# Patient Record
Sex: FEMALE | Race: WHITE | NOT HISPANIC OR LATINO | Employment: FULL TIME | URBAN - METROPOLITAN AREA
[De-identification: names, ages, dates, MRNs, and addresses within clinical notes are randomized per-mention and may not be internally consistent; named-entity substitution may affect disease eponyms.]

---

## 2017-02-10 ENCOUNTER — OFFICE VISIT (OUTPATIENT)
Dept: FAMILY MEDICINE | Facility: CLINIC | Age: 57
End: 2017-02-10
Payer: COMMERCIAL

## 2017-02-10 VITALS
HEIGHT: 69 IN | SYSTOLIC BLOOD PRESSURE: 126 MMHG | OXYGEN SATURATION: 96 % | HEART RATE: 92 BPM | WEIGHT: 243 LBS | TEMPERATURE: 96.7 F | DIASTOLIC BLOOD PRESSURE: 78 MMHG | BODY MASS INDEX: 35.99 KG/M2

## 2017-02-10 DIAGNOSIS — Z00.01 ENCOUNTER FOR GENERAL ADULT MEDICAL EXAMINATION WITH ABNORMAL FINDINGS: Primary | ICD-10-CM

## 2017-02-10 DIAGNOSIS — N39.46 MIXED INCONTINENCE: ICD-10-CM

## 2017-02-10 DIAGNOSIS — E03.9 ACQUIRED HYPOTHYROIDISM: ICD-10-CM

## 2017-02-10 DIAGNOSIS — Z79.899 HIGH RISK MEDICATION USE: ICD-10-CM

## 2017-02-10 DIAGNOSIS — Z12.31 VISIT FOR SCREENING MAMMOGRAM: ICD-10-CM

## 2017-02-10 DIAGNOSIS — Z11.59 NEED FOR HEPATITIS C SCREENING TEST: ICD-10-CM

## 2017-02-10 DIAGNOSIS — M54.50 CHRONIC BILATERAL LOW BACK PAIN WITHOUT SCIATICA: ICD-10-CM

## 2017-02-10 DIAGNOSIS — B00.9 HERPES SIMPLEX VIRUS (HSV) INFECTION: ICD-10-CM

## 2017-02-10 DIAGNOSIS — R06.83 SNORING: ICD-10-CM

## 2017-02-10 DIAGNOSIS — G89.29 CHRONIC BILATERAL LOW BACK PAIN WITHOUT SCIATICA: ICD-10-CM

## 2017-02-10 DIAGNOSIS — K21.9 GASTROESOPHAGEAL REFLUX DISEASE, ESOPHAGITIS PRESENCE NOT SPECIFIED: ICD-10-CM

## 2017-02-10 DIAGNOSIS — Z13.1 SCREENING FOR DIABETES MELLITUS: ICD-10-CM

## 2017-02-10 DIAGNOSIS — R68.82 DECREASED LIBIDO: ICD-10-CM

## 2017-02-10 DIAGNOSIS — Z13.6 CARDIOVASCULAR SCREENING; LDL GOAL LESS THAN 100: ICD-10-CM

## 2017-02-10 DIAGNOSIS — N95.1 MENOPAUSAL SYNDROME (HOT FLUSHES): ICD-10-CM

## 2017-02-10 DIAGNOSIS — R40.0 DAYTIME SOMNOLENCE: ICD-10-CM

## 2017-02-10 DIAGNOSIS — Z12.31 ENCOUNTER FOR SCREENING MAMMOGRAM FOR BREAST CANCER: ICD-10-CM

## 2017-02-10 LAB
ALT SERPL W P-5'-P-CCNC: 27 U/L (ref 0–50)
CHOLEST SERPL-MCNC: 200 MG/DL
CREAT SERPL-MCNC: 0.8 MG/DL (ref 0.52–1.04)
GFR SERPL CREATININE-BSD FRML MDRD: 74 ML/MIN/1.7M2
GLUCOSE SERPL-MCNC: 101 MG/DL (ref 70–99)
HDLC SERPL-MCNC: 54 MG/DL
LDLC SERPL CALC-MCNC: 122 MG/DL
NONHDLC SERPL-MCNC: 146 MG/DL
TRIGL SERPL-MCNC: 121 MG/DL
TSH SERPL DL<=0.005 MIU/L-ACNC: 0.5 MU/L (ref 0.4–4)

## 2017-02-10 PROCEDURE — 80061 LIPID PANEL: CPT | Performed by: INTERNAL MEDICINE

## 2017-02-10 PROCEDURE — 36415 COLL VENOUS BLD VENIPUNCTURE: CPT | Performed by: INTERNAL MEDICINE

## 2017-02-10 PROCEDURE — 99396 PREV VISIT EST AGE 40-64: CPT | Performed by: INTERNAL MEDICINE

## 2017-02-10 PROCEDURE — 99213 OFFICE O/P EST LOW 20 MIN: CPT | Mod: 25 | Performed by: INTERNAL MEDICINE

## 2017-02-10 PROCEDURE — 82565 ASSAY OF CREATININE: CPT | Performed by: INTERNAL MEDICINE

## 2017-02-10 PROCEDURE — 84443 ASSAY THYROID STIM HORMONE: CPT | Performed by: INTERNAL MEDICINE

## 2017-02-10 PROCEDURE — 82947 ASSAY GLUCOSE BLOOD QUANT: CPT | Performed by: INTERNAL MEDICINE

## 2017-02-10 PROCEDURE — 84460 ALANINE AMINO (ALT) (SGPT): CPT | Performed by: INTERNAL MEDICINE

## 2017-02-10 RX ORDER — METHOCARBAMOL 750 MG/1
750 TABLET, FILM COATED ORAL 3 TIMES DAILY PRN
Qty: 60 TABLET | Refills: 1 | Status: SHIPPED | OUTPATIENT
Start: 2017-02-10 | End: 2018-02-23

## 2017-02-10 RX ORDER — FAMCICLOVIR 500 MG/1
500 TABLET ORAL 2 TIMES DAILY
Qty: 180 TABLET | Refills: 5 | Status: SHIPPED | OUTPATIENT
Start: 2017-02-10 | End: 2018-01-06

## 2017-02-10 RX ORDER — ALBUTEROL SULFATE 90 UG/1
2 AEROSOL, METERED RESPIRATORY (INHALATION) EVERY 6 HOURS PRN
Qty: 1 INHALER | Refills: 11 | Status: SHIPPED | OUTPATIENT
Start: 2017-02-10 | End: 2018-02-23

## 2017-02-10 RX ORDER — BUPROPION HYDROCHLORIDE 150 MG/1
150 TABLET ORAL EVERY MORNING
Qty: 90 TABLET | Refills: 1 | Status: SHIPPED | OUTPATIENT
Start: 2017-02-10 | End: 2017-09-14

## 2017-02-10 RX ORDER — LEVOTHYROXINE SODIUM 125 UG/1
125 TABLET ORAL DAILY
Qty: 90 TABLET | Refills: 3 | Status: SHIPPED | OUTPATIENT
Start: 2017-02-10 | End: 2018-01-24

## 2017-02-10 ASSESSMENT — ANXIETY QUESTIONNAIRES
6. BECOMING EASILY ANNOYED OR IRRITABLE: NOT AT ALL
GAD7 TOTAL SCORE: 0
IF YOU CHECKED OFF ANY PROBLEMS ON THIS QUESTIONNAIRE, HOW DIFFICULT HAVE THESE PROBLEMS MADE IT FOR YOU TO DO YOUR WORK, TAKE CARE OF THINGS AT HOME, OR GET ALONG WITH OTHER PEOPLE: NOT DIFFICULT AT ALL
3. WORRYING TOO MUCH ABOUT DIFFERENT THINGS: NOT AT ALL
5. BEING SO RESTLESS THAT IT IS HARD TO SIT STILL: NOT AT ALL
1. FEELING NERVOUS, ANXIOUS, OR ON EDGE: NOT AT ALL
7. FEELING AFRAID AS IF SOMETHING AWFUL MIGHT HAPPEN: NOT AT ALL
2. NOT BEING ABLE TO STOP OR CONTROL WORRYING: NOT AT ALL

## 2017-02-10 ASSESSMENT — PATIENT HEALTH QUESTIONNAIRE - PHQ9: 5. POOR APPETITE OR OVEREATING: NOT AT ALL

## 2017-02-10 NOTE — PROGRESS NOTES
"INTERNAL MEDICINE    SUBJECTIVE:     CC: Amina Pineda is an 56 year old woman who presents for preventive health visit.     Physical  Annual:     Getting at least 3 servings of Calcium per day::  Yes    Bi-annual eye exam::  Yes    Dental care twice a year::  Yes    Sleep apnea or symptoms of sleep apnea::  Daytime drowsiness and Excessive snoring    Diet::  Regular (no restrictions)    Frequency of exercise::  1 day/week    Duration of exercise::  Less than 15 minutes    Taking medications regularly::  Yes    Medication side effects::  None    Additional concerns today::  YES      SOB: The other day while she was walking she started to get some SOB and felt like there was a \"funny feeling,\" in her chest. She has never had this before. She denies any diaphoresis or near syncope. She feels that this was most likely due to her being out of shape.     Sleep: She has been feeling tired all the time. Her  told her that she has been snoring as well. She snores on both inspiration and expiration. There have been no witnessed apneas.     Back and Hip Pain: She has been having pain in her back and hips. The pain happens all throughout the day and night. When she gets the pain, she will start to waddle. Walking sometimes alleviates the pain. She notes that she has minor urinary leakage. The leakage does not seem to have any cause. It is worse when she coughs or sneezes. She has been to a chiropractor in [place] for the pain, but it has been a couple years since she had some follow up with a chiropractor.     Exercise: She has not been exercising like she knows she should be. She has been walking a little bit. She would like to be walking better and play with her dogs in the backyard. She does not feel that she could do a light jog at the moment. She feels that she weighs more now than she ever has, which has her disappointed because she thought the Wellbutrin would help her to lose weight.     Today's PHQ-2 " Score:   PHQ-2 ( 1999 [employer]) 2/7/2017   Q1: Little interest or pleasure in doing things -   Q2: Feeling down, depressed or hopeless -   PHQ-2 Score -   Little interest or pleasure in doing things Not at all   Feeling down, depressed or hopeless Not at all   PHQ-2 Score 0       Abuse: Current or Past(Physical, Sexual or Emotional)- No  Do you feel safe in your environment - Yes    Social History   Substance Use Topics     Smoking status: Never Smoker      Smokeless tobacco: Never Used     Alcohol Use: No     The patient does not drink >3 drinks per day nor >7 drinks per week.    Recent Labs   Lab Test  02/20/15   1003  12/21/11   1626   CHOL  211*  172   HDL  55  41*   LDL  140*  115   TRIG  80  79   CHOLHDLRATIO  3.8  4.2       Reviewed orders with patient.  Reviewed health maintenance and updated orders accordingly - Yes    Mammo Decision Support:  Patient over age 50, mutual decision to screen reflected in health maintenance.    Pertinent mammograms are reviewed under the imaging tab.  History of abnormal Pap smear: NO - age 30-65 PAP every 5 years with negative HPV co-testing recommended  All Histories reviewed and updated in Epic.  Past Medical History   Diagnosis Date     Unspecified hypothyroidism      Esophageal reflux      Depressive disorder, not elsewhere classified      Intramural leiomyoma of uterus      Herpes simplex without mention of complication      oral     Generalized osteoarthrosis, unspecified site      R hip, on meds      Past Surgical History   Procedure Laterality Date     C vaginal hysterectomy  12/03     with BSO, 10-12 week size     C nonspecific procedure  5/02     rotator cuff surgery both shoulder     C nonspecific procedure       wrist surgery for cyst x 2     C ligate fallopian tube       Soft tissue surgery       cyst, both shoulders and left elbow     Orthopedic surgery       Elbow surgery       Lt elbow repair.     Colonoscopy with co2 insufflation N/A 4/23/2015     Procedure:  "COLONOSCOPY WITH CO2 INSUFFLATION;  Surgeon: Bebeto Mortensen MD;  Location: [facility] OR     Colonoscopy  2015     Obstetric History       T2      TAB0   SAB1   E0   M0   L2       # Outcome Date GA Lbr Reynold/2nd Weight Sex Delivery Anes PTL Lv   6  82 40w3d       Y   5  10/08/79 40w0d       Y   4  77     SAB   N   3 Term            2 Term            1 SAB                   ROS:  C: NEGATIVE for fever, chills, change in weight  R: NEGATIVE for significant cough. POSITIVE for SOB on exertion, snoring.   : NEGATIVE for unusual urinary or vaginal symptoms. No vaginal bleeding. POSITIVE for mixed incontinence.   M: NEGATIVE for significant arthralgias or myalgia  All other systems reviewed and were negative.      This document serves as a record of the services and decisions personally performed and made by Briana Melton MD. It was created on his/her behalf by Chanell Lopez, a trained medical scribe. The creation of this document is based the provider's statements to the medical scribe.    Jayden Lopez 9:56 AM, February 10, 2017    Problem list, Medication list, Allergies, and Medical/Social/Surgical histories reviewed in [facility] and updated as appropriate.  Labs reviewed in EPIC  OBJECTIVE:     /78 mmHg  Pulse 92  Temp(Src) 96.7  F (35.9  C) (Oral)  Ht 1.74 m (5' 8.5\")  Wt 110.224 kg (243 lb)  BMI 36.41 kg/m2  SpO2 96%  EXAM:  GENERAL APPEARANCE: healthy, alert and no distress  HENT: ear canals and TM's normal, nose and mouth without ulcers or lesions, oropharynx clear and oral mucous membranes moist  NECK: no adenopathy, no asymmetry, masses, or scars and thyroid normal to palpation  RESP: lungs clear to auscultation - no rales, rhonchi or wheezes  BREAST: normal without masses, tenderness or nipple discharge and no palpable axillary masses or adenopathy  CV: regular rate and rhythm, normal S1 S2, no S3 or S4, no murmur, click or rub, no " peripheral edema and peripheral pulses strong  ABDOMEN: soft, nontender, no hepatosplenomegaly, no masses and bowel sounds normal  MS: no musculoskeletal defects are noted and gait is age appropriate without ataxia, normal ROM of the bilateral hips, no synovitis of the hands, no pain to palpation of the trochanteric bursa, pain to palpation of the lumbar spine, right sacroiliac joint pain.   SKIN: no suspicious lesions or rashes   PSYCH: mentation appears normal and affect normal/bright    ASSESSMENT/PLAN:     I spent 29 minutes of time with the patient and >50% of it was in education and counseling regarding preventive healthcare.     1. Visit for screening mammogram   Patient will schedule   - MA SCREENING DIGITAL BILAT - Future  (s+30); Future        3. Acquired hypothyroidism   The current medical regimen is effective; continue present plan and medications    - levothyroxine (SYNTHROID/LEVOTHROID) 125 MCG tablet; Take 1 tablet (125 mcg) by mouth daily  Dispense: 90 tablet; Refill: 3  - estrogens, conjugated, (PREMARIN) 0.3 MG tablet; Take 1 tablet (0.3 mg) by mouth daily  Dispense: 90 tablet; Refill: 3  - buPROPion (WELLBUTRIN XL) 150 MG 24 hr tablet; Take 1 tablet (150 mg) by mouth every morning  Dispense: 90 tablet; Refill: 1  - omeprazole (PRILOSEC) 20 MG CR capsule; Take 1 capsule (20 mg) by mouth daily  Dispense: 90 capsule; Refill: 3  - albuterol (PROAIR HFA/PROVENTIL HFA/VENTOLIN HFA) 108 (90 BASE) MCG/ACT Inhaler; Inhale 2 puffs into the lungs every 6 hours as needed for shortness of breath / dyspnea or wheezing  Dispense: 1 Inhaler; Refill: 11  - TSH with free T4 reflex    4. Menopausal syndrome (hot flushes)   See above   - levothyroxine (SYNTHROID/LEVOTHROID) 125 MCG tablet; Take 1 tablet (125 mcg) by mouth daily  Dispense: 90 tablet; Refill: 3  - estrogens, conjugated, (PREMARIN) 0.3 MG tablet; Take 1 tablet (0.3 mg) by mouth daily  Dispense: 90 tablet; Refill: 3  - buPROPion (WELLBUTRIN XL) 150 MG  24 hr tablet; Take 1 tablet (150 mg) by mouth every morning  Dispense: 90 tablet; Refill: 1  - omeprazole (PRILOSEC) 20 MG CR capsule; Take 1 capsule (20 mg) by mouth daily  Dispense: 90 capsule; Refill: 3  - albuterol (PROAIR HFA/PROVENTIL HFA/VENTOLIN HFA) 108 (90 BASE) MCG/ACT Inhaler; Inhale 2 puffs into the lungs every 6 hours as needed for shortness of breath / dyspnea or wheezing  Dispense: 1 Inhaler; Refill: 11    5. Gastroesophageal reflux disease, esophagitis presence not specified   See above  - levothyroxine (SYNTHROID/LEVOTHROID) 125 MCG tablet; Take 1 tablet (125 mcg) by mouth daily  Dispense: 90 tablet; Refill: 3  - estrogens, conjugated, (PREMARIN) 0.3 MG tablet; Take 1 tablet (0.3 mg) by mouth daily  Dispense: 90 tablet; Refill: 3  - buPROPion (WELLBUTRIN XL) 150 MG 24 hr tablet; Take 1 tablet (150 mg) by mouth every morning  Dispense: 90 tablet; Refill: 1  - omeprazole (PRILOSEC) 20 MG CR capsule; Take 1 capsule (20 mg) by mouth daily  Dispense: 90 capsule; Refill: 3  - albuterol (PROAIR HFA/PROVENTIL HFA/VENTOLIN HFA) 108 (90 BASE) MCG/ACT Inhaler; Inhale 2 puffs into the lungs every 6 hours as needed for shortness of breath / dyspnea or wheezing  Dispense: 1 Inhaler; Refill: 11  - NUTRITION REFERRAL    6. Decreased libido   See above   - levothyroxine (SYNTHROID/LEVOTHROID) 125 MCG tablet; Take 1 tablet (125 mcg) by mouth daily  Dispense: 90 tablet; Refill: 3  - estrogens, conjugated, (PREMARIN) 0.3 MG tablet; Take 1 tablet (0.3 mg) by mouth daily  Dispense: 90 tablet; Refill: 3  - buPROPion (WELLBUTRIN XL) 150 MG 24 hr tablet; Take 1 tablet (150 mg) by mouth every morning  Dispense: 90 tablet; Refill: 1  - omeprazole (PRILOSEC) 20 MG CR capsule; Take 1 capsule (20 mg) by mouth daily  Dispense: 90 capsule; Refill: 3  - albuterol (PROAIR HFA/PROVENTIL HFA/VENTOLIN HFA) 108 (90 BASE) MCG/ACT Inhaler; Inhale 2 puffs into the lungs every 6 hours as needed for shortness of breath / dyspnea or wheezing   Dispense: 1 Inhaler; Refill: 11    7. High risk medication use   See above   - levothyroxine (SYNTHROID/LEVOTHROID) 125 MCG tablet; Take 1 tablet (125 mcg) by mouth daily  Dispense: 90 tablet; Refill: 3  - estrogens, conjugated, (PREMARIN) 0.3 MG tablet; Take 1 tablet (0.3 mg) by mouth daily  Dispense: 90 tablet; Refill: 3  - buPROPion (WELLBUTRIN XL) 150 MG 24 hr tablet; Take 1 tablet (150 mg) by mouth every morning  Dispense: 90 tablet; Refill: 1  - omeprazole (PRILOSEC) 20 MG CR capsule; Take 1 capsule (20 mg) by mouth daily  Dispense: 90 capsule; Refill: 3  - albuterol (PROAIR HFA/PROVENTIL HFA/VENTOLIN HFA) 108 (90 BASE) MCG/ACT Inhaler; Inhale 2 puffs into the lungs every 6 hours as needed for shortness of breath / dyspnea or wheezing  Dispense: 1 Inhaler; Refill: 11  - ALT  - Creatinine    8. Need for hepatitis C screening test    - levothyroxine (SYNTHROID/LEVOTHROID) 125 MCG tablet; Take 1 tablet (125 mcg) by mouth daily  Dispense: 90 tablet; Refill: 3  - estrogens, conjugated, (PREMARIN) 0.3 MG tablet; Take 1 tablet (0.3 mg) by mouth daily  Dispense: 90 tablet; Refill: 3  - buPROPion (WELLBUTRIN XL) 150 MG 24 hr tablet; Take 1 tablet (150 mg) by mouth every morning  Dispense: 90 tablet; Refill: 1  - omeprazole (PRILOSEC) 20 MG CR capsule; Take 1 capsule (20 mg) by mouth daily  Dispense: 90 capsule; Refill: 3  - albuterol (PROAIR HFA/PROVENTIL HFA/VENTOLIN HFA) 108 (90 BASE) MCG/ACT Inhaler; Inhale 2 puffs into the lungs every 6 hours as needed for shortness of breath / dyspnea or wheezing  Dispense: 1 Inhaler; Refill: 11    9. Encounter for screening mammogram for breast cancer    - levothyroxine (SYNTHROID/LEVOTHROID) 125 MCG tablet; Take 1 tablet (125 mcg) by mouth daily  Dispense: 90 tablet; Refill: 3  - estrogens, conjugated, (PREMARIN) 0.3 MG tablet; Take 1 tablet (0.3 mg) by mouth daily  Dispense: 90 tablet; Refill: 3  - buPROPion (WELLBUTRIN XL) 150 MG 24 hr tablet; Take 1 tablet (150 mg) by mouth  every morning  Dispense: 90 tablet; Refill: 1  - omeprazole (PRILOSEC) 20 MG CR capsule; Take 1 capsule (20 mg) by mouth daily  Dispense: 90 capsule; Refill: 3  - albuterol (PROAIR HFA/PROVENTIL HFA/VENTOLIN HFA) 108 (90 BASE) MCG/ACT Inhaler; Inhale 2 puffs into the lungs every 6 hours as needed for shortness of breath / dyspnea or wheezing  Dispense: 1 Inhaler; Refill: 11    10. Daytime somnolence   Patient will schedule   - SLEEP EVALUATION & MANAGEMENT REFERRAL - ADULT; Future    11. Snoring   See above   - SLEEP EVALUATION & MANAGEMENT REFERRAL - ADULT; Future    12. Mixed incontinence   Patient will schedule with physical therapy.  Consider adding on anticholinergic  - [facility] PT, HAND, AND CHIROPRACTIC REFERRAL    13. BMI 36.0-36.9,adult   Patient will schedule appointment   - NUTRITION REFERRAL    14. Herpes simplex virus (HSV) infection   The current medical regimen is effective; continue present plan and medications    - famciclovir (FAMVIR) 500 MG tablet; Take 1 tablet (500 mg) by mouth 2 times daily  Dispense: 180 tablet; Refill: 5    15. Pain in thoracic spine   The current medical regimen is effective; continue present plan and medications    - methocarbamol (ROBAXIN) 750 MG tablet; Take 1 tablet (750 mg) by mouth 3 times daily as needed  Dispense: 60 tablet; Refill: 1    16. CARDIOVASCULAR SCREENING; LDL GOAL LESS THAN 100    - Lipid panel reflex to direct LDL    17. Screening for diabetes mellitus    - Glucose    18. Chronic bilateral low back pain without sciatica   Patient will schedule   - [facility], HAND, AND CHIROPRACTIC REFERRAL    19. Encounter for general adult medical examination with abnormal findings   Performed today in clinic       COUNSELING:  Reviewed preventive health counseling, as reflected in patient instructions       Regular exercise       Healthy diet/nutrition    BP Screening:   Last 3 BP Readings:    BP Readings from Last 3 Encounters:   02/10/17 126/78   10/22/16 131/74   07/12/16  "118/70     The following was recommended to the patient:  Re-screen BP within a year and recommended lifestyle modifications   reports that she has never smoked. She has never used smokeless tobacco.  Estimated body mass index is 35.96 kg/(m^2) as calculated from the following:    Height as of 4/8/15: 5' 8.5\" (1.74 m).    Weight as of 10/22/16: 240 lb (108.863 kg).   Weight management plan: Discussed healthy diet and exercise guidelines and patient will follow up in 12 months in clinic to re-evaluate.    Counseling Resources:  ATP IV Guidelines  Pooled Cohorts Equation Calculator  Breast Cancer Risk Calculator  FRAX Risk Assessment  ICSI Preventive Guidelines  Dietary Guidelines for Americans, 2010  USDA's MyPlate  ASA Prophylaxis  Lung CA Screening    Patient Instructions   I strongly recommend that you schedule an appointment with a physical therapist for your bladder control and your back.  [facility].  Schedule an appointment with nutritionist Heydi Madrigal or Rachel Lee.  Keeping a food log like My Fitness Pal, will help you to understand your daily dietary intake.   Make an appointment with the sleep center.  Schedule your mammogram.  Check out the $500 for Well at Work (?)    The information in this document, created by the medical scribe for me, accurately reflects the services I personally performed and the decisions made by me. I have reviewed and approved this document for accuracy prior to leaving the patient care area.  Briana Melton MD  10:01 AM, 02/10/2017    Briana Melton MD  [facility] [place]    Answers for HPI/ROS submitted by the patient on 2/7/2017   PHQ-2 Depressed: Not at all, Not at all  PHQ-2 Score: 0    Start: 9:53 AM   End: 10:22 AM   "

## 2017-02-10 NOTE — PROGRESS NOTES
Quick Note:    Normal thyroid. Improved cholesterol. Blood sugar is slightly elevated but not in the range of diabetes. Diet, exercise and staying as trim as possible is the best way to prevent diabetes.    Normal liver blood test. Normal kidney function.  ______

## 2017-02-10 NOTE — NURSING NOTE
"Chief Complaint   Patient presents with     Physical       Initial /78 mmHg  Pulse 92  Temp(Src) 96.7  F (35.9  C) (Oral)  Ht 5' 8.5\" (1.74 m)  Wt 243 lb (110.224 kg)  BMI 36.41 kg/m2  SpO2 96% Estimated body mass index is 36.41 kg/(m^2) as calculated from the following:    Height as of this encounter: 5' 8.5\" (1.74 m).    Weight as of this encounter: 243 lb (110.224 kg).  Medication Reconciliation: complete   Marnie ALVAREZ CMA ([facility])      "

## 2017-02-10 NOTE — PATIENT INSTRUCTIONS
I strongly recommend that you schedule an appointment with a physical therapist for your bladder control and your back.  [facility] for your [facility].  Schedule an appointment with nutritionist Heydi Madrigal or Rachel Lee.  Keeping a food log like My Fitness Pal, will help you to understand your daily dietary intake.   Make an appointment with the sleep center.  Schedule your mammogram.  Check out the $500 for Well at Work (?)    Preventive Health Recommendations  Female Ages 50 - 64    Yearly exam: See your health care provider every year in order to  o Review health changes.   o Discuss preventive care.    o Review your medicines if your doctor has prescribed any.      Get a Pap test every three years (unless you have an abnormal result and your provider advises testing more often).    If you get Pap tests with HPV test, you only need to test every 5 years, unless you have an abnormal result.     You do not need a Pap test if your uterus was removed (hysterectomy) and you have not had cancer.    You should be tested each year for STDs (sexually transmitted diseases) if you're at risk.     Have a mammogram every 1 to 2 years.    Have a colonoscopy at age 50, or have a yearly FIT test (stool test). These exams screen for colon cancer.      Have a cholesterol test every 5 years, or more often if advised.    Have a diabetes test (fasting glucose) every three years. If you are at risk for diabetes, you should have this test more often.     If you are at risk for osteoporosis (brittle bone disease), think about having a bone density scan (DEXA).    Shots: Get a flu shot each year. Get a tetanus shot every 10 years.    Nutrition:     Eat at least 5 servings of fruits and vegetables each day.    Eat whole-grain bread, whole-wheat pasta and brown rice instead of white grains and rice.    Talk to your provider about Calcium and Vitamin D.     Lifestyle    Exercise at least 150 minutes a week (30  minutes a day, 5 days a week). This will help you control your weight and prevent disease.    Limit alcohol to one drink per day.    No smoking.     Wear sunscreen to prevent skin cancer.     See your dentist every six months for an exam and cleaning.    See your eye doctor every 1 to 2 years.        [facility]    If you have any questions regarding to your visit please contact your care team:     Team Pink:   Clinic Hours Telephone Number   Internal Medicine:  Dr. Briana Murphy NP       7am-7pm  Monday - Thursday   7am-5pm  Fridays  [phone]  (Appointment scheduling available 24/7)    Questions about your visit?  Team Line  [phone]   Urgent Care - [place] and [facility] [place] - 11am-9pm Monday-Friday Saturday-Sunday- 9am-5pm   [place] - 5pm-9pm Monday-Friday Saturday-Sunday- 9am-5pm  [phone] - [place]   [phone] - [place]       What options do I have for visits at the clinic other than the traditional office visit?  To expand how we care for you, many of our providers are utilizing electronic visits (e-visits) and telephone visits, when medically appropriate, for interactions with their patients rather than a visit in the clinic.   We also offer nurse visits for many medical concerns. Just like any other service, we will bill your insurance company for this type of visit based on time spent on the phone with your provider. Not all insurance companies cover these visits. Please check with your medical insurance if this type of visit is covered. You will be responsible for any charges that are not paid by your insurance.      E-visits via [employer]:  generally incur a $35.00 fee.  Telephone visits:  Time spent on the phone: *charged based on time that is spent on the phone in increments of 10 minutes. Estimated cost:   5-10 mins $30.00   11-20 mins. $59.00   21-30 mins. $85.00   Use [employer] (secure email communication and access to  your chart) to send your primary care provider a message or make an appointment. Ask someone on your Team how to sign up for Zoom Telephonicst.    For a Price Quote for your services, please call our Consumer Price Line at [phone].    As always, Thank you for trusting us with your health care needs!    Discharged by Virginie COOK CMA ([facility])

## 2017-02-10 NOTE — MR AVS SNAPSHOT
After Visit Summary   2/10/2017    Amina Pineda    MRN: [id number]           Patient Information     Date Of Birth          1960        Visit Information        Provider Department      2/10/2017 9:30 AM Briana Melton MD [facility]        Today's Diagnoses     Encounter for general adult medical examination with abnormal findings    -  1     Visit for screening mammogram         Need for prophylactic vaccination and inoculation against influenza         Acquired hypothyroidism         Menopausal syndrome (hot flushes)         Gastroesophageal reflux disease, esophagitis presence not specified         Decreased libido         High risk medication use         Need for hepatitis C screening test         Encounter for screening mammogram for breast cancer         Daytime somnolence         Snoring         Mixed incontinence         BMI 36.0-36.9,adult         Herpes simplex virus (HSV) infection         Pain in thoracic spine         CARDIOVASCULAR SCREENING; LDL GOAL LESS THAN 100         Screening for diabetes mellitus         Chronic bilateral low back pain without sciatica           Care Instructions    I strongly recommend that you schedule an appointment with a physical therapist for your bladder control and your back.  [facility] is for your [facility].  Schedule an appointment with nutritionist Heydi Madrigal or Rachel Lee.  Keeping a food log like My Fitness Pal, will help you to understand your daily dietary intake.   Make an appointment with the sleep center.  Schedule your mammogram.  Check out the $500 for Well at Work (?)    Preventive Health Recommendations  Female Ages 50 - 64    Yearly exam: See your health care provider every year in order to  o Review health changes.   o Discuss preventive care.    o Review your medicines if your doctor has prescribed any.      Get a Pap test every three years (unless you have an abnormal result and your  provider advises testing more often).    If you get Pap tests with HPV test, you only need to test every 5 years, unless you have an abnormal result.     You do not need a Pap test if your uterus was removed (hysterectomy) and you have not had cancer.    You should be tested each year for STDs (sexually transmitted diseases) if you're at risk.     Have a mammogram every 1 to 2 years.    Have a colonoscopy at age 50, or have a yearly FIT test (stool test). These exams screen for colon cancer.      Have a cholesterol test every 5 years, or more often if advised.    Have a diabetes test (fasting glucose) every three years. If you are at risk for diabetes, you should have this test more often.     If you are at risk for osteoporosis (brittle bone disease), think about having a bone density scan (DEXA).    Shots: Get a flu shot each year. Get a tetanus shot every 10 years.    Nutrition:     Eat at least 5 servings of fruits and vegetables each day.    Eat whole-grain bread, whole-wheat pasta and brown rice instead of white grains and rice.    Talk to your provider about Calcium and Vitamin D.     Lifestyle    Exercise at least 150 minutes a week (30 minutes a day, 5 days a week). This will help you control your weight and prevent disease.    Limit alcohol to one drink per day.    No smoking.     Wear sunscreen to prevent skin cancer.     See your dentist every six months for an exam and cleaning.    See your eye doctor every 1 to 2 years.        [facility]    If you have any questions regarding to your visit please contact your care team:     Team Pink:   Clinic Hours Telephone Number   Internal Medicine:  Dr. Briana Murphy NP       7am-7pm  Monday - Thursday   7am-5pm  Fridays  [phone]  (Appointment scheduling available 24/7)    Questions about your visit?  Team Line  [phone]   Urgent [facility] - [place] [facility] - 11am-9pm  Monday-Friday Saturday-Sunday- 9am-5pm   [place] - 5pm-9pm Monday-Friday Saturday-Sunday- 9am-5pm  [phone] - [place]   [phone] - [place]       What options do I have for visits at the clinic other than the traditional office visit?  To expand how we care for you, many of our providers are utilizing electronic visits (e-visits) and telephone visits, when medically appropriate, for interactions with their patients rather than a visit in the clinic.   We also offer nurse visits for many medical concerns. Just like any other service, we will bill your insurance company for this type of visit based on time spent on the phone with your provider. Not all insurance companies cover these visits. Please check with your medical insurance if this type of visit is covered. You will be responsible for any charges that are not paid by your insurance.      E-visits via [employer]:  generally incur a $35.00 fee.  Telephone visits:  Time spent on the phone: *charged based on time that is spent on the phone in increments of 10 minutes. Estimated cost:   5-10 mins $30.00   11-20 mins. $59.00   21-30 mins. $85.00   Use [employer] (secure email communication and access to your chart) to send your primary care provider a message or make an appointment. Ask someone on your Team how to sign up for [employer].    For a Price Quote for your services, please call our Consumer Price Line at [phone].    As always, Thank you for trusting us with your health care needs!    Discharged by Virginie COOK CMA ([facility])          Follow-ups after your visit        Additional Services     [facility] PT, HAND, AND CHIROPRACTIC REFERRAL       **This order will print in the [facility] Scheduling Office**    Physical Therapy, Hand Therapy and Chiropractic Care are available through:    *[facility]  *[facility]  *[facility]    Call one number to schedule at any of the above locations: [phone].    Your  provider has referred you to: Physical Therapy at [facility] or [facility]    Indication/Reason for Referral: Women's Health (Please Complete Special Programs SmartList)  Onset of Illness:   Therapy Orders: Evaluate and Treat  Special Programs: Women's Health: Pelvic Floor Weakness: Incontinence:   Special Request: Ginger Littlejohn      Additional Comments for the Therapist or Chiropractor:     Please be aware that coverage of these services is subject to the terms and limitations of your health insurance plan.  Call member services at your health plan with any benefit or coverage questions.      Please bring the following to your appointment:    *Your personal calendar for scheduling future appointments  *Comfortable clothing            [facility] PT, HAND, AND CHIROPRACTIC REFERRAL       **This order will print in the [facility] Scheduling Office**    Physical Therapy, Hand Therapy and Chiropractic Care are available through:    *[place] [facility]  *[facility]  *[facility]    Call one number to schedule at any of the above locations: [phone].    Your provider has referred you to: [facility] - PT and/or Chiropractic Care determined by clinical presentation at [facility] or [facility] Initial Visit    Indication/Reason for Referral: Low Back Pain  Onset of Illness:   Therapy Orders: Evaluate and Treat  Special Programs:   Special Request:     Eron Littlejohn      Additional Comments for the Therapist or Chiropractor:     Please be aware that coverage of these services is subject to the terms and limitations of your health insurance plan.  Call member services at your health plan with any benefit or coverage questions.      Please bring the following to your appointment:    *Your personal calendar for scheduling future appointments  *Comfortable clothing            NUTRITION REFERRAL       Your provider has referred you to: [facility]: [facility] [place] [phone]    [email or web address]    Please be aware that coverage of these services is subject to the terms and limitations of your health insurance plan.  Call member services at your health plan with any benefit or coverage questions.      Please bring the following with you to your appointment:    (1) This referral request  (2) Any documents given to you regarding this referral  (3) Any specific questions you have about diet and/or food choices            SLEEP EVALUATION & MANAGEMENT REFERRAL - ADULT       Please be aware that coverage of these services is subject to the terms and limitations of your health insurance plan.  Call member services at your health plan with any benefit or coverage questions.      Please bring the following to your appointment:    >>   List of current medications   >>   This referral request   >>   Any documents/labs given to you for this referral    [facility] [phone] (Age 15 and up)                  Your next 10 appointments already scheduled     Mar 30, 2017  2:00 PM   Return Visit with Tiff Dunn MD   [facility] ([facility])    [address] [place] [id number] [phone]              Future tests that were ordered for you today     Open Future Orders        Priority Expected Expires Ordered    SLEEP EVALUATION & MANAGEMENT REFERRAL - ADULT Routine  2/10/2018 2/10/2017    MA SCREENING DIGITAL BILAT - Future  (s+30) Routine  2/10/2018 2/10/2017            Who to contact     If you have questions or need follow up information about today's clinic visit or your schedule please contact [facility] directly at [phone].  Normal or non-critical lab and imaging results will be communicated to you by MyChart, letter or phone within 4 business days after the clinic has received the results. If you do not hear from us within 7 days, please contact the clinic through [employer] or  "phone. If you have a critical or abnormal lab result, we will notify you by phone as soon as possible.  Submit refill requests through [employer] or call your pharmacy and they will forward the refill request to us. Please allow 3 business days for your refill to be completed.          Additional Information About Your Visit        [employer] Information     [employer] gives you secure access to your electronic health record. If you see a primary care provider, you can also send messages to your care team and make appointments. If you have questions, please call your primary care clinic.  If you do not have a primary care provider, please call [phone] and they will assist you.        Care EveryWhere ID     This is your Care EveryWhere ID. This could be used by other organizations to access your [place] medical records  [id number]        Your Vitals Were     Pulse Temperature Height BMI (Body Mass Index) Pulse Oximetry       92 96.7  F (35.9  C) (Oral) 5' 8.5\" (1.74 m) 36.41 kg/m2 96%        Blood Pressure from Last 3 Encounters:   02/10/17 126/78   10/22/16 131/74   07/12/16 118/70    Weight from Last 3 Encounters:   02/10/17 243 lb (110.224 kg)   10/22/16 240 lb (108.863 kg)   07/12/16 239 lb 12.8 oz (108.773 kg)              We Performed the Following     ALT     Creatinine     Glucose     [facility] PT, HAND, AND CHIROPRACTIC REFERRAL     [facility] PT, HAND, AND CHIROPRACTIC REFERRAL     Lipid panel reflex to direct LDL     NUTRITION REFERRAL     TSH with free T4 reflex          Today's Medication Changes          These changes are accurate as of: 2/10/17 10:24 AM.  If you have any questions, ask your nurse or doctor.               These medicines have changed or have updated prescriptions.        Dose/Directions    famciclovir 500 MG tablet   Commonly known as:  FAMVIR   This may have changed:    - when to take this  - reasons to take this   Used for:  Herpes simplex virus (HSV) infection        Dose:  500 mg   Take 1 tablet " (500 mg) by mouth 2 times daily   Quantity:  180 tablet   Refills:  5         Stop taking these medicines if you haven't already. Please contact your care team if you have questions.     cyclobenzaprine 10 MG tablet   Commonly known as:  FLEXERIL   Stopped by:  Briana Melton MD           terbinafine 250 MG tablet   Commonly known as:  lamISIL   Stopped by:  Briana Melton MD                Where to get your medicines      These medications were sent to [facility] - [address]  [address] [facility] [address]    Hours:  Mon-Fri 8:30am-5:00pm Toll Free [phone] Phone:  [phone]    - albuterol 108 (90 BASE) MCG/ACT Inhaler  - buPROPion 150 MG 24 hr tablet  - estrogens (conjugated) 0.3 MG tablet  - famciclovir 500 MG tablet  - levothyroxine 125 MCG tablet  - methocarbamol 750 MG tablet  - omeprazole 20 MG CR capsule             Primary Care Provider Office Phone # Fax #    Briana Melton [phone]       [address] [address]        Thank you!     Thank you for choosing [facility]  for your care. Our goal is always to provide you with excellent care. Hearing back from our patients is one way we can continue to improve our services. Please take a few minutes to complete the written survey that you may receive in the mail after your visit with us. Thank you!             Your Updated Medication List - Protect others around you: Learn how to safely use, store and throw away your medicines at www.disposemymeds.org.          This list is accurate as of: 2/10/17 10:24 AM.  Always use your most recent med list.                   Brand Name Dispense Instructions for use    albuterol 108 (90 BASE) MCG/ACT Inhaler    PROAIR HFA/PROVENTIL HFA/VENTOLIN HFA    1 Inhaler    Inhale 2 puffs into the lungs every 6 hours as needed for shortness of breath / dyspnea or wheezing        BIOTIN PO          buPROPion 150 MG 24 hr tablet    WELLBUTRIN XL    90 tablet    Take 1 tablet (150 mg) by mouth every morning       estrogens (conjugated) 0.3 MG tablet    PREMARIN    90 tablet    Take 1 tablet (0.3 mg) by mouth daily       famciclovir 500 MG tablet    FAMVIR    180 tablet    Take 1 tablet (500 mg) by mouth 2 times daily       hydroquinone 4 % Crea     56.8 g    Externally apply topically At Bedtime Apply a thin layer to dark areas once nightly for no more than 8 weeks at a time. Take breaks between use.       levothyroxine 125 MCG tablet    SYNTHROID/LEVOTHROID    90 tablet    Take 1 tablet (125 mcg) by mouth daily       methocarbamol 750 MG tablet    ROBAXIN    60 tablet    Take 1 tablet (750 mg) by mouth 3 times daily as needed       nystatin 445205 UNIT/GM Powd    MYCOSTATIN    30 g    Apply 30 g topically 3 times daily as needed       omeprazole 20 MG CR capsule    priLOSEC    90 capsule    Take 1 capsule (20 mg) by mouth daily       UNABLE TO FIND      MEDICATION NAME: Lipoflavinoid+ For ringing in ears       VITAMIN D PO      Take by mouth daily

## 2017-02-11 ASSESSMENT — ANXIETY QUESTIONNAIRES: GAD7 TOTAL SCORE: 0

## 2017-02-11 ASSESSMENT — PATIENT HEALTH QUESTIONNAIRE - PHQ9: SUM OF ALL RESPONSES TO PHQ QUESTIONS 1-9: 1

## 2017-02-15 ENCOUNTER — RADIANT APPOINTMENT (OUTPATIENT)
Dept: MAMMOGRAPHY | Facility: CLINIC | Age: 57
End: 2017-02-15
Attending: INTERNAL MEDICINE
Payer: COMMERCIAL

## 2017-02-15 DIAGNOSIS — Z12.31 VISIT FOR SCREENING MAMMOGRAM: ICD-10-CM

## 2017-02-15 PROCEDURE — G0202 SCR MAMMO BI INCL CAD: HCPCS | Mod: TC

## 2017-02-21 ENCOUNTER — THERAPY VISIT (OUTPATIENT)
Dept: CHIROPRACTIC MEDICINE | Facility: CLINIC | Age: 57
End: 2017-02-21
Payer: COMMERCIAL

## 2017-02-21 DIAGNOSIS — M99.03 SEGMENTAL DYSFUNCTION OF LUMBAR REGION: ICD-10-CM

## 2017-02-21 DIAGNOSIS — M54.2 CERVICALGIA: ICD-10-CM

## 2017-02-21 DIAGNOSIS — M54.50 LUMBAGO: ICD-10-CM

## 2017-02-21 DIAGNOSIS — M99.05 SEGMENTAL DYSFUNCTION OF PELVIC REGION: Primary | ICD-10-CM

## 2017-02-21 DIAGNOSIS — M99.02 SEGMENTAL DYSFUNCTION OF THORACIC REGION: ICD-10-CM

## 2017-02-21 DIAGNOSIS — M62.838 SPASM OF MUSCLE: ICD-10-CM

## 2017-02-21 DIAGNOSIS — M99.01 CERVICAL SEGMENT DYSFUNCTION: ICD-10-CM

## 2017-02-21 PROCEDURE — 97110 THERAPEUTIC EXERCISES: CPT | Performed by: CHIROPRACTOR

## 2017-02-21 PROCEDURE — 98941 CHIROPRACT MANJ 3-4 REGIONS: CPT | Mod: AT | Performed by: CHIROPRACTOR

## 2017-02-21 PROCEDURE — 99203 OFFICE O/P NEW LOW 30 MIN: CPT | Mod: 25 | Performed by: CHIROPRACTOR

## 2017-02-21 NOTE — PROGRESS NOTES
Chiropractic Clinic Visit    PCP: Briana Melton    Amina Pineda is a 56 year old female who is seen  in consultation at the request of  Dr. Geronimo CARRASQUILLO presenting with chronic low back and neck pain . Patient reports that the onset was a year aog adfter sitting in a bad office chair for a period of time.  She has been dealing with neck and low back pain for several years and there are period of exacerbation and remission in terms of pain.  This latest episode has been about a year in length.  She currently rates the pain at a 3 out of 10 and describes it as a dull ache pain with periods of sharpness depending on movement and position. Provocative factors are prolonged sitting and very long walks.  Palliative factors are moving around and moderate walks.       Injury: none  Location of Pain: bilaterally lower cervical and lower lumbar  at the following level(s) C6 , C7 , T1 , T2 , L4  and L5   Duration of Pain: 1 year(s)  Rating of Pain at worst: 6/10  Rating of Pain Currently: 3/10  Symptoms are better with: Walking  Symptoms are worse with: prolonged sitting and very long walks  Additional Features:      Other evaluation and/or treatments so far consists of:     Health History  as reported by the patient:    How does the patient rate their own health:   Good    Current or past medical history:   Depression, Migraines/headaches, Osteoarthritis, Overweight, Pain at night/rest, Thyroid problems and Weakness    Medical allergies  None    Past Traumas/Surgeries  Orthopedic: rotator cuff L & R and elbow    Family History  The family history includes Asthma in her paternal grandmother; Breast Cancer in her mother and paternal grandmother; C.A.D. in her maternal grandfather; CEREBROVASCULAR DISEASE in her paternal grandfather; Coronary Artery Disease in her father and maternal grandfather; Depression/Anxiety in her father; Glaucoma in her mother; Hypertension in her mother; Known Genetic Syndrome in her daughter;  Retinal detachment in her father; Thyroid Disease in her father. There is no history of Skin Cancer.    Medications:  Anti-depressants, Hormone replacement, Muscle relaxants and Thyroid    Occupation:  Sr  IT    Primary job tasks:   Computer work and Prolonged sitting    Barriers as home/work:   none    Additional health Issues:                   Review of Systems  Musculoskeletal: as above  Remainder of review of systems is negative including constitutional, CV, pulmonary, GI, Skin and Neurologic except as noted in HPI or medical history.    Past Medical History   Diagnosis Date     Depressive disorder, not elsewhere classified      Esophageal reflux      Generalized osteoarthrosis, unspecified site      R hip, on meds     Herpes simplex without mention of complication      oral     Intramural leiomyoma of uterus      Unspecified hypothyroidism      Past Surgical History   Procedure Laterality Date     C vaginal hysterectomy  12/03     with BSO, 10-12 week size     C nonspecific procedure  5/02     rotator cuff surgery both shoulder     C nonspecific procedure       wrist surgery for cyst x 2     C ligate fallopian tube       Soft tissue surgery       cyst, both shoulders and left elbow     Orthopedic surgery       Elbow surgery       Lt elbow repair.     Colonoscopy with co2 insufflation N/A 4/23/2015     Procedure: COLONOSCOPY WITH CO2 INSUFFLATION;  Surgeon: Bebeto Mortensen MD;  Location: [facility] OR     Colonoscopy  4/24/2015     Hysterectomy, pap no longer indicated         Objective  There were no vitals taken for this visit.    GENERAL APPEARANCE: healthy, alert and no distress   GAIT: NORMAL  SKIN: no suspicious lesions or rashes  NEURO: Normal strength and tone, mentation intact and speech normal  PSYCH:  mentation appears normal and affect normal/bright      Amina was asked to complete the Neck Disability Index, the Oswestry Low Back Disability Index and Eron Start Back screening  "tool, today in the office. NDI Disability score: 4%; pain severity scale: 3/10., The Oswestry Disability score: 24%.       Cervical Spine Exam    Range of Motion:         forward flexion full         extension moderately limited with pain        lateral rotation moderately limited with pain        lateral flexion moderately limited with pain    Inspection:         No visible deformity        normal lordotic curvature maintained    Tender:        upper border of trapezius    Non-Tender:        remainder of cervical spine area    Strength:       C4 (shoulder shrug)  symmetric 5/5       C5 (shoulder abduction) symmetric 5/5       C6 (elbow flexion) symmetric 5/5       C7 (elbow extension) symmetric 5/5       C8 (finger abduction, thumb flexion) symmetric 5/5    Reflexes:        C5 (biceps) symmetric normal       C7 (triceps) symmetric normal    Sensation:       grossly intact througout bilateral upper extremities    Special Tests:       neg (-) Spurling  Bob's- negative, Distraction - negative and Shoulder depression - Right negative and Left negative    Lymphatics:        no edema noted in the upper extremities       Lumbar exam:    Inspection:  \"     no visible deformity in the low back       normal skin\",    ROM:       limited flexion due to pain       limited extension due to pain    Tender:       paraspinal muscles    Non Tender:       remainder of lumbar spine    Strength:       hip flexion 5/5       knee extension 5/5       ankle dorsiflexion 5/5       ankle plantarflexion 5/5       dorsiflexion of the great toe 5/5    Reflexes:       patellar (L3, L4) symmetric normal    Sensation:      grossly intact throughout lower extremities    Special tests:  SLR - Right negative and Left negative, Fabere - Right positive, Yeoman's - Right negative and Left negative, Yesi - Right negative and Left negative and Ely's - Right negative and Left negative    Segmental spinal dysfunction/restrictions found at:  :  C1 Left " rotation restricted  C6 Right rotation restricted  C7 Right rotation restricted  T1 Right rotation restricted  T2 Right rotation restricted  T5 Extension restriction  T6 Extension restriction  T7 Extension restriction  L4 Right rotation restricted  L5 Right rotation restricted  PSIS Right Extension restriction.    The following soft tissue hypotonicities were observed:Piriformis: right, referred pain: no  Traps: ache, dull pain and stiff, referred pain: no    Trigger points were found in:Piriformis and Traps    Muscle spasm found in:Piriformis and Traps      Radiology:      Assessment:    1. Segmental dysfunction of pelvic region    2. Lumbago    3. Segmental dysfunction of lumbar region    4. Spasm of muscle    5. Segmental dysfunction of thoracic region    6. Cervicalgia    7. Cervical segment dysfunction        RX ordered/plan of care  Anticipated outcomes  Possible risks and side effects    After discussing the risk and benefits of care, patient consented to treatment    Prognosis: Good      Patient's condition:  Patient had restrictions pre-manipulation    Treatment effectiveness:  Post manipulation there is better intersegmental movement and Patient claims to feel looser post manipulation      Plan:    Procedures:  Evaluation and Management  72307 Moderate level exam 30 min    CMT:  76554 Chiropractic manipulative treatment 3-4 regions performed   Cervical: Diversified and Activator, C1 , C6, C7 , Prone, Supine  Thoracic: Diversified, T1, T2, T5, T6, T7, Prone  Lumbar: Diversified, L4, L5, Side posture  Pelvis: Drop Table, PSIS Right , Prone    Modalities:  55453: Heat:   For 5 min to Piriformis and Traps  63409: MSTM:  To Piriformis and Traps  for 5 min    Therapeutic procedures:  Stretches - demonstrated and practiced the following strettches:  Crossed leg piriformis stretch  Seated  Scapular retraction seated  pect minor stretch in corner    Response to Treatment  Reduction in symptoms as reported by  patient    Treatment plan and goals:  Goals:  SITTING: the patient specific goal is to attain pre-injury status of  8 hours comfortably    Frequency of care  Duration of care is estimated to be 6 weeks, from the initial treatment.  It is estimated that the patient will need a total of 6 visits to resolve this episode.  For the initial therapeutic trial of care, the frequency is recommended at 1 X week, once daily.  A reevaluation would be clinically appropriate in 6 visits, to determine progress and further course of care.    In-Office Treatment  Evaluation  Spinal Chiropractic Manipulative Therapy:    Modalities:  Heat and MSTM  Therapeutic exercises:  Stretches -               Recommendations:    Instructions:ice 20 minutes every other hour as needed and stretch as instructed at visit    Follow-up:  Return to care in one week.       Disclaimer: This note consists of symbols derived from keyboarding, dictation and/or voice recognition software. As a result, there may be errors in the script that have gone undetected. Please consider this when interpreting information found in this chart.

## 2017-02-27 ENCOUNTER — OFFICE VISIT (OUTPATIENT)
Dept: SLEEP MEDICINE | Facility: CLINIC | Age: 57
End: 2017-02-27
Payer: COMMERCIAL

## 2017-02-27 VITALS — OXYGEN SATURATION: 98 % | HEIGHT: 68 IN | HEART RATE: 80 BPM | BODY MASS INDEX: 36.83 KG/M2 | WEIGHT: 243 LBS

## 2017-02-27 DIAGNOSIS — G25.81 RESTLESS LEGS SYNDROME (RLS): ICD-10-CM

## 2017-02-27 DIAGNOSIS — R06.00 DYSPNEA AND RESPIRATORY ABNORMALITY: Primary | ICD-10-CM

## 2017-02-27 DIAGNOSIS — Z72.820 LACK OF ADEQUATE SLEEP: ICD-10-CM

## 2017-02-27 DIAGNOSIS — R06.83 SNORING: ICD-10-CM

## 2017-02-27 DIAGNOSIS — R06.89 DYSPNEA AND RESPIRATORY ABNORMALITY: Primary | ICD-10-CM

## 2017-02-27 DIAGNOSIS — R53.83 MALAISE AND FATIGUE: ICD-10-CM

## 2017-02-27 DIAGNOSIS — E66.09 NON MORBID OBESITY DUE TO EXCESS CALORIES: ICD-10-CM

## 2017-02-27 DIAGNOSIS — R53.81 MALAISE AND FATIGUE: ICD-10-CM

## 2017-02-27 DIAGNOSIS — R40.0 DAYTIME SOMNOLENCE: ICD-10-CM

## 2017-02-27 PROBLEM — N95.1 MENOPAUSAL SYNDROME (HOT FLUSHES): Chronic | Status: ACTIVE | Noted: 2017-02-10

## 2017-02-27 LAB — FERRITIN SERPL-MCNC: 35 NG/ML (ref 8–252)

## 2017-02-27 PROCEDURE — 99204 OFFICE O/P NEW MOD 45 MIN: CPT | Performed by: PHYSICIAN ASSISTANT

## 2017-02-27 PROCEDURE — 36415 COLL VENOUS BLD VENIPUNCTURE: CPT | Performed by: PHYSICIAN ASSISTANT

## 2017-02-27 PROCEDURE — 82728 ASSAY OF FERRITIN: CPT | Performed by: PHYSICIAN ASSISTANT

## 2017-02-27 NOTE — MR AVS SNAPSHOT
After Visit Summary   2/27/2017    Amina Pineda    MRN: [id number]           Patient Information     Date Of Birth          1960        Visit Information        Provider Department      2/27/2017 1:20 PM Elsy Luz PA [facility]        Today's Diagnoses     Dyspnea and respiratory abnormality    -  1    Daytime somnolence        Snoring        Non morbid obesity due to excess calories        Lack of adequate sleep        Restless legs syndrome (RLS)        Malaise and fatigue          Care Instructions      Your BMI is Body mass index is 36.95 kg/(m^2).  Weight management is a personal decision.  If you are interested in exploring weight loss strategies, the following discussion covers the approaches that may be successful. Body mass index (BMI) is one way to tell whether you are at a healthy weight, overweight, or obese. It measures your weight in relation to your height.  A BMI of 18.5 to 24.9 is in the healthy range. A person with a BMI of 25 to 29.9 is considered overweight, and someone with a BMI of 30 or greater is considered obese. More than two-thirds of American adults are considered overweight or obese.  Being overweight or obese increases the risk for further weight gain. Excess weight may lead to heart disease and diabetes.  Creating and following plans for healthy eating and physical activity may help you improve your health.  Weight control is part of healthy lifestyle and includes exercise, emotional health, and healthy eating habits. Careful eating habits lifelong are the mainstay of weight control. Though there are significant health benefits from weight loss, long-term weight loss with diet alone may be very difficult to achieve- studies show long-term success with dietary management in less than 10% of people. Attaining a healthy weight may be especially difficult to achieve in those with severe obesity. In some cases, medications, devices and surgical  management might be considered.  What can you do?  If you are overweight or obese and are interested in methods for weight loss, you should discuss this with your provider.     Consider reducing daily calorie intake by 500 calories.     Keep a food journal.     Avoiding skipping meals, consider cutting portions instead.    Diet combined with exercise helps maintain muscle while optimizing fat loss. Strength training is particularly important for building and maintaining muscle mass. Exercise helps reduce stress, increase energy, and improves fitness. Increasing exercise without diet control, however, may not burn enough calories to loose weight.       Start walking three days a week 10-20 minutes at a time    Work towards walking thirty minutes five days a week     Eventually, increase the speed of your walking for 1-2 minutes at time    In addition, we recommend that you review healthy lifestyles and methods for weight loss available through the National Institutes of Health patient information sites:  http://win.niddk.nih.gov/publications/index.htm    And look into health and wellness programs that may be available through your health insurance provider, employer, local community center, or valeria club.    Weight management plan: Patient was referred to their PCP to discuss a diet and exercise plan.            Follow-ups after your visit        Your next 10 appointments already scheduled     Feb 27, 2017  2:00 PM CST   LAB with BK LAB   [facility]    [address] [place] [id number] [phone]           Patient must bring picture ID.  Patient should be prepared to give a urine specimen  Please do not eat 10-12 hours before your appointment if you are coming in fasting for labs on lipids, cholesterol, or glucose (sugar).  Pregnant women should follow their Care Team instructions. Water with medications is okay. Do not drink coffee or other  fluids.   If you have concerns about taking  your medications, please ask at office or if scheduling via TimePadt, send a message by clicking on Secure Messaging, Message Your Care Team.            Mar 01, 2017  4:00 PM CST   [facility] with [facility] [place] [facility] ([facility])    [address]  [place] [address]   [phone]            Mar 08, 2017  2:30 PM CST   HST  with BK BED 5   [facility] [place]    [address]  [facility] [place] [address]   [phone]            Mar 09, 2017  9:00 AM CST   HST Drop Off with BK SC DME   [facility]    [address]  [facility] [place] [id number] [phone]            Mar 09, 2017  9:30 AM CST   Return Sleep Patient with JOSE ANGEL Vergara   [facility]    [address]  [place] [address]   [phone]            Mar 30, 2017  2:00 PM CDT   Return Visit with Tiff Dunn MD   [facility]    [address] [place] [id number] [phone]              Future tests that were ordered for you today     Open Future Orders        Priority Expected Expires Ordered    HST-Home Sleep Apnea Test Routine  8/29/2017 2/27/2017    Ferritin Routine  4/28/2017 2/27/2017            Who to contact     If you have questions or need follow up information about today's clinic visit or your schedule please contact [facility] directly at [phone].  Normal or non-critical lab and imaging results will be communicated to you by MyChart, letter or phone within 4 business days after the clinic has received the results. If you do not hear from us within 7 days, please contact the clinic through MyChart or phone. If you have a  "critical or abnormal lab result, we will notify you by phone as soon as possible.  Submit refill requests through [employer] or call your pharmacy and they will forward the refill request to us. Please allow 3 business days for your refill to be completed.          Additional Information About Your Visit        SplashCasthart Information     [employer] gives you secure access to your electronic health record. If you see a primary care provider, you can also send messages to your care team and make appointments. If you have questions, please call your primary care clinic.  If you do not have a primary care provider, please call [phone] and they will assist you.        Care EveryWhere ID     This is your Care EveryWhere ID. This could be used by other organizations to access your [facility] medical records  [id number]        Your Vitals Were     Pulse Height Pulse Oximetry BMI (Body Mass Index)          80 1.727 m (5' 8\") 98% 36.95 kg/m2         Blood Pressure from Last 3 Encounters:   02/10/17 126/78   10/22/16 131/74   07/12/16 118/70    Weight from Last 3 Encounters:   02/27/17 110.2 kg (243 lb)   02/10/17 110.2 kg (243 lb)   10/22/16 108.9 kg (240 lb)              We Performed the Following     SLEEP EVALUATION & MANAGEMENT REFERRAL - ADULT        Primary Care Provider Office Phone # Fax #    Briana Melton [phone]       [address] [address]        Thank you!     Thank you for choosing [facility]  for your care. Our goal is always to provide you with excellent care. Hearing back from our patients is one way we can continue to improve our services. Please take a few minutes to complete the written survey that you may receive in the mail after your visit with us. Thank you!             Your Updated Medication List - Protect others around you: Learn how to safely use, store and throw away your medicines at www.disposemymeds.org.          This " list is accurate as of: 2/27/17  1:58 PM.  Always use your most recent med list.                   Brand Name Dispense Instructions for use    albuterol 108 (90 BASE) MCG/ACT Inhaler    PROAIR HFA/PROVENTIL HFA/VENTOLIN HFA    1 Inhaler    Inhale 2 puffs into the lungs every 6 hours as needed for shortness of breath / dyspnea or wheezing       BIOTIN PO          buPROPion 150 MG 24 hr tablet    WELLBUTRIN XL    90 tablet    Take 1 tablet (150 mg) by mouth every morning       estrogens (conjugated) 0.3 MG tablet    PREMARIN    90 tablet    Take 1 tablet (0.3 mg) by mouth daily       famciclovir 500 MG tablet    FAMVIR    180 tablet    Take 1 tablet (500 mg) by mouth 2 times daily       hydroquinone 4 % Crea     56.8 g    Externally apply topically At Bedtime Apply a thin layer to dark areas once nightly for no more than 8 weeks at a time. Take breaks between use.       levothyroxine 125 MCG tablet    SYNTHROID/LEVOTHROID    90 tablet    Take 1 tablet (125 mcg) by mouth daily       methocarbamol 750 MG tablet    ROBAXIN    60 tablet    Take 1 tablet (750 mg) by mouth 3 times daily as needed       nystatin 379573 UNIT/GM Powd    MYCOSTATIN    30 g    Apply 30 g topically 3 times daily as needed       omeprazole 20 MG CR capsule    priLOSEC    90 capsule    Take 1 capsule (20 mg) by mouth daily       UNABLE TO FIND      MEDICATION NAME: Lipoflavinoid+ For ringing in ears       VITAMIN D PO      Take by mouth daily

## 2017-02-27 NOTE — PROGRESS NOTES
"  Sleep Consultation:    Date on this visit: 2/27/2017    Amina Pineda is sent by Briana Melton for a sleep consultation regarding daytime sleepiness.    Primary Physician: Briana Melton     Chief Complaint   Patient presents with     Consult     My  tells me I snore (in and out) and it wakes him. I sometimes wake myself up from my snoring.        Amina goes to sleep at 10:30 PM during the week. She wakes up at 6:30 PM with an alarm. She falls asleep in 10-15 minutes.  Amina denies difficulty falling asleep.  She wakes up 2 times a night for 20 minutes before falling back to sleep.  Amina wakes up to let the dog out.  On weekends, Amina goes to sleep at 10:30 PM-11:00 PM.  She wakes up at 7:00-8:00 AM without an alarm. She falls asleep in 10-15 minutes.  Patient gets an average of 7 hours of sleep per night. She does not feel refreshed in the mornings.     Patient does use electronics in bed, watch TV in bed and read in bed.     Amina does not do shift work.      Amina does snore every night and snoring is very loud. Patient does have a regular bed partner. There is report of snoring.  She does have witnessed apneas. They never sleep separately.  Patient sleeps on her back, side and stomach. She has frequent morning dry mouth and morning headaches(twice a week). She reports snort arousals. She was diagnosed with RLS ~10 years ago and prescribed Requip, but stopped due to \"nightmares\".  Amina denies any sleep walking, sleep talking, dream enactment, sleep paralysis, cataplexy and hypnogogic/hypnopompic hallucinations. She has bruxism.     Amina denies difficulty breathing through her nose, claustrophobia and reflux at night.      Amina has gained 25 pounds in the last 5 years.  Patient describes herself as a morning person.  She would prefer to go to sleep at 10:00 PM and wake up at 8:00 AM.  Patient's Crawley Sleepiness score 8/24 inconsistent with severe daytime sleepiness.  She " has fatigue.     Amina naps 0 times per week. She takes some inadvertant naps.  She denies falling asleep while driving. Patient was counseled on the importance of driving while alert, to pull over if drowsy, or nap before getting into the vehicle if sleepy.  She uses 1 cups/day of coffee, 1 sodas/day. Last caffeine intake is usually before 5 PM.    Allergies:    Allergies   Allergen Reactions     No Known Drug Allergies        Medications:    Current Outpatient Prescriptions   Medication Sig Dispense Refill     BIOTIN PO        levothyroxine (SYNTHROID/LEVOTHROID) 125 MCG tablet Take 1 tablet (125 mcg) by mouth daily 90 tablet 3     estrogens, conjugated, (PREMARIN) 0.3 MG tablet Take 1 tablet (0.3 mg) by mouth daily 90 tablet 3     buPROPion (WELLBUTRIN XL) 150 MG 24 hr tablet Take 1 tablet (150 mg) by mouth every morning 90 tablet 1     omeprazole (PRILOSEC) 20 MG CR capsule Take 1 capsule (20 mg) by mouth daily 90 capsule 3     albuterol (PROAIR HFA/PROVENTIL HFA/VENTOLIN HFA) 108 (90 BASE) MCG/ACT Inhaler Inhale 2 puffs into the lungs every 6 hours as needed for shortness of breath / dyspnea or wheezing 1 Inhaler 11     famciclovir (FAMVIR) 500 MG tablet Take 1 tablet (500 mg) by mouth 2 times daily 180 tablet 5     methocarbamol (ROBAXIN) 750 MG tablet Take 1 tablet (750 mg) by mouth 3 times daily as needed 60 tablet 1     UNABLE TO FIND MEDICATION NAME: Lipoflavinoid+  For ringing in ears       hydroquinone 4 % CREA Externally apply topically At Bedtime Apply a thin layer to dark areas once nightly for no more than 8 weeks at a time. Take breaks between use. 56.8 g 0     nystatin (MYCOSTATIN) 370080 UNIT/GM POWD Apply 30 g topically 3 times daily as needed 30 g 1     Cholecalciferol (VITAMIN D PO) Take by mouth daily         Problem List:  Patient Active Problem List    Diagnosis Date Noted     Menopausal syndrome (hot flushes) 02/10/2017     Priority: Medium     Daytime somnolence 02/10/2017     Priority:  Medium     Chronic bilateral low back pain without sciatica 02/10/2017     Priority: Medium     Mixed incontinence 02/10/2017     Priority: Medium     Ocular hypertension, right 09/28/2016     Priority: Medium     Non morbid obesity due to excess calories 07/12/2016     Priority: Medium     Female stress incontinence 04/10/2015     Priority: Medium     Snoring 04/10/2015     Priority: Medium     Restless leg syndrome 04/10/2015     Priority: Medium     CARDIOVASCULAR SCREENING; LDL GOAL LESS THAN 160 10/31/2010     Priority: Medium     Dysthymic disorder 11/06/2006     Priority: Medium     Started fluoxetine 10 mg daily January 29, 2013         Hypothyroidism 11/05/2004     Priority: Medium     Problem list name updated by automated process. Provider to review       Esophageal reflux 11/05/2004     Priority: Medium     Had an EGD in 2005       Herpes simplex virus (HSV) infection 11/05/2004     Priority: Medium     Problem list name updated by automated process. Provider to review       Generalized osteoarthrosis, unspecified site 11/05/2004     Priority: Medium     R hip, on meds          Past Medical/Surgical History:  Past Medical History   Diagnosis Date     Decreased libido 11/6/2006     Depressive disorder, not elsewhere classified      Esophageal reflux      Generalized osteoarthrosis, unspecified site      R hip, on meds     Herpes simplex without mention of complication      oral     Intramural leiomyoma of uterus      Unspecified hypothyroidism      Past Surgical History   Procedure Laterality Date     C vaginal hysterectomy  12/03     with BSO, 10-12 week size     C nonspecific procedure  5/02     rotator cuff surgery both shoulder     C nonspecific procedure       wrist surgery for cyst x 2     C ligate fallopian tube       Soft tissue surgery       cyst, both shoulders and left elbow     Orthopedic surgery       Elbow surgery       Lt elbow repair.     Colonoscopy with co2 insufflation N/A 4/23/2015      Procedure: COLONOSCOPY WITH CO2 INSUFFLATION;  Surgeon: Bebeto Mortensen MD;  Location: [facility] OR     Colonoscopy  2015     Hysterectomy, pap no longer indicated         Social History:  Social History     Social History     Marital status:      Spouse name: N/A     Number of children: N/A     Years of education: N/A     Occupational History     Not on file.     Social History Main Topics     Smoking status: Never Smoker     Smokeless tobacco: Never Used     Alcohol use No     Drug use: No     Sexual activity: Yes     Partners: Male     Other Topics Concern     Parent/Sibling W/ Cabg, Mi Or Angioplasty Before 65f 55m? No     Social History Narrative    Dairy/d 3-4 servings/d.     Caffeine 2 servings/d    Exercise 0 x week    Sunscreen used - Yes    Seatbelts used - Yes    Working smoke/CO detectors in the home - Yes    Guns stored in the home - Yes    Self Breast Exams - No    Self Testicular Exam - NA    Eye Exam up to date - Yes     Dental Exam up to date - Yes     Pap Smear up to date - Yes  Dr. Whitaker    Mammogram up to date - Yes     PSA up to date - NA    Dexa Scan up to date - NA    Flex Sig / Colonoscopy up to date - NA    Immunizations up to date - Yes Today    Abuse: Current or Past(Physical, Sexual or Emotional)- No    Do you feel safe in your environment - Yes           Family History:  Family History   Problem Relation Age of Onset     C.A.D. Maternal Grandfather      Coronary Artery Disease Maternal Grandfather      Heart Attack, ()     Coronary Artery Disease Father      High Cholesterol     Depression/Anxiety Father      Depression     Thyroid Disease Father      Hypo Thryoid     Retinal detachment Father      Hypertension Mother      High Blood Pressure     Breast Cancer Mother      Has spot being watching.  Checked every 6 months     Glaucoma Mother      glc surgery     Breast Cancer Paternal Grandmother      Breast removed ()     Asthma  "Paternal Grandmother      Emphysema ()     CEREBROVASCULAR DISEASE Paternal Grandfather      Strokes ()     Known Genetic Syndrome Daughter      Whitaker's Syndrome     Skin Cancer No family hx of      no family hx of skin cancer       Review of Systems:  A complete review of systems reviewed by me is negative with the exeption of what has been mentioned in the history of present illness.  CONSTITUTIONAL: NEGATIVE for weight gain/loss, fever, chills, sweats or night sweats, drug allergies.  EYES: NEGATIVE for changes in vision, blind spots, double vision.  ENT: NEGATIVE for ear pain, sore throat, sinus pain, post-nasal drip, runny nose, bloody nose  CARDIAC: NEGATIVE for fast heartbeats or fluttering in chest, chest pain or pressure, breathlessness when lying flat, swollen legs or swollen feet.  NEUROLOGIC: NEGATIVE headaches, weakness or numbness in the arms or legs.  DERMATOLOGIC: NEGATIVE for rashes, new moles or change in mole(s)  PULMONARY: NEGATIVE SOB at rest, SOB with activity, dry cough, productive cough, coughing up blood, wheezing or whistling when breathing.    GASTROINTESTINAL: NEGATIVE for nausea or vomitting, loose or watery stools, fat or grease in stools, constipation, abdominal pain, bowel movements black in color or blood noted.  GENITOURINARY: NEGATIVE for pain during urination, blood in urine, urinating more frequently than usual, irregular menstrual periods.  MUSCULOSKELETAL: NEGATIVE for muscle pain, bone or joint pain, swollen joints.  ENDOCRINE: NEGATIVE for increased thirst or urination, diabetes.  LYMPHATIC: NEGATIVE for swollen lymph nodes, lumps or bumps in the breasts or nipple discharge.    Physical Examination:  Vitals: Pulse 80  Ht 1.727 m (5' 8\")  Wt 110.2 kg (243 lb)  SpO2 98%  BMI 36.95 kg/m2  BMI= Body mass index is 36.95 kg/(m^2).         Fisher Total Score 2017   Total score - Fisher 8       GENERAL APPEARANCE: no distress  EYES: Eyes grossly normal to " "inspection, PERRL and conjunctivae and sclerae normal  HENT: ear canals and TM's normal and nose and mouth without ulcers or lesions  NECK: no asymmetry, masses, or scars  RESP: lungs clear to auscultation - no rales, rhonchi or wheezes  CV: regular rates and rhythm and normal S1 S2, no S3 or S4  LYMPHATICS: normal ant/post cervical and supraclavicular nodes  ABDOMEN: bowel sounds normal  MS: extremities normal- no gross deformities noted  NEURO: Normal strength and tone, mentation intact and speech normal  PSYCH: mentation appears normal and affect normal/bright  Mallampati Class: I.  Tonsillar Stage: 1  hidden by pillars.    Last Basic Metabolic Panel:  Lab Results   Component Value Date     01/29/2013      Lab Results   Component Value Date    POTASSIUM 4.8 01/29/2013     Lab Results   Component Value Date    CHLORIDE 102 01/29/2013     Lab Results   Component Value Date    JOSE 9.2 01/29/2013     Lab Results   Component Value Date    CO2 27 01/29/2013     Lab Results   Component Value Date    BUN 18 01/29/2013     Lab Results   Component Value Date    CR 0.80 02/10/2017     Lab Results   Component Value Date     02/10/2017     Lab Results   Component Value Date    TSH 0.50 02/10/2017       Impression:  1. Patient has features and risk factors for possible obstructive sleep apnea including: loud snoring, snort arousals, non-refreshing sleep, bruxism, morning headaches and daytime fatigue. The STOP-BANG score is 4/8.     2. Restless legs syndrome (RLS), Requip caused \"nightmares\" in the past.   Plan:    1. Schedule a Home Sleep Apnea Testing to evaluate for obstructive sleep apnea.    2. For the RLS, we will check her ferritin and treat with supplemental iron if it is low. We may consider gabapentin if ferritin is normal.  3. Advised her against drowsy driving.    4. Recommend weight management.     Literature provided regarding sleep apnea and restless leg syndrome.      She will follow up with me in " approximately one day after her sleep study has been completed to review the results and discuss plan of care.       Home Sleep Apnea Testing reviewed.  Obstructive sleep apnea reviewed.  Complications of untreated sleep apnea were reviewed.    Elsy Luz PA-C    CC: Briana Melton

## 2017-02-27 NOTE — NURSING NOTE
"Chief Complaint   Patient presents with     Consult     My  tells me I snore (in and out) and it wakes him. I sometimes wake myself up from my snoring.        Initial There were no vitals taken for this visit. Estimated body mass index is 36.41 kg/(m^2) as calculated from the following:    Height as of 2/10/17: 1.74 m (5' 8.5\").    Weight as of 2/10/17: 110.2 kg (243 lb).  Medication Reconciliation: complete  Neck circumference: 14.5 inches/37 cm    "

## 2017-02-27 NOTE — PATIENT INSTRUCTIONS

## 2017-03-01 ENCOUNTER — THERAPY VISIT (OUTPATIENT)
Dept: CHIROPRACTIC MEDICINE | Facility: CLINIC | Age: 57
End: 2017-03-01
Payer: COMMERCIAL

## 2017-03-01 DIAGNOSIS — M99.01 CERVICAL SEGMENT DYSFUNCTION: ICD-10-CM

## 2017-03-01 DIAGNOSIS — M62.838 SPASM OF MUSCLE: ICD-10-CM

## 2017-03-01 DIAGNOSIS — M99.03 SEGMENTAL DYSFUNCTION OF LUMBAR REGION: ICD-10-CM

## 2017-03-01 DIAGNOSIS — M54.2 CERVICALGIA: ICD-10-CM

## 2017-03-01 DIAGNOSIS — M54.50 LUMBAGO: ICD-10-CM

## 2017-03-01 DIAGNOSIS — M99.05 SEGMENTAL DYSFUNCTION OF PELVIC REGION: Primary | ICD-10-CM

## 2017-03-01 DIAGNOSIS — M99.02 SEGMENTAL DYSFUNCTION OF THORACIC REGION: ICD-10-CM

## 2017-03-01 PROCEDURE — 98941 CHIROPRACT MANJ 3-4 REGIONS: CPT | Mod: AT | Performed by: CHIROPRACTOR

## 2017-03-01 NOTE — PROGRESS NOTES
Visit #:  2 of 6, based on treatment plan    Subjective:  Amina Pineda is a 56 year old female who is seen in f/u up for:        Segmental dysfunction of pelvic region  Lumbago  Segmental dysfunction of lumbar region  Spasm of muscle  Segmental dysfunction of thoracic region  Cervicalgia  Cervical segment dysfunction.     Since last visit on 2/21/2017,  Amina Pineda reports the following changes: Pain immediately after last treatment: 1/10 and their pain level today 2/10.  Amina is feeling better.  She is having less pain in her back.  She is feeling a little stiff but overall she is feeling improved.    Area of chief complaint:  Cervical and Lumbar :  Symptoms are graded at 2/10. The quality is described as stiff, achey.  Motion has increased, but is still not normal. Patient feels that they are improved due to a reduction in symptoms.        Objective:  The following was observed:    P: palpatory tenderness, piriformis and upper traps    A: static palpation demonstrates intersegmental asymmetry , pelvis, lumbar thoracic and cervical regions    R: motion palpation notes restricted motion, :  C2 Right rotation restricted  C6 Left rotation restricted  C7 Left rotation restricted  T1 Right rotation restricted  T2 Right rotation restricted  T6 Extension restriction  T7 Extension restriction  T8 Extension restriction  L3 Right rotation restricted  L5 Right rotation restricted  PSIS Right Extension restriction    T: hypertonicity at: Lumbar erector spine, Piriformis and TFL R>>L      Assessment:    Segmental spinal dysfunction/restrictions found at:  C2   C6   C7   T1   T2   T6  T7  T8  L4  L5  PSIS Right    Diagnoses:      1. Segmental dysfunction of pelvic region    2. Lumbago    3. Segmental dysfunction of lumbar region    4. Spasm of muscle    5. Segmental dysfunction of thoracic region    6. Cervicalgia    7. Cervical segment dysfunction        Patient's condition:  Patient had restrictions  pre-manipulation    Treatment effectiveness:  Post manipulation there is better intersegmental movement and Patient claims to feel looser post manipulation      Procedures:  CMT:  54466 Chiropractic manipulative treatment 3-4 regions performed   Cervical: Diversified and Activator, C2, C6, C7 , Prone, Supine  Thoracic: Diversified and Activator, T1, T2, T6, T7, T8, Prone  Lumbar: Diversified, L4, L5, Side posture  Pelvis: Drop Table, PSIS Right , Prone    Modalities:  32867: MSTM:  To Piriformis and Traps  for 5 min    Therapeutic procedures:  None      Prognosis: Good    Progress towards Goals: Patient is making progress towards the goal     Response to Treatment:   Reduction in symptoms as reported by patient      Recommendations:    Instructions:ice 20 minutes every other hour as needed    Follow-up:  Return to care in one week

## 2017-03-07 ENCOUNTER — THERAPY VISIT (OUTPATIENT)
Dept: CHIROPRACTIC MEDICINE | Facility: CLINIC | Age: 57
End: 2017-03-07
Payer: COMMERCIAL

## 2017-03-07 DIAGNOSIS — M99.03 SOMATIC DYSFUNCTION OF LUMBAR REGION: ICD-10-CM

## 2017-03-07 DIAGNOSIS — M54.50 LUMBAGO: ICD-10-CM

## 2017-03-07 DIAGNOSIS — M62.838 SPASM OF MUSCLE: ICD-10-CM

## 2017-03-07 DIAGNOSIS — M54.2 CERVICALGIA: ICD-10-CM

## 2017-03-07 DIAGNOSIS — M99.02 THORACIC SEGMENT DYSFUNCTION: ICD-10-CM

## 2017-03-07 DIAGNOSIS — M99.01 CERVICAL SEGMENT DYSFUNCTION: ICD-10-CM

## 2017-03-07 DIAGNOSIS — M99.05 SOMATIC DYSFUNCTION OF PELVIS REGION: Primary | ICD-10-CM

## 2017-03-07 PROCEDURE — 98941 CHIROPRACT MANJ 3-4 REGIONS: CPT | Mod: AT | Performed by: CHIROPRACTOR

## 2017-03-07 NOTE — PROGRESS NOTES
Visit #:  3 of 6, based on treatment plan    Subjective:  Amina Pineda is a 56 year old female who is seen in f/u up for:        Segmental dysfunction of pelvic region  Lumbago  Segmental dysfunction of lumbar region  Spasm of muscle  Segmental dysfunction of thoracic region  Cervicalgia  Cervical segment dysfunction.     Since last visit on 3/1/2017,  Amina Pineda reports the following changes: Pain immediately after last treatment: 1/10 and their pain level today 4/10.  Amina is feeling stiff today.  She is not necessarily in pain but she feels very stiff.  She has been helping her  remodel her bathroom.    Area of chief complaint:  Cervical and Lumbar :  Symptoms are graded at 4/10. The quality is described as stiff, achey.  Motion has increased, but is still not normal. Patient feels that they are improved due to a reduction in symptoms.        Objective:  The following was observed:    P: palpatory tenderness, piriformis and upper traps    A: static palpation demonstrates intersegmental asymmetry , pelvis, lumbar thoracic and cervical regions    R: motion palpation notes restricted motion, :    C6 Left rotation restricted  C7 Left rotation restricted  T1 Right rotation restricted  T2 Right rotation restricted  T6 Extension restriction  T7 Extension restriction  T8 Extension restriction  L3 Right rotation restricted  L5 Right rotation restricted  PSIS Right Extension restriction    T: hypertonicity at: Lumbar erector spine, Piriformis and TFL R>>L      Assessment:    Segmental spinal dysfunction/restrictions found at:  C6   C7   T1   T2   T6  T7  T8  L4  L5  PSIS Right    Diagnoses:      1. Segmental dysfunction of pelvic region    2. Lumbago    3. Segmental dysfunction of lumbar region    4. Spasm of muscle    5. Segmental dysfunction of thoracic region    6. Cervicalgia    7. Cervical segment dysfunction        Patient's condition:  Patient had restrictions pre-manipulation    Treatment  effectiveness:  Post manipulation there is better intersegmental movement and Patient claims to feel looser post manipulation      Procedures:  CMT:  88500 Chiropractic manipulative treatment 3-4 regions performed   Cervical: Diversified and Activator, C6, C7 , Prone, Supine  Thoracic: Diversified and Activator, T1, T2, T6, T7, T8, Prone  Lumbar: Diversified, L4, L5, Side posture  Pelvis: Drop Table, PSIS Right , Prone    Modalities:  21779: MSTM:  To Piriformis and Traps  for 5 min    Therapeutic procedures:  None      Prognosis: Good    Progress towards Goals: Patient is making progress towards the goal     Response to Treatment:   Reduction in symptoms as reported by patient      Recommendations:    Instructions:ice 20 minutes every other hour as needed    Follow-up:  Return to care in one week

## 2017-03-08 ENCOUNTER — OFFICE VISIT (OUTPATIENT)
Dept: SLEEP MEDICINE | Facility: CLINIC | Age: 57
End: 2017-03-08
Payer: COMMERCIAL

## 2017-03-08 DIAGNOSIS — R06.00 DYSPNEA AND RESPIRATORY ABNORMALITY: ICD-10-CM

## 2017-03-08 DIAGNOSIS — R06.89 DYSPNEA AND RESPIRATORY ABNORMALITY: ICD-10-CM

## 2017-03-08 DIAGNOSIS — G47.33 OSA (OBSTRUCTIVE SLEEP APNEA): Primary | ICD-10-CM

## 2017-03-08 DIAGNOSIS — G25.81 RESTLESS LEGS SYNDROME (RLS): ICD-10-CM

## 2017-03-08 DIAGNOSIS — R53.81 MALAISE AND FATIGUE: ICD-10-CM

## 2017-03-08 DIAGNOSIS — Z72.820 LACK OF ADEQUATE SLEEP: ICD-10-CM

## 2017-03-08 DIAGNOSIS — R40.0 DAYTIME SOMNOLENCE: ICD-10-CM

## 2017-03-08 DIAGNOSIS — E03.9 ACQUIRED HYPOTHYROIDISM: Chronic | ICD-10-CM

## 2017-03-08 DIAGNOSIS — E66.09 NON MORBID OBESITY DUE TO EXCESS CALORIES: ICD-10-CM

## 2017-03-08 DIAGNOSIS — R53.83 MALAISE AND FATIGUE: ICD-10-CM

## 2017-03-08 PROCEDURE — G0399 HOME SLEEP TEST/TYPE 3 PORTA: HCPCS | Performed by: INTERNAL MEDICINE

## 2017-03-08 NOTE — MR AVS SNAPSHOT
After Visit Summary   3/8/2017    Amina Pineda    MRN: [id number]           Patient Information     Date Of Birth          1960        Visit Information        Provider Department      3/8/2017 2:30 PM BK BED 5 [facility]        Today's Diagnoses     ANDREW (obstructive sleep apnea)    -  1       Follow-ups after your visit        Your next 10 appointments already scheduled     Mar 09, 2017  9:00 AM CST   HST Drop Off with BK SC DME   [facility] ([facility]    [address]  [place] [address]   [phone]            Mar 09, 2017  9:30 AM CST   Return Sleep Patient with JOSE ANGEL Vergara   [facility] ([facility]    [address]  [place] [address]   [phone]            Mar 15, 2017  3:30 PM CDT   LINDY Chiropractor with Javon Khan DC   [facility] [place] [facility] ([facility]    [address]  [place] [address]   [phone]            Mar 30, 2017  2:00 PM CDT   Return Visit with Tiff Dunn MD   [facility] ([facility])    [address] [place] [address] [phone]              Who to contact     If you have questions or need follow up information about today's clinic visit or your schedule please contact [facility] directly at [phone].  Normal or non-critical lab and imaging results will be communicated to you by MyChart, letter or phone within 4 business days after the clinic has received the results. If you do not hear from us within 7 days, please contact the clinic through MyChart or phone. If you have a critical or abnormal lab result, we will notify you by phone as soon as possible.  Submit refill requests through [employer] or call your pharmacy and they will forward the refill request to us. Please allow 3 business  days for your refill to be completed.          Additional Information About Your Visit        MyChart Information     [employer] gives you secure access to your electronic health record. If you see a primary care provider, you can also send messages to your care team and make appointments. If you have questions, please call your primary care clinic.  If you do not have a primary care provider, please call [phone] and they will assist you.        Care EveryWhere ID     This is your Care EveryWhere ID. This could be used by other organizations to access your [place] medical records  [id number]         Blood Pressure from Last 3 Encounters:   02/10/17 126/78   10/22/16 131/74   07/12/16 118/70    Weight from Last 3 Encounters:   02/27/17 110.2 kg (243 lb)   02/10/17 110.2 kg (243 lb)   10/22/16 108.9 kg (240 lb)              Today, you had the following     No orders found for display       Primary Care Provider Office Phone # Fax #    Briana Melton [phone]       [address] [address]        Thank you!     Thank you for choosing [facility]  for your care. Our goal is always to provide you with excellent care. Hearing back from our patients is one way we can continue to improve our services. Please take a few minutes to complete the written survey that you may receive in the mail after your visit with us. Thank you!             Your Updated Medication List - Protect others around you: Learn how to safely use, store and throw away your medicines at www.disposemymeds.org.          This list is accurate as of: 3/8/17  2:59 PM.  Always use your most recent med list.                   Brand Name Dispense Instructions for use    albuterol 108 (90 BASE) MCG/ACT Inhaler    PROAIR HFA/PROVENTIL HFA/VENTOLIN HFA    1 Inhaler    Inhale 2 puffs into the lungs every 6 hours as needed for shortness of breath / dyspnea or wheezing       BIOTIN PO           buPROPion 150 MG 24 hr tablet    WELLBUTRIN XL    90 tablet    Take 1 tablet (150 mg) by mouth every morning       estrogens (conjugated) 0.3 MG tablet    PREMARIN    90 tablet    Take 1 tablet (0.3 mg) by mouth daily       famciclovir 500 MG tablet    FAMVIR    180 tablet    Take 1 tablet (500 mg) by mouth 2 times daily       hydroquinone 4 % Crea     56.8 g    Externally apply topically At Bedtime Apply a thin layer to dark areas once nightly for no more than 8 weeks at a time. Take breaks between use.       levothyroxine 125 MCG tablet    SYNTHROID/LEVOTHROID    90 tablet    Take 1 tablet (125 mcg) by mouth daily       methocarbamol 750 MG tablet    ROBAXIN    60 tablet    Take 1 tablet (750 mg) by mouth 3 times daily as needed       nystatin 315108 UNIT/GM Powd    MYCOSTATIN    30 g    Apply 30 g topically 3 times daily as needed       omeprazole 20 MG CR capsule    priLOSEC    90 capsule    Take 1 capsule (20 mg) by mouth daily       UNABLE TO FIND      MEDICATION NAME: Lipoflavinoid+ For ringing in ears       VITAMIN D PO      Take by mouth daily

## 2017-03-09 ENCOUNTER — OFFICE VISIT (OUTPATIENT)
Dept: SLEEP MEDICINE | Facility: CLINIC | Age: 57
End: 2017-03-09
Payer: COMMERCIAL

## 2017-03-09 ENCOUNTER — DOCUMENTATION ONLY (OUTPATIENT)
Dept: SLEEP MEDICINE | Facility: CLINIC | Age: 57
End: 2017-03-09
Payer: COMMERCIAL

## 2017-03-09 VITALS
HEIGHT: 69 IN | HEART RATE: 69 BPM | BODY MASS INDEX: 36.29 KG/M2 | SYSTOLIC BLOOD PRESSURE: 126 MMHG | DIASTOLIC BLOOD PRESSURE: 72 MMHG | WEIGHT: 245 LBS | OXYGEN SATURATION: 97 %

## 2017-03-09 DIAGNOSIS — G47.19 EXCESSIVE DAYTIME SLEEPINESS: ICD-10-CM

## 2017-03-09 DIAGNOSIS — G47.33 OBSTRUCTIVE SLEEP APNEA: Primary | ICD-10-CM

## 2017-03-09 DIAGNOSIS — R53.83 MALAISE AND FATIGUE: ICD-10-CM

## 2017-03-09 DIAGNOSIS — R53.81 MALAISE AND FATIGUE: ICD-10-CM

## 2017-03-09 DIAGNOSIS — G47.33 OSA (OBSTRUCTIVE SLEEP APNEA): ICD-10-CM

## 2017-03-09 PROCEDURE — 99214 OFFICE O/P EST MOD 30 MIN: CPT | Performed by: PHYSICIAN ASSISTANT

## 2017-03-09 NOTE — PATIENT INSTRUCTIONS

## 2017-03-09 NOTE — MR AVS SNAPSHOT
After Visit Summary   3/9/2017    Amina Pineda    MRN: [id number]           Patient Information     Date Of Birth          1960        Visit Information        Provider Department      3/9/2017 9:30 AM Elsy Luz PA [facility]        Today's Diagnoses     Obstructive sleep apnea    -  1    Excessive daytime sleepiness        Malaise and fatigue          Care Instructions      Your BMI is Body mass index is 36.18 kg/(m^2).  Weight management is a personal decision.  If you are interested in exploring weight loss strategies, the following discussion covers the approaches that may be successful. Body mass index (BMI) is one way to tell whether you are at a healthy weight, overweight, or obese. It measures your weight in relation to your height.  A BMI of 18.5 to 24.9 is in the healthy range. A person with a BMI of 25 to 29.9 is considered overweight, and someone with a BMI of 30 or greater is considered obese. More than two-thirds of American adults are considered overweight or obese.  Being overweight or obese increases the risk for further weight gain. Excess weight may lead to heart disease and diabetes.  Creating and following plans for healthy eating and physical activity may help you improve your health.  Weight control is part of healthy lifestyle and includes exercise, emotional health, and healthy eating habits. Careful eating habits lifelong are the mainstay of weight control. Though there are significant health benefits from weight loss, long-term weight loss with diet alone may be very difficult to achieve- studies show long-term success with dietary management in less than 10% of people. Attaining a healthy weight may be especially difficult to achieve in those with severe obesity. In some cases, medications, devices and surgical management might be considered.  What can you do?  If you are overweight or obese and are interested in methods for weight loss, you  should discuss this with your provider.     Consider reducing daily calorie intake by 500 calories.     Keep a food journal.     Avoiding skipping meals, consider cutting portions instead.    Diet combined with exercise helps maintain muscle while optimizing fat loss. Strength training is particularly important for building and maintaining muscle mass. Exercise helps reduce stress, increase energy, and improves fitness. Increasing exercise without diet control, however, may not burn enough calories to loose weight.       Start walking three days a week 10-20 minutes at a time    Work towards walking thirty minutes five days a week     Eventually, increase the speed of your walking for 1-2 minutes at time    In addition, we recommend that you review healthy lifestyles and methods for weight loss available through the National Institutes of Health patient information sites:  http://win.niddk.nih.gov/publications/index.htm    And look into health and wellness programs that may be available through your health insurance provider, employer, local community center, or valeria club.    Weight management plan: Patient was referred to their PCP to discuss a diet and exercise plan.            Follow-ups after your visit        Additional Services     SLEEP DENTAL REFERRAL       Dental appliance resources recommended by [facility]     Below is a list of dental appliance resources recommended by [facility]   If you wish to choose your own dental sleep dentist, you may identify a provider close to you: [email or web address]    [facility]   Sleep Medicine [facility]   Allan Manzo DDS, MS   [address] [address]   Appointments: [phone]  Fax: [phone]   Email: [email or web address]   [facility]ental [facility] Surgery Clinic   Rocky Pickard DMD    Zach Lewis DDS   [address]   [place] [address]   Appointments: [phone]   Website: [email or web address]    Snoring and Sleep Apnea   D[facility] Center   THERESA Monahan DDS  [address]   [place] [address]   Appointments: [phone]   Fax: [phone]   Email: [email or web address]  Website: [employer]     [facility]   Office 1   Steven Franklin DDS - [DME Medicare]  [address]   [place] [address]  Appointments: [phone]  Fax: [phone]  Website: [employer]    [facility]   Office 2  Steven Franklin DDS - [DME Medicare]  [address]  [place] [address]  Appointments: [phone]  Fax: [phone]  Website: [employer]    [facility]  Merrick Bhagat DDS  [address] [place] [address]  Appointments: [phone]    [facility]   [place] Office   Zach Lewis DDS   [employer]   [address]   [place] [address]   Appointments: [phone]   Fax: [phone]   Website: [employer]     [facility]   [place] Office   Jase Godfrey DDS, MS - [DME Medicare]  [facility]   [address]   [place] [address]   Appointments: [phone]   Fax: [phone]   Website: [employer]     [facility]  Brian Rehman DMD, MSD - [DME Medicare]  [address]  [place] [address]  Appointments [phone]  Fax: [phone]  Website: [email or web address]    [facility]   [place] Office   Adelia Felton DDS, PhD, [address]   [place] [address]   Appointments: [phone]   Fax: [phone]   Website: [email or web address]     [facility]    [place] Office   Adelia Felton DDS, PhD, MS   [facility]   [address]   [place] [address]   Appointments: [phone]   Fax: [phone]   Website: [facility]     [facility]  Merna Henriquez DDS  [facility]  [address] [place] [address]  Appointments: [phone]   Fax: [phone]    If you wish to choose your own dental sleep dentist, you may identify a provider close to you: [email or web address]      AHI: 6.7 HST DATE: 3/8/2017     Return to clinic in as needed for Home Sleep Test to confirm adequacy of Treatment.    Other information regarding this referral: Mandibular repositioning appliance for ANDREW. If your insurance asks for a CPT code, it is .    Please be aware that coverage of these services is subject to the terms and limitations of your health insurance plan.  Call member services at your health plan with any benefit or coverage questions.      Please bring the following to your appointment:    >>   List of current medications   >>   This referral request   >>   Any documents/labs given to you for this referral                  Follow-up notes from your care team     Return if symptoms worsen or fail to improve.      Your next 10 appointments already scheduled     Mar 15, 2017  3:30 PM CDT   LIND[facility] Chiropractor with [facility] ([facility])    [address]  [place] [address] [phone]            Mar 30, 2017  2:00 PM CDT   [facility]iff [facility]   [facility]    [address] [place] [address] [phone]              Who to contact     If you have questions or need follow up information about today's clinic visit or your schedule please contact [facility] directly at [phone].  Normal or  "non-critical lab and imaging results will be communicated to you by MyChart, letter or phone within 4 business days after the clinic has received the results. If you do not hear from us within 7 days, please contact the clinic through [employer] or phone. If you have a critical or abnormal lab result, we will notify you by phone as soon as possible.  Submit refill requests through [email or web address] or call your pharmacy and they will forward the refill request to us. Please allow 3 business days for your refill to be completed.          Additional Information About Your Visit        [email or web address] Information     [email or web address] gives you secure access to your electronic health record. If you see a primary care provider, you can also send messages to your care team and make appointments. If you have questions, please call your primary care clinic.  If you do not have a primary care provider, please call [phone] and they will assist you.        Care EveryWhere ID     This is your Care EveryWhere ID. This could be used by other organizations to access your [place] medical records  [id number]        Your Vitals Were     Pulse Height Pulse Oximetry BMI (Body Mass Index)          69 1.753 m (5' 9\") 97% 36.18 kg/m2         Blood Pressure from Last 3 Encounters:   03/09/17 126/72   02/10/17 126/78   10/22/16 131/74    Weight from Last 3 Encounters:   03/09/17 111.1 kg (245 lb)   02/27/17 110.2 kg (243 lb)   02/10/17 110.2 kg (243 lb)              We Performed the Following     SLEEP DENTAL REFERRAL        Primary Care Provider Office Phone # Fax #    Briana Melton [phone]       [facility] [address] [address]        Thank you!     Thank you for choosing [facility]  for your care. Our goal is always to provide you with excellent care. Hearing back from our patients is one way we can continue to improve our services. Please take a few minutes to complete the written survey " that you may receive in the mail after your visit with us. Thank you!             Your Updated Medication List - Protect others around you: Learn how to safely use, store and throw away your medicines at www.disposemymeds.org.          This list is accurate as of: 3/9/17  9:43 AM.  Always use your most recent med list.                   Brand Name Dispense Instructions for use    albuterol 108 (90 BASE) MCG/ACT Inhaler    PROAIR HFA/PROVENTIL HFA/VENTOLIN HFA    1 Inhaler    Inhale 2 puffs into the lungs every 6 hours as needed for shortness of breath / dyspnea or wheezing       BIOTIN PO          buPROPion 150 MG 24 hr tablet    WELLBUTRIN XL    90 tablet    Take 1 tablet (150 mg) by mouth every morning       estrogens (conjugated) 0.3 MG tablet    PREMARIN    90 tablet    Take 1 tablet (0.3 mg) by mouth daily       famciclovir 500 MG tablet    FAMVIR    180 tablet    Take 1 tablet (500 mg) by mouth 2 times daily       hydroquinone 4 % Crea     56.8 g    Externally apply topically At Bedtime Apply a thin layer to dark areas once nightly for no more than 8 weeks at a time. Take breaks between use.       levothyroxine 125 MCG tablet    SYNTHROID/LEVOTHROID    90 tablet    Take 1 tablet (125 mcg) by mouth daily       methocarbamol 750 MG tablet    ROBAXIN    60 tablet    Take 1 tablet (750 mg) by mouth 3 times daily as needed       nystatin [id number] UNIT/GM Powd    MYCOSTATIN    30 g    Apply 30 g topically 3 times daily as needed       omeprazole 20 MG CR capsule    priLOSEC    90 capsule    Take 1 capsule (20 mg) by mouth daily       UNABLE TO FIND      MEDICATION NAME: Lipoflavinoid+ For ringing in ears       VITAMIN D PO      Take by mouth daily

## 2017-03-09 NOTE — NURSING NOTE
"Chief Complaint   Patient presents with     RECHECK     hst results       Initial There were no vitals taken for this visit. Estimated body mass index is 36.95 kg/(m^2) as calculated from the following:    Height as of 2/27/17: 1.727 m (5' 8\").    Weight as of 2/27/17: 110.2 kg (243 lb).  Medication Reconciliation: complete    "

## 2017-03-09 NOTE — PROGRESS NOTES
"Home Sleep Apnea Testing Results Visit:    Chief Complaint   Patient presents with     RECHECK     hst results       Amina Pineda is a 56 year old female who returns to [facility] after having had Home Sleep Apnea Testing.  She presented with loud snoring, snort arousals, non-refreshing sleep, bruxism, morning headaches and daytime fatigue.    Estimated body mass index is 36.18 kg/(m^2) as calculated from the following:    Height as of this encounter: 1.753 m (5' 9\").    Weight as of this encounter: 111.1 kg (245 lb).  Total score - [place]: 8 (2017  1:00 PM)  STOP-BAN/8    Home Sleep Apnea Testing - 3/8/17: 243 lbs 0 oz: AHI 6.7/hr. Supine AHI 19.4/hr.   Oxygen Asael of 85%.  Baseline 92.4%.  Sp02 =< 88% for 3 minutes  She slept on her back (16.7%), prone (0.0%), left (15.4) and right (41.3%) sides.   Analysis time: 517 minutes.     Signal quality of Oxymeter 100% Good  Nasal Cannula 100% Good  RIP belts 100% Good.     Amina Pineda reports that she slept Poor .     Past medical/surgical history, family history, social history, medications and allergies were reviewed.      /72  Pulse 69  Ht 1.753 m (5' 9\")  Wt 111.1 kg (245 lb)  SpO2 97%  BMI 36.18 kg/m2    Impression/Plan:  Mild Obstructive Sleep Apnea.   Sleep associated hypoxemia was not present.     Home Sleep Apnea Testing was reviewed in detail today with Amina and a copy given to her for her records.     Treatment options discussed today including  auto-CPAP at 5-15 cmH2O, oral appliance therapy, positional therapy or polysomnography with full night PAP titration.  Elected treatment of sleep apnea and daytime sleepiness/fatigue with oral appliance therapy.  Sleep dental referral placed.   Follow up as needed.     25 minutes spent with patient with >50% spent in counseling and coordination of care regarding ANDREW.      Elsy Luz PA-C    CC:  Briana Melton        "

## 2017-03-13 PROBLEM — G47.33 OSA (OBSTRUCTIVE SLEEP APNEA): Chronic | Status: ACTIVE | Noted: 2017-03-09

## 2017-03-13 NOTE — PROCEDURES
"HOME SLEEP STUDY INTERPRETATION    Patient: Amina Pineda  MRN: [id number]  YOB: 1960  Study Date: 3/8/2017  Referring Provider: Briana Melton;   Ordering Provider: JOSE ANGEL Anderson     Indications for Home Study: Amina Pineda is a 56 year old female with loud snoring, snort arousals, non-refreshing sleep, bruxism, morning headaches and daytime fatigue. The STOP-BANG score is 4/8.       Estimated body mass index is 36.18 kg/(m^2) as calculated from the following:    Height as of 3/9/17: 1.753 m (5' 9\").    Weight as of 3/9/17: 111.1 kg (245 lb).  Total score - [place]: 8 (2/27/2017  1:00 PM)      Data: A full night home sleep study was performed recording the standard physiologic parameters including body position, movement, sound, nasal pressure, thermal oral airflow, chest and abdominal movements with respiratory inductance plethysmography, and oxygen saturation by pulse oximetry. Pulse rate was estimated by oximetry recording. This study was considered adequate based on > 4 hours of quality oximetry and respiratory recording. As specified by the AASM Manual for the Scoring of Sleep and Associated events, version 2.3, Rule VIII.D 1B, 4% oxygen desaturation scoring for hypopneas is used as a standard of care on all home sleep apnea testing.    Analysis Time:  517 minutes    Respiration:   Sleep Associated Hypoxemia: sustained hypoxemia was not present. Baseline oxygen saturation was 92%.  Time with saturation less than or equal to 88% was 3 minutes. The lowest oxygen saturation was 85%.   Snoring: Snoring was present.  Respiratory events: The home study revealed a presence of 12 obstructive apneas and 4 mixed and central apneas. There were 42 hypopneas resulting in a combined apnea/hypopnea index [AHI] of 6.7 events per hour.  AHI was 19.4 per hour supine, n/a per hour prone, 2.3 per hour on left side, and 3.6 per hour on right side.   Pattern: Excluding events noted above, respiratory " rate and pattern was Normal.    Position: Percent of time spent: supine - 17%, prone - 0%, on left - 15%, on right - 41%.    Heart Rate: By pulse oximetry normal rate was noted.     Assessment:   Mild obstructive sleep apnea, principally supine positional .  Sleep associated hypoxemia was not present.    Recommendations:  Consider auto-CPAP at 5-18 cmH2O, oral appliance therapy or positional therapy.  Suggest optimizing sleep hygiene and avoiding sleep deprivation.  Weight management.    Diagnosis Code(s): Obstructive Sleep Apnea G47.33    Kale Valdez MD, March 13, 2017   Diplomate, American Board of Internal Medicine, Sleep Medicine

## 2017-03-15 ENCOUNTER — THERAPY VISIT (OUTPATIENT)
Dept: CHIROPRACTIC MEDICINE | Facility: CLINIC | Age: 57
End: 2017-03-15
Payer: COMMERCIAL

## 2017-03-15 DIAGNOSIS — M54.2 CERVICALGIA: ICD-10-CM

## 2017-03-15 DIAGNOSIS — M99.05 SEGMENTAL DYSFUNCTION OF PELVIC REGION: Primary | ICD-10-CM

## 2017-03-15 DIAGNOSIS — M99.01 CERVICAL SEGMENT DYSFUNCTION: ICD-10-CM

## 2017-03-15 DIAGNOSIS — M99.02 SEGMENTAL DYSFUNCTION OF THORACIC REGION: ICD-10-CM

## 2017-03-15 DIAGNOSIS — M62.838 SPASM OF MUSCLE: ICD-10-CM

## 2017-03-15 DIAGNOSIS — M54.50 LUMBAGO: ICD-10-CM

## 2017-03-15 DIAGNOSIS — M99.03 SEGMENTAL DYSFUNCTION OF LUMBAR REGION: ICD-10-CM

## 2017-03-15 PROCEDURE — 98941 CHIROPRACT MANJ 3-4 REGIONS: CPT | Mod: AT | Performed by: CHIROPRACTOR

## 2017-03-15 NOTE — PROGRESS NOTES
Visit #:  4 of 6, based on treatment plan    Subjective:  Amina Pineda is a 56 year old female who is seen in f/u up for:        Segmental dysfunction of pelvic region  Lumbago  Segmental dysfunction of lumbar region  Spasm of muscle  Segmental dysfunction of thoracic region  Cervicalgia  Cervical segment dysfunction.     Since last visit on 3/7/2017,  Amina Pineda reports the following changes: Pain immediately after last treatment: 1/10 and their pain level today 3/10.  Amina is feeling stiff today.  She is not necessarily in pain but she feels very stiff.  Amina feels better and had some good days where she was feeling pretty good.  She is still a little stiff today.      Area of chief complaint:  Cervical and Lumbar :  Symptoms are graded at 4/10. The quality is described as stiff, achey.  Motion has increased, but is still not normal. Patient feels that they are improved due to a reduction in symptoms.        Objective:  The following was observed:    P: palpatory tenderness, piriformis and upper traps    A: static palpation demonstrates intersegmental asymmetry , pelvis, lumbar thoracic and cervical regions    R: motion palpation notes restricted motion, :    C6 Left rotation restricted  C7 Left rotation restricted  T1 Right rotation restricted  T2 Right rotation restricted  T6 Extension restriction  T7 Extension restriction  T8 Extension restriction  L3 Right rotation restricted  L5 Right rotation restricted  PSIS Right Extension restriction    T: hypertonicity at: Lumbar erector spine, Piriformis and TFL R>>L      Assessment:    Segmental spinal dysfunction/restrictions found at:  C6   C7   T1   T2   T6  T7  T8  L4  L5  PSIS Right    Diagnoses:      1. Segmental dysfunction of pelvic region    2. Lumbago    3. Segmental dysfunction of lumbar region    4. Spasm of muscle    5. Segmental dysfunction of thoracic region    6. Cervicalgia    7. Cervical segment dysfunction        Patient's  condition:  Patient had restrictions pre-manipulation    Treatment effectiveness:  Post manipulation there is better intersegmental movement and Patient claims to feel looser post manipulation      Procedures:  CMT:  91452 Chiropractic manipulative treatment 3-4 regions performed   Cervical:  Activator, C6, C7 , Prone, Supine  Thoracic  Activator, T1, T2, T6, T7, T8, Prone  Lumbar: Activator, L4, L5, Side posture  Pelvis: Drop Table, PSIS Right , Prone    Modalities:  90161: MSTM:  To Piriformis and Traps  for 5 min    Therapeutic procedures:  None      Prognosis: Good    Progress towards Goals: Patient is making progress towards the goal     Response to Treatment:   Reduction in symptoms as reported by patient      Recommendations:    Instructions:ice 20 minutes every other hour as needed    Follow-up:  Return to care in one week

## 2017-03-23 ENCOUNTER — TRANSFERRED RECORDS (OUTPATIENT)
Dept: HEALTH INFORMATION MANAGEMENT | Facility: CLINIC | Age: 57
End: 2017-03-23

## 2017-03-27 ENCOUNTER — THERAPY VISIT (OUTPATIENT)
Dept: CHIROPRACTIC MEDICINE | Facility: CLINIC | Age: 57
End: 2017-03-27
Payer: COMMERCIAL

## 2017-03-27 DIAGNOSIS — M99.05 SEGMENTAL DYSFUNCTION OF PELVIC REGION: Primary | ICD-10-CM

## 2017-03-27 DIAGNOSIS — M99.03 SOMATIC DYSFUNCTION OF LUMBAR REGION: ICD-10-CM

## 2017-03-27 DIAGNOSIS — M99.01 CERVICAL SEGMENT DYSFUNCTION: ICD-10-CM

## 2017-03-27 DIAGNOSIS — M99.02 THORACIC SEGMENT DYSFUNCTION: ICD-10-CM

## 2017-03-27 DIAGNOSIS — M54.2 CERVICALGIA: ICD-10-CM

## 2017-03-27 DIAGNOSIS — M54.50 LUMBAGO: ICD-10-CM

## 2017-03-27 DIAGNOSIS — M62.838 SPASM OF MUSCLE: ICD-10-CM

## 2017-03-27 PROCEDURE — 97810 ACUP 1/> WO ESTIM 1ST 15 MIN: CPT | Performed by: CHIROPRACTOR

## 2017-03-27 PROCEDURE — 98941 CHIROPRACT MANJ 3-4 REGIONS: CPT | Mod: AT | Performed by: CHIROPRACTOR

## 2017-03-27 NOTE — PROGRESS NOTES
Visit #:  5 of 6, based on treatment plan    Subjective:  Amina Pineda is a 56 year old female who is seen in f/u up for:        Segmental dysfunction of pelvic region  Lumbago  Segmental dysfunction of lumbar region  Spasm of muscle  Segmental dysfunction of thoracic region  Cervicalgia  Cervical segment dysfunction.     Since last visit on 3/15/2017,  Amina Pineda reports the following changes: Pain immediately after last treatment: 1/10 and their pain level today 5/10.  Amina is feeling stiff today and has been having some headaches.  She has missed last weeks appointment and was uncomfortable for that week.  A two weeks gap between visit at this time is too long of an interval    Area of chief complaint:  Cervical and Lumbar :  Symptoms are graded at 5/10. The quality is described as stiff, achey.  Motion has increased, but is still not normal. Patient feels that they are improved due to a reduction in symptoms.        Objective:  The following was observed:    P: palpatory tenderness, piriformis and upper traps    A: static palpation demonstrates intersegmental asymmetry , pelvis, lumbar thoracic and cervical regions    R: motion palpation notes restricted motion, :  C1 left rotation restricted  C6 Left rotation restricted  C7 Left rotation restricted  T1 Right rotation restricted  T2 Right rotation restricted  T6 Extension restriction  T7 Extension restriction  T8 Extension restriction  L3 Right rotation restricted  L5 Right rotation restricted  PSIS Right Extension restriction    T: hypertonicity at: Lumbar erector spine, Piriformis and TFL R>>L      Assessment:    Segmental spinal dysfunction/restrictions found at:  C1  C6   C7   T1   T2   T6  T7  T8  L4  L5  PSIS Right    Diagnoses:      1. Segmental dysfunction of pelvic region    2. Lumbago    3. Segmental dysfunction of lumbar region    4. Spasm of muscle    5. Segmental dysfunction of thoracic region    6. Cervicalgia    7. Cervical segment  dysfunction        Patient's condition:  Patient had restrictions pre-manipulation    Treatment effectiveness:  Post manipulation there is better intersegmental movement and Patient claims to feel looser post manipulation      Procedures:  CMT:  92960 Chiropractic manipulative treatment 3-4 regions performed   Cervical:  Activator, C1, C6, C7 , Prone, Supine  Thoracic  Activator, T1, T2, T6, T7, T8, Prone  Lumbar: Activator, L4, L5, Side posture  Pelvis: Drop Table, PSIS Right , Prone    Modalities:  25116: MSTM:  To Piriformis and Traps  for 5 min    Therapeutic procedures:  None      Prognosis: Good    Progress towards Goals: Patient is making progress towards the goal     Response to Treatment:   Reduction in symptoms as reported by patient      Recommendations:    Instructions:ice 20 minutes every other hour as needed    Follow-up:  Return to care in one week

## 2017-03-30 ENCOUNTER — OFFICE VISIT (OUTPATIENT)
Dept: DERMATOLOGY | Facility: CLINIC | Age: 57
End: 2017-03-30
Payer: COMMERCIAL

## 2017-03-30 DIAGNOSIS — L82.1 SEBORRHEIC KERATOSIS: ICD-10-CM

## 2017-03-30 DIAGNOSIS — Z51.81 MEDICATION MONITORING ENCOUNTER: ICD-10-CM

## 2017-03-30 DIAGNOSIS — R51.9 NONINTRACTABLE HEADACHE, UNSPECIFIED CHRONICITY PATTERN, UNSPECIFIED HEADACHE TYPE: ICD-10-CM

## 2017-03-30 DIAGNOSIS — B35.1 ONYCHOMYCOSIS: ICD-10-CM

## 2017-03-30 DIAGNOSIS — D18.01 CHERRY ANGIOMA: ICD-10-CM

## 2017-03-30 DIAGNOSIS — Z51.81 MEDICATION MONITORING ENCOUNTER: Primary | ICD-10-CM

## 2017-03-30 LAB
ALT SERPL W P-5'-P-CCNC: 29 U/L (ref 0–50)
AST SERPL W P-5'-P-CCNC: 17 U/L (ref 0–45)
BASOPHILS # BLD AUTO: 0.1 10E9/L (ref 0–0.2)
BASOPHILS NFR BLD AUTO: 0.7 %
DIFFERENTIAL METHOD BLD: NORMAL
EOSINOPHIL # BLD AUTO: 0.3 10E9/L (ref 0–0.7)
EOSINOPHIL NFR BLD AUTO: 4.2 %
ERYTHROCYTE [DISTWIDTH] IN BLOOD BY AUTOMATED COUNT: 13.6 % (ref 10–15)
HCT VFR BLD AUTO: 42.7 % (ref 35–47)
HGB BLD-MCNC: 14.1 G/DL (ref 11.7–15.7)
LYMPHOCYTES # BLD AUTO: 1.5 10E9/L (ref 0.8–5.3)
LYMPHOCYTES NFR BLD AUTO: 21.9 %
MCH RBC QN AUTO: 29.2 PG (ref 26.5–33)
MCHC RBC AUTO-ENTMCNC: 33 G/DL (ref 31.5–36.5)
MCV RBC AUTO: 88 FL (ref 78–100)
MONOCYTES # BLD AUTO: 0.5 10E9/L (ref 0–1.3)
MONOCYTES NFR BLD AUTO: 6.6 %
NEUTROPHILS # BLD AUTO: 4.6 10E9/L (ref 1.6–8.3)
NEUTROPHILS NFR BLD AUTO: 66.6 %
PLATELET # BLD AUTO: 250 10E9/L (ref 150–450)
RBC # BLD AUTO: 4.83 10E12/L (ref 3.8–5.2)
WBC # BLD AUTO: 6.8 10E9/L (ref 4–11)

## 2017-03-30 PROCEDURE — 85025 COMPLETE CBC W/AUTO DIFF WBC: CPT | Performed by: DERMATOLOGY

## 2017-03-30 PROCEDURE — 84460 ALANINE AMINO (ALT) (SGPT): CPT | Performed by: DERMATOLOGY

## 2017-03-30 PROCEDURE — 84450 TRANSFERASE (AST) (SGOT): CPT | Performed by: DERMATOLOGY

## 2017-03-30 PROCEDURE — 36415 COLL VENOUS BLD VENIPUNCTURE: CPT | Performed by: DERMATOLOGY

## 2017-03-30 PROCEDURE — 99213 OFFICE O/P EST LOW 20 MIN: CPT | Performed by: DERMATOLOGY

## 2017-03-30 NOTE — NURSING NOTE
Dermatology Rooming Note    Amina Pineda's goals for this visit include:   Chief Complaint   Patient presents with     Derm Problem     1 yr skin check. recheck toe, spot on chin.        Is a scribe okay for this visit:YES    Are records needed for this visit(If yes, obtain release of information): Not applicable     Vitals: There were no vitals taken for this visit.    Referring Provider:  No referring provider defined for this encounter.

## 2017-03-30 NOTE — MR AVS SNAPSHOT
After Visit Summary   3/30/2017    Amina Pineda    MRN: [id number]           Patient Information     Date Of Birth          1960        Visit Information        Provider Department      3/30/2017 2:00 PM Tiff Dunn MD [facility]        Today's Diagnoses     Medication monitoring encounter    -  1    Onychomycosis        Seborrheic keratosis        Cherry angioma        Nonintractable headache, unspecified chronicity pattern, unspecified headache type           Follow-ups after your visit        Your next 10 appointments already scheduled     Apr 03, 2017  3:30 PM CDT   [facility] with Javon Khan DC   [facility] [place] [facility] ([facility] [place])    [address]  [place] [address] [phone]              Future tests that were ordered for you today     Open Standing Orders        Priority Remaining Interval Expires Ordered    CBC with platelets differential Routine 2/2 4 weeks 7/30/2017 3/30/2017    ALT Routine 2/2 4 weeks 7/30/2017 3/30/2017    AST Routine 2/2 4 weeks 7/30/2017 3/30/2017            Who to contact     If you have questions or need follow up information about today's clinic visit or your schedule please contact [facility] directly at [phone].  Normal or non-critical lab and imaging results will be communicated to you by MyChart, letter or phone within 4 business days after the clinic has received the results. If you do not hear from us within 7 days, please contact the clinic through [email or web address] or phone. If you have a critical or abnormal lab result, we will notify you by phone as soon as possible.  Submit refill requests through [employer] or call your pharmacy and they will forward the refill request to us. Please allow 3 business days for your refill to be completed.          Additional Information About Your Visit        [employer] Information     [employer] gives you secure access to your electronic  health record. If you see a primary care provider, you can also send messages to your care team and make appointments. If you have questions, please call your primary care clinic.  If you do not have a primary care provider, please call [phone] and they will assist you.      [employer] is an electronic gateway that provides easy, online access to your medical records. With [employer], you can request a clinic appointment, read your test results, renew a prescription or communicate with your care team.     To access your existing account, please contact your [facility] or call [phone] for assistance.        Care EveryWhere ID     This is your Care EveryWhere ID. This could be used by other organizations to access your [place] medical records  [id number]         Blood Pressure from Last 3 Encounters:   03/09/17 126/72   02/10/17 126/78   10/22/16 131/74    Weight from Last 3 Encounters:   03/09/17 111.1 kg (245 lb)   02/27/17 110.2 kg (243 lb)   02/10/17 110.2 kg (243 lb)               Primary Care Provider Office Phone # Fax #    Briana Melton [phone]       [facility] [address] [address]        Thank you!     Thank you for choosing [facility]  for your care. Our goal is always to provide you with excellent care. Hearing back from our patients is one way we can continue to improve our services. Please take a few minutes to complete the written survey that you may receive in the mail after your visit with us. Thank you!             Your Updated Medication List - Protect others around you: Learn how to safely use, store and throw away your medicines at www.disposemymeds.org.          This list is accurate as of: 3/30/17  2:41 PM.  Always use your most recent med list.                   Brand Name Dispense Instructions for use    albuterol 108 (90 BASE) MCG/ACT Inhaler    PROAIR HFA/PROVENTIL HFA/VENTOLIN HFA     1 Inhaler    Inhale 2 puffs into the lungs every 6 hours as needed for shortness of breath / dyspnea or wheezing       BIOTIN PO          buPROPion 150 MG 24 hr tablet    WELLBUTRIN XL    90 tablet    Take 1 tablet (150 mg) by mouth every morning       estrogens (conjugated) 0.3 MG tablet    PREMARIN    90 tablet    Take 1 tablet (0.3 mg) by mouth daily       famciclovir 500 MG tablet    FAMVIR    180 tablet    Take 1 tablet (500 mg) by mouth 2 times daily       hydroquinone 4 % Crea     56.8 g    Externally apply topically At Bedtime Apply a thin layer to dark areas once nightly for no more than 8 weeks at a time. Take breaks between use.       IRON SUPPLEMENT PO          levothyroxine 125 MCG tablet    SYNTHROID/LEVOTHROID    90 tablet    Take 1 tablet (125 mcg) by mouth daily       methocarbamol 750 MG tablet    ROBAXIN    60 tablet    Take 1 tablet (750 mg) by mouth 3 times daily as needed       nystatin 330111 UNIT/GM Powd    MYCOSTATIN    30 g    Apply 30 g topically 3 times daily as needed       omeprazole 20 MG CR capsule    priLOSEC    90 capsule    Take 1 capsule (20 mg) by mouth daily       UNABLE TO FIND      MEDICATION NAME: Lipoflavinoid+ For ringing in ears       VITAMIN C PO          VITAMIN D PO      Take by mouth daily

## 2017-03-30 NOTE — PROGRESS NOTES
[facility] Dermatology Note      Dermatology Problem List:  1. Lesion on left nose, biopsied in [place] ~2006, pt reports showed lupus versus lymphoma and saw heme onc who reported lesion was not worrisome. Unable to obtain record  2. Onychomycosis, KOH positve  -Previous Tx: terbinafine (initiated 3/14/2016)  3. Hyperkeratosis, right great toe  -Previous Tx: Urea 40% cream (initiated 3/14/2016) with resolution  3. Photoaging/idiopathic guttate hypomelanosis  -Previous Tx: tretinoin 0.05% cream and hydroquinone    Encounter Date: Mar 30, 2017    CC:  Chief Complaint   Patient presents with     Derm Problem     1 yr skin check. recheck toe, spot on chin.          History of Present Illness:  Ms. Amina Pineda is a 56 year old female who presents as a follow-up for lesion on the chin and onychomycosis. The patient was last seen 3/14/2016 when a biopsy was performed on the right lateral canthus, terbinafine started for onychomycosis and Urea 40% started for hyperkeratosis. Today, the patient reports some improvement of her toenails however the fungus may be returning. Reports a dry area on her chin that has been present for 9 months. Notes the skin thickening of her feet has resolved. The patient reports no other lesions of concern.    Past Medical History:   Patient Active Problem List   Diagnosis     Hypothyroidism     Esophageal reflux     Herpes simplex virus (HSV) infection     Generalized osteoarthrosis, unspecified site     Dysthymic disorder     CARDIOVASCULAR SCREENING; LDL GOAL LESS THAN 160     Female stress incontinence     Restless leg syndrome     Non morbid obesity due to excess calories     Ocular hypertension, right     Menopausal syndrome (hot flushes)     Daytime somnolence     Chronic bilateral low back pain without sciatica     Mixed incontinence     ANDREW (obstructive sleep apnea)     Past Medical History:   Diagnosis Date     Decreased libido 11/6/2006     Depressive disorder,  not elsewhere classified      Esophageal reflux      Generalized osteoarthrosis, unspecified site     R hip, on meds     Herpes simplex without mention of complication     oral     Intramural leiomyoma of uterus      Unspecified hypothyroidism      Past Surgical History:   Procedure Laterality Date     C LIGATE FALLOPIAN TUBE       C NONSPECIFIC PROCEDURE  5/02    rotator cuff surgery both shoulder     C NONSPECIFIC PROCEDURE      wrist surgery for cyst x 2     C VAGINAL HYSTERECTOMY  12/03    with BSO, 10-12 week size     COLONOSCOPY  4/24/2015     COLONOSCOPY WITH CO2 INSUFFLATION N/A 4/23/2015    Procedure: COLONOSCOPY WITH CO2 INSUFFLATION;  Surgeon: Bebeto Mortensen MD;  Location: [facility] OR     ELBOW SURGERY      Lt elbow repair.     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 The patient denies use of tanning beds.    Family History:  Dad with AKs, no other family history of skin cancer.     Medications:  Current Outpatient Prescriptions   Medication Sig Dispense Refill     BIOTIN PO        levothyroxine (SYNTHROID/LEVOTHROID) 125 MCG tablet Take 1 tablet (125 mcg) by mouth daily 90 tablet 3     estrogens, conjugated, (PREMARIN) 0.3 MG tablet Take 1 tablet (0.3 mg) by mouth daily 90 tablet 3     buPROPion (WELLBUTRIN XL) 150 MG 24 hr tablet Take 1 tablet (150 mg) by mouth every morning 90 tablet 1     omeprazole (PRILOSEC) 20 MG CR capsule Take 1 capsule (20 mg) by mouth daily 90 capsule 3     albuterol (PROAIR HFA/PROVENTIL HFA/VENTOLIN HFA) 108 (90 BASE) MCG/ACT Inhaler Inhale 2 puffs into the lungs every 6 hours as needed for shortness of breath / dyspnea or wheezing 1 Inhaler 11     famciclovir (FAMVIR) 500 MG tablet Take 1 tablet (500 mg) by mouth 2 times daily 180 tablet 5     methocarbamol (ROBAXIN) 750 MG tablet Take 1 tablet (750 mg) by mouth 3 times daily as  needed 60 tablet 1     UNABLE TO FIND MEDICATION NAME: Lipoflavinoid+  For ringing in ears       hydroquinone 4 % CREA Externally apply topically At Bedtime Apply a thin layer to dark areas once nightly for no more than 8 weeks at a time. Take breaks between use. 56.8 g 0     nystatin (MYCOSTATIN) [id number] UNIT/GM POWD Apply 30 g topically 3 times daily as needed 30 g 1     Cholecalciferol (VITAMIN D PO) Take by mouth daily        Allergies   Allergen Reactions     No Known Drug Allergies      Review of Systems:  -Const: Denies recent hospitalizations. Denies fevers or chills. Reports headache that started this am and is not improving.   -Skin: As above in HPI. No additional skin concerns.  -GI- no N/V  -Eye- no changes in vistion    Physical exam:  Vitals: There were no vitals taken for this visit.  GEN: This is a well developed, well-nourished female in no acute distress, in a pleasant mood.    SKIN: Total skin excluding the undergarment areas was performed. The exam included the head/face, neck, both arms, chest, back, abdomen, both legs, digits and/or nails. Including exam of the buttocks. Declines genital exam  -Thickening and yellow discoloration of the toenails.   -There is a waxy stuck on tan to brown papule on the chin.  -There are bright red some shaped papules scattered on the trunk.  -No other lesions of concern on areas examined.     Impression/Plan:  1. Hyperkeratosis, right great toe. Resolved with Urea 40% cream.     No further intervention required at this time.   2. Onychomycosis, KOH positive. Patient started terbinafine 3/2016 with some improvement but no resolution    Restart terbinafine 250 mg daily x12 weeks for nails. Patient counseled that nails may take approximately 12 to 18  months for toenails to completely grow out.     The patient was counseled on the rare but serious risk of liver disease. ALT/AST will be obtained in 1 month. Drug interaction check was performed.     Recommend keeping  toenails short.    3. Cherry angiomas, trunk    No further intervention required at this time.   4. Seborrheic keratosis, chin    Concern for shave removal due to scarring and location but offered patient for definitive diagnosis. She declines. I let her know that she should call clinic if it changes at all.   5. Headache-since monday    Recommend follow up with PCP    Follow-up in July, earlier for new or changing lesions.     Staff Involved:  Staff/Scribe    Scribe Disclosure:   I, Bertha Gayle, am serving as a scribe to document services personally performed by Dr. Tiff Dunn, based on data collection and the provider's statements to me.     Provider Disclosure:   I agree with above History, Review of Systems, Physical exam and Plan. I have reviewed the content of the documentation and have edited it as needed. I have personally performed the services documented here and the documentation accurately represents those services and the decisions I have made.     Tiff Dunn MD    Department of Dermatology  [facility]: Phone: [phone], Fax:[phone]  [facility]: Phone: [phone], Fax: [phone]

## 2017-03-30 NOTE — LETTER
3/30/2017       RE: Amina Pineda  [address]  [place] [address]     Dear Colleague,    Thank you for referring your patient, Amina Pineda, to the [facility] at [facility]. Please see a copy of my visit note below.    [facility] Dermatology Note      Dermatology Problem List:  1. Lesion on left nose, biopsied in [place] ~2006, pt reports showed lupus versus lymphoma and saw heme onc who reported lesion was not worrisome. Unable to obtain record  2. Onychomycosis, KOH positve  -Previous Tx: terbinafine (initiated 3/14/2016)  3. Hyperkeratosis, right great toe  -Previous Tx: Urea 40% cream (initiated 3/14/2016) with resolution  3. Photoaging/idiopathic guttate hypomelanosis  -Previous Tx: tretinoin 0.05% cream and hydroquinone    Encounter Date: Mar 30, 2017    CC:  Chief Complaint   Patient presents with     Derm Problem     1 yr skin check. recheck toe, spot on chin.          History of Present Illness:  Ms. Amina Pineda is a 56 year old female who presents as a follow-up for lesion on the chin and onychomycosis. The patient was last seen 3/14/2016 when a biopsy was performed on the right lateral canthus, terbinafine started for onychomycosis and Urea 40% started for hyperkeratosis. Today, the patient reports some improvement of her toenails however the fungus may be returning. Reports a dry area on her chin that has been present for 9 months. Notes the skin thickening of her feet has resolved. The patient reports no other lesions of concern.    Past Medical History:   Patient Active Problem List   Diagnosis     Hypothyroidism     Esophageal reflux     Herpes simplex virus (HSV) infection     Generalized osteoarthrosis, unspecified site     Dysthymic disorder     CARDIOVASCULAR SCREENING; LDL GOAL LESS THAN 160     Female stress incontinence     Restless leg syndrome     Non morbid obesity due to excess calories      Ocular hypertension, right     Menopausal syndrome (hot flushes)     Daytime somnolence     Chronic bilateral low back pain without sciatica     Mixed incontinence     ANDREW (obstructive sleep apnea)     Past Medical History:   Diagnosis Date     Decreased libido 11/6/2006     Depressive disorder, not elsewhere classified      Esophageal reflux      Generalized osteoarthrosis, unspecified site     R hip, on meds     Herpes simplex without mention of complication     oral     Intramural leiomyoma of uterus      Unspecified hypothyroidism      Past Surgical History:   Procedure Laterality Date     C LIGATE FALLOPIAN TUBE       C NONSPECIFIC PROCEDURE  5/02    rotator cuff surgery both shoulder     C NONSPECIFIC PROCEDURE      wrist surgery for cyst x 2     C VAGINAL HYSTERECTOMY  12/03    with BSO, 10-12 week size     COLONOSCOPY  4/24/2015     COLONOSCOPY WITH CO2 INSUFFLATION N/A 4/23/2015    Procedure: COLONOSCOPY WITH CO2 INSUFFLATION;  Surgeon: Bebeto Mortensen MD;  Location: [facility] OR     ELBOW SURGERY      Lt elbow repair.     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 The patient denies use of tanning beds.    Family History:  Dad with AKs, no other family history of skin cancer.     Medications:  Current Outpatient Prescriptions   Medication Sig Dispense Refill     BIOTIN PO        levothyroxine (SYNTHROID/LEVOTHROID) 125 MCG tablet Take 1 tablet (125 mcg) by mouth daily 90 tablet 3     estrogens, conjugated, (PREMARIN) 0.3 MG tablet Take 1 tablet (0.3 mg) by mouth daily 90 tablet 3     buPROPion (WELLBUTRIN XL) 150 MG 24 hr tablet Take 1 tablet (150 mg) by mouth every morning 90 tablet 1     omeprazole (PRILOSEC) 20 MG CR capsule Take 1 capsule (20 mg) by mouth daily 90 capsule 3     albuterol (PROAIR HFA/PROVENTIL HFA/VENTOLIN HFA) 108 (90 BASE) MCG/ACT Inhaler Inhale 2  puffs into the lungs every 6 hours as needed for shortness of breath / dyspnea or wheezing 1 Inhaler 11     famciclovir (FAMVIR) 500 MG tablet Take 1 tablet (500 mg) by mouth 2 times daily 180 tablet 5     methocarbamol (ROBAXIN) 750 MG tablet Take 1 tablet (750 mg) by mouth 3 times daily as needed 60 tablet 1     UNABLE TO FIND MEDICATION NAME: Lipoflavinoid+  For ringing in ears       hydroquinone 4 % CREA Externally apply topically At Bedtime Apply a thin layer to dark areas once nightly for no more than 8 weeks at a time. Take breaks between use. 56.8 g 0     nystatin (MYCOSTATIN) 791466 UNIT/GM POWD Apply 30 g topically 3 times daily as needed 30 g 1     Cholecalciferol (VITAMIN D PO) Take by mouth daily        Allergies   Allergen Reactions     No Known Drug Allergies      Review of Systems:  -Const: Denies recent hospitalizations. Denies fevers or chills. Reports headache that started this am and is not improving.   -Skin: As above in HPI. No additional skin concerns.  -GI- no N/V  -Eye- no changes in vistion    Physical exam:  Vitals: There were no vitals taken for this visit.  GEN: This is a well developed, well-nourished female in no acute distress, in a pleasant mood.    SKIN: Total skin excluding the undergarment areas was performed. The exam included the head/face, neck, both arms, chest, back, abdomen, both legs, digits and/or nails. Including exam of the buttocks. Declines genital exam  -Thickening and yellow discoloration of the toenails.   -There is a waxy stuck on tan to brown papule on the chin.  -There are bright red some shaped papules scattered on the trunk.  -No other lesions of concern on areas examined.     Impression/Plan:  1. Hyperkeratosis, right great toe. Resolved with Urea 40% cream.     No further intervention required at this time.   2. Onychomycosis, KOH positive. Patient started terbinafine 3/2016 with some improvement but no resolution    Restart terbinafine 250 mg daily x12 weeks  for nails. Patient counseled that nails may take approximately 12 to 18  months for toenails to completely grow out.     The patient was counseled on the rare but serious risk of liver disease. ALT/AST will be obtained in 1 month. Drug interaction check was performed.     Recommend keeping toenails short.    3. Cherry angiomas, trunk    No further intervention required at this time.   4. Seborrheic keratosis, chin    Concern for shave removal due to scarring and location but offered patient for definitive diagnosis. She declines. I let her know that she should call clinic if it changes at all.   5. Headache-since monday    Recommend follow up with PCP    Follow-up in July, earlier for new or changing lesions.     Staff Involved:  Staff/Scribe    Scribe Disclosure:   I, Bertha Gayle, am serving as a scribe to document services personally performed by Dr. Tiff Dunn, based on data collection and the provider's statements to me.     Provider Disclosure:   I agree with above History, Review of Systems, Physical exam and Plan. I have reviewed the content of the documentation and have edited it as needed. I have personally performed the services documented here and the documentation accurately represents those services and the decisions I have made.     Tiff Dunn MD    Department of Dermatology  [facility]: Phone: [phone], Fax:[phone]  [facility]: Phone: [phone], Fax: [phone]        Again, thank you for allowing me to participate in the care of your patient.      Sincerely,    Tiff Dunn MD

## 2017-03-31 ENCOUNTER — TELEPHONE (OUTPATIENT)
Dept: DERMATOLOGY | Facility: CLINIC | Age: 57
End: 2017-03-31

## 2017-03-31 DIAGNOSIS — B35.1 ONYCHOMYCOSIS: Primary | ICD-10-CM

## 2017-03-31 RX ORDER — TERBINAFINE HYDROCHLORIDE 250 MG/1
250 TABLET ORAL DAILY
Qty: 90 TABLET | Refills: 0 | Status: SHIPPED | OUTPATIENT
Start: 2017-03-31 | End: 2017-10-26

## 2017-03-31 RX ORDER — TERBINAFINE HYDROCHLORIDE 250 MG/1
250 TABLET ORAL DAILY
Qty: 90 TABLET | Refills: 0 | Status: SHIPPED | OUTPATIENT
Start: 2017-03-31 | End: 2017-03-31

## 2017-03-31 NOTE — TELEPHONE ENCOUNTER
Patient called stated she hasnt been sent the terbinafine at all after dr. solis reviewed labs,  Please send the medication to the [facility].   This cma will route to Dr. Solis.   Eliud aMnzo CMA

## 2017-04-03 ENCOUNTER — THERAPY VISIT (OUTPATIENT)
Dept: CHIROPRACTIC MEDICINE | Facility: CLINIC | Age: 57
End: 2017-04-03
Payer: COMMERCIAL

## 2017-04-03 DIAGNOSIS — M62.838 SPASM OF MUSCLE: ICD-10-CM

## 2017-04-03 DIAGNOSIS — M54.2 CERVICALGIA: ICD-10-CM

## 2017-04-03 DIAGNOSIS — M99.02 THORACIC SEGMENT DYSFUNCTION: ICD-10-CM

## 2017-04-03 DIAGNOSIS — M99.01 CERVICAL SEGMENT DYSFUNCTION: ICD-10-CM

## 2017-04-03 DIAGNOSIS — M99.03 SEGMENTAL DYSFUNCTION OF LUMBAR REGION: ICD-10-CM

## 2017-04-03 DIAGNOSIS — M54.50 LUMBAGO: ICD-10-CM

## 2017-04-03 DIAGNOSIS — M99.05 SEGMENTAL DYSFUNCTION OF PELVIC REGION: Primary | ICD-10-CM

## 2017-04-03 PROCEDURE — 98941 CHIROPRACT MANJ 3-4 REGIONS: CPT | Mod: AT | Performed by: CHIROPRACTOR

## 2017-04-03 NOTE — PROGRESS NOTES
Visit #:  6 of 6, based on treatment plan    Subjective:  Amina Pineda is a 56 year old female who is seen in f/u up for:        Segmental dysfunction of pelvic region  Lumbago  Segmental dysfunction of lumbar region  Spasm of muscle  Segmental dysfunction of thoracic region  Cervicalgia  Cervical segment dysfunction.     Since last visit on 3/27/2017,  Amina Pineda reports the following changes: Pain immediately after last treatment: 1/10 and their pain level today 2/10.  Amina is feeling stiff today and has been having some headaches.  She has been doing well.  She was able to work on her bathroom and that did not flare up her low back or neck.    Area of chief complaint:  Cervical and Lumbar :  Symptoms are graded at 2/10. The quality is described as stiff, achey.  Motion has increased, but is still not normal. Patient feels that they are improved due to a reduction in symptoms.        Objective:  The following was observed:    P: palpatory tenderness, piriformis and upper traps    A: static palpation demonstrates intersegmental asymmetry , pelvis, lumbar thoracic and cervical regions    R: motion palpation notes restricted motion, :  C1 left rotation restricted  C6 Left rotation restricted  C7 Left rotation restricted  T1 Right rotation restricted  T2 Right rotation restricted  T6 Extension restriction  T7 Extension restriction  T8 Extension restriction  L3 Right rotation restricted  L5 Right rotation restricted  PSIS Right Extension restriction    T: hypertonicity at: Lumbar erector spine, Piriformis and TFL R>>L      Assessment:    Segmental spinal dysfunction/restrictions found at:  C1  C6   C7   T1   T2   T6  T7  T8  L4  L5  PSIS Right    Diagnoses:      1. Segmental dysfunction of pelvic region    2. Lumbago    3. Segmental dysfunction of lumbar region    4. Spasm of muscle    5. Segmental dysfunction of thoracic region    6. Cervicalgia    7. Cervical segment dysfunction        Patient's  condition:  Patient had restrictions pre-manipulation    Treatment effectiveness:  Post manipulation there is better intersegmental movement and Patient claims to feel looser post manipulation      Procedures:  CMT:  94161 Chiropractic manipulative treatment 3-4 regions performed   Cervical:  Activator, C6, C7 , Prone, Supine  Thoracic  Activator, T1, T2, T6, T7, T8, Prone  Lumbar: Activator, L4, L5, Side posture  Pelvis: Drop Table, PSIS Right , Prone    Modalities:  65505: MSTM:  To Piriformis and Traps  for 5 min    Therapeutic procedures:  None      Prognosis: Good    Progress towards Goals: Patient is making progress towards the goal     Response to Treatment:   Reduction in symptoms as reported by patient      Recommendations:    Instructions:ice 20 minutes every other hour as needed    Follow-up:  Return to care in one week

## 2017-04-13 ENCOUNTER — THERAPY VISIT (OUTPATIENT)
Dept: CHIROPRACTIC MEDICINE | Facility: CLINIC | Age: 57
End: 2017-04-13
Payer: COMMERCIAL

## 2017-04-13 DIAGNOSIS — M99.02 SEGMENTAL DYSFUNCTION OF THORACIC REGION: ICD-10-CM

## 2017-04-13 DIAGNOSIS — M99.03 SEGMENTAL DYSFUNCTION OF LUMBAR REGION: ICD-10-CM

## 2017-04-13 DIAGNOSIS — M99.01 CERVICAL SEGMENT DYSFUNCTION: ICD-10-CM

## 2017-04-13 DIAGNOSIS — M54.2 CERVICALGIA: ICD-10-CM

## 2017-04-13 DIAGNOSIS — M54.50 LUMBAGO: ICD-10-CM

## 2017-04-13 DIAGNOSIS — M99.05 SEGMENTAL DYSFUNCTION OF PELVIC REGION: Primary | ICD-10-CM

## 2017-04-13 DIAGNOSIS — M62.838 SPASM OF MUSCLE: ICD-10-CM

## 2017-04-13 PROCEDURE — 98941 CHIROPRACT MANJ 3-4 REGIONS: CPT | Mod: AT | Performed by: CHIROPRACTOR

## 2017-04-13 NOTE — PROGRESS NOTES
Visit #:  7    Subjective:  Amina Pineda is a 56 year old female who is seen in f/u up for:        Segmental dysfunction of pelvic region  Lumbago  Segmental dysfunction of lumbar region  Spasm of muscle  Segmental dysfunction of thoracic region  Cervicalgia  Cervical segment dysfunction.     Since last visit on 4/3/2017,  Amina Pineda reports the following changes: Pain immediately after last treatment: 1/10 and their pain level today 2/10.  Amina is feeling a little stiff today.  Howver she has been pretty good since last visit.  She was traveling on business and had to sleep in a very uncomfortable bed.  That did not seem to irritate her back.    Area of chief complaint:  Cervical and Lumbar :  Symptoms are graded at 2/10. The quality is described as stiff, achey.  Motion has increased, but is still not normal. Patient feels that they are improved due to a reduction in symptoms.        Objective:  The following was observed:    P: palpatory tenderness, piriformis and upper traps    A: static palpation demonstrates intersegmental asymmetry , pelvis, lumbar thoracic and cervical regions    R: motion palpation notes restricted motion, :  C1 left rotation restricted  C6 Left rotation restricted  C7 Left rotation restricted  T1 Right rotation restricted  T2 Right rotation restricted  T6 Extension restriction  T7 Extension restriction  T8 Extension restriction  L3 Right rotation restricted  L5 Right rotation restricted  PSIS Right Extension restriction    T: hypertonicity at: Lumbar erector spine, Piriformis and TFL R>>L      Assessment:    Segmental spinal dysfunction/restrictions found at:  C1  C6   C7   T1   T2   T6  T7  T8  L4  L5  PSIS Right    Diagnoses:      1. Segmental dysfunction of pelvic region    2. Lumbago    3. Segmental dysfunction of lumbar region    4. Spasm of muscle    5. Segmental dysfunction of thoracic region    6. Cervicalgia    7. Cervical segment dysfunction        Patient's  condition:  Patient had restrictions pre-manipulation    Treatment effectiveness:  Post manipulation there is better intersegmental movement and Patient claims to feel looser post manipulation      Procedures:  CMT:  87763 Chiropractic manipulative treatment 3-4 regions performed   Cervical:  Activator, C6, C7 , Prone, Supine  Thoracic  Activator, T1, T2, T6, T7, T8, Prone  Lumbar: Activator, L4, L5, Side posture  Pelvis: Drop Table, PSIS Right , Prone    Modalities:  31345: MSTM:  To Piriformis and Traps  for 5 min    Therapeutic procedures:  None      Prognosis: Good    Progress towards Goals: Patient is making progress towards the goal     Response to Treatment:   Reduction in symptoms as reported by patient      Recommendations:    Instructions:ice 20 minutes every other hour as needed    Follow-up:  Return to care in one week

## 2017-06-13 ENCOUNTER — OFFICE VISIT (OUTPATIENT)
Dept: URGENT CARE | Facility: URGENT CARE | Age: 57
End: 2017-06-13
Payer: COMMERCIAL

## 2017-06-13 VITALS
TEMPERATURE: 97.5 F | HEART RATE: 79 BPM | WEIGHT: 243 LBS | DIASTOLIC BLOOD PRESSURE: 76 MMHG | SYSTOLIC BLOOD PRESSURE: 137 MMHG | RESPIRATION RATE: 15 BRPM | BODY MASS INDEX: 35.88 KG/M2 | OXYGEN SATURATION: 97 %

## 2017-06-13 DIAGNOSIS — H04.301 TEAR DUCT INFECTION, RIGHT: Primary | ICD-10-CM

## 2017-06-13 PROCEDURE — 99213 OFFICE O/P EST LOW 20 MIN: CPT | Performed by: PHYSICIAN ASSISTANT

## 2017-06-13 RX ORDER — CLINDAMYCIN HCL 300 MG
300 CAPSULE ORAL 4 TIMES DAILY
Qty: 28 CAPSULE | Refills: 0 | Status: SHIPPED | OUTPATIENT
Start: 2017-06-13 | End: 2017-06-13

## 2017-06-13 RX ORDER — CLINDAMYCIN HCL 300 MG
300 CAPSULE ORAL 4 TIMES DAILY
Qty: 28 CAPSULE | Refills: 0 | Status: SHIPPED | OUTPATIENT
Start: 2017-06-13 | End: 2017-06-21

## 2017-06-13 ASSESSMENT — PAIN SCALES - GENERAL: PAINLEVEL: MILD PAIN (2)

## 2017-06-13 NOTE — MR AVS SNAPSHOT
After Visit Summary   6/13/2017    Amina Pineda    MRN: [id number]           Patient Information     Date Of Birth          1960        Visit Information        Provider Department      6/13/2017 6:15 PM Briana Kimble PA-C [facility]        Today's Diagnoses     Tear duct infection, right    -  1       Follow-ups after your visit        Additional Services     OPHTHALMOLOGY ADULT REFERRAL       Your provider has referred you to: FMG: [facility] [place] [phone]   [email or web address]  UMP: [facility] [place] [phone]   [email or web address]  FHN: [facility] [place] [phone]   [email or web address]  [place] [phone]   [email or web address]    Please be aware that coverage of these services is subject to the terms and limitations of your health insurance plan.  Call member services at your health plan with any benefit or coverage questions.      Please bring the following with you to your appointment:    (1) Any X-Rays, CTs or MRIs which have been performed.  Contact the facility where they were done to arrange for  prior to your scheduled appointment.    (2) List of current medications  (3) This referral request   (4) Any documents/labs given to you for this referral                  Your next 10 appointments already scheduled     Jul 27, 2017  2:15 PM CDT   Return Visit with Tiff Dunn MD   [facility] ([facility])    [address] [address] [place] [address] [phone]              Who to contact     If you have questions or need follow up information about today's clinic visit or your schedule please contact [facility] directly at [phone].  Normal or non-critical lab and imaging results will be communicated to you by MyChart, letter or phone within  4 business days after the clinic has received the results. If you do not hear from us within 7 days, please contact the clinic through FANCRU or phone. If you have a critical or abnormal lab result, we will notify you by phone as soon as possible.  Submit refill requests through FANCRU or call your pharmacy and they will forward the refill request to us. Please allow 3 business days for your refill to be completed.          Additional Information About Your Visit        FANCRU Information     FANCRU gives you secure access to your electronic health record. If you see a primary care provider, you can also send messages to your care team and make appointments. If you have questions, please call your primary care clinic.  If you do not have a primary care provider, please call [phone] and they will assist you.        Care EveryWhere ID     This is your Care EveryWhere ID. This could be used by other organizations to access your [facility] medical records  [id number]        Your Vitals Were     Pulse Temperature Respirations Pulse Oximetry Breastfeeding? BMI (Body Mass Index)    79 97.5  F (36.4  C) (Oral) 15 97% No 35.88 kg/m2       Blood Pressure from Last 3 Encounters:   06/13/17 137/76   03/09/17 126/72   02/10/17 126/78    Weight from Last 3 Encounters:   06/13/17 243 lb (110.2 kg)   03/09/17 245 lb (111.1 kg)   02/27/17 243 lb (110.2 kg)              We Performed the Following     OPHTHALMOLOGY ADULT REFERRAL          Today's Medication Changes          These changes are accurate as of: 6/13/17  6:51 PM.  If you have any questions, ask your nurse or doctor.               Start taking these medicines.        Dose/Directions    clindamycin 300 MG capsule   Commonly known as:  CLEOCIN   Used for:  Tear duct infection, right   Started by:  Briana Kimble PA-C        Dose:  300 mg   Take 1 capsule (300 mg) by mouth 4 times daily for 7 days   Quantity:  28 capsule   Refills:  0            Where to get your  medicines      These medications were sent to [id number] - [facility] [address] [facility]  [address], [place] [address]     Phone:  [phone]     clindamycin 300 MG capsule                Primary Care Provider Office Phone # Fax #    Briana Ludmila Melton [phone]       [address] [address]        Thank you!     Thank you for choosing [facility]  for your care. Our goal is always to provide you with excellent care. Hearing back from our patients is one way we can continue to improve our services. Please take a few minutes to complete the written survey that you may receive in the mail after your visit with us. Thank you!             Your Updated Medication List - Protect others around you: Learn how to safely use, store and throw away your medicines at www.disposemymeds.org.          This list is accurate as of: 6/13/17  6:51 PM.  Always use your most recent med list.                   Brand Name Dispense Instructions for use    albuterol 108 (90 BASE) MCG/ACT Inhaler    PROAIR HFA/PROVENTIL HFA/VENTOLIN HFA    1 Inhaler    Inhale 2 puffs into the lungs every 6 hours as needed for shortness of breath / dyspnea or wheezing       BIOTIN PO          buPROPion 150 MG 24 hr tablet    WELLBUTRIN XL    90 tablet    Take 1 tablet (150 mg) by mouth every morning       clindamycin 300 MG capsule    CLEOCIN    28 capsule    Take 1 capsule (300 mg) by mouth 4 times daily for 7 days       estrogens (conjugated) 0.3 MG tablet    PREMARIN    90 tablet    Take 1 tablet (0.3 mg) by mouth daily       famciclovir 500 MG tablet    FAMVIR    180 tablet    Take 1 tablet (500 mg) by mouth 2 times daily       hydroquinone 4 % Crea     56.8 g    Externally apply topically At Bedtime Apply a thin layer to dark areas once nightly for no more than 8 weeks at a time. Take breaks between use.        IRON SUPPLEMENT PO          levothyroxine 125 MCG tablet    SYNTHROID/LEVOTHROID    90 tablet    Take 1 tablet (125 mcg) by mouth daily       methocarbamol 750 MG tablet    ROBAXIN    60 tablet    Take 1 tablet (750 mg) by mouth 3 times daily as needed       nystatin 749643 UNIT/GM Powd    MYCOSTATIN    30 g    Apply 30 g topically 3 times daily as needed       omeprazole 20 MG CR capsule    priLOSEC    90 capsule    Take 1 capsule (20 mg) by mouth daily       terbinafine 250 MG tablet    lamISIL    90 tablet    Take 1 tablet (250 mg) by mouth daily       UNABLE TO FIND      MEDICATION NAME: Lipoflavinoid+ For ringing in ears       VITAMIN C PO          VITAMIN D PO      Take by mouth daily

## 2017-06-13 NOTE — PROGRESS NOTES
SUBJECTIVE:                                                      HPI  Amina Pineda is a 57 year old female who presents today with tender lump below her R eye for the past 1week.  Has noticed tender lump underneath her R eye for the past 1week which has been increasing in tenderness and size.  Has also noticed some redness and watery drainage which she states is nothing new.  No eye pain, red eyes or changes in her vision.  No URI symptoms or fevers.  No injury or trauma.  She has been doing ice pack without any relief.    Reviewed PMH.  Patient Active Problem List   Diagnosis     Hypothyroidism     Esophageal reflux     Herpes simplex virus (HSV) infection     Generalized osteoarthrosis, unspecified site     Dysthymic disorder     CARDIOVASCULAR SCREENING; LDL GOAL LESS THAN 160     Female stress incontinence     Restless leg syndrome     Non morbid obesity due to excess calories     Ocular hypertension, right     Menopausal syndrome (hot flushes)     Daytime somnolence     Chronic bilateral low back pain without sciatica     Mixed incontinence     ANDREW (obstructive sleep apnea)     Current Outpatient Prescriptions   Medication Sig Dispense Refill     terbinafine (LAMISIL) 250 MG tablet Take 1 tablet (250 mg) by mouth daily 90 tablet 0     Ferrous Sulfate (IRON SUPPLEMENT PO)        Ascorbic Acid (VITAMIN C PO)        BIOTIN PO        levothyroxine (SYNTHROID/LEVOTHROID) 125 MCG tablet Take 1 tablet (125 mcg) by mouth daily 90 tablet 3     estrogens, conjugated, (PREMARIN) 0.3 MG tablet Take 1 tablet (0.3 mg) by mouth daily 90 tablet 3     buPROPion (WELLBUTRIN XL) 150 MG 24 hr tablet Take 1 tablet (150 mg) by mouth every morning 90 tablet 1     omeprazole (PRILOSEC) 20 MG CR capsule Take 1 capsule (20 mg) by mouth daily 90 capsule 3     albuterol (PROAIR HFA/PROVENTIL HFA/VENTOLIN HFA) 108 (90 BASE) MCG/ACT Inhaler Inhale 2 puffs into the lungs every 6 hours as needed for shortness of breath / dyspnea or  wheezing 1 Inhaler 11     famciclovir (FAMVIR) 500 MG tablet Take 1 tablet (500 mg) by mouth 2 times daily 180 tablet 5     methocarbamol (ROBAXIN) 750 MG tablet Take 1 tablet (750 mg) by mouth 3 times daily as needed 60 tablet 1     UNABLE TO FIND MEDICATION NAME: Lipoflavinoid+  For ringing in ears       hydroquinone 4 % CREA Externally apply topically At Bedtime Apply a thin layer to dark areas once nightly for no more than 8 weeks at a time. Take breaks between use. (Patient not taking: Reported on 3/30/2017) 56.8 g 0     nystatin (MYCOSTATIN) 291202 UNIT/GM POWD Apply 30 g topically 3 times daily as needed 30 g 1     Cholecalciferol (VITAMIN D PO) Take by mouth daily       Allergies   Allergen Reactions     No Known Drug Allergies          ROS  All other pertinent ROS are negative.        Physical Exam   Constitutional: She is oriented to person, place, and time and well-developed, well-nourished, and in no distress. No distress.   HENT:   Head: Normocephalic and atraumatic.   Nose: Nose normal.   Mouth/Throat: Oropharynx is clear and moist. No oropharyngeal exudate.   TMs are intact without any erythema or bulging bilaterally.  Airway is patent.   Eyes: Conjunctivae and EOM are normal. Pupils are equal, round, and reactive to light. Lids are everted and swept, no foreign bodies found. Right eye exhibits discharge. No scleral icterus.   Mildly erythematous, firm, tender nodule present along the R nasolacrimal duct.     Neck: Normal range of motion. Neck supple. No thyromegaly present.   Cardiovascular: Normal rate, regular rhythm, normal heart sounds and intact distal pulses.  Exam reveals no gallop and no friction rub.    No murmur heard.  Pulmonary/Chest: Effort normal and breath sounds normal. No accessory muscle usage. No respiratory distress. She has no decreased breath sounds. She has no wheezes. She has no rhonchi. She has no rales.   Lymphadenopathy:        Head (right side): No tonsillar adenopathy  present.        Head (left side): No tonsillar adenopathy present.     She has no cervical adenopathy.   Neurological: She is alert and oriented to person, place, and time.   Skin: Skin is warm and dry. No cyanosis. Nails show no clubbing.   Psychiatric: Mood and affect normal.   Nursing note and vitals reviewed.        Assessment/Plan:  Tear duct infection, right:  H&P is consistent with an infected blocked tear duct.  Will treat with clindamycin X1week and recommended warm compresses.  Tylenol/ibuprofen as needed for pain. Frequent hand washing.  Will send to ophthamology if no improvement or worsening symptoms.  Recheck in clinic if symptoms worsen or if symptoms do not improve.    -     OPHTHALMOLOGY ADULT REFERRAL  -     clindamycin (CLEOCIN) 300 MG capsule; Take 1 capsule (300 mg) by mouth 4 times daily        Briana Kimble PA-C

## 2017-06-14 ENCOUNTER — OFFICE VISIT (OUTPATIENT)
Dept: OPHTHALMOLOGY | Facility: CLINIC | Age: 57
End: 2017-06-14
Payer: COMMERCIAL

## 2017-06-14 DIAGNOSIS — H04.321 ACUTE DACRYOCYSTITIS, RIGHT: Primary | ICD-10-CM

## 2017-06-14 PROCEDURE — 92002 INTRM OPH EXAM NEW PATIENT: CPT | Performed by: OPHTHALMOLOGY

## 2017-06-14 ASSESSMENT — VISUAL ACUITY
OS_CC: 20/25
OD_CC: 20/25
METHOD: SNELLEN - LINEAR

## 2017-06-14 ASSESSMENT — SLIT LAMP EXAM - LIDS: COMMENTS: NORMAL

## 2017-06-14 NOTE — PATIENT INSTRUCTIONS
Continue Clindamycin 300 mg four times daily   Continue warm packs twice daily   Return visit one week for an MD check    Zenon Bob M.D.

## 2017-06-14 NOTE — PROGRESS NOTES
Current Eye Medications:  None     Subjective:  For the past week patient has had a swollen lump on the R. Side of nose near the NLD which is tender to touch.  Last night she went to  and was told to use hot packs as often as can, and massage.  She started on Clindamycin 300mg qid.  Hasn't noticed a lot of improvement since being seen in .    No previous hx of similar episode.     Objective:  See Ophthalmology Exam.       Assessment:  Possible acute dacryocystitis right eye (versus cellulitis, other).      Plan: Continue Clindamycin 300 mg four times daily.   Continue warm packs twice daily.  Return visit one week for an MD check    Zenon Bob M.D.

## 2017-06-14 NOTE — MR AVS SNAPSHOT
After Visit Summary   6/14/2017    Amina Pineda    MRN: [id number]           Patient Information     Date Of Birth          1960        Visit Information        Provider Department      6/14/2017 2:45 PM Zenon Bob MD [facility]        Care Instructions    Continue Clindamycin 300 mg four times daily   Continue warm packs twice daily   Return visit one week for an MD check    Zenon Bob M.D.            Follow-ups after your visit        Your next 10 appointments already scheduled     Jul 27, 2017  2:15 PM CDT   [facility]    [address] [address] [place] [id number] [phone]              Who to contact     If you have questions or need follow up information about today's clinic visit or your schedule please contact [facility] directly at [phone].  Normal or non-critical lab and imaging results will be communicated to you by PlaySpanhart, letter or phone within 4 business days after the clinic has received the results. If you do not hear from us within 7 days, please contact the clinic through PlaySpanhart or phone. If you have a critical or abnormal lab result, we will notify you by phone as soon as possible.  Submit refill requests through PageStitch or call your pharmacy and they will forward the refill request to us. Please allow 3 business days for your refill to be completed.          Additional Information About Your Visit        MyChart Information     PageStitch gives you secure access to your electronic health record. If you see a primary care provider, you can also send messages to your care team and make appointments. If you have questions, please call your primary care clinic.  If you do not have a primary care provider, please call [phone] and they will assist you.        Care EveryWhere ID     This is your Care EveryWhere ID. This could be used by  other organizations to access your [place] medical records  [id number]         Blood Pressure from Last 3 Encounters:   06/13/17 137/76   03/09/17 126/72   02/10/17 126/78    Weight from Last 3 Encounters:   06/13/17 110.2 kg (243 lb)   03/09/17 111.1 kg (245 lb)   02/27/17 110.2 kg (243 lb)              Today, you had the following     No orders found for display       Primary Care Provider Office Phone # Fax #    Briana Melton [phone]       [facility] [address] [place] [address]        Thank you!     Thank you for choosing [facility]  for your care. Our goal is always to provide you with excellent care. Hearing back from our patients is one way we can continue to improve our services. Please take a few minutes to complete the written survey that you may receive in the mail after your visit with us. Thank you!             Your Updated Medication List - Protect others around you: Learn how to safely use, store and throw away your medicines at www.disposemymeds.org.          This list is accurate as of: 6/14/17  3:57 PM.  Always use your most recent med list.                   Brand Name Dispense Instructions for use    albuterol 108 (90 BASE) MCG/ACT Inhaler    PROAIR HFA/PROVENTIL HFA/VENTOLIN HFA    1 Inhaler    Inhale 2 puffs into the lungs every 6 hours as needed for shortness of breath / dyspnea or wheezing       BIOTIN PO          buPROPion 150 MG 24 hr tablet    WELLBUTRIN XL    90 tablet    Take 1 tablet (150 mg) by mouth every morning       clindamycin 300 MG capsule    CLEOCIN    28 capsule    Take 1 capsule (300 mg) by mouth 4 times daily       estrogens (conjugated) 0.3 MG tablet    PREMARIN    90 tablet    Take 1 tablet (0.3 mg) by mouth daily       famciclovir 500 MG tablet    FAMVIR    180 tablet    Take 1 tablet (500 mg) by mouth 2 times daily       hydroquinone 4 % Crea     56.8 g    Externally apply topically At Bedtime Apply a thin  layer to dark areas once nightly for no more than 8 weeks at a time. Take breaks between use.       IRON SUPPLEMENT PO          levothyroxine 125 MCG tablet    SYNTHROID/LEVOTHROID    90 tablet    Take 1 tablet (125 mcg) by mouth daily       methocarbamol 750 MG tablet    ROBAXIN    60 tablet    Take 1 tablet (750 mg) by mouth 3 times daily as needed       nystatin [id number] UNIT/GM Powd    MYCOSTATIN    30 g    Apply 30 g topically 3 times daily as needed       omeprazole 20 MG CR capsule    priLOSEC    90 capsule    Take 1 capsule (20 mg) by mouth daily       terbinafine 250 MG tablet    lamISIL    90 tablet    Take 1 tablet (250 mg) by mouth daily       UNABLE TO FIND      MEDICATION NAME: Lipoflavinoid+ For ringing in ears       VITAMIN C PO          VITAMIN D PO      Take by mouth daily

## 2017-06-21 ENCOUNTER — OFFICE VISIT (OUTPATIENT)
Dept: OPHTHALMOLOGY | Facility: CLINIC | Age: 57
End: 2017-06-21
Payer: COMMERCIAL

## 2017-06-21 DIAGNOSIS — H04.301 TEAR DUCT INFECTION, RIGHT: ICD-10-CM

## 2017-06-21 PROCEDURE — 92012 INTRM OPH EXAM EST PATIENT: CPT | Performed by: OPHTHALMOLOGY

## 2017-06-21 RX ORDER — CLINDAMYCIN HCL 300 MG
300 CAPSULE ORAL 4 TIMES DAILY
Qty: 28 CAPSULE | Refills: 0 | Status: SHIPPED | OUTPATIENT
Start: 2017-06-21 | End: 2017-10-26

## 2017-06-21 ASSESSMENT — VISUAL ACUITY
OD_CC: 20/25
METHOD: SNELLEN - LINEAR
OS_CC: 20/25

## 2017-06-21 ASSESSMENT — SLIT LAMP EXAM - LIDS: COMMENTS: NORMAL

## 2017-06-21 NOTE — PROGRESS NOTES
Current Eye Medications:  None     Subjective:  MD check.  Patient still has tender lump, and when she massages, she can feel drainage in throat. Finished Clindamycin this morning.     Objective:  See Ophthalmology Exam.       Assessment:  Possible dacryocystitis about 50% improved.      Plan: Resume Clindamycin as prescribed.  Return visit one week for an MD check.    Zenon Bob M.D.

## 2017-06-21 NOTE — MR AVS SNAPSHOT
After Visit Summary   6/21/2017    Amina Pineda    MRN: [id number]           Patient Information     Date Of Birth          1960        Visit Information        Provider Department      6/21/2017 8:45 AM Zenon Bob MD [facility]        Today's Diagnoses     Tear duct infection, right          Care Instructions    Resume Clindamycin as prescribed  Return visit one week for an MD check    Zenon Bob M.D.            Follow-ups after your visit        Your next 10 appointments already scheduled     Jul 27, 2017  2:15 PM CDT   [facility]iff Dunn [facility]   [facility]    [address] [address] [place] [address] [phone]              Who to contact     If you have questions or need follow up information about today's clinic visit or your schedule please contact [facility] directly at [phone].  Normal or non-critical lab and imaging results will be communicated to you by GeneriMedhart, letter or phone within 4 business days after the clinic has received the results. If you do not hear from us within 7 days, please contact the clinic through GeneriMedhart or phone. If you have a critical or abnormal lab result, we will notify you by phone as soon as possible.  Submit refill requests through Zaggora or call your pharmacy and they will forward the refill request to us. Please allow 3 business days for your refill to be completed.          Additional Information About Your Visit        MyChart Information     Zaggora gives you secure access to your electronic health record. If you see a primary care provider, you can also send messages to your care team and make appointments. If you have questions, please call your primary care clinic.  If you do not have a primary care provider, please call [phone] and they will assist you.        Care EveryWhere ID     This is your Care EveryWhere ID. This could  be used by other organizations to access your [place] medical records  [id number]         Blood Pressure from Last 3 Encounters:   06/13/17 137/76   03/09/17 126/72   02/10/17 126/78    Weight from Last 3 Encounters:   06/13/17 110.2 kg (243 lb)   03/09/17 111.1 kg (245 lb)   02/27/17 110.2 kg (243 lb)              Today, you had the following     No orders found for display         Where to get your medicines      These medications were sent to [facility] [place] - [address]  [address], [facility] [address]     Phone:  [phone]     clindamycin 300 MG capsule          Primary Care Provider Office Phone # Fax #    Briana Melton [phone]       [facility] [address] [facility] [address]        Equal Access to Services     GOSIA WOODARD : Hadii aad ku hadasho Soomaali, waaxda luqadaha, qaybta kaalmada adeegyada, waxay alexain haydelia rehman . So [facility] [phone].    ATENCIÓN: Si habla español, tiene a aguilar disposición servicios gratuitos de asistencia lingüística. Llame al [phone].    We comply with applicable federal civil rights laws and [place] laws. We do not discriminate on the basis of race, color, national origin, age, disability sex, sexual orientation or gender identity.            Thank you!     Thank you for choosing [facility]  for your care. Our goal is always to provide you with excellent care. Hearing back from our patients is one way we can continue to improve our services. Please take a few minutes to complete the written survey that you may receive in the mail after your visit with us. Thank you!             Your Updated Medication List - Protect others around you: Learn how to safely use, store and throw away your medicines at www.disposemymeds.org.          This list is accurate as of: 6/21/17  9:02 AM.  Always use your most recent med list.                   Brand  Name Dispense Instructions for use Diagnosis    albuterol 108 (90 BASE) MCG/ACT Inhaler    PROAIR HFA/PROVENTIL HFA/VENTOLIN HFA    1 Inhaler    Inhale 2 puffs into the lungs every 6 hours as needed for shortness of breath / dyspnea or wheezing    Acquired hypothyroidism, Menopausal syndrome (hot flushes), Gastroesophageal reflux disease, esophagitis presence not specified, Decreased libido, High risk medication use, Need for hepatitis C screening test, Encounter for screening mammogram for breast cancer       BIOTIN PO           buPROPion 150 MG 24 hr tablet    WELLBUTRIN XL    90 tablet    Take 1 tablet (150 mg) by mouth every morning    Acquired hypothyroidism, Menopausal syndrome (hot flushes), Gastroesophageal reflux disease, esophagitis presence not specified, Decreased libido, High risk medication use, Need for hepatitis C screening test, Encounter for screening mammogram for breast cancer       clindamycin 300 MG capsule    CLEOCIN    28 capsule    Take 1 capsule (300 mg) by mouth 4 times daily    Tear duct infection, right       estrogens (conjugated) 0.3 MG tablet    PREMARIN    90 tablet    Take 1 tablet (0.3 mg) by mouth daily    Menopausal syndrome (hot flushes), Acquired hypothyroidism, Gastroesophageal reflux disease, esophagitis presence not specified, Decreased libido, High risk medication use, Need for hepatitis C screening test, Encounter for screening mammogram for breast cancer       famciclovir 500 MG tablet    FAMVIR    180 tablet    Take 1 tablet (500 mg) by mouth 2 times daily    Herpes simplex virus (HSV) infection       hydroquinone 4 % Crea     56.8 g    Externally apply topically At Bedtime Apply a thin layer to dark areas once nightly for no more than 8 weeks at a time. Take breaks between use.    Solar lentigo       IRON SUPPLEMENT PO           levothyroxine 125 MCG tablet    SYNTHROID/LEVOTHROID    90 tablet    Take 1 tablet (125 mcg) by mouth daily    Acquired hypothyroidism,  Menopausal syndrome (hot flushes), Gastroesophageal reflux disease, esophagitis presence not specified, Decreased libido, High risk medication use, Need for hepatitis C screening test, Encounter for screening mammogram for breast cancer       methocarbamol 750 MG tablet    ROBAXIN    60 tablet    Take 1 tablet (750 mg) by mouth 3 times daily as needed        nystatin [id number] UNIT/GM Powd    MYCOSTATIN    30 g    Apply 30 g topically 3 times daily as needed    Yeast infection of the skin       omeprazole 20 MG CR capsule    priLOSEC    90 capsule    Take 1 capsule (20 mg) by mouth daily    Gastroesophageal reflux disease, esophagitis presence not specified, Decreased libido, Acquired hypothyroidism, Menopausal syndrome (hot flushes), High risk medication use, Need for hepatitis C screening test, Encounter for screening mammogram for breast cancer       terbinafine 250 MG tablet    lamISIL    90 tablet    Take 1 tablet (250 mg) by mouth daily    Onychomycosis       UNABLE TO FIND      MEDICATION NAME: Lipoflavinoid+ For ringing in ears        VITAMIN C PO           VITAMIN D PO      Take by mouth daily

## 2017-06-28 ENCOUNTER — OFFICE VISIT (OUTPATIENT)
Dept: OPHTHALMOLOGY | Facility: CLINIC | Age: 57
End: 2017-06-28
Payer: COMMERCIAL

## 2017-06-28 DIAGNOSIS — H04.321 ACUTE DACRYOCYSTITIS, RIGHT: Primary | ICD-10-CM

## 2017-06-28 PROCEDURE — 92012 INTRM OPH EXAM EST PATIENT: CPT | Performed by: OPHTHALMOLOGY

## 2017-06-28 ASSESSMENT — VISUAL ACUITY
OS_CC: 20/25
METHOD: SNELLEN - LINEAR
OS_CC+: -2
OD_CC: 20/20

## 2017-06-28 NOTE — PROGRESS NOTES
Current Eye Medications:  Clndamycin       Subjective:  MD check   Doing well.  No pain or discomfort.  Lump is gone.  Almost done with clindamycin.  Using 4 x day.     Objective:  See Ophthalmology Exam.       Assessment:  Acute dacryocystitis or cellulitis improved.      Plan: Finish Clindamycin.  Call if recurrent pain, swelling, tearing, or mattering.  Zenon Bob M.D.

## 2017-06-28 NOTE — MR AVS SNAPSHOT
After Visit Summary   6/28/2017    Amina Pineda    MRN: [id number]           Patient Information     Date Of Birth          1960        Visit Information        Provider Department      6/28/2017 3:45 PM Zenon Bob MD [facility]        Care Instructions    Finish Clindamycin.  Call if recurrent pain, swelling, tearing, or mattering.  Zenon Bob M.D.            Follow-ups after your visit        Your next 10 appointments already scheduled     Jul 27, 2017  2:15 PM CDT   Return Visit with Tiff Dunn MD   [facility]    [address] [place] [address] [phone]              Who to contact     If you have questions or need follow up information about today's clinic visit or your schedule please contact [facility] directly at [phone].  Normal or non-critical lab and imaging results will be communicated to you by KidoZenhart, letter or phone within 4 business days after the clinic has received the results. If you do not hear from us within 7 days, please contact the clinic through KidoZenhart or phone. If you have a critical or abnormal lab result, we will notify you by phone as soon as possible.  Submit refill requests through Suda or call your pharmacy and they will forward the refill request to us. Please allow 3 business days for your refill to be completed.          Additional Information About Your Visit        MyChart Information     Suda gives you secure access to your electronic health record. If you see a primary care provider, you can also send messages to your care team and make appointments. If you have questions, please call your primary care clinic.  If you do not have a primary care provider, please call [phone] and they will assist you.        Care EveryWhere ID     This is your Care EveryWhere ID. This could be used by other organizations to access your [facility]  medical records  [id number]         Blood Pressure from Last 3 Encounters:   06/13/17 137/76   03/09/17 126/72   02/10/17 126/78    Weight from Last 3 Encounters:   06/13/17 110.2 kg (243 lb)   03/09/17 111.1 kg (245 lb)   02/27/17 110.2 kg (243 lb)              Today, you had the following     No orders found for display       Primary Care Provider Office Phone # Fax #    Briana Ludmila Melton [phone]       [address] [address]        Equal Access to Services     [facility] : Hadii aad ku hadasho Solroinali, waaxda luqadaha, qaybta kaalmada adedanielyada, liane rehman . So [facility] [phone].    ATENCIÓN: Si habla español, tiene a aguilar disposición servicios gratuitos de asistencia lingüística. Llame al [phone].    We comply with applicable federal civil rights laws and [place] laws. We do not discriminate on the basis of race, color, national origin, age, disability sex, sexual orientation or gender identity.            Thank you!     Thank you for choosing [facility]  for your care. Our goal is always to provide you with excellent care. Hearing back from our patients is one way we can continue to improve our services. Please take a few minutes to complete the written survey that you may receive in the mail after your visit with us. Thank you!             Your Updated Medication List - Protect others around you: Learn how to safely use, store and throw away your medicines at www.disposemymeds.org.          This list is accurate as of: 6/28/17  5:04 PM.  Always use your most recent med list.                   Brand Name Dispense Instructions for use Diagnosis    albuterol 108 (90 BASE) MCG/ACT Inhaler    PROAIR HFA/PROVENTIL HFA/VENTOLIN HFA    1 Inhaler    Inhale 2 puffs into the lungs every 6 hours as needed for shortness of breath / dyspnea or wheezing    Acquired hypothyroidism, Menopausal syndrome (hot  flushes), Gastroesophageal reflux disease, esophagitis presence not specified, Decreased libido, High risk medication use, Need for hepatitis C screening test, Encounter for screening mammogram for breast cancer       BIOTIN PO           buPROPion 150 MG 24 hr tablet    WELLBUTRIN XL    90 tablet    Take 1 tablet (150 mg) by mouth every morning    Acquired hypothyroidism, Menopausal syndrome (hot flushes), Gastroesophageal reflux disease, esophagitis presence not specified, Decreased libido, High risk medication use, Need for hepatitis C screening test, Encounter for screening mammogram for breast cancer       clindamycin 300 MG capsule    CLEOCIN    28 capsule    Take 1 capsule (300 mg) by mouth 4 times daily    Tear duct infection, right       estrogens (conjugated) 0.3 MG tablet    PREMARIN    90 tablet    Take 1 tablet (0.3 mg) by mouth daily    Menopausal syndrome (hot flushes), Acquired hypothyroidism, Gastroesophageal reflux disease, esophagitis presence not specified, Decreased libido, High risk medication use, Need for hepatitis C screening test, Encounter for screening mammogram for breast cancer       famciclovir 500 MG tablet    FAMVIR    180 tablet    Take 1 tablet (500 mg) by mouth 2 times daily    Herpes simplex virus (HSV) infection       hydroquinone 4 % Crea     56.8 g    Externally apply topically At Bedtime Apply a thin layer to dark areas once nightly for no more than 8 weeks at a time. Take breaks between use.    Solar lentigo       IRON SUPPLEMENT PO           levothyroxine 125 MCG tablet    SYNTHROID/LEVOTHROID    90 tablet    Take 1 tablet (125 mcg) by mouth daily    Acquired hypothyroidism, Menopausal syndrome (hot flushes), Gastroesophageal reflux disease, esophagitis presence not specified, Decreased libido, High risk medication use, Need for hepatitis C screening test, Encounter for screening mammogram for breast cancer       methocarbamol 750 MG tablet    ROBAXIN    60 tablet    Take 1  tablet (750 mg) by mouth 3 times daily as needed        nystatin 218632 UNIT/GM Powd    MYCOSTATIN    30 g    Apply 30 g topically 3 times daily as needed    Yeast infection of the skin       omeprazole 20 MG CR capsule    priLOSEC    90 capsule    Take 1 capsule (20 mg) by mouth daily    Gastroesophageal reflux disease, esophagitis presence not specified, Decreased libido, Acquired hypothyroidism, Menopausal syndrome (hot flushes), High risk medication use, Need for hepatitis C screening test, Encounter for screening mammogram for breast cancer       terbinafine 250 MG tablet    lamISIL    90 tablet    Take 1 tablet (250 mg) by mouth daily    Onychomycosis       UNABLE TO FIND      MEDICATION NAME: Lipoflavinoid+ For ringing in ears        VITAMIN C PO           VITAMIN D PO      Take by mouth daily

## 2017-06-30 ASSESSMENT — EXTERNAL EXAM - LEFT EYE: OS_EXAM: NORMAL

## 2017-06-30 ASSESSMENT — SLIT LAMP EXAM - LIDS: COMMENTS: NORMAL

## 2017-06-30 ASSESSMENT — EXTERNAL EXAM - RIGHT EYE: OD_EXAM: NORMAL

## 2017-09-14 DIAGNOSIS — Z11.59 NEED FOR HEPATITIS C SCREENING TEST: ICD-10-CM

## 2017-09-14 DIAGNOSIS — N95.1 MENOPAUSAL SYNDROME (HOT FLUSHES): ICD-10-CM

## 2017-09-14 DIAGNOSIS — Z12.31 ENCOUNTER FOR SCREENING MAMMOGRAM FOR BREAST CANCER: ICD-10-CM

## 2017-09-14 DIAGNOSIS — Z79.899 HIGH RISK MEDICATION USE: ICD-10-CM

## 2017-09-14 DIAGNOSIS — R68.82 DECREASED LIBIDO: ICD-10-CM

## 2017-09-14 DIAGNOSIS — K21.9 GASTROESOPHAGEAL REFLUX DISEASE, ESOPHAGITIS PRESENCE NOT SPECIFIED: ICD-10-CM

## 2017-09-14 DIAGNOSIS — E03.9 ACQUIRED HYPOTHYROIDISM: ICD-10-CM

## 2017-09-14 NOTE — TELEPHONE ENCOUNTER
Bupropion       Last Written Prescription Date: 2/10/2017  Last Fill Quantity: 90; # refills: 1  Last Office Visit with [facility], [facility] or  Health prescribing provider:  2/10/2017        Last PHQ-9 score on record=   PHQ-9 SCORE 2/10/2017   Total Score -   Total Score 1       Lab Results   Component Value Date    AST 17 03/30/2017     Lab Results   Component Value Date    ALT 29 03/30/2017

## 2017-09-15 RX ORDER — BUPROPION HYDROCHLORIDE 150 MG/1
TABLET ORAL
Qty: 90 TABLET | Refills: 1 | Status: SHIPPED | OUTPATIENT
Start: 2017-09-15 | End: 2018-02-23

## 2017-09-15 NOTE — TELEPHONE ENCOUNTER
Prescription approved per [facility] Refill Protocol.  Ruth Ribeiro, RN - BC

## 2017-09-26 ENCOUNTER — TELEPHONE (OUTPATIENT)
Dept: DERMATOLOGY | Facility: CLINIC | Age: 57
End: 2017-09-26

## 2017-09-26 NOTE — TELEPHONE ENCOUNTER
I left a message for patient to call [facility].  Schedule follow up with Dr. Dunn for nails.  Sydney Jones RN

## 2017-09-28 NOTE — TELEPHONE ENCOUNTER
Called and spoke with pt.  Offered to schedule pt a return appt with Dr. Dunn.  Pt agreed appt scheduled 10/26/17 at 3:30pm.    Advised to call sooner if any further questions or concerns.    Aliyah Lazaro LPN

## 2017-10-26 ENCOUNTER — OFFICE VISIT (OUTPATIENT)
Dept: DERMATOLOGY | Facility: CLINIC | Age: 57
End: 2017-10-26
Payer: COMMERCIAL

## 2017-10-26 DIAGNOSIS — D22.9 NEVUS: ICD-10-CM

## 2017-10-26 DIAGNOSIS — B35.1 ONYCHOMYCOSIS: Primary | ICD-10-CM

## 2017-10-26 DIAGNOSIS — R23.8 PAPULE: ICD-10-CM

## 2017-10-26 PROCEDURE — 99213 OFFICE O/P EST LOW 20 MIN: CPT | Performed by: DERMATOLOGY

## 2017-10-26 RX ORDER — CICLOPIROX 80 MG/ML
SOLUTION TOPICAL
Qty: 13.2 ML | Refills: 5 | Status: SHIPPED | OUTPATIENT
Start: 2017-10-26 | End: 2018-10-30

## 2017-10-26 NOTE — MR AVS SNAPSHOT
After Visit Summary   10/26/2017    Amina Pineda    MRN: [id number]           Patient Information     Date Of Birth          1960        Visit Information        Provider Department      10/26/2017 3:30 PM Tiff Dunn MD [facility]        Today's Diagnoses     Onychomycosis    -  1    Papule        Nevus           Follow-ups after your visit        Follow-up notes from your care team     Return in about 8 months (around 6/26/2018).      Who to contact     If you have questions or need follow up information about today's clinic visit or your schedule please contact [facility] directly at [phone].  Normal or non-critical lab and imaging results will be communicated to you by MyChart, letter or phone within 4 business days after the clinic has received the results. If you do not hear from us within 7 days, please contact the clinic through Golden Gekkohart or phone. If you have a critical or abnormal lab result, we will notify you by phone as soon as possible.  Submit refill requests through aWhere or call your pharmacy and they will forward the refill request to us. Please allow 3 business days for your refill to be completed.          Additional Information About Your Visit        MyChart Information     aWhere gives you secure access to your electronic health record. If you see a primary care provider, you can also send messages to your care team and make appointments. If you have questions, please call your primary care clinic.  If you do not have a primary care provider, please call [phone] and they will assist you.      aWhere is an electronic gateway that provides easy, online access to your medical records. With aWhere, you can request a clinic appointment, read your test results, renew a prescription or communicate with your care team.     To access your existing account, please contact your [facility] or call  [phone] for assistance.        Care EveryWhere ID     This is your Care EveryWhere ID. This could be used by other organizations to access your [place] medical records  [id number]         Blood Pressure from Last 3 Encounters:   06/13/17 137/76   03/09/17 126/72   02/10/17 126/78    Weight from Last 3 Encounters:   06/13/17 110.2 kg (243 lb)   03/09/17 111.1 kg (245 lb)   02/27/17 110.2 kg (243 lb)              Today, you had the following     No orders found for display         Today's Medication Changes          These changes are accurate as of: 10/26/17  4:02 PM.  If you have any questions, ask your nurse or doctor.               Start taking these medicines.        Dose/Directions    ciclopirox 8 % Soln   Used for:  Onychomycosis        Apply to adjacent skin and affected nails daily. Remove with alcohol every 7 days   Quantity:  13.2 mL   Refills:  5            Where to get your medicines      These medications were sent to [facility] [address] [address] [facility], [address]  [address] [facility] [address]     Phone:  [phone]     ciclopirox 8 % Soln                Primary Care Provider Office Phone # Fax #    Briana Melton [phone] [address] [facility] [address]        Equal Access to Services     [facility] AH: Hadii johnny walkero Soblair, waaxda luqadaha, qaybta kaalliane joiner . So [facility] [phone].    ATENCIÓN: Si habla español, tiene a aguilar disposición servicios gratuitos de asistencia lingüística. Llame al [phone].    We comply with applicable federal civil rights laws and [place] laws. We do not discriminate on the basis of race, color, national origin, age, disability, sex, sexual orientation, or gender identity.            Thank you!     Thank you for choosing [facility]  for your care. Our goal is always to provide you with excellent care.  Hearing back from our patients is one way we can continue to improve our services. Please take a few minutes to complete the written survey that you may receive in the mail after your visit with us. Thank you!             Your Updated Medication List - Protect others around you: Learn how to safely use, store and throw away your medicines at www.disposemymeds.org.          This list is accurate as of: 10/26/17  4:02 PM.  Always use your most recent med list.                   Brand Name Dispense Instructions for use Diagnosis    albuterol 108 (90 BASE) MCG/ACT Inhaler    PROAIR HFA/PROVENTIL HFA/VENTOLIN HFA    1 Inhaler    Inhale 2 puffs into the lungs every 6 hours as needed for shortness of breath / dyspnea or wheezing    Acquired hypothyroidism, Menopausal syndrome (hot flushes), Gastroesophageal reflux disease, esophagitis presence not specified, Decreased libido, High risk medication use, Need for hepatitis C screening test, Encounter for screening mammogram for breast cancer       BIOTIN PO           buPROPion 150 MG 24 hr tablet    WELLBUTRIN XL    90 tablet    TAKE ONE TABLET BY MOUTH EVERY MORNING    Acquired hypothyroidism, Menopausal syndrome (hot flushes), Gastroesophageal reflux disease, esophagitis presence not specified, Decreased libido, High risk medication use, Need for hepatitis C screening test, Encounter for screening mammogram for breast cancer       ciclopirox 8 % Soln     13.2 mL    Apply to adjacent skin and affected nails daily. Remove with alcohol every 7 days    Onychomycosis       clindamycin 300 MG capsule    CLEOCIN    28 capsule    Take 1 capsule (300 mg) by mouth 4 times daily    Tear duct infection, right       estrogens (conjugated) 0.3 MG tablet    PREMARIN    90 tablet    Take 1 tablet (0.3 mg) by mouth daily    Menopausal syndrome (hot flushes), Acquired hypothyroidism, Gastroesophageal reflux disease, esophagitis presence not specified, Decreased libido, High risk medication  use, Need for hepatitis C screening test, Encounter for screening mammogram for breast cancer       famciclovir 500 MG tablet    FAMVIR    180 tablet    Take 1 tablet (500 mg) by mouth 2 times daily    Herpes simplex virus (HSV) infection       hydroquinone 4 % Crea     56.8 g    Externally apply topically At Bedtime Apply a thin layer to dark areas once nightly for no more than 8 weeks at a time. Take breaks between use.    Solar lentigo       IRON SUPPLEMENT PO           levothyroxine 125 MCG tablet    SYNTHROID/LEVOTHROID    90 tablet    Take 1 tablet (125 mcg) by mouth daily    Acquired hypothyroidism, Menopausal syndrome (hot flushes), Gastroesophageal reflux disease, esophagitis presence not specified, Decreased libido, High risk medication use, Need for hepatitis C screening test, Encounter for screening mammogram for breast cancer       methocarbamol 750 MG tablet    ROBAXIN    60 tablet    Take 1 tablet (750 mg) by mouth 3 times daily as needed        nystatin [id number] UNIT/GM Powd    MYCOSTATIN    30 g    Apply 30 g topically 3 times daily as needed    Yeast infection of the skin       omeprazole 20 MG CR capsule    priLOSEC    90 capsule    Take 1 capsule (20 mg) by mouth daily    Gastroesophageal reflux disease, esophagitis presence not specified, Decreased libido, Acquired hypothyroidism, Menopausal syndrome (hot flushes), High risk medication use, Need for hepatitis C screening test, Encounter for screening mammogram for breast cancer       terbinafine 250 MG tablet    lamISIL    90 tablet    Take 1 tablet (250 mg) by mouth daily    Onychomycosis       UNABLE TO FIND      MEDICATION NAME: Lipoflavinoid+ For ringing in ears        VITAMIN C PO           VITAMIN D PO      Take by mouth daily

## 2017-10-26 NOTE — LETTER
10/26/2017       RE: Amina Pineda  [address]  [place] [address]     Dear Colleague,    Thank you for referring your patient, Amina Pineda, to the [facility]. Please see a copy of my visit note below.    [facility] Dermatology Note      Dermatology Problem List:  1. Lesion on left nose, biopsied in [place] ~2006, pt reports showed lupus versus lymphoma and saw heme onc who reported lesion was not worrisome. Unable to obtain record  2. Onychomycosis, KOH positve  - Current Tx: ciclopirox 8% solution   -Previous Tx: terbinafine (initiated 3/14/2016)  3. Hyperkeratosis, right great toe  -Previous Tx: Urea 40% cream (initiated 3/14/2016) with resolution  3. Photoaging/idiopathic guttate hypomelanosis  -Previous Tx: tretinoin 0.05% cream and hydroquinone    Encounter Date: Oct 26, 2017    CC:  Chief Complaint   Patient presents with     RECHECK     Recheck onychomycosis  - patient states that her toenails are better         History of Present Illness:  Ms. Amina Pineda is a 57 year old female who presents as a follow-up for history on onochomychosis at last visit was restarted on terbinafine and has improved since them. She has not used anything since the treatment of terbinafine.   The patient also notes a spot on her back of concern.    Presents with her .   The patient reports no other lesions of concern.    Past Medical History:   Patient Active Problem List   Diagnosis     Hypothyroidism     Esophageal reflux     Herpes simplex virus (HSV) infection     Generalized osteoarthrosis, unspecified site     Dysthymic disorder     CARDIOVASCULAR SCREENING; LDL GOAL LESS THAN 160     Female stress incontinence     Restless leg syndrome     Non morbid obesity due to excess calories     Ocular hypertension, right     Menopausal syndrome (hot flushes)     Daytime somnolence     Chronic bilateral low back  pain without sciatica     Mixed incontinence     ANDREW (obstructive sleep apnea)     Acute dacryocystitis, right     Past Medical History:   Diagnosis Date     Decreased libido 11/6/2006     Depressive disorder, not elsewhere classified      Esophageal reflux      Generalized osteoarthrosis, unspecified site     R hip, on meds     Herpes simplex without mention of complication     oral     Intramural leiomyoma of uterus      Unspecified hypothyroidism      Past Surgical History:   Procedure Laterality Date     C LIGATE FALLOPIAN TUBE       C NONSPECIFIC PROCEDURE  5/02    rotator cuff surgery both shoulder     C NONSPECIFIC PROCEDURE      wrist surgery for cyst x 2     C VAGINAL HYSTERECTOMY  12/03    with BSO, 10-12 week size     COLONOSCOPY  4/24/2015     COLONOSCOPY WITH CO2 INSUFFLATION N/A 4/23/2015    Procedure: COLONOSCOPY WITH CO2 INSUFFLATION;  Surgeon: Bebeto Mortensen MD;  Location: [facility] OR     ELBOW SURGERY      Lt elbow repair.     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 The patient denies use of tanning beds.    Family History:  Dad with AKs, no other family history of skin cancer.     Medications:  Current Outpatient Prescriptions   Medication Sig Dispense Refill     buPROPion (WELLBUTRIN XL) 150 MG 24 hr tablet TAKE ONE TABLET BY MOUTH EVERY MORNING 90 tablet 1     Ferrous Sulfate (IRON SUPPLEMENT PO)        Ascorbic Acid (VITAMIN C PO)        BIOTIN PO        levothyroxine (SYNTHROID/LEVOTHROID) 125 MCG tablet Take 1 tablet (125 mcg) by mouth daily 90 tablet 3     estrogens, conjugated, (PREMARIN) 0.3 MG tablet Take 1 tablet (0.3 mg) by mouth daily 90 tablet 3     omeprazole (PRILOSEC) 20 MG CR capsule Take 1 capsule (20 mg) by mouth daily 90 capsule 3     albuterol (PROAIR HFA/PROVENTIL HFA/VENTOLIN HFA) 108 (90 BASE) MCG/ACT Inhaler Inhale 2 puffs into  the lungs every 6 hours as needed for shortness of breath / dyspnea or wheezing 1 Inhaler 11     famciclovir (FAMVIR) 500 MG tablet Take 1 tablet (500 mg) by mouth 2 times daily 180 tablet 5     methocarbamol (ROBAXIN) 750 MG tablet Take 1 tablet (750 mg) by mouth 3 times daily as needed 60 tablet 1     UNABLE TO FIND MEDICATION NAME: Lipoflavinoid+  For ringing in ears       hydroquinone 4 % CREA Externally apply topically At Bedtime Apply a thin layer to dark areas once nightly for no more than 8 weeks at a time. Take breaks between use. 56.8 g 0     nystatin (MYCOSTATIN) 625538 UNIT/GM POWD Apply 30 g topically 3 times daily as needed 30 g 1     Cholecalciferol (VITAMIN D PO) Take by mouth daily       clindamycin (CLEOCIN) 300 MG capsule Take 1 capsule (300 mg) by mouth 4 times daily (Patient not taking: Reported on 10/26/2017) 28 capsule 0     terbinafine (LAMISIL) 250 MG tablet Take 1 tablet (250 mg) by mouth daily (Patient not taking: Reported on 10/26/2017) 90 tablet 0      Allergies   Allergen Reactions     No Known Drug Allergies      Review of Systems:  -Const: Patijennifern is generally feeling well  -Skin: As above in HPI. No additional skin concerns.    Physical exam:  Vitals: There were no vitals taken for this visit.  GEN: This is a well developed, well-nourished female in no acute distress, in a pleasant mood.    SKIN: Focused examination of the feet, back and face was performed.  -Pink colored flat papule on chin and back the chin papule is unchanged.   -Yellow discoloration and hyperkeratosis on 3rd toenail and bilateral great toenail.   -No other lesions of concern on areas examined.     Impression/Plan:    1. Onychomycosis, KOH positive. Patient started terbinafine 3/2016 with some improvement     Start using ciclopirox 8% solution once daily and wipe away with alcohol once weekly. Apply to nail and surrounding skin.   2.   Pink colored flat papule on chin,  unchanged.    We will continue to monitor.  Patient to report changes.   3. Nevus, back- no intervention needed at this time    Follow-up in 8 months, earlier for new or changing lesions.     Staff Involved:  Staff/Scribe    Scribe Disclosure:   I, Lyssa Santos, am serving as a scribe to document services personally performed by Dr. Tiff Dunn, based on data collection and the provider's statements to me.     Provider Disclosure:   The documentation recorded by the scribe accurately reflects the services I personally performed and the decisions made by me.    Tiff Dunn MD    Department of Dermatology  [facility]: Phone: [phone], Fax:[phone]  [facility]: Phone: [phone], Fax: [phone]        Again, thank you for allowing me to participate in the care of your patient.      Sincerely,    Tiff Dunn MD

## 2017-10-26 NOTE — PROGRESS NOTES
[facility] Dermatology Note      Dermatology Problem List:  1. Lesion on left nose, biopsied in [place] ~2006, pt reports showed lupus versus lymphoma and saw heme onc who reported lesion was not worrisome. Unable to obtain record  2. Onychomycosis, KOH positve  - Current Tx: ciclopirox 8% solution   -Previous Tx: terbinafine (initiated 3/14/2016)  3. Hyperkeratosis, right great toe  -Previous Tx: Urea 40% cream (initiated 3/14/2016) with resolution  3. Photoaging/idiopathic guttate hypomelanosis  -Previous Tx: tretinoin 0.05% cream and hydroquinone    Encounter Date: Oct 26, 2017    CC:  Chief Complaint   Patient presents with     RECHECK     Recheck onychomycosis  - patient states that her toenails are better         History of Present Illness:  Ms. Amina Pineda is a 57 year old female who presents as a follow-up for history on onochomychosis at last visit was restarted on terbinafine and has improved since them. She has not used anything since the treatment of terbinafine.   The patient also notes a spot on her back of concern.    Presents with her .   The patient reports no other lesions of concern.    Past Medical History:   Patient Active Problem List   Diagnosis     Hypothyroidism     Esophageal reflux     Herpes simplex virus (HSV) infection     Generalized osteoarthrosis, unspecified site     Dysthymic disorder     CARDIOVASCULAR SCREENING; LDL GOAL LESS THAN 160     Female stress incontinence     Restless leg syndrome     Non morbid obesity due to excess calories     Ocular hypertension, right     Menopausal syndrome (hot flushes)     Daytime somnolence     Chronic bilateral low back pain without sciatica     Mixed incontinence     ANDREW (obstructive sleep apnea)     Acute dacryocystitis, right     Past Medical History:   Diagnosis Date     Decreased libido 11/6/2006     Depressive disorder, not elsewhere classified      Esophageal reflux      Generalized osteoarthrosis,  unspecified site     R hip, on meds     Herpes simplex without mention of complication     oral     Intramural leiomyoma of uterus      Unspecified hypothyroidism      Past Surgical History:   Procedure Laterality Date     C LIGATE FALLOPIAN TUBE       C NONSPECIFIC PROCEDURE  5/02    rotator cuff surgery both shoulder     C NONSPECIFIC PROCEDURE      wrist surgery for cyst x 2     C VAGINAL HYSTERECTOMY  12/03    with BSO, 10-12 week size     COLONOSCOPY  4/24/2015     COLONOSCOPY WITH CO2 INSUFFLATION N/A 4/23/2015    Procedure: COLONOSCOPY WITH CO2 INSUFFLATION;  Surgeon: Bebeto Mortensen MD;  Location: [facility] OR     ELBOW SURGERY      Lt elbow repair.     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 The patient denies use of tanning beds.    Family History:  Dad with AKs, no other family history of skin cancer.     Medications:  Current Outpatient Prescriptions   Medication Sig Dispense Refill     buPROPion (WELLBUTRIN XL) 150 MG 24 hr tablet TAKE ONE TABLET BY MOUTH EVERY MORNING 90 tablet 1     Ferrous Sulfate (IRON SUPPLEMENT PO)        Ascorbic Acid (VITAMIN C PO)        BIOTIN PO        levothyroxine (SYNTHROID/LEVOTHROID) 125 MCG tablet Take 1 tablet (125 mcg) by mouth daily 90 tablet 3     estrogens, conjugated, (PREMARIN) 0.3 MG tablet Take 1 tablet (0.3 mg) by mouth daily 90 tablet 3     omeprazole (PRILOSEC) 20 MG CR capsule Take 1 capsule (20 mg) by mouth daily 90 capsule 3     albuterol (PROAIR HFA/PROVENTIL HFA/VENTOLIN HFA) 108 (90 BASE) MCG/ACT Inhaler Inhale 2 puffs into the lungs every 6 hours as needed for shortness of breath / dyspnea or wheezing 1 Inhaler 11     famciclovir (FAMVIR) 500 MG tablet Take 1 tablet (500 mg) by mouth 2 times daily 180 tablet 5     methocarbamol (ROBAXIN) 750 MG tablet Take 1 tablet (750 mg) by mouth 3 times daily as needed 60  tablet 1     UNABLE TO FIND MEDICATION NAME: Lipoflavinoid+  For ringing in ears       hydroquinone 4 % CREA Externally apply topically At Bedtime Apply a thin layer to dark areas once nightly for no more than 8 weeks at a time. Take breaks between use. 56.8 g 0     nystatin (MYCOSTATIN) 374989 UNIT/GM POWD Apply 30 g topically 3 times daily as needed 30 g 1     Cholecalciferol (VITAMIN D PO) Take by mouth daily       clindamycin (CLEOCIN) 300 MG capsule Take 1 capsule (300 mg) by mouth 4 times daily (Patient not taking: Reported on 10/26/2017) 28 capsule 0     terbinafine (LAMISIL) 250 MG tablet Take 1 tablet (250 mg) by mouth daily (Patient not taking: Reported on 10/26/2017) 90 tablet 0      Allergies   Allergen Reactions     No Known Drug Allergies      Review of Systems:  -Const: Robertan is generally feeling well  -Skin: As above in HPI. No additional skin concerns.    Physical exam:  Vitals: There were no vitals taken for this visit.  GEN: This is a well developed, well-nourished female in no acute distress, in a pleasant mood.    SKIN: Focused examination of the feet, back and face was performed.  -Pink colored flat papule on chin and back the chin papule is unchanged.   -Yellow discoloration and hyperkeratosis on 3rd toenail and bilateral great toenail.   -No other lesions of concern on areas examined.     Impression/Plan:    1. Onychomycosis, KOH positive. Patient started terbinafine 3/2016 with some improvement     Start using ciclopirox 8% solution once daily and wipe away with alcohol once weekly. Apply to nail and surrounding skin.   2.   Pink colored flat papule on chin,  unchanged.    We will continue to monitor. Patient to report changes.   3. Nevus, back- no intervention needed at this time    Follow-up in 8 months, earlier for new or changing lesions.     Staff Involved:  Staff/Scribe    Scribe Disclosure:   Lyssa GALEANO, am serving as a scribe to document services personally performed by   Tiff Dunn, based on data collection and the provider's statements to me.     Provider Disclosure:   The documentation recorded by the scribe accurately reflects the services I personally performed and the decisions made by me.    Tiff Dunn MD    Department of Dermatology  [facility]: Phone: [phone], Fax:[phone]  [facility]: Phone: [phone], Fax: [phone]

## 2018-01-06 ENCOUNTER — MYC REFILL (OUTPATIENT)
Dept: INTERNAL MEDICINE | Facility: CLINIC | Age: 58
End: 2018-01-06

## 2018-01-06 DIAGNOSIS — B00.9 HERPES SIMPLEX VIRUS (HSV) INFECTION: ICD-10-CM

## 2018-01-08 NOTE — TELEPHONE ENCOUNTER
[facility]:  Gisele Henriquez Jan 8, 2018 7:12 AM        ----- Message -----   From: Amina Pineda   Sent: 1/6/2018 6:47 AM   To: Mikal Shaikh  Subject: Medication Renewal Request     Original authorizing provider: MD Amina Way would like a refill of the following medications:  famciclovir (FAMVIR) 500 MG tablet [Briana Melton MD]    Preferred pharmacy: [facility] - [place], [place] - [facility] [address]     Comment:

## 2018-01-10 RX ORDER — FAMCICLOVIR 500 MG/1
500 TABLET ORAL 2 TIMES DAILY
Qty: 180 TABLET | Refills: 0 | Status: SHIPPED | OUTPATIENT
Start: 2018-01-10 | End: 2018-02-23

## 2018-01-24 ENCOUNTER — TELEPHONE (OUTPATIENT)
Dept: INTERNAL MEDICINE | Facility: CLINIC | Age: 58
End: 2018-01-24

## 2018-01-24 DIAGNOSIS — Z11.59 NEED FOR HEPATITIS C SCREENING TEST: ICD-10-CM

## 2018-01-24 DIAGNOSIS — E03.9 ACQUIRED HYPOTHYROIDISM: ICD-10-CM

## 2018-01-24 DIAGNOSIS — K21.9 GASTROESOPHAGEAL REFLUX DISEASE, ESOPHAGITIS PRESENCE NOT SPECIFIED: ICD-10-CM

## 2018-01-24 DIAGNOSIS — Z12.31 ENCOUNTER FOR SCREENING MAMMOGRAM FOR BREAST CANCER: ICD-10-CM

## 2018-01-24 DIAGNOSIS — R68.82 DECREASED LIBIDO: ICD-10-CM

## 2018-01-24 DIAGNOSIS — Z79.899 HIGH RISK MEDICATION USE: ICD-10-CM

## 2018-01-24 DIAGNOSIS — N95.1 MENOPAUSAL SYNDROME (HOT FLUSHES): ICD-10-CM

## 2018-01-24 RX ORDER — LEVOTHYROXINE SODIUM 125 UG/1
125 TABLET ORAL DAILY
Qty: 30 TABLET | Refills: 0 | Status: SHIPPED | OUTPATIENT
Start: 2018-01-24 | End: 2018-02-23

## 2018-01-24 NOTE — TELEPHONE ENCOUNTER
Reason for Call:  Medication or medication refill:    Do you use a [facility]?  Name of the pharmacy and phone number for the current request:  [facility]?    Name of the medication requested: Levothyroxin    Other request: will run out of refills prior to her AFE 2-    Can we leave a detailed message on this number? YES    Phone number patient can be reached at: Cell number on file:    Telephone Information:   Mobile [phone]       Best Time: Any time    Call taken on 1/24/2018 at 3:50 PM by Karey Huerta

## 2018-01-25 NOTE — TELEPHONE ENCOUNTER
Spoke with patient and advised patient that 30 day sneha period refill will be sent in to cover until her appointment with Dr. Melton on 2-23-17.     levothyroxine (SYNTHROID/LEVOTHROID) 125 MCG tablet 30 tablet 0 1/24/2018  No     Sig: Take 1 tablet (125 mcg) by mouth daily     Class: E-Prescribe     Route: Oral     Order: [id number]     Shadia Ziegler RN

## 2018-02-23 ENCOUNTER — OFFICE VISIT (OUTPATIENT)
Dept: INTERNAL MEDICINE | Facility: CLINIC | Age: 58
End: 2018-02-23
Payer: COMMERCIAL

## 2018-02-23 VITALS
HEIGHT: 69 IN | SYSTOLIC BLOOD PRESSURE: 112 MMHG | HEART RATE: 78 BPM | OXYGEN SATURATION: 93 % | DIASTOLIC BLOOD PRESSURE: 80 MMHG | TEMPERATURE: 97.3 F | WEIGHT: 239 LBS | BODY MASS INDEX: 35.4 KG/M2 | RESPIRATION RATE: 18 BRPM

## 2018-02-23 DIAGNOSIS — Z12.31 ENCOUNTER FOR SCREENING MAMMOGRAM FOR BREAST CANCER: ICD-10-CM

## 2018-02-23 DIAGNOSIS — R68.82 DECREASED LIBIDO: ICD-10-CM

## 2018-02-23 DIAGNOSIS — E03.9 ACQUIRED HYPOTHYROIDISM: ICD-10-CM

## 2018-02-23 DIAGNOSIS — Z23 NEED FOR TD VACCINE: ICD-10-CM

## 2018-02-23 DIAGNOSIS — R73.01 IMPAIRED FASTING GLUCOSE: ICD-10-CM

## 2018-02-23 DIAGNOSIS — G89.29 CHRONIC LOW BACK PAIN, UNSPECIFIED BACK PAIN LATERALITY, WITH SCIATICA PRESENCE UNSPECIFIED: ICD-10-CM

## 2018-02-23 DIAGNOSIS — K21.9 GASTROESOPHAGEAL REFLUX DISEASE, ESOPHAGITIS PRESENCE NOT SPECIFIED: ICD-10-CM

## 2018-02-23 DIAGNOSIS — B00.9 HERPES SIMPLEX VIRUS (HSV) INFECTION: ICD-10-CM

## 2018-02-23 DIAGNOSIS — M54.5 CHRONIC LOW BACK PAIN, UNSPECIFIED BACK PAIN LATERALITY, WITH SCIATICA PRESENCE UNSPECIFIED: ICD-10-CM

## 2018-02-23 DIAGNOSIS — Z79.899 HIGH RISK MEDICATION USE: ICD-10-CM

## 2018-02-23 DIAGNOSIS — G47.33 OSA (OBSTRUCTIVE SLEEP APNEA): Chronic | ICD-10-CM

## 2018-02-23 DIAGNOSIS — Z00.00 ROUTINE GENERAL MEDICAL EXAMINATION AT A HEALTH CARE FACILITY: Primary | ICD-10-CM

## 2018-02-23 DIAGNOSIS — E78.5 HYPERLIPIDEMIA LDL GOAL <100: ICD-10-CM

## 2018-02-23 DIAGNOSIS — N95.1 MENOPAUSAL SYNDROME (HOT FLUSHES): ICD-10-CM

## 2018-02-23 LAB — TSH SERPL DL<=0.005 MIU/L-ACNC: 0.4 MU/L (ref 0.4–4)

## 2018-02-23 PROCEDURE — 90714 TD VACC NO PRESV 7 YRS+ IM: CPT | Performed by: INTERNAL MEDICINE

## 2018-02-23 PROCEDURE — 84443 ASSAY THYROID STIM HORMONE: CPT | Performed by: INTERNAL MEDICINE

## 2018-02-23 PROCEDURE — 99396 PREV VISIT EST AGE 40-64: CPT | Mod: 25 | Performed by: INTERNAL MEDICINE

## 2018-02-23 PROCEDURE — 90471 IMMUNIZATION ADMIN: CPT | Performed by: INTERNAL MEDICINE

## 2018-02-23 PROCEDURE — 36415 COLL VENOUS BLD VENIPUNCTURE: CPT | Performed by: INTERNAL MEDICINE

## 2018-02-23 RX ORDER — BUPROPION HYDROCHLORIDE 150 MG/1
150 TABLET ORAL EVERY MORNING
Qty: 90 TABLET | Refills: 3 | Status: SHIPPED | OUTPATIENT
Start: 2018-02-23 | End: 2019-04-02

## 2018-02-23 RX ORDER — LEVOTHYROXINE SODIUM 125 UG/1
125 TABLET ORAL DAILY
Qty: 90 TABLET | Refills: 3 | Status: SHIPPED | OUTPATIENT
Start: 2018-02-23 | End: 2019-03-11

## 2018-02-23 RX ORDER — CYCLOBENZAPRINE HCL 10 MG
5-10 TABLET ORAL 3 TIMES DAILY PRN
Qty: 90 TABLET | Refills: 1 | Status: SHIPPED | OUTPATIENT
Start: 2018-02-23 | End: 2019-04-02

## 2018-02-23 RX ORDER — ALBUTEROL SULFATE 90 UG/1
2 AEROSOL, METERED RESPIRATORY (INHALATION) EVERY 6 HOURS PRN
Qty: 1 INHALER | Refills: 11 | Status: SHIPPED | OUTPATIENT
Start: 2018-02-23 | End: 2019-04-02

## 2018-02-23 RX ORDER — NICOTINE 14MG/24HR
PATCH, TRANSDERMAL 24 HOURS TRANSDERMAL DAILY
COMMUNITY
End: 2019-04-02

## 2018-02-23 RX ORDER — FAMCICLOVIR 500 MG/1
500 TABLET ORAL 2 TIMES DAILY PRN
Qty: 180 TABLET | Refills: 3 | Status: SHIPPED | OUTPATIENT
Start: 2018-02-23 | End: 2019-04-02

## 2018-02-23 ASSESSMENT — PAIN SCALES - GENERAL: PAINLEVEL: NO PAIN (0)

## 2018-02-23 NOTE — NURSING NOTE
"Chief Complaint   Patient presents with     Physical     no pap, patient is not fasting, discuss muscle relaxants     Other     due for advanced care planning, DAP, PHQ9, Brody, TSH, flu vacc       Initial /80 (BP Location: Left arm, Cuff Size: Adult Regular)  Pulse 78  Temp 97.3  F (36.3  C) (Oral)  Resp 18  Ht 5' 8.75\" (1.746 m)  Wt 239 lb (108.4 kg)  SpO2 93%  Breastfeeding? No  BMI 35.55 kg/m2 Estimated body mass index is 35.55 kg/(m^2) as calculated from the following:    Height as of this encounter: 5' 8.75\" (1.746 m).    Weight as of this encounter: 239 lb (108.4 kg).  Medication Reconciliation: complete       Mildred Talavera CMA    "

## 2018-02-23 NOTE — PATIENT INSTRUCTIONS
I would get a 3D mammogram if it is covered by your insurance.     Schedule a fasting lab draw and mammogram.    Come back this summer for your Shingrix shingles vaccine.      [facility] [place] [facility]    If you have any questions regarding to your visit please contact your care team:     Team Pink:   Clinic Hours Telephone Number   Internal Medicine:  Dr. Briana Murphy, NP       7am-7pm  Monday - Thursday   7am-5pm  Fridays  [phone]  (Appointment scheduling available 24/7)    Questions about your visit?  Team Line  [phone]   [facility] [place] [facility] 11am-9pm Monday-Friday Saturday-Sunday- 9am-5pm   [place] - 5pm-9pm Monday-Friday Saturday-Sunday- 9am-5pm  [phone] - [place]   [phone] - [place]       What options do I have for visits at the clinic other than the traditional office visit?  To expand how we care for you, many of our providers are utilizing electronic visits (e-visits) and telephone visits, when medically appropriate, for interactions with their patients rather than a visit in the clinic.   We also offer nurse visits for many medical concerns. Just like any other service, we will bill your insurance company for this type of visit based on time spent on the phone with your provider. Not all insurance companies cover these visits. Please check with your medical insurance if this type of visit is covered. You will be responsible for any charges that are not paid by your insurance.      E-visits via [employer]:  generally incur a $35.00 fee.  Telephone visits:  Time spent on the phone: *charged based on time that is spent on the phone in increments of 10 minutes. Estimated cost:   5-10 mins $30.00   11-20 mins. $59.00   21-30 mins. $85.00   Use [employer] (secure email communication and access to your chart) to send your primary care provider a message or make an appointment. Ask someone on your Team how to sign up for  Angelina.    For a Price Quote for your services, please call our Consumer Price Line at [phone].    As always, Thank you for trusting us with your health care needs!    Marnie ALVAREZ CMA ([facility])

## 2018-02-23 NOTE — MR AVS SNAPSHOT
After Visit Summary   2/23/2018    Amina Pineda    MRN: [id number]           Patient Information     Date Of Birth          1960        Visit Information        Provider Department      2/23/2018 2:00 PM Briana Melton MD [facility]        Today's Diagnoses     Acquired hypothyroidism    -  1    Menopausal syndrome (hot flushes)        Gastroesophageal reflux disease, esophagitis presence not specified        Decreased libido        High risk medication use        Encounter for screening mammogram for breast cancer        Herpes simplex virus (HSV) infection        ANDREW (obstructive sleep apnea)        Chronic low back pain, unspecified back pain laterality, with sciatica presence unspecified        Impaired fasting glucose        Hyperlipidemia LDL goal <100        Need for TD vaccine        Routine general medical examination at a health care facility          Care Instructions    I would get a 3D mammogram if it is covered by your insurance.     Schedule a fasting lab draw and mammogram.    Come back this summer for your Shingrix shingles vaccine.      [facility]    If you have any questions regarding to your visit please contact your care team:     Team Pink:   Clinic Hours Telephone Number   Internal Medicine:  Dr. Briana Murphy, NP       7am-7pm  Monday - Thursday   7am-5pm  Fridays  [phone]  (Appointment scheduling available 24/7)    Questions about your visit?  Team Line  [phone]   Urgent Care - [place] - 11am-9pm Monday-Friday Saturday-Sunday- 9am-5pm   [place] - 5pm-9pm Monday-Friday Saturday-Sunday- 9am-5pm  [phone] - [place]   [phone] - [place]       What options do I have for visits at the clinic other than the traditional office visit?  To expand how we care for you, many of our providers are utilizing electronic visits (e-visits) and telephone visits,  when medically appropriate, for interactions with their patients rather than a visit in the clinic.   We also offer nurse visits for many medical concerns. Just like any other service, we will bill your insurance company for this type of visit based on time spent on the phone with your provider. Not all insurance companies cover these visits. Please check with your medical insurance if this type of visit is covered. You will be responsible for any charges that are not paid by your insurance.      E-visits via Sociallhart:  generally incur a $35.00 fee.  Telephone visits:  Time spent on the phone: *charged based on time that is spent on the phone in increments of 10 minutes. Estimated cost:   5-10 mins $30.00   11-20 mins. $59.00   21-30 mins. $85.00   Use [facility] (secure email communication and access to your chart) to send your primary care provider a message or make an appointment. Ask someone on your Team how to sign up for [facility].    For a Price Quote for your services, please call our OnlineSheetMusic Line at [phone].    As always, Thank you for trusting us with your health care needs!    Marnie ALVAREZ CMA ([facility])            Follow-ups after your visit        Follow-up notes from your care team     Return in about 1 year (around 2/23/2019).      Your next 10 appointments already scheduled     Jun 26, 2018  2:45 PM CDT   Return Visit with Tiff Dunn MD   [facility] ([facility]    [address] [place] [id number] [phone]              Future tests that were ordered for you today     Open Future Orders        Priority Expected Expires Ordered    Lipid panel reflex to direct LDL Fasting Routine 2/23/2018 2/23/2019 2/23/2018    **Glucose FUTURE anytime Routine 2/23/2018 2/23/2019 2/23/2018    **A1C FUTURE anytime Routine 2/23/2018 2/23/2019 2/23/2018    *MA Screening Digital Bilateral Routine  2/23/2019 2/23/2018            Who to contact     If you have  "questions or need follow up information about today's clinic visit or your schedule please contact [facility] directly at [phone].  Normal or non-critical lab and imaging results will be communicated to you by Qeex[employer]art, letter or phone within 4 business days after the clinic has received the results. If you do not hear from us within 7 days, please contact the clinic through Qe[employer] or phone. If you have a critical or abnormal lab result, we will notify you by phone as soon as possible.  Submit refill requests through [employer] or call your pharmacy and they will forward the refill request to us. Please allow 3 business days for your refill to be completed.          Additional Information About Your Visit        [employer] Information     [employer] gives you secure access to your electronic health record. If you see a primary care provider, you can also send messages to your care team and make appointments. If you have questions, please call your primary care clinic.  If you do not have a primary care provider, please call [phone] and they will assist you.        Care EveryWhere ID     This is your Care EveryWhere ID. This could be used by other organizations to access your [place] medical records  [id number]        Your Vitals Were     Pulse Temperature Respirations Height Pulse Oximetry Breastfeeding?    78 97.3  F (36.3  C) (Oral) 18 5' 8.75\" (1.746 m) 93% No    BMI (Body Mass Index)                   35.55 kg/m2            Blood Pressure from Last 3 Encounters:   02/23/18 112/80   06/13/17 137/76   03/09/17 126/72    Weight from Last 3 Encounters:   02/23/18 239 lb (108.4 kg)   06/13/17 243 lb (110.2 kg)   03/09/17 245 lb (111.1 kg)              We Performed the Following     TD PRESERV FREE >=7 YRS ADS IM     TSH WITH FREE T4 REFLEX          Today's Medication Changes          These changes are accurate as of 2/23/18  2:49 PM.  If you have any questions, ask your nurse or doctor.             "   Start taking these medicines.        Dose/Directions    cyclobenzaprine 10 MG tablet   Commonly known as:  FLEXERIL   Used for:  Chronic low back pain, unspecified back pain laterality, with sciatica presence unspecified   Started by:  Briana Melton MD        Dose:  5-10 mg   Take 0.5-1 tablets (5-10 mg) by mouth 3 times daily as needed for muscle spasms   Quantity:  90 tablet   Refills:  1         These medicines have changed or have updated prescriptions.        Dose/Directions    buPROPion 150 MG 24 hr tablet   Commonly known as:  WELLBUTRIN XL   This may have changed:  See the new instructions.   Used for:  Acquired hypothyroidism, Menopausal syndrome (hot flushes), Gastroesophageal reflux disease, esophagitis presence not specified, Decreased libido, High risk medication use, Encounter for screening mammogram for breast cancer   Changed by:  Briana Melton MD        Dose:  150 mg   Take 1 tablet (150 mg) by mouth every morning   Quantity:  90 tablet   Refills:  3       famciclovir 500 MG tablet   Commonly known as:  FAMVIR   This may have changed:    - when to take this  - reasons to take this   Used for:  Herpes simplex virus (HSV) infection   Changed by:  Briana Melton MD        Dose:  500 mg   Take 1 tablet (500 mg) by mouth 2 times daily as needed   Quantity:  180 tablet   Refills:  3         Stop taking these medicines if you haven't already. Please contact your care team if you have questions.     methocarbamol 750 MG tablet   Commonly known as:  ROBAXIN   Stopped by:  Briana Melton MD                Where to get your medicines      These medications were sent to [facility] - [place], [place] - [address] [place] [address]  [address] [place] [address], [facility] [address]    Hours:  Mon-Fri 8:30am-5:00pm Toll Free [phone] Phone:  [phone]     albuterol 108 (90 BASE) MCG/ACT Inhaler    buPROPion 150 MG 24 hr tablet    cyclobenzaprine 10 MG tablet    famciclovir 500 MG  tablet    levothyroxine 125 MCG tablet                Primary Care Provider Office Phone # Fax #    Briana Melton [phone] [address] [facility] [address]        Equal Access to Services     ANUP WOODARD : Hadii aad ku hadkilliano Soomaali, waaxda luqadaha, qaybta kaalmada adeshiela, liane matamorosn sim armenta laAdelaidedelia cage. So [facility] [phone].    ATENCIÓN: Si habla español, tiene a aguilar disposición servicios gratuitos de asistencia lingüística. [facility] [phone].    We comply with applicable federal civil rights laws and [place] laws. We do not discriminate on the basis of race, color, national origin, age, disability, sex, sexual orientation, or gender identity.            Thank you!     Thank you for choosing [facility]  for your care. Our goal is always to provide you with excellent care. Hearing back from our patients is one way we can continue to improve our services. Please take a few minutes to complete the written survey that you may receive in the mail after your visit with us. Thank you!             Your Updated Medication List - Protect others around you: Learn how to safely use, store and throw away your medicines at www.disposemymeds.org.          This list is accurate as of 2/23/18  2:49 PM.  Always use your most recent med list.                   Brand Name Dispense Instructions for use Diagnosis    albuterol 108 (90 BASE) MCG/ACT Inhaler    PROAIR HFA/PROVENTIL HFA/VENTOLIN HFA    1 Inhaler    Inhale 2 puffs into the lungs every 6 hours as needed for shortness of breath / dyspnea or wheezing    Acquired hypothyroidism, Menopausal syndrome (hot flushes), Gastroesophageal reflux disease, esophagitis presence not specified, Decreased libido, High risk medication use, Encounter for screening mammogram for breast cancer       BIOTIN PO           buPROPion 150 MG 24 hr tablet    WELLBUTRIN XL    90 tablet    Take 1 tablet (150 mg) by mouth every  morning    Acquired hypothyroidism, Menopausal syndrome (hot flushes), Gastroesophageal reflux disease, esophagitis presence not specified, Decreased libido, High risk medication use, Encounter for screening mammogram for breast cancer       ciclopirox 8 % Soln     13.2 mL    Apply to adjacent skin and affected nails daily. Remove with alcohol every 7 days    Onychomycosis       cyclobenzaprine 10 MG tablet    FLEXERIL    90 tablet    Take 0.5-1 tablets (5-10 mg) by mouth 3 times daily as needed for muscle spasms    Chronic low back pain, unspecified back pain laterality, with sciatica presence unspecified       estrogens (conjugated) 0.3 MG tablet    PREMARIN    90 tablet    Take 1 tablet (0.3 mg) by mouth daily    Menopausal syndrome (hot flushes), Acquired hypothyroidism, Gastroesophageal reflux disease, esophagitis presence not specified, Decreased libido, High risk medication use, Need for hepatitis C screening test, Encounter for screening mammogram for breast cancer       famciclovir 500 MG tablet    FAMVIR    180 tablet    Take 1 tablet (500 mg) by mouth 2 times daily as needed    Herpes simplex virus (HSV) infection       hydroquinone 4 % Crea     56.8 g    Externally apply topically At Bedtime Apply a thin layer to dark areas once nightly for no more than 8 weeks at a time. Take breaks between use.    Solar lentigo       IRON SUPPLEMENT PO           levothyroxine 125 MCG tablet    SYNTHROID/LEVOTHROID    90 tablet    Take 1 tablet (125 mcg) by mouth daily    Acquired hypothyroidism, Menopausal syndrome (hot flushes), Gastroesophageal reflux disease, esophagitis presence not specified, Decreased libido, High risk medication use, Encounter for screening mammogram for breast cancer       nystatin [id number] UNIT/GM Powd    MYCOSTATIN    30 g    Apply 30 g topically 3 times daily as needed    Yeast infection of the skin       omeprazole 20 MG CR capsule    priLOSEC    90 capsule    Take 1 capsule (20 mg) by mouth  daily    Gastroesophageal reflux disease, esophagitis presence not specified, Decreased libido, Acquired hypothyroidism, Menopausal syndrome (hot flushes), High risk medication use, Need for hepatitis C screening test, Encounter for screening mammogram for breast cancer       Probiotic 250 MG Caps      Take by mouth daily        UNABLE TO FIND      MEDICATION NAME: Lipoflavinoid+ For ringing in ears        VITAMIN C PO           VITAMIN D PO      Take by mouth daily

## 2018-02-23 NOTE — PROGRESS NOTES
INTERNAL MEDICINE   SUBJECTIVE:   CC: Amina Pineda is an 57 year old woman who presents for preventive health visit.     Physical   Annual:     Getting at least 3 servings of Calcium per day::  Yes    Bi-annual eye exam::  Yes    Dental care twice a year::  Yes    Sleep apnea or symptoms of sleep apnea::  Sleep apnea    Diet::  Regular (no restrictions)    Frequency of exercise::  None    Taking medications regularly::  Yes    Medication side effects::  None    Additional concerns today::  No          Answers for HPI/ROS submitted by the patient on 2/23/2018   PHQ-2 Score: 1    Diet - Tried [employer] with her family for 6 months but her  did not like all the rice. She has been using [employer] for a couple months now.    Sleep apnea - She uses a CPAP sometimes. Her  tells her when she does not wear it, she snores. She is not waking herself up but wakes up fatigued.    Back - She wants to switch from Robaxin to Flexiril. She started doing [employer] but is not doing any back-focused exercises. Went to chiropractor for a while a year ago and it helped her walk better.     Hot flashes - Still has hot flashes but they are manageable.    Nail fungus - She did Lamisil twice and does not remember to put ciclopirox on her nails.     Herpes - Sometimes biting a piece of hot pizza or eating at a buffet triggers her herpes.    Family hx of breast cancer - Her mom is susceptible and her paternal grandma had breast cancer.    [place].     Today's PHQ-2 Score:   PHQ-2 ( 1999 Pfizer) 2/23/2018   Q1: Little interest or pleasure in doing things 1   Q2: Feeling down, depressed or hopeless 0   PHQ-2 Score 1   Q1: Little interest or pleasure in doing things Several days   Q2: Feeling down, depressed or hopeless Not at all   PHQ-2 Score 1       Abuse: Current or Past(Physical, Sexual or Emotional)- No  Do you feel safe in your environment - Yes    Social History   Substance Use Topics     Smoking  "status: Never Smoker     Smokeless tobacco: Never Used     Alcohol use No     Alcohol Use 2/23/2018   If you drink alcohol, do you typically have greater than 3 drinks per day OR greater than 7 drinks per week?   No   No flowsheet data found.    Reviewed orders with patient.  Reviewed health maintenance and updated orders accordingly - Yes  Labs reviewed in [facility]    Patient over age 50, mutual decision to screen reflected in health maintenance.    Pertinent mammograms are reviewed under the imaging tab.  History of abnormal Pap smear: Status post benign hysterectomy. Health Maintenance and Surgical History updated.    Reviewed and updated as needed this visit by clinical staff  Tobacco  Allergies  Meds  Med Hx  Surg Hx  Fam Hx  Soc Hx        Reviewed and updated as needed this visit by Provider          Review of Systems   ROS: 10 point ROS neg other than the symptoms noted above in the HPI.    This document serves as a record of the services and decisions personally performed and made by Briana Melton MD. It was created on his/her behalf by Anahi Sandoval, trained medical scribe. The creation of this document is based the provider's statements to the medical scribes.    Scribe Anahi Sandoval 2:30 PM, February 23, 2018    OBJECTIVE:   /80 (BP Location: Left arm, Cuff Size: Adult Regular)  Pulse 78  Temp 97.3  F (36.3  C) (Oral)  Resp 18  Ht 1.746 m (5' 8.75\")  Wt 108.4 kg (239 lb)  SpO2 93%  Breastfeeding? No  BMI 35.55 kg/m2  Physical Exam  GENERAL APPEARANCE: healthy, alert and no distress  HENT: ear canals and TM's normal, mouth without ulcers or lesions, oropharynx clear and oral mucous membranes moist  NECK: no adenopathy, no asymmetry, masses, or scars and thyroid normal to palpation  RESP: lungs clear to auscultation - no rales, rhonchi or wheezes  BREAST: normal without masses, tenderness or nipple discharge and no palpable axillary masses or adenopathy  CV: regular rate and rhythm, normal S1 S2, no " S3 or S4, no murmur, click or rub, no peripheral edema and peripheral pulses strong  ABDOMEN: soft, nontender, no hepatosplenomegaly, no masses and bowel sounds normal  MS: no musculoskeletal defects are noted and gait is age appropriate without ataxia  SKIN: no suspicious lesions or rashes  NEURO: Normal strength and tone, sensory exam grossly normal, mentation intact and speech normal  PSYCH: mentation appears normal and affect normal/bright    ASSESSMENT/PLAN:   1. Acquired hypothyroidism    - TSH WITH FREE T4 REFLEX  - levothyroxine (SYNTHROID/LEVOTHROID) 125 MCG tablet; Take 1 tablet (125 mcg) by mouth daily  Dispense: 90 tablet; Refill: 3  - buPROPion (WELLBUTRIN XL) 150 MG 24 hr tablet; Take 1 tablet (150 mg) by mouth every morning  Dispense: 90 tablet; Refill: 3  - albuterol (PROAIR HFA/PROVENTIL HFA/VENTOLIN HFA) 108 (90 BASE) MCG/ACT Inhaler; Inhale 2 puffs into the lungs every 6 hours as needed for shortness of breath / dyspnea or wheezing  Dispense: 1 Inhaler; Refill: 11    2. Menopausal syndrome (hot flushes)    - levothyroxine (SYNTHROID/LEVOTHROID) 125 MCG tablet; Take 1 tablet (125 mcg) by mouth daily  Dispense: 90 tablet; Refill: 3  - buPROPion (WELLBUTRIN XL) 150 MG 24 hr tablet; Take 1 tablet (150 mg) by mouth every morning  Dispense: 90 tablet; Refill: 3  - albuterol (PROAIR HFA/PROVENTIL HFA/VENTOLIN HFA) 108 (90 BASE) MCG/ACT Inhaler; Inhale 2 puffs into the lungs every 6 hours as needed for shortness of breath / dyspnea or wheezing  Dispense: 1 Inhaler; Refill: 11    3. Gastroesophageal reflux disease, esophagitis presence not specified    - levothyroxine (SYNTHROID/LEVOTHROID) 125 MCG tablet; Take 1 tablet (125 mcg) by mouth daily  Dispense: 90 tablet; Refill: 3  - buPROPion (WELLBUTRIN XL) 150 MG 24 hr tablet; Take 1 tablet (150 mg) by mouth every morning  Dispense: 90 tablet; Refill: 3  - albuterol (PROAIR HFA/PROVENTIL HFA/VENTOLIN HFA) 108 (90 BASE) MCG/ACT Inhaler; Inhale 2 puffs into  the lungs every 6 hours as needed for shortness of breath / dyspnea or wheezing  Dispense: 1 Inhaler; Refill: 11    4. Decreased libido    - levothyroxine (SYNTHROID/LEVOTHROID) 125 MCG tablet; Take 1 tablet (125 mcg) by mouth daily  Dispense: 90 tablet; Refill: 3  - buPROPion (WELLBUTRIN XL) 150 MG 24 hr tablet; Take 1 tablet (150 mg) by mouth every morning  Dispense: 90 tablet; Refill: 3  - albuterol (PROAIR HFA/PROVENTIL HFA/VENTOLIN HFA) 108 (90 BASE) MCG/ACT Inhaler; Inhale 2 puffs into the lungs every 6 hours as needed for shortness of breath / dyspnea or wheezing  Dispense: 1 Inhaler; Refill: 11    5. High risk medication use    - levothyroxine (SYNTHROID/LEVOTHROID) 125 MCG tablet; Take 1 tablet (125 mcg) by mouth daily  Dispense: 90 tablet; Refill: 3  - buPROPion (WELLBUTRIN XL) 150 MG 24 hr tablet; Take 1 tablet (150 mg) by mouth every morning  Dispense: 90 tablet; Refill: 3  - albuterol (PROAIR HFA/PROVENTIL HFA/VENTOLIN HFA) 108 (90 BASE) MCG/ACT Inhaler; Inhale 2 puffs into the lungs every 6 hours as needed for shortness of breath / dyspnea or wheezing  Dispense: 1 Inhaler; Refill: 11    6. Encounter for screening mammogram for breast cancer    - levothyroxine (SYNTHROID/LEVOTHROID) 125 MCG tablet; Take 1 tablet (125 mcg) by mouth daily  Dispense: 90 tablet; Refill: 3  - buPROPion (WELLBUTRIN XL) 150 MG 24 hr tablet; Take 1 tablet (150 mg) by mouth every morning  Dispense: 90 tablet; Refill: 3  - albuterol (PROAIR HFA/PROVENTIL HFA/VENTOLIN HFA) 108 (90 BASE) MCG/ACT Inhaler; Inhale 2 puffs into the lungs every 6 hours as needed for shortness of breath / dyspnea or wheezing  Dispense: 1 Inhaler; Refill: 11  - *MA Screening Digital Bilateral; Future    7. Herpes simplex virus (HSV) infection    - famciclovir (FAMVIR) 500 MG tablet; Take 1 tablet (500 mg) by mouth 2 times daily as needed  Dispense: 180 tablet; Refill: 3    8. ANDREW (obstructive sleep apnea)  Uses mouth device     9. Chronic low back pain,  "unspecified back pain laterality, with sciatica presence unspecified    - cyclobenzaprine (FLEXERIL) 10 MG tablet; Take 0.5-1 tablets (5-10 mg) by mouth 3 times daily as needed for muscle spasms  Dispense: 90 tablet; Refill: 1    10. Impaired fasting glucose    - **Glucose FUTURE anytime; Future  - **A1C FUTURE anytime; Future    11. Hyperlipidemia LDL goal <100    - Lipid panel reflex to direct LDL Fasting; Future    12. Need for TD vaccine    - TD PRESERV FREE >=7 YRS ADS IM    13. Routine general medical examination at a health care facility        COUNSELING:  Reviewed preventive health counseling, as reflected in patient instructions  Special attention given to:        Regular exercise       Healthy diet/nutrition    BP Screening:   Last 3 BP Readings:    BP Readings from Last 3 Encounters:   02/23/18 112/80   06/13/17 137/76   03/09/17 126/72     The following was recommended to the patient:  Re-screen BP within a year and recommended lifestyle modifications     reports that she has never smoked. She has never used smokeless tobacco.  Estimated body mass index is 35.55 kg/(m^2) as calculated from the following:    Height as of this encounter: 1.746 m (5' 8.75\").    Weight as of this encounter: 108.4 kg (239 lb).   Weight management plan: Discussed healthy diet and exercise guidelines and patient will follow up in 12 months in clinic to re-evaluate.    Counseling Resources:  ATP IV Guidelines  Pooled Cohorts Equation Calculator  Breast Cancer Risk Calculator  FRAX Risk Assessment  ICSI Preventive Guidelines  Dietary Guidelines for Americans, 2010  USDA's MyPlate  ASA Prophylaxis  Lung CA Screening    Patient Instructions     I would get a 3D mammogram if it is covered by your insurance.     Schedule a fasting lab draw and mammogram.    Come back this summer for your Shingrix shingles vaccine.      [facility]    If you have any questions regarding to your visit please contact your care team: "     Team Pink:   Clinic Hours Telephone Number   Internal Medicine:  Dr. Briana Murphy, NP       7am-7pm  Monday - Thursday   7am-5pm  Fridays  [phone]  (Appointment scheduling available 24/7)    Questions about your visit?  Team Line  [phone]   [facility] [place] [facility] 11am-9pm Monday-Friday Saturday-Sunday- 9am-5pm   [place] - 5pm-9pm Monday-Friday Saturday-Sunday- 9am-5pm  [phone] - [place]   [phone] - [place]       What options do I have for visits at the clinic other than the traditional office visit?  To expand how we care for you, many of our providers are utilizing electronic visits (e-visits) and telephone visits, when medically appropriate, for interactions with their patients rather than a visit in the clinic.   We also offer nurse visits for many medical concerns. Just like any other service, we will bill your insurance company for this type of visit based on time spent on the phone with your provider. Not all insurance companies cover these visits. Please check with your medical insurance if this type of visit is covered. You will be responsible for any charges that are not paid by your insurance.      E-visits via [employer]:  generally incur a $35.00 fee.  Telephone visits:  Time spent on the phone: *charged based on time that is spent on the phone in increments of 10 minutes. Estimated cost:   5-10 mins $30.00   11-20 mins. $59.00   21-30 mins. $85.00   Use [employer] (secure email communication and access to your chart) to send your primary care provider a message or make an appointment. Ask someone on your Team how to sign up for [employer]    For a Price Quote for your services, please call our Consumer Price Line at [phone].    As always, Thank you for trusting us with your health care needs!    Marnie ALVAREZ CMA ([facility])      I spent 19 minutes of time with the patient and >50% of it was in education and  counseling regarding preventive health.    The information in this document, created by a scribe for me, accurately reflects the services I personally performed and the decisions made by me. I have reviewed and approved this document for accuracy.     Briana Melton MD  [facility]    Start 2:28 PM  End 2:47 PM

## 2018-02-25 PROBLEM — M54.5 CHRONIC LOW BACK PAIN, UNSPECIFIED BACK PAIN LATERALITY, WITH SCIATICA PRESENCE UNSPECIFIED: Status: ACTIVE | Noted: 2017-02-10

## 2018-02-25 PROBLEM — R73.01 IMPAIRED FASTING GLUCOSE: Status: ACTIVE | Noted: 2018-02-25

## 2018-02-25 PROBLEM — E78.5 HYPERLIPIDEMIA LDL GOAL <100: Status: ACTIVE | Noted: 2018-02-25

## 2018-02-25 PROBLEM — R68.82 DECREASED LIBIDO: Status: ACTIVE | Noted: 2018-02-25

## 2018-02-26 ENCOUNTER — THERAPY VISIT (OUTPATIENT)
Dept: CHIROPRACTIC MEDICINE | Facility: CLINIC | Age: 58
End: 2018-02-26
Payer: COMMERCIAL

## 2018-02-26 DIAGNOSIS — M99.02 THORACIC SEGMENT DYSFUNCTION: ICD-10-CM

## 2018-02-26 DIAGNOSIS — M99.05 SEGMENTAL DYSFUNCTION OF PELVIC REGION: Primary | ICD-10-CM

## 2018-02-26 DIAGNOSIS — M99.03 SEGMENTAL DYSFUNCTION OF LUMBAR REGION: ICD-10-CM

## 2018-02-26 DIAGNOSIS — M62.838 SPASM OF MUSCLE: ICD-10-CM

## 2018-02-26 DIAGNOSIS — M54.50 LUMBAGO: ICD-10-CM

## 2018-02-26 PROCEDURE — 98941 CHIROPRACT MANJ 3-4 REGIONS: CPT | Mod: AT | Performed by: CHIROPRACTOR

## 2018-02-26 NOTE — PROGRESS NOTES
Visit #:  1  Subjective:  Amina Pineda is a 56 year old female who has been seen for:        Segmental dysfunction of pelvic region  Lumbago  Segmental dysfunction of lumbar region  Spasm of muscle  Segmental dysfunction of thoracic region  Cervicalgia  Cervical segment dysfunction.     Since last visit on 4/13/2017,  Amina Pineda reports the following changes: Pain immediately after last treatment: 1/10 and their pain level today 5/10.  Amina is feeling a little stiff today after she was snow blowing this past weekend.  Other than this event she has been doing well since last visit. The pain is located in her lower left lumbar region and very local.  She feels that she aggravated her back when she was snow blowing.    Area of chief complaint:  Lumbar :  Symptoms are graded at 5/10. The quality is described as stiff, achey.  Motion has increased, but is still not normal. Patient feels that they are improved due to a reduction in symptoms.        Objective:  The following was observed:    P: palpatory tenderness, piriformis and upper traps    A: static palpation demonstrates intersegmental asymmetry , pelvis, lumbar thoracic and cervical regions    R: motion palpation notes restricted motion, :    T10 Extension restriction  T11 Extension restriction  L3 Right rotation restricted  L5 Right rotation restricted  PSIS Right Extension restriction    T: hypertonicity at: Lumbar erector spine, Piriformis and TFL R>>L      Assessment:    Segmental spinal dysfunction/restrictions found at:  T10  T11  T12  L4  L5  PSIS Right    Diagnoses:      1. Segmental dysfunction of pelvic region    2. Lumbago    3. Segmental dysfunction of lumbar region    4. Spasm of muscle    5. Segmental dysfunction of thoracic region                Patient's condition:  Patient had restrictions pre-manipulation    Treatment effectiveness:  Post manipulation there is better intersegmental movement and Patient claims to feel looser post  manipulation      Procedures:  CMT:  18191 Chiropractic manipulative treatment 3-4 regions performed   Thoracic  Activator, T10, T11, T12, , Prone  Lumbar: Activator, L4, L5,Prone  Pelvis: Drop Table, PSIS Right , Prone    Modalities:  10360: MSTM:  To Piriformis and Traps  for 5 min    Therapeutic procedures:  None      Prognosis: Good    Progress towards Goals: Patient is making progress towards the goal     Response to Treatment:   Reduction in symptoms as reported by patient      Recommendations:    Instructions:ice 20 minutes every other hour as needed    Follow-up:  Return to care in one week

## 2018-03-01 ENCOUNTER — RADIANT APPOINTMENT (OUTPATIENT)
Dept: GENERAL RADIOLOGY | Facility: CLINIC | Age: 58
End: 2018-03-01
Attending: CHIROPRACTOR
Payer: COMMERCIAL

## 2018-03-01 ENCOUNTER — THERAPY VISIT (OUTPATIENT)
Dept: CHIROPRACTIC MEDICINE | Facility: CLINIC | Age: 58
End: 2018-03-01
Payer: COMMERCIAL

## 2018-03-01 ENCOUNTER — TELEPHONE (OUTPATIENT)
Dept: CHIROPRACTIC MEDICINE | Facility: CLINIC | Age: 58
End: 2018-03-01

## 2018-03-01 ENCOUNTER — OFFICE VISIT (OUTPATIENT)
Dept: ORTHOPEDICS | Facility: CLINIC | Age: 58
End: 2018-03-01
Payer: COMMERCIAL

## 2018-03-01 VITALS
HEIGHT: 68 IN | DIASTOLIC BLOOD PRESSURE: 80 MMHG | BODY MASS INDEX: 36.07 KG/M2 | WEIGHT: 238 LBS | SYSTOLIC BLOOD PRESSURE: 122 MMHG

## 2018-03-01 DIAGNOSIS — S92.414A CLOSED NONDISPLACED FRACTURE OF PROXIMAL PHALANX OF RIGHT GREAT TOE, INITIAL ENCOUNTER: ICD-10-CM

## 2018-03-01 DIAGNOSIS — M79.674 GREAT TOE PAIN, RIGHT: Primary | ICD-10-CM

## 2018-03-01 DIAGNOSIS — M54.50 LUMBAGO: ICD-10-CM

## 2018-03-01 DIAGNOSIS — M79.674 PAIN OF TOE OF RIGHT FOOT: ICD-10-CM

## 2018-03-01 DIAGNOSIS — M99.05 SEGMENTAL DYSFUNCTION OF PELVIC REGION: ICD-10-CM

## 2018-03-01 DIAGNOSIS — M99.03 SEGMENTAL DYSFUNCTION OF LUMBAR REGION: ICD-10-CM

## 2018-03-01 DIAGNOSIS — M62.838 SPASM OF MUSCLE: ICD-10-CM

## 2018-03-01 DIAGNOSIS — M79.674 PAIN OF TOE OF RIGHT FOOT: Primary | ICD-10-CM

## 2018-03-01 PROCEDURE — 98941 CHIROPRACT MANJ 3-4 REGIONS: CPT | Mod: AT | Performed by: CHIROPRACTOR

## 2018-03-01 PROCEDURE — 99203 OFFICE O/P NEW LOW 30 MIN: CPT | Performed by: PHYSICIAN ASSISTANT

## 2018-03-01 PROCEDURE — 73630 X-RAY EXAM OF FOOT: CPT | Mod: RT

## 2018-03-01 NOTE — LETTER
3/1/2018         RE: Amnia Pineda  [address]  [place] [address]        Dear Colleague,    Thank you for referring your patient, Amina Pineda, to the [facility] . Please see a copy of my visit note below.    Sports Medicine Clinic Visit    PCP: Briana Melton    Amina Pineda is a 57 year old female who is seen  in consultation at the request of Dr Javon Khan DC presenting with right big toe pain    Injury: Slipped while snowblowing    Location of Pain: right great toe  Duration of Pain: 4 day(s)  Rating of Pain at worst: 3/10  Rating of Pain Currently: 1/10  Symptoms are better with: Other medications: Robaxin for back pain  Symptoms are worse with: running, Walking, standing and down stairs  Additional Features:   Positive: swelling and bruising   Negative: popping, grinding, catching, locking, instability, paresthesias, numbness, weakness and pain in other joints  Other evaluation and/or treatments so far consists of: Nothing  Prior History of related problems: injury 6 years ago with bruising to toe. Part tear of Achilles    Social History:     Review of Systems  Skin: yes bruising, minimal swelling  Musculoskeletal: as above  Neurologic: minimal numbness, paresthesias  Remainder of review of systems is negative including constitutional, CV, pulmonary, GI, except as noted in HPI or medical history.    Patient's current problem list, past medical and surgical history, and family history were reviewed.    Patient Active Problem List   Diagnosis     Hypothyroidism     Esophageal reflux     Herpes simplex virus (HSV) infection     Generalized osteoarthrosis, unspecified site     Dysthymic disorder     CARDIOVASCULAR SCREENING; LDL GOAL LESS THAN 160     Female stress incontinence     Restless leg syndrome     Non morbid obesity due to excess calories     Ocular hypertension, right     Menopausal syndrome (hot flushes)     Daytime  somnolence     Chronic low back pain, unspecified back pain laterality, with sciatica presence unspecified     Mixed incontinence     ANDREW (obstructive sleep apnea)     Acute dacryocystitis, right     Decreased libido     Impaired fasting glucose     Hyperlipidemia LDL goal <100     Past Medical History:   Diagnosis Date     Decreased libido 2006     Depressive disorder, not elsewhere classified      Esophageal reflux      Generalized osteoarthrosis, unspecified site     R hip, on meds     Herpes simplex without mention of complication     oral     Intramural leiomyoma of uterus      Unspecified hypothyroidism      Past Surgical History:   Procedure Laterality Date     C LIGATE FALLOPIAN TUBE       C NONSPECIFIC PROCEDURE      rotator cuff surgery both shoulder     C NONSPECIFIC PROCEDURE      wrist surgery for cyst x 2     C VAGINAL HYSTERECTOMY      with BSO, 10-12 week size     COLONOSCOPY  2015     COLONOSCOPY WITH CO2 INSUFFLATION N/A 2015    Procedure: COLONOSCOPY WITH CO2 INSUFFLATION;  Surgeon: Bebeto Mortensen MD;  Location: [facility] OR     ELBOW SURGERY      Lt elbow repair.     HYSTERECTOMY, PAP NO LONGER INDICATED       ORTHOPEDIC SURGERY       SOFT TISSUE SURGERY      cyst, both shoulders and left elbow     Family History   Problem Relation Age of Onset     C.A.D. Maternal Grandfather      Coronary Artery Disease Maternal Grandfather      Heart Attack, ()     Coronary Artery Disease Father      High Cholesterol     Depression/Anxiety Father      Depression     Thyroid Disease Father      Hypo Thryoid     Retinal detachment Father      Hypertension Mother      High Blood Pressure     Breast Cancer Mother      Has spot being watching.  Checked every 6 months     Glaucoma Mother      glc surgery     Breast Cancer Paternal Grandmother      Breast removed ()     Asthma Paternal Grandmother      Emphysema ()     CEREBROVASCULAR DISEASE Paternal Grandfather       Strokes ()     Known Genetic Syndrome Daughter      Whitaker's Syndrome     Skin Cancer No family hx of      no family hx of skin cancer         Objective  There were no vitals taken for this visit.    GENERAL APPEARANCE: healthy, alert and no distress   GAIT: antalgic  SKIN: no suspicious lesions or rashes  HEENT: Sclera clear, anicteric  CV: no lower extremity edema, good peripheral pulses  RESP: Breathing not labored  NEURO: Normal strength and tone, mentation intact and speech normal  PSYCH:  mentation appears normal and affect normal/bright    Right Foot and Ankle Exam:  Inspection:       Ecchymosis dorsal aspect of great toe with minimal swelling.    Foot inspection:       hallux valgus    Tender:       Mainly tender medial portion of great toe at base       Minimal tenderness flexor tendon    Non-Tender:       Distal phalanx great toe, metatarsals, tarsals, ankle     ROM:        Full active and passive toe flexion, extension    Strength:       Full strength with great toe extension; very slight weakness/pain with toe flexion    Special Tests:       No instability with varus/valgus stressing of great toe    Gait:       antalgic gait - she has been able to bear weight but painful    Lower extremity alignment:       Normal    Neurovascular:       2+ peripheral pulses bilaterally and brisk capillary refill       sensation grossly intact      Radiology  I ordered, visualized and reviewed these images with the patient    RIGHT FOOT THREE VIEWS  3/1/2018 5:05 PM      HISTORY: Pain of toe of right foot.     COMPARISON: 2012         IMPRESSION: Ossified density seen at the base of the medial aspect of  the proximal phalanx first digit, this could be an avulsion fracture.  Otherwise no acute fracture or dislocation.    Assessment:  Right great toe - proximal phalanx collateral avulsion fracture    Plan:  Discussed the assessment with the patient.    Recommended rest, ice, crutches.  She may weight-bear as  tolerated.  We discussed taping, however, it is uncomfortable to have her toe pulled laterally, so we will avoid taping.  Recommended wearing a post-op shoe, which she already has.  She was provided with crutches today.      Follow up: one week or sooner if needed.      Again, thank you for allowing me to participate in the care of your patient.        Sincerely,        Laureen Murcia PA-C

## 2018-03-01 NOTE — MR AVS SNAPSHOT
After Visit Summary   3/1/2018    Amina Pineda    MRN: [id number]           Patient Information     Date Of Birth          1960        Visit Information        Provider Department      3/1/2018 6:40 PM Laureen Murcia PA-C [facility] [place]        Care Instructions    Plan:  - Today's Plan of Care:  Rest, crutches  Ice, elevation  Post-op shoe  Follow-up in one week                Follow-ups after your visit        Your next 10 appointments already scheduled     Mar 08, 2018  3:45 PM CST   [facility] Chiropractor with Javon Khan DC   [facility] [place] [facility] ([facility] [place]    [address] [facility] [address] [address]  [place] [id number] [phone]            Jun 26, 2018  2:45 PM CDT   Return Visit with Tiff Dunn MD   [facility]    [address] [place] [address] [phone]              Who to contact     If you have questions or need follow up information about today's clinic visit or your schedule please contact [facility] [place] directly at [phone].  Normal or non-critical lab and imaging results will be communicated to you by Enconcerthart, letter or phone within 4 business days after the clinic has received the results. If you do not hear from us within 7 days, please contact the clinic through Enconcerthart or phone. If you have a critical or abnormal lab result, we will notify you by phone as soon as possible.  Submit refill requests through [employer] Res[employer] or call your pharmacy and they will forward the refill request to us. Please allow 3 business days for your refill to be completed.          Additional Information About Your Visit        MyChart Information     [employer] gives you secure access to your electronic health record. If you see a primary care provider, you can also send messages to your care team and make appointments. If you have questions, please call your  "primary care clinic.  If you do not have a primary care provider, please call [phone] and they will assist you.        Care EveryWhere ID     This is your Care EveryWhere ID. This could be used by other organizations to access your [facility] medical records  [id number]        Your Vitals Were     Height BMI (Body Mass Index)                5' 8\" (1.727 m) 36.19 kg/m2           Blood Pressure from Last 3 Encounters:   03/01/18 122/80   02/23/18 112/80   06/13/17 137/76    Weight from Last 3 Encounters:   03/01/18 238 lb (108 kg)   02/23/18 239 lb (108.4 kg)   06/13/17 243 lb (110.2 kg)              Today, you had the following     No orders found for display       Primary Care Provider Office Phone # Fax #    Briana Ludmila Melton [phone] [address] [facility] [address]        Equal Access to Services     [facility]: Hadii aad ku hadasho Soomaali, waaxda luqadaha, qaybta kaalmada adeegyada, waxay idiin hayaan nusrateg kharash la'yinn . So [facility] [phone].    ATENCIÓN: Si habla español, tiene a aguilar disposición servicios gratuitos de asistencia lingüística. Llame al [phone].    We comply with applicable federal civil rights laws and [place] laws. We do not discriminate on the basis of race, color, national origin, age, disability, sex, sexual orientation, or gender identity.            Thank you!     Thank you for choosing [facility]  for your care. Our goal is always to provide you with excellent care. Hearing back from our patients is one way we can continue to improve our services. Please take a few minutes to complete the written survey that you may receive in the mail after your visit with us. Thank you!             Your Updated Medication List - Protect others around you: Learn how to safely use, store and throw away your medicines at www.disposemymeds.org.          This list is accurate as of 3/1/18  7:35 PM.  Always use your most " recent med list.                   Brand Name Dispense Instructions for use Diagnosis    albuterol 108 (90 BASE) MCG/ACT Inhaler    PROAIR HFA/PROVENTIL HFA/VENTOLIN HFA    1 Inhaler    Inhale 2 puffs into the lungs every 6 hours as needed for shortness of breath / dyspnea or wheezing    Acquired hypothyroidism, Menopausal syndrome (hot flushes), Gastroesophageal reflux disease, esophagitis presence not specified, Decreased libido, High risk medication use, Encounter for screening mammogram for breast cancer       BIOTIN PO           buPROPion 150 MG 24 hr tablet    WELLBUTRIN XL    90 tablet    Take 1 tablet (150 mg) by mouth every morning    Acquired hypothyroidism, Menopausal syndrome (hot flushes), Gastroesophageal reflux disease, esophagitis presence not specified, Decreased libido, High risk medication use, Encounter for screening mammogram for breast cancer       ciclopirox 8 % Soln     13.2 mL    Apply to adjacent skin and affected nails daily. Remove with alcohol every 7 days    Onychomycosis       cyclobenzaprine 10 MG tablet    FLEXERIL    90 tablet    Take 0.5-1 tablets (5-10 mg) by mouth 3 times daily as needed for muscle spasms    Chronic low back pain, unspecified back pain laterality, with sciatica presence unspecified       estrogens (conjugated) 0.3 MG tablet    PREMARIN    90 tablet    Take 1 tablet (0.3 mg) by mouth daily    Menopausal syndrome (hot flushes), Acquired hypothyroidism, Gastroesophageal reflux disease, esophagitis presence not specified, Decreased libido, High risk medication use, Need for hepatitis C screening test, Encounter for screening mammogram for breast cancer       famciclovir 500 MG tablet    FAMVIR    180 tablet    Take 1 tablet (500 mg) by mouth 2 times daily as needed    Herpes simplex virus (HSV) infection       hydroquinone 4 % Crea     56.8 g    Externally apply topically At Bedtime Apply a thin layer to dark areas once nightly for no more than 8 weeks at a time.  Take breaks between use.    Solar lentigo       IRON SUPPLEMENT PO           levothyroxine 125 MCG tablet    SYNTHROID/LEVOTHROID    90 tablet    Take 1 tablet (125 mcg) by mouth daily    Acquired hypothyroidism, Menopausal syndrome (hot flushes), Gastroesophageal reflux disease, esophagitis presence not specified, Decreased libido, High risk medication use, Encounter for screening mammogram for breast cancer       nystatin 861502 UNIT/GM Powd    MYCOSTATIN    30 g    Apply 30 g topically 3 times daily as needed    Yeast infection of the skin       omeprazole 20 MG CR capsule    priLOSEC    90 capsule    Take 1 capsule (20 mg) by mouth daily    Gastroesophageal reflux disease, esophagitis presence not specified, Decreased libido, Acquired hypothyroidism, Menopausal syndrome (hot flushes), High risk medication use, Need for hepatitis C screening test, Encounter for screening mammogram for breast cancer       Probiotic 250 MG Caps      Take by mouth daily        UNABLE TO FIND      MEDICATION NAME: Lipoflavinoid+ For ringing in ears        VITAMIN C PO           VITAMIN D PO      Take by mouth daily

## 2018-03-01 NOTE — TELEPHONE ENCOUNTER
Called Amina with the preliminary radiology report from her foot XR, identifying a evulsion fracture of her toe.  Advised her to follow up with ortho regarding this.  She agreed and will be calling ortho.

## 2018-03-01 NOTE — PROGRESS NOTES
Visit #:  2  Subjective:  Amina Pineda is a 56 year old female who has been seen for:        Segmental dysfunction of pelvic region  Lumbago  Segmental dysfunction of lumbar region  Spasm of muscle  Segmental dysfunction of thoracic region  Cervicalgia  Cervical segment dysfunction.     Since last visit on 4/13/2017,  Amina Pineda reports the following changes: Pain immediately after last treatment: 1/10 and their pain level today 3/10.  Amina is feeling better.  Her back is less stiff and she ia able to do more.  She does mention that she hit her toe when she fell and it is still quite sore and is affecting her ability to walk comfortably.    Area of chief complaint:  Lumbar :  Symptoms are graded at 3/10. The quality is described as stiff, achey.  Motion has increased, but is still not normal. Patient feels that they are improved due to a reduction in symptoms.        Objective:  The following was observed:    P: palpatory tenderness, piriformis and upper traps    A: static palpation demonstrates intersegmental asymmetry , pelvis, lumbar thoracic and cervical regions    R: motion palpation notes restricted motion, :    T10 Extension restriction  T11 Extension restriction  L3 Right rotation restricted  L5 Right rotation restricted  PSIS Right Extension restriction    T: hypertonicity at: Lumbar erector spine, Piriformis and TFL R>>L      Assessment:    Segmental spinal dysfunction/restrictions found at:  T10  T11  T12  L4  L5  PSIS Right    Diagnoses:      1. Segmental dysfunction of pelvic region    2. Lumbago    3. Segmental dysfunction of lumbar region    4. Spasm of muscle    5. Segmental dysfunction of thoracic region                Patient's condition:  Patient had restrictions pre-manipulation    Treatment effectiveness:  Post manipulation there is better intersegmental movement and Patient claims to feel looser post manipulation      Procedures:  CMT:  10830 Chiropractic manipulative treatment  3-4 regions performed   Thoracic  Activator, T10, T11, T12, , Prone  Lumbar: Activator, L4, L5,Prone  Pelvis: Drop Table, PSIS Right , Prone    Modalities:  57362: MSTM:  To Piriformis and Traps  for 5 min    Therapeutic procedures:  None      Prognosis: Good    Progress towards Goals: Patient is making progress towards the goal     Response to Treatment:   Reduction in symptoms as reported by patient      Recommendations:    Instructions:ice 20 minutes every other hour as needed    Follow-up:  Return to care in one week    Follow XR for foot

## 2018-03-02 NOTE — PROGRESS NOTES
Sports Medicine Clinic Visit    PCP: Briana Melton    Amina Pineda is a 57 year old female who is seen  in consultation at the request of Dr Javon Khan DC presenting with right big toe pain    Injury: Slipped while snowblowing    Location of Pain: right great toe  Duration of Pain: 4 day(s)  Rating of Pain at worst: 3/10  Rating of Pain Currently: 1/10  Symptoms are better with: Other medications: Robaxin for back pain  Symptoms are worse with: running, Walking, standing and down stairs  Additional Features:   Positive: swelling and bruising   Negative: popping, grinding, catching, locking, instability, paresthesias, numbness, weakness and pain in other joints  Other evaluation and/or treatments so far consists of: Nothing  Prior History of related problems: injury 6 years ago with bruising to toe. Part tear of Achilles    Social History:     Review of Systems  Skin: yes bruising, minimal swelling  Musculoskeletal: as above  Neurologic: minimal numbness, paresthesias  Remainder of review of systems is negative including constitutional, CV, pulmonary, GI, except as noted in HPI or medical history.    Patient's current problem list, past medical and surgical history, and family history were reviewed.    Patient Active Problem List   Diagnosis     Hypothyroidism     Esophageal reflux     Herpes simplex virus (HSV) infection     Generalized osteoarthrosis, unspecified site     Dysthymic disorder     CARDIOVASCULAR SCREENING; LDL GOAL LESS THAN 160     Female stress incontinence     Restless leg syndrome     Non morbid obesity due to excess calories     Ocular hypertension, right     Menopausal syndrome (hot flushes)     Daytime somnolence     Chronic low back pain, unspecified back pain laterality, with sciatica presence unspecified     Mixed incontinence     ANDREW (obstructive sleep apnea)     Acute dacryocystitis, right     Decreased libido     Impaired fasting glucose     Hyperlipidemia LDL  goal <100     Past Medical History:   Diagnosis Date     Decreased libido 2006     Depressive disorder, not elsewhere classified      Esophageal reflux      Generalized osteoarthrosis, unspecified site     R hip, on meds     Herpes simplex without mention of complication     oral     Intramural leiomyoma of uterus      Unspecified hypothyroidism      Past Surgical History:   Procedure Laterality Date     C LIGATE FALLOPIAN TUBE       C NONSPECIFIC PROCEDURE      rotator cuff surgery both shoulder     C NONSPECIFIC PROCEDURE      wrist surgery for cyst x 2     C VAGINAL HYSTERECTOMY      with BSO, 10-12 week size     COLONOSCOPY  2015     COLONOSCOPY WITH CO2 INSUFFLATION N/A 2015    Procedure: COLONOSCOPY WITH CO2 INSUFFLATION;  Surgeon: Bebeto Mortensen MD;  Location: [facility] OR     ELBOW SURGERY      Lt elbow repair.     HYSTERECTOMY, PAP NO LONGER INDICATED       ORTHOPEDIC SURGERY       SOFT TISSUE SURGERY      cyst, both shoulders and left elbow     Family History   Problem Relation Age of Onset     C.A.D. Maternal Grandfather      Coronary Artery Disease Maternal Grandfather      Heart Attack, ()     Coronary Artery Disease Father      High Cholesterol     Depression/Anxiety Father      Depression     Thyroid Disease Father      Hypo Thryoid     Retinal detachment Father      Hypertension Mother      High Blood Pressure     Breast Cancer Mother      Has spot being watching.  Checked every 6 months     Glaucoma Mother      glc surgery     Breast Cancer Paternal Grandmother      Breast removed ()     Asthma Paternal Grandmother      Emphysema ()     CEREBROVASCULAR DISEASE Paternal Grandfather      Strokes ()     Known Genetic Syndrome Daughter      Whitaker's Syndrome     Skin Cancer No family hx of      no family hx of skin cancer         Objective  There were no vitals taken for this visit.    GENERAL APPEARANCE: healthy, alert and no distress   GAIT:  antalgic  SKIN: no suspicious lesions or rashes  HEENT: Sclera clear, anicteric  CV: no lower extremity edema, good peripheral pulses  RESP: Breathing not labored  NEURO: Normal strength and tone, mentation intact and speech normal  PSYCH:  mentation appears normal and affect normal/bright    Right Foot and Ankle Exam:  Inspection:       Ecchymosis dorsal aspect of great toe with minimal swelling.    Foot inspection:       hallux valgus    Tender:       Mainly tender medial portion of great toe at base       Minimal tenderness flexor tendon    Non-Tender:       Distal phalanx great toe, metatarsals, tarsals, ankle     ROM:        Full active and passive toe flexion, extension    Strength:       Full strength with great toe extension; very slight weakness/pain with toe flexion    Special Tests:       No instability with varus/valgus stressing of great toe    Gait:       antalgic gait - she has been able to bear weight but painful    Lower extremity alignment:       Normal    Neurovascular:       2+ peripheral pulses bilaterally and brisk capillary refill       sensation grossly intact      Radiology  I ordered, visualized and reviewed these images with the patient    RIGHT FOOT THREE VIEWS  3/1/2018 5:05 PM      HISTORY: Pain of toe of right foot.     COMPARISON: 11/16/2012         IMPRESSION: Ossified density seen at the base of the medial aspect of  the proximal phalanx first digit, this could be an avulsion fracture.  Otherwise no acute fracture or dislocation.    Assessment:  Right great toe - proximal phalanx collateral avulsion fracture    Plan:  Discussed the assessment with the patient.    Recommended rest, ice, crutches.  She may weight-bear as tolerated.  We discussed taping, however, it is uncomfortable to have her toe pulled laterally, so we will avoid taping.  Recommended wearing a post-op shoe, which she already has.  She was provided with crutches today.      Follow up: one week or sooner if  needed.

## 2018-03-02 NOTE — PATIENT INSTRUCTIONS
Plan:  - Today's Plan of Care:  Rest, crutches  Ice, elevation  Post-op shoe  Follow-up in one week

## 2018-03-08 ENCOUNTER — OFFICE VISIT (OUTPATIENT)
Dept: ORTHOPEDICS | Facility: CLINIC | Age: 58
End: 2018-03-08
Payer: COMMERCIAL

## 2018-03-08 ENCOUNTER — THERAPY VISIT (OUTPATIENT)
Dept: CHIROPRACTIC MEDICINE | Facility: CLINIC | Age: 58
End: 2018-03-08
Payer: COMMERCIAL

## 2018-03-08 VITALS — SYSTOLIC BLOOD PRESSURE: 148 MMHG | DIASTOLIC BLOOD PRESSURE: 87 MMHG | HEART RATE: 77 BPM

## 2018-03-08 DIAGNOSIS — M99.03 SEGMENTAL DYSFUNCTION OF LUMBAR REGION: ICD-10-CM

## 2018-03-08 DIAGNOSIS — M62.838 SPASM OF MUSCLE: ICD-10-CM

## 2018-03-08 DIAGNOSIS — M54.50 LUMBAGO: ICD-10-CM

## 2018-03-08 DIAGNOSIS — M99.02 THORACIC SEGMENT DYSFUNCTION: ICD-10-CM

## 2018-03-08 DIAGNOSIS — S92.414D CLOSED NONDISPLACED FRACTURE OF PROXIMAL PHALANX OF RIGHT GREAT TOE WITH ROUTINE HEALING, SUBSEQUENT ENCOUNTER: Primary | ICD-10-CM

## 2018-03-08 DIAGNOSIS — M99.05 SEGMENTAL DYSFUNCTION OF PELVIC REGION: Primary | ICD-10-CM

## 2018-03-08 PROCEDURE — 98941 CHIROPRACT MANJ 3-4 REGIONS: CPT | Mod: AT | Performed by: CHIROPRACTOR

## 2018-03-08 PROCEDURE — 99213 OFFICE O/P EST LOW 20 MIN: CPT | Performed by: PHYSICIAN ASSISTANT

## 2018-03-08 NOTE — MR AVS SNAPSHOT
After Visit Summary   3/8/2018    Amina Pineda    MRN: [id number]           Patient Information     Date Of Birth          1960        Visit Information        Provider Department      3/8/2018 5:40 PM Laureen Murcia PA-C [facility] [place]        Care Instructions    Plan:  - Today's Plan of Care:  Wear stiff sole shoe and buddy tape toes.  Crutches if needed with weight-bearing.  Ice as needed for discomfort.    Follow Up: 4 weeks              Follow-ups after your visit        Your next 10 appointments already scheduled     Jun 26, 2018  2:45 PM CDT   Return Visit with Tiff Dunn MD   [facility]    [id number] [place] [address] [phone]              Who to contact     If you have questions or need follow up information about today's clinic visit or your schedule please contact [facility] [place] directly at [phone].  Normal or non-critical lab and imaging results will be communicated to you by KnockaTVhart, letter or phone within 4 business days after the clinic has received the results. If you do not hear from us within 7 days, please contact the clinic through SOPATect or phone. If you have a critical or abnormal lab result, we will notify you by phone as soon as possible.  Submit refill requests through [employer] or call your pharmacy and they will forward the refill request to us. Please allow 3 business days for your refill to be completed.          Additional Information About Your Visit        KnockaTVhart Information     [employer] gives you secure access to your electronic health record. If you see a primary care provider, you can also send messages to your care team and make appointments. If you have questions, please call your primary care clinic.  If you do not have a primary care provider, please call [phone] and they will assist you.        Care  EveryWhere ID     This is your Care EveryWhere ID. This could be used by other organizations to access your [facility] medical records  [id number]        Your Vitals Were     Pulse                   77            Blood Pressure from Last 3 Encounters:   03/08/18 148/87   03/01/18 122/80   02/23/18 112/80    Weight from Last 3 Encounters:   03/01/18 238 lb (108 kg)   02/23/18 239 lb (108.4 kg)   06/13/17 243 lb (110.2 kg)              Today, you had the following     No orders found for display       Primary Care Provider Office Phone # Fax #    Briana Ludmila Melton [phone]       [address] [facility]  [place] [address]        Equal Access to Services     [facility] : Hadii aad ku hadasho Soomaali, waaxda luqadaha, qaybta kaalmada adeegyada, waxamanda rehman . So [facility] [phone].    ATENCIÓN: Si habla español, tiene a aguilar disposición servicios gratuitos de asistencia lingüística. LlKettering Health [facility] Memorial [phone].    We comply with applicable federal civil rights laws and [place] laws. We do not discriminate on the basis of race, color, national origin, age, disability, sex, sexual orientation, or gender identity.            Thank you!     Thank you for choosing [facility]  for your care. Our goal is always to provide you with excellent care. Hearing back from our patients is one way we can continue to improve our services. Please take a few minutes to complete the written survey that you may receive in the mail after your visit with us. Thank you!             Your Updated Medication List - Protect others around you: Learn how to safely use, store and throw away your medicines at www.disposemymeds.org.          This list is accurate as of 3/8/18  6:03 PM.  Always use your most recent med list.                   Brand Name Dispense Instructions for use Diagnosis    albuterol 108 (90 BASE) MCG/ACT Inhaler    PROAIR HFA/PROVENTIL HFA/VENTOLIN HFA    1  Inhaler    Inhale 2 puffs into the lungs every 6 hours as needed for shortness of breath / dyspnea or wheezing    Acquired hypothyroidism, Menopausal syndrome (hot flushes), Gastroesophageal reflux disease, esophagitis presence not specified, Decreased libido, High risk medication use, Encounter for screening mammogram for breast cancer       BIOTIN PO           buPROPion 150 MG 24 hr tablet    WELLBUTRIN XL    90 tablet    Take 1 tablet (150 mg) by mouth every morning    Acquired hypothyroidism, Menopausal syndrome (hot flushes), Gastroesophageal reflux disease, esophagitis presence not specified, Decreased libido, High risk medication use, Encounter for screening mammogram for breast cancer       ciclopirox 8 % Soln     13.2 mL    Apply to adjacent skin and affected nails daily. Remove with alcohol every 7 days    Onychomycosis       cyclobenzaprine 10 MG tablet    FLEXERIL    90 tablet    Take 0.5-1 tablets (5-10 mg) by mouth 3 times daily as needed for muscle spasms    Chronic low back pain, unspecified back pain laterality, with sciatica presence unspecified       estrogens (conjugated) 0.3 MG tablet    PREMARIN    90 tablet    Take 1 tablet (0.3 mg) by mouth daily    Menopausal syndrome (hot flushes), Acquired hypothyroidism, Gastroesophageal reflux disease, esophagitis presence not specified, Decreased libido, High risk medication use, Need for hepatitis C screening test, Encounter for screening mammogram for breast cancer       famciclovir 500 MG tablet    FAMVIR    180 tablet    Take 1 tablet (500 mg) by mouth 2 times daily as needed    Herpes simplex virus (HSV) infection       hydroquinone 4 % Crea     56.8 g    Externally apply topically At Bedtime Apply a thin layer to dark areas once nightly for no more than 8 weeks at a time. Take breaks between use.    Solar lentigo       IRON SUPPLEMENT PO           levothyroxine 125 MCG tablet    SYNTHROID/LEVOTHROID    90 tablet    Take 1 tablet (125 mcg) by mouth  daily    Acquired hypothyroidism, Menopausal syndrome (hot flushes), Gastroesophageal reflux disease, esophagitis presence not specified, Decreased libido, High risk medication use, Encounter for screening mammogram for breast cancer       nystatin [id number] UNIT/GM Powd    MYCOSTATIN    30 g    Apply 30 g topically 3 times daily as needed    Yeast infection of the skin       omeprazole 20 MG CR capsule    priLOSEC    90 capsule    Take 1 capsule (20 mg) by mouth daily    Gastroesophageal reflux disease, esophagitis presence not specified, Decreased libido, Acquired hypothyroidism, Menopausal syndrome (hot flushes), High risk medication use, Need for hepatitis C screening test, Encounter for screening mammogram for breast cancer       order for DME     1 Device    Equipment being ordered: Crutch, Adult, Aluminum    Great toe pain, right       Probiotic 250 MG Caps      Take by mouth daily        UNABLE TO FIND      MEDICATION NAME: Lipoflavinoid+ For ringing in ears        VITAMIN C PO           VITAMIN D PO      Take by mouth daily

## 2018-03-08 NOTE — PATIENT INSTRUCTIONS
Plan:  - Today's Plan of Care:  Wear stiff sole shoe and buddy tape toes.  Crutches if needed with weight-bearing.  Ice as needed for discomfort.    Follow Up: 4 weeks

## 2018-03-08 NOTE — PROGRESS NOTES
Sports Medicine Clinic Visit - Interim History March 8, 2018      PCP: Briana Melton    Amina Pineda is a 57 year old female who is seen in f/u up for right big toe fracture. Since last visit on 3/1/18 patient is doing well. She used crutches up until 3/8/18 evening. She said that her pain is improving; she continues to have pain localized to medial aspect of base of great toe. Post-op shoe seems to be working ok for walking. She feels that it is big.    - Now ~ 11 days from initial injury    Social History:     Review of Systems  Skin: no bruising, no swelling  Musculoskeletal: as above  Neurologic: no numbness, paresthesias  Remainder of review of systems is negative including constitutional, CV, pulmonary, GI, except as noted in HPI or medical history.    Patient's current problem list, past medical and surgical history, and family history were reviewed.    Patient Active Problem List   Diagnosis     Hypothyroidism     Esophageal reflux     Herpes simplex virus (HSV) infection     Generalized osteoarthrosis, unspecified site     Dysthymic disorder     CARDIOVASCULAR SCREENING; LDL GOAL LESS THAN 160     Female stress incontinence     Restless leg syndrome     Non morbid obesity due to excess calories     Ocular hypertension, right     Menopausal syndrome (hot flushes)     Daytime somnolence     Chronic low back pain, unspecified back pain laterality, with sciatica presence unspecified     Mixed incontinence     ANDREW (obstructive sleep apnea)     Acute dacryocystitis, right     Decreased libido     Impaired fasting glucose     Hyperlipidemia LDL goal <100     Past Medical History:   Diagnosis Date     Decreased libido 11/6/2006     Depressive disorder, not elsewhere classified      Esophageal reflux      Generalized osteoarthrosis, unspecified site     R hip, on meds     Herpes simplex without mention of complication     oral     Intramural leiomyoma of uterus      Unspecified hypothyroidism       Past Surgical History:   Procedure Laterality Date     C LIGATE FALLOPIAN TUBE       C NONSPECIFIC PROCEDURE      rotator cuff surgery both shoulder     C NONSPECIFIC PROCEDURE      wrist surgery for cyst x 2     C VAGINAL HYSTERECTOMY      with BSO, 10-12 week size     COLONOSCOPY  2015     COLONOSCOPY WITH CO2 INSUFFLATION N/A 2015    Procedure: COLONOSCOPY WITH CO2 INSUFFLATION;  Surgeon: Bebeto Mortensen MD;  Location: [facility]     ELBOW SURGERY      Lt elbow repair.     HYSTERECTOMY, PAP NO LONGER INDICATED       ORTHOPEDIC SURGERY       SOFT TISSUE SURGERY      cyst, both shoulders and left elbow     Family History   Problem Relation Age of Onset     C.A.D. Maternal Grandfather      Coronary Artery Disease Maternal Grandfather      Heart Attack, ()     Coronary Artery Disease Father      High Cholesterol     Depression/Anxiety Father      Depression     Thyroid Disease Father      Hypo Thryoid     Retinal detachment Father      Hypertension Mother      High Blood Pressure     Breast Cancer Mother      Has spot being watching.  Checked every 6 months     Glaucoma Mother      glc surgery     Breast Cancer Paternal Grandmother      Breast removed ()     Asthma Paternal Grandmother      Emphysema ()     CEREBROVASCULAR DISEASE Paternal Grandfather      Strokes ()     Known Genetic Syndrome Daughter      Whitaker's Syndrome     Skin Cancer No family hx of      no family hx of skin cancer       Objective  /87 (BP Location: Right arm, Patient Position: Sitting)  Pulse 77    GENERAL APPEARANCE: healthy, alert and no distress   GAIT: antalgic  SKIN: no suspicious lesions or rashes  HEENT: Sclera clear, anicteric  CV: no lower extremity edema, good peripheral pulses  RESP: Breathing not labored  NEURO: Normal strength and tone, mentation intact and speech normal  PSYCH:  mentation appears normal and affect normal/bright    Right Foot and Ankle  Exam:    Inspection:       no visible ecchymosis       no visible edema or effusion    Foot inspection:       no deformity noted    Tender:       Medial aspect of base of proximal phalanx great toe right    Non-Tender:       Plantar and flexor aspects of great toe right       Remainder of great toe    ROM:        Full active and passive toe range of motion     Strength:       Normal strength with resisted toe flexion/extension - slight pain with flexion    Neurovascular:       2+ peripheral pulses bilaterally and brisk capillary refill       sensation grossly intact    Assessment:  Right great toe avulsion fracture with routine healing    Plan:  Discussed the assessment with the patient.    We tried fitting her with alternative post-op shoe but unfortunately could not find a better fit.  She has a boot at home that she will plan to try.  I also recommended buddy taping for immobilization and it might provide added comfort.  Recommended ice as needed for discomfort.  We did also discuss option of splint for her foot; she declined this.    Follow up: four weeks

## 2018-03-08 NOTE — PROGRESS NOTES
Visit #:  2  Subjective:  Amina Pineda is a 56 year old female who has been seen for:        Segmental dysfunction of pelvic region  Lumbago  Segmental dysfunction of lumbar region  Spasm of muscle  Segmental dysfunction of thoracic region  Cervicalgia  Cervical segment dysfunction.     Since last visit on 3/1/2018,  Amina Pineda reports the following changes: Pain immediately after last treatment: 1/10 and their pain level today 3/10.  Amina is feeling better,  But her low back is still a bit stiff and sore.  She says walking in a post op shoe for the past week has altered her gait and is affecting her low back.    Area of chief complaint:  Lumbar :  Symptoms are graded at 3/10. The quality is described as stiff, achey.  Motion has increased, but is still not normal. Patient feels that they are improved due to a reduction in symptoms.        Objective:  The following was observed:    P: palpatory tenderness, piriformis and upper traps    A: static palpation demonstrates intersegmental asymmetry , pelvis, lumbar thoracic and cervical regions    R: motion palpation notes restricted motion, :    T10 Extension restriction  T11 Extension restriction  L3 Right rotation restricted  L5 Right rotation restricted  PSIS Right Extension restriction    T: hypertonicity at: Lumbar erector spine, Piriformis and TFL R>>L      Assessment:    Segmental spinal dysfunction/restrictions found at:  T10  T11  T12  L4  L5  PSIS Right    Diagnoses:      1. Segmental dysfunction of pelvic region    2. Lumbago    3. Segmental dysfunction of lumbar region    4. Spasm of muscle    5. Segmental dysfunction of thoracic region                Patient's condition:  Patient had restrictions pre-manipulation    Treatment effectiveness:  Post manipulation there is better intersegmental movement and Patient claims to feel looser post manipulation      Procedures:  CMT:  09155 Chiropractic manipulative treatment 3-4 regions performed    Thoracic  Activator, T10, T11, T12, , Prone  Lumbar: Activator, L4, L5,Prone  Pelvis: Drop Table, PSIS Right , Prone    Modalities:  45054: MSTM:  To Piriformis and Traps  for 5 min    Therapeutic procedures:  None      Prognosis: Good    Progress towards Goals: Patient is making progress towards the goal     Response to Treatment:   Reduction in symptoms as reported by patient      Recommendations:    Instructions:ice 20 minutes every other hour as needed    Follow-up:  Return to care in one week    Follow XR for foot

## 2018-03-08 NOTE — LETTER
3/8/2018         RE: Amina Pineda  [address]  [place] [address]        Dear Colleague,    Thank you for referring your patient, Amina Pineda, to the [facility] . Please see a copy of my visit note below.    Sports Medicine Clinic Visit - Interim History March 8, 2018      PCP: Briana Melton    Amina Pineda is a 57 year old female who is seen in f/u up for right big toe fracture. Since last visit on 3/1/18 patient is doing well. She used crutches up until 3/8/18 evening. She said that her pain is improving; she continues to have pain localized to medial aspect of base of great toe. Post-op shoe seems to be working ok for walking. She feels that it is big.    - Now ~ 11 days from initial injury    Social History:     Review of Systems  Skin: no bruising, no swelling  Musculoskeletal: as above  Neurologic: no numbness, paresthesias  Remainder of review of systems is negative including constitutional, CV, pulmonary, GI, except as noted in HPI or medical history.    Patient's current problem list, past medical and surgical history, and family history were reviewed.    Patient Active Problem List   Diagnosis     Hypothyroidism     Esophageal reflux     Herpes simplex virus (HSV) infection     Generalized osteoarthrosis, unspecified site     Dysthymic disorder     CARDIOVASCULAR SCREENING; LDL GOAL LESS THAN 160     Female stress incontinence     Restless leg syndrome     Non morbid obesity due to excess calories     Ocular hypertension, right     Menopausal syndrome (hot flushes)     Daytime somnolence     Chronic low back pain, unspecified back pain laterality, with sciatica presence unspecified     Mixed incontinence     ANDREW (obstructive sleep apnea)     Acute dacryocystitis, right     Decreased libido     Impaired fasting glucose     Hyperlipidemia LDL goal <100     Past Medical History:   Diagnosis Date     Decreased libido 11/6/2006      Depressive disorder, not elsewhere classified      Esophageal reflux      Generalized osteoarthrosis, unspecified site     R hip, on meds     Herpes simplex without mention of complication     oral     Intramural leiomyoma of uterus      Unspecified hypothyroidism      Past Surgical History:   Procedure Laterality Date     C LIGATE FALLOPIAN TUBE       C NONSPECIFIC PROCEDURE      rotator cuff surgery both shoulder     C NONSPECIFIC PROCEDURE      wrist surgery for cyst x 2     C VAGINAL HYSTERECTOMY      with BSO, 10-12 week size     COLONOSCOPY  2015     COLONOSCOPY WITH CO2 INSUFFLATION N/A 2015    Procedure: COLONOSCOPY WITH CO2 INSUFFLATION;  Surgeon: Bebeto Mortensen MD;  Location: [facility]     ELBOW SURGERY      Lt elbow repair.     HYSTERECTOMY, PAP NO LONGER INDICATED       ORTHOPEDIC SURGERY       SOFT TISSUE SURGERY      cyst, both shoulders and left elbow     Family History   Problem Relation Age of Onset     C.A.D. Maternal Grandfather      Coronary Artery Disease Maternal Grandfather      Heart Attack, ()     Coronary Artery Disease Father      High Cholesterol     Depression/Anxiety Father      Depression     Thyroid Disease Father      Hypo Thryoid     Retinal detachment Father      Hypertension Mother      High Blood Pressure     Breast Cancer Mother      Has spot being watching.  Checked every 6 months     Glaucoma Mother      glc surgery     Breast Cancer Paternal Grandmother      Breast removed ()     Asthma Paternal Grandmother      Emphysema ()     CEREBROVASCULAR DISEASE Paternal Grandfather      Strokes ()     Known Genetic Syndrome Daughter      Whitaker's Syndrome     Skin Cancer No family hx of      no family hx of skin cancer       Objective  /87 (BP Location: Right arm, Patient Position: Sitting)  Pulse 77    GENERAL APPEARANCE: healthy, alert and no distress   GAIT: antalgic  SKIN: no suspicious lesions or rashes  HEENT: Sclera  clear, anicteric  CV: no lower extremity edema, good peripheral pulses  RESP: Breathing not labored  NEURO: Normal strength and tone, mentation intact and speech normal  PSYCH:  mentation appears normal and affect normal/bright    Right Foot and Ankle Exam:    Inspection:       no visible ecchymosis       no visible edema or effusion    Foot inspection:       no deformity noted    Tender:       Medial aspect of base of proximal phalanx great toe right    Non-Tender:       Plantar and flexor aspects of great toe right       Remainder of great toe    ROM:        Full active and passive toe range of motion     Strength:       Normal strength with resisted toe flexion/extension - slight pain with flexion    Neurovascular:       2+ peripheral pulses bilaterally and brisk capillary refill       sensation grossly intact    Assessment:  Right great toe avulsion fracture with routine healing    Plan:  Discussed the assessment with the patient.    We tried fitting her with alternative post-op shoe but unfortunately could not find a better fit.  She has a boot at home that she will plan to try.  I also recommended buddy taping for immobilization and it might provide added comfort.  Recommended ice as needed for discomfort.  We did also discuss option of splint for her foot; she declined this.    Follow up: four weeks      Again, thank you for allowing me to participate in the care of your patient.        Sincerely,        Laureen Murcia PA-C

## 2018-04-17 DIAGNOSIS — E78.5 HYPERLIPIDEMIA LDL GOAL <100: ICD-10-CM

## 2018-04-17 DIAGNOSIS — R73.01 IMPAIRED FASTING GLUCOSE: ICD-10-CM

## 2018-04-17 LAB
CHOLEST SERPL-MCNC: 185 MG/DL
GLUCOSE SERPL-MCNC: 106 MG/DL (ref 70–99)
HBA1C MFR BLD: 5.7 % (ref 0–5.6)
HDLC SERPL-MCNC: 59 MG/DL
LDLC SERPL CALC-MCNC: 105 MG/DL
NONHDLC SERPL-MCNC: 126 MG/DL
TRIGL SERPL-MCNC: 107 MG/DL

## 2018-04-17 PROCEDURE — 80061 LIPID PANEL: CPT | Performed by: INTERNAL MEDICINE

## 2018-04-17 PROCEDURE — 82947 ASSAY GLUCOSE BLOOD QUANT: CPT | Performed by: INTERNAL MEDICINE

## 2018-04-17 PROCEDURE — 36415 COLL VENOUS BLD VENIPUNCTURE: CPT | Performed by: INTERNAL MEDICINE

## 2018-04-17 PROCEDURE — 83036 HEMOGLOBIN GLYCOSYLATED A1C: CPT | Performed by: INTERNAL MEDICINE

## 2018-04-17 NOTE — PROGRESS NOTES
Slightly elevated 3 month sugar average but not in the diabetic range. The rest of your labs are still pending. Dr. Melton

## 2018-04-20 ENCOUNTER — RADIANT APPOINTMENT (OUTPATIENT)
Dept: MAMMOGRAPHY | Facility: CLINIC | Age: 58
End: 2018-04-20
Attending: INTERNAL MEDICINE
Payer: COMMERCIAL

## 2018-04-20 DIAGNOSIS — Z12.31 VISIT FOR SCREENING MAMMOGRAM: ICD-10-CM

## 2018-04-20 PROCEDURE — 77067 SCR MAMMO BI INCL CAD: CPT | Mod: TC

## 2018-04-20 PROCEDURE — 77063 BREAST TOMOSYNTHESIS BI: CPT | Mod: TC

## 2018-06-26 ENCOUNTER — OFFICE VISIT (OUTPATIENT)
Dept: DERMATOLOGY | Facility: CLINIC | Age: 58
End: 2018-06-26
Payer: COMMERCIAL

## 2018-06-26 DIAGNOSIS — B35.1 ONYCHOMYCOSIS: Primary | ICD-10-CM

## 2018-06-26 PROCEDURE — 87101 SKIN FUNGI CULTURE: CPT | Performed by: DERMATOLOGY

## 2018-06-26 PROCEDURE — 99212 OFFICE O/P EST SF 10 MIN: CPT | Performed by: DERMATOLOGY

## 2018-06-26 NOTE — PROGRESS NOTES
[facility] Dermatology Note      Dermatology Problem List:  1. Lesion on left nose, biopsied in [place] ~2006, pt reports showed lupus versus lymphoma and saw heme onc who reported lesion was not worrisome. Unable to obtain record  2. Onychomycosis, KOH positve  - Current Tx:cicloprix  -Previous Tx: terbinafine x2 (initiated 2nd round 3/14/2016), ciclopirox 8% solution   3. Hyperkeratosis, right great toe  -Previous Tx: Urea 40% cream (initiated 3/14/2016) with resolution  3. Photoaging/idiopathic guttate hypomelanosis  -Previous Tx: tretinoin 0.05% cream and hydroquinone    Encounter Date: Jun 26, 2018    CC:  Chief Complaint   Patient presents with     Derm Problem     Onychomycosis - bilateral toenails - nail polish did not work for her - she stated that she was not good at using it nightly - any alternatives?         History of Present Illness:  Ms. mAina Pineda is a 58 year old female who presents as a follow-up for history on onychomychosis. The patient was last seen 10/26/2017 when she was treated with ciclopirox. The patient reports that she has not been consistent with the nail polish and has not been using it. The patient reports that her nails have not been changing, improving or worsening. Terbinafine never cleared her fungus even with repeat course. SHe would like to know if she could try any new medications.      She reports that her underarms turned dark after using deoderant    The patient reports no other lesions of concern.    Past Medical History:   Patient Active Problem List   Diagnosis     Hypothyroidism     Esophageal reflux     Herpes simplex virus (HSV) infection     Generalized osteoarthrosis, unspecified site     Dysthymic disorder     CARDIOVASCULAR SCREENING; LDL GOAL LESS THAN 160     Female stress incontinence     Restless leg syndrome     Non morbid obesity due to excess calories     Ocular hypertension, right     Menopausal syndrome (hot flushes)     Daytime  somnolence     Chronic low back pain, unspecified back pain laterality, with sciatica presence unspecified     Mixed incontinence     ANDREW (obstructive sleep apnea)     Acute dacryocystitis, right     Decreased libido     Impaired fasting glucose     Hyperlipidemia LDL goal <100     Past Medical History:   Diagnosis Date     Decreased libido 11/6/2006     Depressive disorder, not elsewhere classified      Esophageal reflux      Generalized osteoarthrosis, unspecified site     R hip, on meds     Herpes simplex without mention of complication     oral     Intramural leiomyoma of uterus      Unspecified hypothyroidism      Past Surgical History:   Procedure Laterality Date     C LIGATE FALLOPIAN TUBE       C NONSPECIFIC PROCEDURE  5/02    rotator cuff surgery both shoulder     C NONSPECIFIC PROCEDURE      wrist surgery for cyst x 2     C VAGINAL HYSTERECTOMY  12/03    with BSO, 10-12 week size     COLONOSCOPY  4/24/2015     COLONOSCOPY WITH CO2 INSUFFLATION N/A 4/23/2015    Procedure: COLONOSCOPY WITH CO2 INSUFFLATION;  Surgeon: Bebeto Mortensen MD;  Location: [facility] OR     ELBOW SURGERY      Lt elbow repair.     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 She works in [employer] for [employer].  The patient denies use of tanning beds.  Kept in chart for convenience.       Family History:  Dad with AKs, no other family history of skin cancer.   Kept in chart for convenience.       Medications:  Current Outpatient Prescriptions   Medication Sig Dispense Refill     albuterol (PROAIR HFA/PROVENTIL HFA/VENTOLIN HFA) 108 (90 BASE) MCG/ACT Inhaler Inhale 2 puffs into the lungs every 6 hours as needed for shortness of breath / dyspnea or wheezing 1 Inhaler 11     Ascorbic Acid (VITAMIN C PO)        BIOTIN PO        buPROPion (WELLBUTRIN XL) 150 MG 24 hr tablet Take 1 tablet (150 mg) by mouth every  morning 90 tablet 3     Cholecalciferol (VITAMIN D PO) Take by mouth daily       cyclobenzaprine (FLEXERIL) 10 MG tablet Take 0.5-1 tablets (5-10 mg) by mouth 3 times daily as needed for muscle spasms 90 tablet 1     estrogens, conjugated, (PREMARIN) 0.3 MG tablet Take 1 tablet (0.3 mg) by mouth daily 90 tablet 3     famciclovir (FAMVIR) 500 MG tablet Take 1 tablet (500 mg) by mouth 2 times daily as needed 180 tablet 3     Ferrous Sulfate (IRON SUPPLEMENT PO)        hydroquinone 4 % CREA Externally apply topically At Bedtime Apply a thin layer to dark areas once nightly for no more than 8 weeks at a time. Take breaks between use. 56.8 g 0     levothyroxine (SYNTHROID/LEVOTHROID) 125 MCG tablet Take 1 tablet (125 mcg) by mouth daily 90 tablet 3     nystatin (MYCOSTATIN) 805749 UNIT/GM POWD Apply 30 g topically 3 times daily as needed 30 g 1     omeprazole (PRILOSEC) 20 MG CR capsule TAKE ONE CAPSULE BY MOUTH EVERY DAY 90 capsule 2     Saccharomyces boulardii (PROBIOTIC) 250 MG CAPS Take by mouth daily       UNABLE TO FIND MEDICATION NAME: Lipoflavinoid+  For ringing in ears       ciclopirox 8 % SOLN Apply to adjacent skin and affected nails daily. Remove with alcohol every 7 days (Patient not taking: Reported on 6/26/2018) 13.2 mL 5     order for DME Equipment being ordered: Crutch, Adult, Aluminum (Patient not taking: Reported on 6/26/2018) 1 Device 0      Allergies   Allergen Reactions     No Known Drug Allergies      Review of Systems:  -Const: Nel is generally feeling well  -Skin: As above in HPI. No additional skin concerns.    Physical exam:  Vitals: There were no vitals taken for this visit.  GEN: This is a well developed, well-nourished female in no acute distress, in a pleasant mood.    SKIN: Focused examination of the feet and face was performed.  -Yellow discoloration and hyperkeratosis on 3rd toenail left  and bilateral great toenails. Clearing at the base  - Skin colored flat papule on  chin  -Hyperpigmented macules on the axilla  -No other lesions of concern on areas examined.     Impression/Plan:  1. Onychomycosis, KOH positive. Terbinafine x2 courses- improving- needs culture    Continue ciclopirox 8% solution     Cx taken today with nail clipping     Photograph taken today   Consider repeat terbinafine 250 mg daily x6 months for nails.  Patient counseled that nails may take approximately 12 to 18  months for toenails to completely grow out. SHe should use athlete foot poder in all shoes. Wear cotton socks. Clip toenails as short as possible.    2.  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     Continue to monitor     3. Skin colored flat papule on chin, unchanged.    We will continue to monitor. Patient to report changes.       Follow-up in 1 year, earlier for new or changing lesions.     Staff Involved:  Staff/Scribe    Scribe Disclosure:   I, Lyssa Santos, am serving as a scribe to document services personally performed by Dr. Tiff Dunn, based on data collection and the provider's statements to me.       Provider Disclosure:   The documentation recorded by the scribe accurately reflects the services I personally performed and the decisions made by me.    Tiff Dunn MD    Department of Dermatology  [facility]: Phone: [phone], Fax:[phone]  [facility]: Phone: [phone], Fax: [phone]

## 2018-06-26 NOTE — NURSING NOTE
@Amina Pineda's goals for this visit include:   Chief Complaint   Patient presents with     Derm Problem     Onychomycosis - bilateral toenails - nail polish did not work for her - she stated that she was not good at using it nightly - any alternatives?       She requests these members of her care team be copied on today's visit information: NO    PCP: Briana Melton    Referring Provider:  No referring provider defined for this encounter.    There were no vitals taken for this visit.    Do you need any medication refills at today's visit? VLADIMIR Sanchez, CMA

## 2018-06-26 NOTE — MR AVS SNAPSHOT
After Visit Summary   6/26/2018    Amina Pineda    MRN: [id number]           Patient Information     Date Of Birth          1960        Visit Information        Provider Department      6/26/2018 2:45 PM Tiff Dunn MD [facility]        Today's Diagnoses     Onychomycosis    -  1       Follow-ups after your visit        Follow-up notes from your care team     Return in about 4 months (around 10/26/2018) for nails.      Who to contact     If you have questions or need follow up information about today's clinic visit or your schedule please contact [facility] directly at [phone].  Normal or non-critical lab and imaging results will be communicated to you by MyChart, letter or phone within 4 business days after the clinic has received the results. If you do not hear from us within 7 days, please contact the clinic through [employer] or phone. If you have a critical or abnormal lab result, we will notify you by phone as soon as possible.  Submit refill requests through [employer] or call your pharmacy and they will forward the refill request to us. Please allow 3 business days for your refill to be completed.          Additional Information About Your Visit        MyChart Information     [employer] gives you secure access to your electronic health record. If you see a primary care provider, you can also send messages to your care team and make appointments. If you have questions, please call your primary care clinic.  If you do not have a primary care provider, please call [phone] and they will assist you.      [employer] is an electronic gateway that provides easy, online access to your medical records. With [employer], you can request a clinic appointment, read your test results, renew a prescription or communicate with your care team.     To access your existing account, please contact your [facility] or call [phone] for  assistance.        Care EveryWhere ID     This is your Care EveryWhere ID. This could be used by other organizations to access your [facility] medical records  [id number]         Blood Pressure from Last 3 Encounters:   03/08/18 148/87   03/01/18 122/80   02/23/18 112/80    Weight from Last 3 Encounters:   03/01/18 108 kg (238 lb)   02/23/18 108.4 kg (239 lb)   06/13/17 110.2 kg (243 lb)              We Performed the Following     Fungus skin hair nail culture        Primary Care Provider Office Phone # Fax #    Briana Melton [phone]       [address] [facility] [address]  [facility] [address]        Equal Access to Services     ANUP WOODARD : Hadii johnny santana hadasho Soblair, waaxda luqadaha, qaybta kaalmada adeegyada, liane rehman . So [facility] [phone].    ATENCIÓN: Si habla español, tiene a aguilar disposición servicios gratuitos de asistencia lingüística. [facility] [phone].    We comply with applicable federal civil rights laws and [place] laws. We do not discriminate on the basis of race, color, national origin, age, disability, sex, sexual orientation, or gender identity.            Thank you!     Thank you for choosing [facility]  for your care. Our goal is always to provide you with excellent care. Hearing back from our patients is one way we can continue to improve our services. Please take a few minutes to complete the written survey that you may receive in the mail after your visit with us. Thank you!             Your Updated Medication List - Protect others around you: Learn how to safely use, store and throw away your medicines at www.disposemymeds.org.          This list is accurate as of 6/26/18  3:37 PM.  Always use your most recent med list.                   Brand Name Dispense Instructions for use Diagnosis    albuterol 108 (90 Base) MCG/ACT Inhaler    PROAIR HFA/PROVENTIL HFA/VENTOLIN HFA    1 Inhaler    Inhale 2 puffs into the lungs every 6  hours as needed for shortness of breath / dyspnea or wheezing    Acquired hypothyroidism, Menopausal syndrome (hot flushes), Gastroesophageal reflux disease, esophagitis presence not specified, Decreased libido, High risk medication use, Encounter for screening mammogram for breast cancer       BIOTIN PO           buPROPion 150 MG 24 hr tablet    WELLBUTRIN XL    90 tablet    Take 1 tablet (150 mg) by mouth every morning    Acquired hypothyroidism, Menopausal syndrome (hot flushes), Gastroesophageal reflux disease, esophagitis presence not specified, Decreased libido, High risk medication use, Encounter for screening mammogram for breast cancer       ciclopirox 8 % Soln     13.2 mL    Apply to adjacent skin and affected nails daily. Remove with alcohol every 7 days    Onychomycosis       cyclobenzaprine 10 MG tablet    FLEXERIL    90 tablet    Take 0.5-1 tablets (5-10 mg) by mouth 3 times daily as needed for muscle spasms    Chronic low back pain, unspecified back pain laterality, with sciatica presence unspecified       estrogens (conjugated) 0.3 MG tablet    PREMARIN    90 tablet    Take 1 tablet (0.3 mg) by mouth daily    Menopausal syndrome (hot flushes), Acquired hypothyroidism, Gastroesophageal reflux disease, esophagitis presence not specified, Decreased libido, High risk medication use, Need for hepatitis C screening test, Encounter for screening mammogram for breast cancer       famciclovir 500 MG tablet    FAMVIR    180 tablet    Take 1 tablet (500 mg) by mouth 2 times daily as needed    Herpes simplex virus (HSV) infection       hydroquinone 4 % Crea     56.8 g    Externally apply topically At Bedtime Apply a thin layer to dark areas once nightly for no more than 8 weeks at a time. Take breaks between use.    Solar lentigo       IRON SUPPLEMENT PO           levothyroxine 125 MCG tablet    SYNTHROID/LEVOTHROID    90 tablet    Take 1 tablet (125 mcg) by mouth daily    Acquired hypothyroidism, Menopausal  syndrome (hot flushes), Gastroesophageal reflux disease, esophagitis presence not specified, Decreased libido, High risk medication use, Encounter for screening mammogram for breast cancer       nystatin 895365 UNIT/GM Powd    MYCOSTATIN    30 g    Apply 30 g topically 3 times daily as needed    Yeast infection of the skin       omeprazole 20 MG CR capsule    priLOSEC    90 capsule    TAKE ONE CAPSULE BY MOUTH EVERY DAY    Gastroesophageal reflux disease, esophagitis presence not specified, Decreased libido, Acquired hypothyroidism, Menopausal syndrome (hot flushes), High risk medication use, Need for hepatitis C screening test, Encounter for screening mammogram for breast cancer       order for DME     1 Device    Equipment being ordered: Crutch, Adult, Aluminum    Great toe pain, right       Probiotic 250 MG Caps      Take by mouth daily        UNABLE TO FIND      MEDICATION NAME: Lipoflavinoid+ For ringing in ears        VITAMIN C PO           VITAMIN D PO      Take by mouth daily

## 2018-06-26 NOTE — LETTER
6/26/2018         RE: Amina Pineda  [address]  [place] [address]        Dear Colleague,    Thank you for referring your patient, Amina Pineda, to the [facility]. Please see a copy of my visit note below.    [facility] Dermatology Note      Dermatology Problem List:  1. Lesion on left nose, biopsied in [place] ~2006, pt reports showed lupus versus lymphoma and saw heme onc who reported lesion was not worrisome. Unable to obtain record  2. Onychomycosis, KOH positve  - Current Tx:cicloprix  -Previous Tx: terbinafine x2 (initiated 2nd round 3/14/2016), ciclopirox 8% solution   3. Hyperkeratosis, right great toe  -Previous Tx: Urea 40% cream (initiated 3/14/2016) with resolution  3. Photoaging/idiopathic guttate hypomelanosis  -Previous Tx: tretinoin 0.05% cream and hydroquinone    Encounter Date: Jun 26, 2018    CC:  Chief Complaint   Patient presents with     Derm Problem     Onychomycosis - bilateral toenails - nail polish did not work for her - she stated that she was not good at using it nightly - any alternatives?         History of Present Illness:  Ms. Amina Pineda is a 58 year old female who presents as a follow-up for history on onychomychosis. The patient was last seen 10/26/2017 when she was treated with ciclopirox. The patient reports that she has not been consistent with the nail polish and has not been using it. The patient reports that her nails have not been changing, improving or worsening. Terbinafine never cleared her fungus even with repeat course. SHe would like to know if she could try any new medications.      She reports that her underarms turned dark after using deoderant    The patient reports no other lesions of concern.    Past Medical History:   Patient Active Problem List   Diagnosis     Hypothyroidism     Esophageal reflux     Herpes simplex virus (HSV) infection     Generalized osteoarthrosis, unspecified site      Dysthymic disorder     CARDIOVASCULAR SCREENING; LDL GOAL LESS THAN 160     Female stress incontinence     Restless leg syndrome     Non morbid obesity due to excess calories     Ocular hypertension, right     Menopausal syndrome (hot flushes)     Daytime somnolence     Chronic low back pain, unspecified back pain laterality, with sciatica presence unspecified     Mixed incontinence     ANDREW (obstructive sleep apnea)     Acute dacryocystitis, right     Decreased libido     Impaired fasting glucose     Hyperlipidemia LDL goal <100     Past Medical History:   Diagnosis Date     Decreased libido 11/6/2006     Depressive disorder, not elsewhere classified      Esophageal reflux      Generalized osteoarthrosis, unspecified site     R hip, on meds     Herpes simplex without mention of complication     oral     Intramural leiomyoma of uterus      Unspecified hypothyroidism      Past Surgical History:   Procedure Laterality Date     C LIGATE FALLOPIAN TUBE       C NONSPECIFIC PROCEDURE  5/02    rotator cuff surgery both shoulder     C NONSPECIFIC PROCEDURE      wrist surgery for cyst x 2     C VAGINAL HYSTERECTOMY  12/03    with BSO, 10-12 week size     COLONOSCOPY  4/24/2015     COLONOSCOPY WITH CO2 INSUFFLATION N/A 4/23/2015    Procedure: COLONOSCOPY WITH CO2 INSUFFLATION;  Surgeon: Bebeto Mortensen MD;  Location: [facility] OR     ELBOW SURGERY      Lt elbow repair.     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 She works in [employer] for [employer].  The patient denies use of tanning beds.  Kept in chart for convenience.       Family History:  Dad with AKs, no other family history of skin cancer.   Kept in chart for convenience.       Medications:  Current Outpatient Prescriptions   Medication Sig Dispense Refill     albuterol (PROAIR HFA/PROVENTIL HFA/VENTOLIN HFA) 108 (90 BASE) MCG/ACT  Inhaler Inhale 2 puffs into the lungs every 6 hours as needed for shortness of breath / dyspnea or wheezing 1 Inhaler 11     Ascorbic Acid (VITAMIN C PO)        BIOTIN PO        buPROPion (WELLBUTRIN XL) 150 MG 24 hr tablet Take 1 tablet (150 mg) by mouth every morning 90 tablet 3     Cholecalciferol (VITAMIN D PO) Take by mouth daily       cyclobenzaprine (FLEXERIL) 10 MG tablet Take 0.5-1 tablets (5-10 mg) by mouth 3 times daily as needed for muscle spasms 90 tablet 1     estrogens, conjugated, (PREMARIN) 0.3 MG tablet Take 1 tablet (0.3 mg) by mouth daily 90 tablet 3     famciclovir (FAMVIR) 500 MG tablet Take 1 tablet (500 mg) by mouth 2 times daily as needed 180 tablet 3     Ferrous Sulfate (IRON SUPPLEMENT PO)        hydroquinone 4 % CREA Externally apply topically At Bedtime Apply a thin layer to dark areas once nightly for no more than 8 weeks at a time. Take breaks between use. 56.8 g 0     levothyroxine (SYNTHROID/LEVOTHROID) 125 MCG tablet Take 1 tablet (125 mcg) by mouth daily 90 tablet 3     nystatin (MYCOSTATIN) 289248 UNIT/GM POWD Apply 30 g topically 3 times daily as needed 30 g 1     omeprazole (PRILOSEC) 20 MG CR capsule TAKE ONE CAPSULE BY MOUTH EVERY DAY 90 capsule 2     Saccharomyces boulardii (PROBIOTIC) 250 MG CAPS Take by mouth daily       UNABLE TO FIND MEDICATION NAME: Lipoflavinoid+  For ringing in ears       ciclopirox 8 % SOLN Apply to adjacent skin and affected nails daily. Remove with alcohol every 7 days (Patient not taking: Reported on 6/26/2018) 13.2 mL 5     order for DME Equipment being ordered: Crutch, Adult, Aluminum (Patient not taking: Reported on 6/26/2018) 1 Device 0      Allergies   Allergen Reactions     No Known Drug Allergies      Review of Systems:  -Const: Nel is generally feeling well  -Skin: As above in HPI. No additional skin concerns.    Physical exam:  Vitals: There were no vitals taken for this visit.  GEN: This is a well developed, well-nourished female in  no acute distress, in a pleasant mood.    SKIN: Focused examination of the feet and face was performed.  -Yellow discoloration and hyperkeratosis on 3rd toenail left  and bilateral great toenails. Clearing at the base  - Skin colored flat papule on chin  -Hyperpigmented macules on the axilla  -No other lesions of concern on areas examined.     Impression/Plan:  1. Onychomycosis, KOH positive. Terbinafine x2 courses- improving- needs culture    Continue ciclopirox 8% solution     Cx taken today with nail clipping     Photograph taken today   Consider repeat terbinafine 250 mg daily x6 months for nails.  Patient counseled that nails may take approximately 12 to 18  months for toenails to completely grow out. SHe should use athlete foot poder in all shoes. Wear cotton socks. Clip toenails as short as possible.    2.  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     Continue to monitor     3. Skin colored flat papule on chin, unchanged.    We will continue to monitor. Patient to report changes.       Follow-up in 1 year, earlier for new or changing lesions.     Staff Involved:  Staff/Scribe    Scribe Disclosure:   I, Lyssa Santos, am serving as a scribe to document services personally performed by Dr. Tiff Dunn, based on data collection and the provider's statements to me.       Provider Disclosure:   The documentation recorded by the scribe accurately reflects the services I personally performed and the decisions made by me.    Tiff Dunn MD    Department of Dermatology  [facility]: Phone: [phone], Fax:[phone]  [facility]: Phone: [phone], Fax: [phone]        Again, thank you for allowing me to participate in the care of your patient.        Sincerely,        Tiff Dunn MD

## 2018-07-09 DIAGNOSIS — K21.9 GASTROESOPHAGEAL REFLUX DISEASE, ESOPHAGITIS PRESENCE NOT SPECIFIED: ICD-10-CM

## 2018-07-09 DIAGNOSIS — N95.1 MENOPAUSAL SYNDROME (HOT FLUSHES): ICD-10-CM

## 2018-07-09 DIAGNOSIS — Z79.899 HIGH RISK MEDICATION USE: ICD-10-CM

## 2018-07-09 DIAGNOSIS — Z12.31 ENCOUNTER FOR SCREENING MAMMOGRAM FOR BREAST CANCER: ICD-10-CM

## 2018-07-09 DIAGNOSIS — E03.9 ACQUIRED HYPOTHYROIDISM: ICD-10-CM

## 2018-07-09 DIAGNOSIS — R68.82 DECREASED LIBIDO: ICD-10-CM

## 2018-07-09 DIAGNOSIS — Z11.59 NEED FOR HEPATITIS C SCREENING TEST: ICD-10-CM

## 2018-07-09 NOTE — TELEPHONE ENCOUNTER
"Routing refill request to provider for review/approval because:  Elevated BP      Requested Prescriptions   Pending Prescriptions Disp Refills     PREMARIN 0.3 MG tablet [Pharmacy Med Name: PREMARIN 0.3MG TABS]  Last Written Prescription Date:  2/10/17  Last Fill Quantity: 90,  # refills: 3   Last office visit: 2/10/2017 with prescribing provider:     Future Office Visit:     90 tablet 3     Sig: TAKE ONE TABLET BY MOUTH EVERY DAY    Hormone Replacement Therapy Failed    7/9/2018  9:06 AM       Failed - Blood pressure under 140/90 in past 12 months    BP Readings from Last 3 Encounters:   03/08/18 148/87   03/01/18 122/80   02/23/18 112/80                Passed - Recent (12 mo) or future (30 days) visit within the authorizing provider's specialty    Patient had office visit in the last 12 months or has a visit in the next 30 days with authorizing provider or within the authorizing provider's specialty.  See \"Patient Info\" tab in inbasket, or \"Choose Columns\" in Meds & Orders section of the refill encounter.           Passed - Patient has mammogram in past 2 years on file if age 50-75       Passed - Patient is 18 years of age or older       Passed - No active pregnancy on record       Passed - No positive pregnancy test on record in past 12 months        Ruth Ribeiro RN - BC      "

## 2018-07-10 RX ORDER — CONJUGATED ESTROGENS 0.3 MG/1
TABLET, FILM COATED ORAL
Qty: 90 TABLET | Refills: 1 | Status: SHIPPED | OUTPATIENT
Start: 2018-07-10 | End: 2019-04-02

## 2018-07-24 LAB
BACTERIA SPEC CULT: NORMAL
SPECIMEN SOURCE: NORMAL

## 2018-08-02 ENCOUNTER — TELEPHONE (OUTPATIENT)
Dept: DERMATOLOGY | Facility: CLINIC | Age: 58
End: 2018-08-02

## 2018-08-02 NOTE — TELEPHONE ENCOUNTER
Notes Recorded by Dejah Vidal LPN on 8/2/2018 at 9:26 AM  Called and informed patient of results. Patient verbalized understanding and had no further questions or concerns at this time. Will follow up in October as scheduled.     Dejah Vidal LPN    ------    Notes Recorded by Tiff Dunn MD on 8/1/2018 at 7:17 PM  Nothing grew from fungal culture. Given this, it may be she has no fungus but actually just damage left from old infection in her nail and we may not be able to get it to where it used to be before. I recommend we recheck her nails in 6 months. If they are worsening, then re clip for fungus. However, if they are stable, this supports that there is no active infection        1mo ago       Specimen Description Toenail     Culture Micro No growth after 4 weeks            Dejah Vidal LPN

## 2018-08-28 ENCOUNTER — OFFICE VISIT (OUTPATIENT)
Dept: URGENT CARE | Facility: URGENT CARE | Age: 58
End: 2018-08-28
Payer: COMMERCIAL

## 2018-08-28 VITALS
TEMPERATURE: 97.7 F | OXYGEN SATURATION: 96 % | DIASTOLIC BLOOD PRESSURE: 87 MMHG | WEIGHT: 233.4 LBS | HEART RATE: 67 BPM | SYSTOLIC BLOOD PRESSURE: 151 MMHG | BODY MASS INDEX: 35.49 KG/M2

## 2018-08-28 DIAGNOSIS — R07.0 THROAT PAIN: ICD-10-CM

## 2018-08-28 DIAGNOSIS — J02.0 STREP THROAT: Primary | ICD-10-CM

## 2018-08-28 PROBLEM — E66.01 MORBID OBESITY (H): Status: ACTIVE | Noted: 2018-08-28

## 2018-08-28 LAB
DEPRECATED S PYO AG THROAT QL EIA: ABNORMAL
SPECIMEN SOURCE: ABNORMAL

## 2018-08-28 PROCEDURE — 87880 STREP A ASSAY W/OPTIC: CPT | Performed by: PHYSICIAN ASSISTANT

## 2018-08-28 PROCEDURE — 99213 OFFICE O/P EST LOW 20 MIN: CPT | Performed by: PHYSICIAN ASSISTANT

## 2018-08-28 RX ORDER — AMOXICILLIN 875 MG
875 TABLET ORAL 2 TIMES DAILY
Qty: 20 TABLET | Refills: 0 | Status: SHIPPED | OUTPATIENT
Start: 2018-08-28 | End: 2018-09-13

## 2018-08-28 NOTE — PROGRESS NOTES
S: 57 yo female with ST x 3 days. No fever. No cough. No HA. No abd pain. No known exposure. [place] White area on left tonsil        Allergies   Allergen Reactions     No Known Drug Allergies        Past Medical History:   Diagnosis Date     Decreased libido 11/6/2006     Depressive disorder, not elsewhere classified      Esophageal reflux      Generalized osteoarthrosis, unspecified site     R hip, on meds     Herpes simplex without mention of complication     oral     Intramural leiomyoma of uterus      Unspecified hypothyroidism          Current Outpatient Prescriptions on File Prior to Visit:  albuterol (PROAIR HFA/PROVENTIL HFA/VENTOLIN HFA) 108 (90 BASE) MCG/ACT Inhaler Inhale 2 puffs into the lungs every 6 hours as needed for shortness of breath / dyspnea or wheezing   Ascorbic Acid (VITAMIN C PO)    BIOTIN PO    buPROPion (WELLBUTRIN XL) 150 MG 24 hr tablet Take 1 tablet (150 mg) by mouth every morning   Cholecalciferol (VITAMIN D PO) Take by mouth daily   ciclopirox 8 % SOLN Apply to adjacent skin and affected nails daily. Remove with alcohol every 7 days (Patient not taking: Reported on 6/26/2018)   cyclobenzaprine (FLEXERIL) 10 MG tablet Take 0.5-1 tablets (5-10 mg) by mouth 3 times daily as needed for muscle spasms   famciclovir (FAMVIR) 500 MG tablet Take 1 tablet (500 mg) by mouth 2 times daily as needed   Ferrous Sulfate (IRON SUPPLEMENT PO)    hydroquinone 4 % CREA Externally apply topically At Bedtime Apply a thin layer to dark areas once nightly for no more than 8 weeks at a time. Take breaks between use.   levothyroxine (SYNTHROID/LEVOTHROID) 125 MCG tablet Take 1 tablet (125 mcg) by mouth daily   nystatin (MYCOSTATIN) 339838 UNIT/GM POWD Apply 30 g topically 3 times daily as needed   omeprazole (PRILOSEC) 20 MG CR capsule TAKE ONE CAPSULE BY MOUTH EVERY DAY   order for DME Equipment being ordered: Crutch, Adult, Aluminum (Patient not taking: Reported on 6/26/2018)   PREMARIN  0.3 MG tablet TAKE ONE TABLET BY MOUTH EVERY DAY   Saccharomyces boulardii (PROBIOTIC) 250 MG CAPS Take by mouth daily   UNABLE TO FIND MEDICATION NAME: Lipoflavinoid+For ringing in ears     No current facility-administered medications on file prior to visit.     Social History   Substance Use Topics     Smoking status: Never Smoker     Smokeless tobacco: Never Used     Alcohol use No       ROS:  CONSTITUTIONAL: Negative for fatigue or fever.  EYES: Negative for eye problems.  ENT: As above.  RESP: As above.  CV: Negative for chest pains.  GI: Negative for vomiting.  MUSCULOSKELETAL:  Negative for significant muscle or joint pains.  NEUROLOGIC: Negative for headaches.  SKIN: Negative for rash.    OBJECTIVE:  There were no vitals taken for this visit.  GENERAL APPEARANCE: Healthy, alert and no distress.  EYES:Conjunctiva/sclera clear.  EARS: No cerumen.   Ear canals w/o erythema.  TM's intact w/o erythema.    NOSE/MOUTH: Nose without ulcers, erythema or lesions.  SINUSES: No maxillary sinus tenderness.  THROAT: Mild-moderate erythema w/o tonsillar enlargement . Some  Exudate on left.  NECK: Supple, nontender, no lymphadenopathy.  RESP: Lungs clear to auscultation - no rales, rhonchi or wheezes  CV: Regular rate and rhythm, normal S1 S2, no murmur noted.  NEURO: Awake, alert    SKIN: No rashes  Results for orders placed or performed in visit on 08/28/18   Strep, Rapid Screen   Result Value Ref Range    Specimen Description Throat     Rapid Strep A Screen (A)      POSITIVE: Group A Streptococcal antigen detected by immunoassay.         ASSESSMENT:     ICD-10-CM    1. Strep throat J02.0 amoxicillin (AMOXIL) 875 MG tablet   2. Throat pain R07.0 Strep, Rapid Screen       PLAN:Contagious x 24 hrs  Lots of rest and fluids.  RTC if any worsening symptoms or if not improving.    Kari Mahajan PA-C

## 2018-08-28 NOTE — MR AVS SNAPSHOT
After Visit Summary   8/28/2018    Amina Pineda    MRN: [id number]           Patient Information     Date Of Birth          1960        Visit Information        Provider Department      8/28/2018 5:30 PM Kari Mahajan PA-C [facility]        Today's Diagnoses     Strep throat    -  1    Throat pain           Follow-ups after your visit        Your next 10 appointments already scheduled     Oct 30, 2018  3:30 PM CDT   Return Visit with Tiff Dunn MD   [facility] ([facility])    [address] [place] [address] [phone]              Who to contact     If you have questions or need follow up information about today's clinic visit or your schedule please contact [facility] directly at [phone].  Normal or non-critical lab and imaging results will be communicated to you by MyChart, letter or phone within 4 business days after the clinic has received the results. If you do not hear from us within 7 days, please contact the clinic through MyChart or phone. If you have a critical or abnormal lab result, we will notify you by phone as soon as possible.  Submit refill requests through [employer] or call your pharmacy and they will forward the refill request to us. Please allow 3 business days for your refill to be completed.          Additional Information About Your Visit        MyChart Information     [employer] gives you secure access to your electronic health record. If you see a primary care provider, you can also send messages to your care team and make appointments. If you have questions, please call your primary care clinic.  If you do not have a primary care provider, please call [phone] and they will assist you.        Care EveryWhere ID     This is your Care EveryWhere ID. This could be used by other organizations to access your [facility] medical records  [id number]        Your Vitals Were      Pulse Temperature Pulse Oximetry BMI (Body Mass Index)          67 97.7  F (36.5  C) (Oral) 96% 35.49 kg/m2         Blood Pressure from Last 3 Encounters:   08/28/18 151/87   03/08/18 148/87   03/01/18 122/80    Weight from Last 3 Encounters:   08/28/18 233 lb 6.4 oz (105.9 kg)   03/01/18 238 lb (108 kg)   02/23/18 239 lb (108.4 kg)              We Performed the Following     Strep, Rapid Screen          Today's Medication Changes          These changes are accurate as of 8/28/18  5:55 PM.  If you have any questions, ask your nurse or doctor.               Start taking these medicines.        Dose/Directions    amoxicillin 875 MG tablet   Commonly known as:  AMOXIL   Used for:  Strep throat        Dose:  875 mg   Take 1 tablet (875 mg) by mouth 2 times daily   Quantity:  20 tablet   Refills:  0            Where to get your medicines      These medications were sent to [facility] [address], [facility] [address]  [id number] [place] [address]     Phone:  [phone]     amoxicillin 875 MG tablet                Primary Care Provider Office Phone # Fax #    Briana Melton [phone]       [address] [facility] [address]        Equal Access to Services     [facility] AH: Hadii johnny ku hadasho Soomaali, waaxda luqadaha, qaybta kaalmada adeegyada, liane cage. So [facility] [phone].    ATENCIÓN: Si habla español, tiene a aguilar disposición servicios gratuitos de asistencia lingüística. Llame al [phone].    We comply with applicable federal civil rights laws and [place] laws. We do not discriminate on the basis of race, color, national origin, age, disability, sex, sexual orientation, or gender identity.            Thank you!     Thank you for choosing [facility]  for your care. Our goal is always to provide you with excellent care. Hearing back from our patients is one way we can continue to improve our  services. Please take a few minutes to complete the written survey that you may receive in the mail after your visit with us. Thank you!             Your Updated Medication List - Protect others around you: Learn how to safely use, store and throw away your medicines at www.disposemymeds.org.          This list is accurate as of 8/28/18  5:55 PM.  Always use your most recent med list.                   Brand Name Dispense Instructions for use Diagnosis    albuterol 108 (90 Base) MCG/ACT inhaler    PROAIR HFA/PROVENTIL HFA/VENTOLIN HFA    1 Inhaler    Inhale 2 puffs into the lungs every 6 hours as needed for shortness of breath / dyspnea or wheezing    Acquired hypothyroidism, Menopausal syndrome (hot flushes), Gastroesophageal reflux disease, esophagitis presence not specified, Decreased libido, High risk medication use, Encounter for screening mammogram for breast cancer       amoxicillin 875 MG tablet    AMOXIL    20 tablet    Take 1 tablet (875 mg) by mouth 2 times daily    Strep throat       BIOTIN PO           buPROPion 150 MG 24 hr tablet    WELLBUTRIN XL    90 tablet    Take 1 tablet (150 mg) by mouth every morning    Acquired hypothyroidism, Menopausal syndrome (hot flushes), Gastroesophageal reflux disease, esophagitis presence not specified, Decreased libido, High risk medication use, Encounter for screening mammogram for breast cancer       ciclopirox 8 % Soln     13.2 mL    Apply to adjacent skin and affected nails daily. Remove with alcohol every 7 days    Onychomycosis       cyclobenzaprine 10 MG tablet    FLEXERIL    90 tablet    Take 0.5-1 tablets (5-10 mg) by mouth 3 times daily as needed for muscle spasms    Chronic low back pain, unspecified back pain laterality, with sciatica presence unspecified       famciclovir 500 MG tablet    FAMVIR    180 tablet    Take 1 tablet (500 mg) by mouth 2 times daily as needed    Herpes simplex virus (HSV) infection       hydroquinone 4 % Crea     56.8 g     Externally apply topically At Bedtime Apply a thin layer to dark areas once nightly for no more than 8 weeks at a time. Take breaks between use.    Solar lentigo       IRON SUPPLEMENT PO           levothyroxine 125 MCG tablet    SYNTHROID/LEVOTHROID    90 tablet    Take 1 tablet (125 mcg) by mouth daily    Acquired hypothyroidism, Menopausal syndrome (hot flushes), Gastroesophageal reflux disease, esophagitis presence not specified, Decreased libido, High risk medication use, Encounter for screening mammogram for breast cancer       nystatin 230432 UNIT/GM Powd    MYCOSTATIN    30 g    Apply 30 g topically 3 times daily as needed    Yeast infection of the skin       omeprazole 20 MG CR capsule    priLOSEC    90 capsule    TAKE ONE CAPSULE BY MOUTH EVERY DAY    Gastroesophageal reflux disease, esophagitis presence not specified, Decreased libido, Acquired hypothyroidism, Menopausal syndrome (hot flushes), High risk medication use, Need for hepatitis C screening test, Encounter for screening mammogram for breast cancer       order for DME     1 Device    Equipment being ordered: Crutch, Adult, Aluminum    Great toe pain, right       PREMARIN 0.3 MG tablet   Generic drug:  estrogens (conjugated)     90 tablet    TAKE ONE TABLET BY MOUTH EVERY DAY    Menopausal syndrome (hot flushes), Acquired hypothyroidism, Gastroesophageal reflux disease, esophagitis presence not specified, Decreased libido, High risk medication use, Need for hepatitis C screening test, Encounter for screening mammogram for breast cancer       Probiotic 250 MG Caps      Take by mouth daily        UNABLE TO FIND      MEDICATION NAME: Lipoflavinoid+ For ringing in ears        VITAMIN C PO           VITAMIN D PO      Take by mouth daily

## 2018-09-13 ENCOUNTER — OFFICE VISIT (OUTPATIENT)
Dept: FAMILY MEDICINE | Facility: CLINIC | Age: 58
End: 2018-09-13
Payer: COMMERCIAL

## 2018-09-13 VITALS
SYSTOLIC BLOOD PRESSURE: 123 MMHG | HEART RATE: 72 BPM | TEMPERATURE: 97.7 F | OXYGEN SATURATION: 96 % | DIASTOLIC BLOOD PRESSURE: 76 MMHG

## 2018-09-13 DIAGNOSIS — Z87.09 HISTORY OF STREP SORE THROAT: Primary | ICD-10-CM

## 2018-09-13 DIAGNOSIS — B37.31 CANDIDIASIS OF VULVA AND VAGINA: ICD-10-CM

## 2018-09-13 DIAGNOSIS — J03.90 TONSILLITIS: ICD-10-CM

## 2018-09-13 LAB
DEPRECATED S PYO AG THROAT QL EIA: NORMAL
SPECIMEN SOURCE: NORMAL

## 2018-09-13 PROCEDURE — 87880 STREP A ASSAY W/OPTIC: CPT | Performed by: FAMILY MEDICINE

## 2018-09-13 PROCEDURE — 87081 CULTURE SCREEN ONLY: CPT | Performed by: FAMILY MEDICINE

## 2018-09-13 PROCEDURE — 99214 OFFICE O/P EST MOD 30 MIN: CPT | Performed by: FAMILY MEDICINE

## 2018-09-13 RX ORDER — CEPHALEXIN 500 MG/1
500 CAPSULE ORAL 2 TIMES DAILY
Qty: 20 CAPSULE | Refills: 0 | Status: SHIPPED | OUTPATIENT
Start: 2018-09-13 | End: 2019-04-02

## 2018-09-13 RX ORDER — FLUCONAZOLE 150 MG/1
150 TABLET ORAL ONCE
Qty: 1 TABLET | Refills: 0 | Status: SHIPPED | OUTPATIENT
Start: 2018-09-13 | End: 2018-09-13

## 2018-09-13 NOTE — MR AVS SNAPSHOT
After Visit Summary   9/13/2018    Amina Pinead    MRN: [id number]           Patient Information     Date Of Birth          1960        Visit Information        Provider Department      9/13/2018 2:20 PM Jocelyn Singh MD [facility]        Today's Diagnoses     History of strep sore throat    -  1    Tonsillitis        Candidiasis of vulva and vagina           Follow-ups after your visit        Your next 10 appointments already scheduled     Oct 30, 2018  3:30 PM CDT   [facility]    [id number] [place] [address] [phone]              Who to contact     If you have questions or need follow up information about today's clinic visit or your schedule please contact [facility] directly at [phone].  Normal or non-critical lab and imaging results will be communicated to you by MyChart, letter or phone within 4 business days after the clinic has received the results. If you do not hear from us within 7 days, please contact the clinic through S[employer] or phone. If you have a critical or abnormal lab result, we will notify you by phone as soon as possible.  Submit refill requests through [employer] or call your pharmacy and they will forward the refill request to us. Please allow 3 business days for your refill to be completed.          Additional Information About Your Visit        MyChart Information     [employer] gives you secure access to your electronic health record. If you see a primary care provider, you can also send messages to your care team and make appointments. If you have questions, please call your primary care clinic.  If you do not have a primary care provider, please call [phone] and they will assist you.        Care EveryWhere ID     This is your Care EveryWhere ID. This could be used by other organizations to access your [facility]  medical records  [id number]        Your Vitals Were     Pulse Temperature Pulse Oximetry             72 97.7  F (36.5  C) (Oral) 96%          Blood Pressure from Last 3 Encounters:   09/13/18 123/76   08/28/18 151/87   03/08/18 148/87    Weight from Last 3 Encounters:   08/28/18 233 lb 6.4 oz (105.9 kg)   03/01/18 238 lb (108 kg)   02/23/18 239 lb (108.4 kg)              We Performed the Following     Beta strep group A culture     Rapid strep screen          Today's Medication Changes          These changes are accurate as of 9/13/18  6:43 PM.  If you have any questions, ask your nurse or doctor.               Start taking these medicines.        Dose/Directions    cephALEXin 500 MG capsule   Commonly known as:  KEFLEX   Used for:  Tonsillitis        Dose:  500 mg   Take 1 capsule (500 mg) by mouth 2 times daily   Quantity:  20 capsule   Refills:  0       fluconazole 150 MG tablet   Commonly known as:  DIFLUCAN   Used for:  Candidiasis of vulva and vagina        Dose:  150 mg   Take 1 tablet (150 mg) by mouth once for 1 dose   Quantity:  1 tablet   Refills:  0            Where to get your medicines      These medications were sent to [address], [facility] [address]  [address] [address] [address]     Phone:  [phone]     cephALEXin 500 MG capsule    fluconazole 150 MG tablet                Primary Care Provider Office Phone # Fax #    Briana Melton [phone]       [address] [place] [address]        Equal Access to Services     [facility] AH: Hadii aad ku hadasho Soomaali, waaxda luqadaha, qaybta kaalmada adeegyada, waxay idiin hayaan sim cage. So [facility] [phone].    ATENCIÓN: Si habla español, tiene a aguilar disposición servicios gratuitos de asistencia lingüística. Llame al [phone].    We comply with applicable federal civil rights laws and [place] laws. We do not discriminate on the basis of race,  color, national origin, age, disability, sex, sexual orientation, or gender identity.            Thank you!     Thank you for [facility]. Our goal is always to provide you with excellent care. Hearing back from our patients is one way we can continue to improve our services. Please take a few minutes to complete the written survey that you may receive in the mail after your visit with us. Thank you!             Your Updated Medication List - Protect others around you: Learn how to safely use, store and throw away your medicines at www.disposemymeds.org.          This list is accurate as of 9/13/18  6:43 PM.  Always use your most recent med list.                   Brand Name Dispense Instructions for use Diagnosis    albuterol 108 (90 Base) MCG/ACT inhaler    PROAIR HFA/PROVENTIL HFA/VENTOLIN HFA    1 Inhaler    Inhale 2 puffs into the lungs every 6 hours as needed for shortness of breath / dyspnea or wheezing    Acquired hypothyroidism, Menopausal syndrome (hot flushes), Gastroesophageal reflux disease, esophagitis presence not specified, Decreased libido, High risk medication use, Encounter for screening mammogram for breast cancer       BIOTIN PO           buPROPion 150 MG 24 hr tablet    WELLBUTRIN XL    90 tablet    Take 1 tablet (150 mg) by mouth every morning    Acquired hypothyroidism, Menopausal syndrome (hot flushes), Gastroesophageal reflux disease, esophagitis presence not specified, Decreased libido, High risk medication use, Encounter for screening mammogram for breast cancer       cephALEXin 500 MG capsule    KEFLEX    20 capsule    Take 1 capsule (500 mg) by mouth 2 times daily    Tonsillitis       ciclopirox 8 % Soln     13.2 mL    Apply to adjacent skin and affected nails daily. Remove with alcohol every 7 days    Onychomycosis       cyclobenzaprine 10 MG tablet    FLEXERIL    90 tablet    Take 0.5-1 tablets (5-10 mg) by mouth 3 times daily as needed for muscle  spasms    Chronic low back pain, unspecified back pain laterality, with sciatica presence unspecified       famciclovir 500 MG tablet    FAMVIR    180 tablet    Take 1 tablet (500 mg) by mouth 2 times daily as needed    Herpes simplex virus (HSV) infection       fluconazole 150 MG tablet    DIFLUCAN    1 tablet    Take 1 tablet (150 mg) by mouth once for 1 dose    Candidiasis of vulva and vagina       hydroquinone 4 % Crea     56.8 g    Externally apply topically At Bedtime Apply a thin layer to dark areas once nightly for no more than 8 weeks at a time. Take breaks between use.    Solar lentigo       IRON SUPPLEMENT PO           levothyroxine 125 MCG tablet    SYNTHROID/LEVOTHROID    90 tablet    Take 1 tablet (125 mcg) by mouth daily    Acquired hypothyroidism, Menopausal syndrome (hot flushes), Gastroesophageal reflux disease, esophagitis presence not specified, Decreased libido, High risk medication use, Encounter for screening mammogram for breast cancer       nystatin 235598 UNIT/GM Powd    MYCOSTATIN    30 g    Apply 30 g topically 3 times daily as needed    Yeast infection of the skin       omeprazole 20 MG CR capsule    priLOSEC    90 capsule    TAKE ONE CAPSULE BY MOUTH EVERY DAY    Gastroesophageal reflux disease, esophagitis presence not specified, Decreased libido, Acquired hypothyroidism, Menopausal syndrome (hot flushes), High risk medication use, Need for hepatitis C screening test, Encounter for screening mammogram for breast cancer       order for DME     1 Device    Equipment being ordered: Crutch, Adult, Aluminum    Great toe pain, right       PREMARIN 0.3 MG tablet   Generic drug:  estrogens (conjugated)     90 tablet    TAKE ONE TABLET BY MOUTH EVERY DAY    Menopausal syndrome (hot flushes), Acquired hypothyroidism, Gastroesophageal reflux disease, esophagitis presence not specified, Decreased libido, High risk medication use, Need for hepatitis C screening test, Encounter for screening  mammogram for breast cancer       Probiotic 250 MG Caps      Take by mouth daily        UNABLE TO FIND      MEDICATION NAME: Lipoflavinoid+ For ringing in ears        VITAMIN C PO           VITAMIN D PO      Take by mouth daily

## 2018-09-13 NOTE — PROGRESS NOTES
SUBJECTIVE:   Amina Pineda is a 58 year old female who presents to clinic today for the following health issues:      Patient c/o lump on left side of throat, feels like under tissue. Patient just finished antibiotics for strep and lump never went away. No pain, just feels it when swallowing.    2 weeks ago had strep diagnosed in urgent care. At that time patient woke up with sore throat and swelling over the uvula area. Noticed a small white spot on the left tonsil.     Patient took amoxicillin which she already finished  It stopped being sore    But this morning woke up and felt it hurting again when she swallows  Patient looked and saw white spot was still there.     Patient also has been having vaginal itching. She tried monistat one dose but not completely better.  fever  - No  cough  -No  ill contacts - No  able to swallow liquids and solids -YES  other symptoms above  Rash: No  Has tried over the counter medications no relief  because of persistence, patient came in to be seen.    ROS:  denies any exertional chest pain or shortness of breath  denies any unusual rash or joint swelling  denies post-tussive emesis or pertussis like symptoms  Negative for constitutional, eye, ear, nose, throat, skin, respiratory, cardiac, and gastrointestinal other than those outlined in the HPI.    PMH: chart reviewed  FH: chart reviewed    SH: chart reviewed and as above   Physical Exam:   /76  Pulse 72  Temp 97.7  F (36.5  C) (Oral)  SpO2 96%  General : Awake Alert not in any acute cardiorespiratory distress  Head:       Normocephalic Atraumatic  Eyes:    Pupils equally reactive to light and accomodation. Sclera not icteric.   ENT:   midline nasal septum, mild nasal congestion, sinuses non-tender  left ear: no tragal tenderness, no mastoid tenderness, normal EAC, normal TM  right ear: left ear: no tragal tenderness, no mastoid tenderness, normal EAC, normal TM  mouth moist buccal mucosa, Yes hyperemic posterior  pharyngeal wall, no trismus  tonsils: left tonsil abnormal with small yellowish papule/cyst   anterior cervical nodes: No tender  posterior cervical nodes: No  palpable  Heart:  Regular in rate and rhythm, no murmurs rubs or gallops  Lungs: Symmetrical Chest Expansion, no retractions, clear breath sounds  Abdomen: soft, no hepatosplenomegally  Psych: Appropriate mood and affect. Pleasant  Skin: patient undressed to level of his/her comfort. No visible concerning lesions.    Labs: Strep negative     ICD-10-CM    1. History of strep sore throat Z87.09 Rapid strep screen     Beta strep group A culture   2. Tonsillitis J03.90 cephALEXin (KEFLEX) 500 MG capsule   3. Candidiasis of vulva and vagina B37.3 fluconazole (DIFLUCAN) 150 MG tablet     Strep culture pending. She has a history of tonsillitis. Will await throat culture however she will be out of town for more than a week  Prescribed with keflex to take if symptoms persist or if culture comes back positive. She has mychart and will be watching for results via [employer]mate[employer]art  Patient requested prescription for fluconazole as needed if she develops a yeast vaginitis. She says that she gets a yeast infection with antibiotics and monistat usually doesn't work. Aware that Fluconazole does have side effects that were discussed, aware of concerns for liver function and bone marrow suppression. Aware contraindicated if pregnant or breastfeeding. Advised to try monistat first then if not improved may use one dose of Fluconazole. But if persist, recommend being seen.   If tonsil lesion persists > 1 month-r recommend ENT evaluation. She will schedule appt   supportive treatment: advised supportive treatment, Advised to come back in if with any worsening symptoms or if not better despite supportive measures. Especially if with any worsening sore throat, inability to eat or drink or swallow, or trismus. Symptoms of peritonsillar abscess discussed. Patient voiced understanding.  adverse  reactions of medication discussed  OTC medications discussed  advised to come back in right away if with any worsening symptoms or if with no relief despite treatment plan  patient voiced understanding and had no further questions at this time.

## 2018-09-14 LAB
BACTERIA SPEC CULT: NORMAL
SPECIMEN SOURCE: NORMAL

## 2018-09-28 ENCOUNTER — OFFICE VISIT (OUTPATIENT)
Dept: OTOLARYNGOLOGY | Facility: CLINIC | Age: 58
End: 2018-09-28
Payer: COMMERCIAL

## 2018-09-28 VITALS
DIASTOLIC BLOOD PRESSURE: 78 MMHG | TEMPERATURE: 97 F | BODY MASS INDEX: 35.43 KG/M2 | WEIGHT: 233 LBS | SYSTOLIC BLOOD PRESSURE: 132 MMHG

## 2018-09-28 DIAGNOSIS — J35.8 TONSILLAR CYST: Primary | ICD-10-CM

## 2018-09-28 PROCEDURE — 99203 OFFICE O/P NEW LOW 30 MIN: CPT | Performed by: OTOLARYNGOLOGY

## 2018-09-28 NOTE — PATIENT INSTRUCTIONS
General Scheduling Information  To schedule your CT/MRI scan, please contact [place] [facility] at [phone]   [address] [place] [address]    To schedule your Surgery, please contact our Specialty Schedulers at [phone]    ENT Clinic Locations Clinic Hours Telephone Number     Joe Villanueva  [address] [place] [address]   Tuesday:       8:00am -- 4:00pm    Wednesday:  8:00am - 4:00pm   To schedule an appointment with   Dr. Pruitt,   please contact our   Specialty Scheduling Department at:     [phone]       [facility] Sanchez  [address] [place] [address]   Friday:          8:00am - 4:00pm         Urgent Care Locations Clinic Hours Telephone Numbers     Joe Parrish  [address] [place] [address]     Monday-Friday:     11:00pm - 9:00pm    Saturday-Sunday:  9:00am - 5:00pm   [phone]     Joe Sanchez  [address] [place] [address]     Monday-Friday:      5:00pm - 9:00pm     Saturday-Sunday:  9:00am - 5:00pm   [phone]

## 2018-09-28 NOTE — PROGRESS NOTES
Chief Complaint - lump on tonsil    History of Present Illness - Amina Pineda is a 58 year old female who presents with a history of a lump on the left tonsil. She can feel with swallowing. Has been a few weeks. Started with strep and a sore throat. Has had tonsil stones, this is different. Has sleep apnea. No dysphagia. No hemoptysis. Was on antibiotics for strep, drinking warm liquids. Never smoker.    Past Medical History -   Patient Active Problem List   Diagnosis     Hypothyroidism     Esophageal reflux     Herpes simplex virus (HSV) infection     Generalized osteoarthrosis, unspecified site     Dysthymic disorder     CARDIOVASCULAR SCREENING; LDL GOAL LESS THAN 160     Female stress incontinence     Restless leg syndrome     Non morbid obesity due to excess calories     Ocular hypertension, right     Menopausal syndrome (hot flushes)     Daytime somnolence     Chronic low back pain, unspecified back pain laterality, with sciatica presence unspecified     Mixed incontinence     ANDREW (obstructive sleep apnea)     Acute dacryocystitis, right     Decreased libido     Impaired fasting glucose     Hyperlipidemia LDL goal <100     Morbid obesity (H)       Current Medications -   Current Outpatient Prescriptions:      albuterol (PROAIR HFA/PROVENTIL HFA/VENTOLIN HFA) 108 (90 BASE) MCG/ACT Inhaler, Inhale 2 puffs into the lungs every 6 hours as needed for shortness of breath / dyspnea or wheezing, Disp: 1 Inhaler, Rfl: 11     Ascorbic Acid (VITAMIN C PO), , Disp: , Rfl:      BIOTIN PO, , Disp: , Rfl:      buPROPion (WELLBUTRIN XL) 150 MG 24 hr tablet, Take 1 tablet (150 mg) by mouth every morning, Disp: 90 tablet, Rfl: 3     cephALEXin (KEFLEX) 500 MG capsule, Take 1 capsule (500 mg) by mouth 2 times daily, Disp: 20 capsule, Rfl: 0     Cholecalciferol (VITAMIN D PO), Take by mouth daily, Disp: , Rfl:      ciclopirox 8 % SOLN, Apply to adjacent skin and affected nails daily. Remove with alcohol every 7 days, Disp:  13.2 mL, Rfl: 5     cyclobenzaprine (FLEXERIL) 10 MG tablet, Take 0.5-1 tablets (5-10 mg) by mouth 3 times daily as needed for muscle spasms, Disp: 90 tablet, Rfl: 1     famciclovir (FAMVIR) 500 MG tablet, Take 1 tablet (500 mg) by mouth 2 times daily as needed, Disp: 180 tablet, Rfl: 3     Ferrous Sulfate (IRON SUPPLEMENT PO), , Disp: , Rfl:      hydroquinone 4 % CREA, Externally apply topically At Bedtime Apply a thin layer to dark areas once nightly for no more than 8 weeks at a time. Take breaks between use., Disp: 56.8 g, Rfl: 0     levothyroxine (SYNTHROID/LEVOTHROID) 125 MCG tablet, Take 1 tablet (125 mcg) by mouth daily, Disp: 90 tablet, Rfl: 3     nystatin (MYCOSTATIN) 275719 UNIT/GM POWD, Apply 30 g topically 3 times daily as needed, Disp: 30 g, Rfl: 1     omeprazole (PRILOSEC) 20 MG CR capsule, TAKE ONE CAPSULE BY MOUTH EVERY DAY, Disp: 90 capsule, Rfl: 2     order for DME, Equipment being ordered: Crutch, Adult, Aluminum, Disp: 1 Device, Rfl: 0     PREMARIN 0.3 MG tablet, TAKE ONE TABLET BY MOUTH EVERY DAY, Disp: 90 tablet, Rfl: 1     Saccharomyces boulardii (PROBIOTIC) 250 MG CAPS, Take by mouth daily, Disp: , Rfl:      UNABLE TO FIND, MEDICATION NAME: Lipoflavinoid+ For ringing in ears, Disp: , Rfl:     Allergies -   Allergies   Allergen Reactions     No Known Drug Allergies        Social History -   Social History     Social History     Marital status:      Spouse name: N/A     Number of children: N/A     Years of education: N/A     Social History Main Topics     Smoking status: Never Smoker     Smokeless tobacco: Never Used     Alcohol use No     Drug use: No     Sexual activity: Yes     Partners: Male     Other Topics Concern     Parent/Sibling W/ Cabg, Mi Or Angioplasty Before 65f 55m? No     Social History Narrative    Dairy/d 3-4 servings/d.     Caffeine 2 servings/d    Exercise 0 x week    Sunscreen used - Yes    Seatbelts used - Yes    Working smoke/CO detectors in the home - Yes    Guns  stored in the home - Yes    Self Breast Exams - No    Self Testicular Exam - NA    Eye Exam up to date - Yes     Dental Exam up to date - Yes     Pap Smear up to date - Yes  Dr. Whitaker    Mammogram up to date - Yes     PSA up to date - NA    Dexa Scan up to date - NA    Flex Sig / Colonoscopy up to date - NA    Immunizations up to date - Yes Today    Abuse: Current or Past(Physical, Sexual or Emotional)- No    Do you feel safe in your environment - Yes           Family History -   Family History   Problem Relation Age of Onset     C.A.D. Maternal Grandfather      Coronary Artery Disease Maternal Grandfather      Heart Attack, ()     Coronary Artery Disease Father      High Cholesterol     Depression/Anxiety Father      Depression     Thyroid Disease Father      Hypo Thryoid     Retinal detachment Father      Hypertension Mother      High Blood Pressure     Breast Cancer Mother      Has spot being watching.  Checked every 6 months     Glaucoma Mother      glc surgery     Breast Cancer Paternal Grandmother      Breast removed ()     Asthma Paternal Grandmother      Emphysema ()     Cerebrovascular Disease Paternal Grandfather      Strokes ()     Known Genetic Syndrome Daughter      Whitaker's Syndrome     Skin Cancer No family hx of      no family hx of skin cancer     Review of Systems - As per HPI and PMHx, otherwise 7 system review of the head and neck is negative.    Physical Exam  /78  Temp 97  F (36.1  C) (Tympanic)  Wt 105.7 kg (233 lb)  BMI 35.43 kg/m2  General - The patient is in no distress. Alert and oriented to person and place, answers questions and cooperates with examination appropriately.   Voice and Breathing - The patient was breathing comfortably without the use of accessory muscles. There was no wheezing, stridor, or stertor.  The patients voice was clear and strong.  Eyes - Extraocular movements intact.  Sclera were not icteric or  injected, conjunctiva were pink and moist.  Mouth - Examination of the oral cavity showed pink, healthy oral mucosa. No lesions or ulcerations noted.  The tongue was mobile and midline.  Throat - The walls of the oropharynx were smooth, symmetric, and had no lesions or ulcerations.  The tonsillar pillars and soft palate were symmetric.  The uvula was midline on elevation. Tonsils 1+, left tonsil cyst, 5-6 mm. No postnasal drainage.  Neck -  Palpation of the occipital, submental, submandibular, internal jugular chain, and supraclavicular nodes did not demonstrate any abnormal lymph nodes or masses. No parotid masses. Palpation of the thyroid was soft and smooth, with no nodules or goiter appreciated.  The trachea was mobile and midline.  Neurologic - CN II-XII are grossly intact, no focal neurologic deficits.       A/P - Amina Pineda is a 58 year old female with a left tonsil cyst. I reassured her. This may decrease in size, but may not. If it continues to bother hear I can remove it in clinic. Return prn.         Dr. Hunter Pruitt  Otolaryngology  [facility]

## 2018-09-28 NOTE — LETTER
9/28/2018         RE: Amina Pineda  [address]  [place] [address]        Dear Colleague,    Thank you for referring your patient, Amina Pineda, to the [facility]. Please see a copy of my visit note below.    Chief Complaint - lump on tonsil    History of Present Illness - Amina Pineda is a 58 year old female who presents with a history of a lump on the left tonsil. She can feel with swallowing. Has been a few weeks. Started with strep and a sore throat. Has had tonsil stones, this is different. Has sleep apnea. No dysphagia. No hemoptysis. Was on antibiotics for strep, drinking warm liquids. Never smoker.    Past Medical History -   Patient Active Problem List   Diagnosis     Hypothyroidism     Esophageal reflux     Herpes simplex virus (HSV) infection     Generalized osteoarthrosis, unspecified site     Dysthymic disorder     CARDIOVASCULAR SCREENING; LDL GOAL LESS THAN 160     Female stress incontinence     Restless leg syndrome     Non morbid obesity due to excess calories     Ocular hypertension, right     Menopausal syndrome (hot flushes)     Daytime somnolence     Chronic low back pain, unspecified back pain laterality, with sciatica presence unspecified     Mixed incontinence     ANDREW (obstructive sleep apnea)     Acute dacryocystitis, right     Decreased libido     Impaired fasting glucose     Hyperlipidemia LDL goal <100     Morbid obesity (H)       Current Medications -   Current Outpatient Prescriptions:      albuterol (PROAIR HFA/PROVENTIL HFA/VENTOLIN HFA) 108 (90 BASE) MCG/ACT Inhaler, Inhale 2 puffs into the lungs every 6 hours as needed for shortness of breath / dyspnea or wheezing, Disp: 1 Inhaler, Rfl: 11     Ascorbic Acid (VITAMIN C PO), , Disp: , Rfl:      BIOTIN PO, , Disp: , Rfl:      buPROPion (WELLBUTRIN XL) 150 MG 24 hr tablet, Take 1 tablet (150 mg) by mouth every morning, Disp: 90 tablet, Rfl: 3     cephALEXin (KEFLEX) 500 MG capsule, Take 1  capsule (500 mg) by mouth 2 times daily, Disp: 20 capsule, Rfl: 0     Cholecalciferol (VITAMIN D PO), Take by mouth daily, Disp: , Rfl:      ciclopirox 8 % SOLN, Apply to adjacent skin and affected nails daily. Remove with alcohol every 7 days, Disp: 13.2 mL, Rfl: 5     cyclobenzaprine (FLEXERIL) 10 MG tablet, Take 0.5-1 tablets (5-10 mg) by mouth 3 times daily as needed for muscle spasms, Disp: 90 tablet, Rfl: 1     famciclovir (FAMVIR) 500 MG tablet, Take 1 tablet (500 mg) by mouth 2 times daily as needed, Disp: 180 tablet, Rfl: 3     Ferrous Sulfate (IRON SUPPLEMENT PO), , Disp: , Rfl:      hydroquinone 4 % CREA, Externally apply topically At Bedtime Apply a thin layer to dark areas once nightly for no more than 8 weeks at a time. Take breaks between use., Disp: 56.8 g, Rfl: 0     levothyroxine (SYNTHROID/LEVOTHROID) 125 MCG tablet, Take 1 tablet (125 mcg) by mouth daily, Disp: 90 tablet, Rfl: 3     nystatin (MYCOSTATIN) 838232 UNIT/GM POWD, Apply 30 g topically 3 times daily as needed, Disp: 30 g, Rfl: 1     omeprazole (PRILOSEC) 20 MG CR capsule, TAKE ONE CAPSULE BY MOUTH EVERY DAY, Disp: 90 capsule, Rfl: 2     order for DME, Equipment being ordered: Crutch, Adult, Aluminum, Disp: 1 Device, Rfl: 0     PREMARIN 0.3 MG tablet, TAKE ONE TABLET BY MOUTH EVERY DAY, Disp: 90 tablet, Rfl: 1     Saccharomyces boulardii (PROBIOTIC) 250 MG CAPS, Take by mouth daily, Disp: , Rfl:      UNABLE TO FIND, MEDICATION NAME: Lipoflavinoid+ For ringing in ears, Disp: , Rfl:     Allergies -   Allergies   Allergen Reactions     No Known Drug Allergies        Social History -   Social History     Social History     Marital status:      Spouse name: N/A     Number of children: N/A     Years of education: N/A     Social History Main Topics     Smoking status: Never Smoker     Smokeless tobacco: Never Used     Alcohol use No     Drug use: No     Sexual activity: Yes     Partners: Male     Other Topics Concern     Parent/Sibling  W/ Cabg, Mi Or Angioplasty Before 65f 55m? No     Social History Narrative    Dairy/d 3-4 servings/d.     Caffeine 2 servings/d    Exercise 0 x week    Sunscreen used - Yes    Seatbelts used - Yes    Working smoke/CO detectors in the home - Yes    Guns stored in the home - Yes    Self Breast Exams - No    Self Testicular Exam - NA    Eye Exam up to date - Yes     Dental Exam up to date - Yes     Pap Smear up to date - Yes  Dr. Whitaker    Mammogram up to date - Yes     PSA up to date - NA    Dexa Scan up to date - NA    Flex Sig / Colonoscopy up to date - NA    Immunizations up to date - Yes Today    Abuse: Current or Past(Physical, Sexual or Emotional)- No    Do you feel safe in your environment - Yes           Family History -   Family History   Problem Relation Age of Onset     C.A.D. Maternal Grandfather      Coronary Artery Disease Maternal Grandfather      Heart Attack, ()     Coronary Artery Disease Father      High Cholesterol     Depression/Anxiety Father      Depression     Thyroid Disease Father      Hypo Thryoid     Retinal detachment Father      Hypertension Mother      High Blood Pressure     Breast Cancer Mother      Has spot being watching.  Checked every 6 months     Glaucoma Mother      glc surgery     Breast Cancer Paternal Grandmother      Breast removed ()     Asthma Paternal Grandmother      Emphysema ()     Cerebrovascular Disease Paternal Grandfather      Strokes ()     Known Genetic Syndrome Daughter      Whitaker's Syndrome     Skin Cancer No family hx of      no family hx of skin cancer     Review of Systems - As per HPI and PMHx, otherwise 7 system review of the head and neck is negative.    Physical Exam  /78  Temp 97  F (36.1  C) (Tympanic)  Wt 105.7 kg (233 lb)  BMI 35.43 kg/m2  General - The patient is in no distress. Alert and oriented to person and place, answers questions and cooperates with examination appropriately.    Voice and Breathing - The patient was breathing comfortably without the use of accessory muscles. There was no wheezing, stridor, or stertor.  The patients voice was clear and strong.  Eyes - Extraocular movements intact.  Sclera were not icteric or injected, conjunctiva were pink and moist.  Mouth - Examination of the oral cavity showed pink, healthy oral mucosa. No lesions or ulcerations noted.  The tongue was mobile and midline.  Throat - The walls of the oropharynx were smooth, symmetric, and had no lesions or ulcerations.  The tonsillar pillars and soft palate were symmetric.  The uvula was midline on elevation. Tonsils 1+, left tonsil cyst, 5-6 mm. No postnasal drainage.  Neck -  Palpation of the occipital, submental, submandibular, internal jugular chain, and supraclavicular nodes did not demonstrate any abnormal lymph nodes or masses. No parotid masses. Palpation of the thyroid was soft and smooth, with no nodules or goiter appreciated.  The trachea was mobile and midline.  Neurologic - CN II-XII are grossly intact, no focal neurologic deficits.       A/P - Amina Pineda is a 58 year old female with a left tonsil cyst. I reassured her. This may decrease in size, but may not. If it continues to bother hear I can remove it in clinic. Return prn.         Dr. Hunter Pruitt  Otolaryngology  [facility]      Again, thank you for allowing me to participate in the care of your patient.        Sincerely,        Hunter Pruitt MD

## 2018-10-30 ENCOUNTER — OFFICE VISIT (OUTPATIENT)
Dept: DERMATOLOGY | Facility: CLINIC | Age: 58
End: 2018-10-30
Payer: COMMERCIAL

## 2018-10-30 DIAGNOSIS — Z51.81 MEDICATION MONITORING ENCOUNTER: Primary | ICD-10-CM

## 2018-10-30 DIAGNOSIS — B35.1 ONYCHOMYCOSIS: ICD-10-CM

## 2018-10-30 LAB
ALBUMIN SERPL-MCNC: 3.8 G/DL (ref 3.4–5)
ALP SERPL-CCNC: 108 U/L (ref 40–150)
ALT SERPL W P-5'-P-CCNC: 30 U/L (ref 0–50)
AST SERPL W P-5'-P-CCNC: 18 U/L (ref 0–45)
BILIRUB DIRECT SERPL-MCNC: 0.1 MG/DL (ref 0–0.2)
BILIRUB SERPL-MCNC: 0.4 MG/DL (ref 0.2–1.3)
BUN SERPL-MCNC: 12 MG/DL (ref 7–30)
CREAT SERPL-MCNC: 0.87 MG/DL (ref 0.52–1.04)
GFR SERPL CREATININE-BSD FRML MDRD: 66 ML/MIN/1.7M2
PROT SERPL-MCNC: 7.4 G/DL (ref 6.8–8.8)

## 2018-10-30 PROCEDURE — 84520 ASSAY OF UREA NITROGEN: CPT | Performed by: DERMATOLOGY

## 2018-10-30 PROCEDURE — 36415 COLL VENOUS BLD VENIPUNCTURE: CPT | Performed by: DERMATOLOGY

## 2018-10-30 PROCEDURE — 82565 ASSAY OF CREATININE: CPT | Performed by: DERMATOLOGY

## 2018-10-30 PROCEDURE — 99213 OFFICE O/P EST LOW 20 MIN: CPT | Performed by: DERMATOLOGY

## 2018-10-30 PROCEDURE — 80076 HEPATIC FUNCTION PANEL: CPT | Performed by: DERMATOLOGY

## 2018-10-30 PROCEDURE — 87220 TISSUE EXAM FOR FUNGI: CPT | Performed by: DERMATOLOGY

## 2018-10-30 ASSESSMENT — PAIN SCALES - GENERAL: PAINLEVEL: NO PAIN (0)

## 2018-10-30 NOTE — NURSING NOTE
Amina Pineda's goals for this visit include:   Chief Complaint   Patient presents with     Follow Up For     onychomycosis currently not using anything previously used Terbinafine and ciclopirox 8% solution        She requests these members of her care team be copied on today's visit information:     PCP: Briana Melton    Referring Provider:  No referring provider defined for this encounter.    There were no vitals taken for this visit.    Do you need any medication refills at today's visit? Diana Vidal LPN

## 2018-10-30 NOTE — PROGRESS NOTES
[facility] Dermatology Note      Dermatology Problem List:  1. Lesion on left nose, biopsied in [place] ~2006, pt reports showed lupus versus lymphoma and saw heme onc who reported lesion was not worrisome. Unable to obtain record  2. Onychomycosis, KOH positve  - Current Tx:cicloprix  -Previous Tx: terbinafine x2 (initiated 2nd round 3/14/2016), ciclopirox 8% solution   3. Hyperkeratosis, right great toe  -Previous Tx: Urea 40% cream (initiated 3/14/2016) with resolution  3. Photoaging/idiopathic guttate hypomelanosis  -Previous Tx: tretinoin 0.05% cream and hydroquinone    Encounter Date: Oct 30, 2018    CC:  Chief Complaint   Patient presents with     Follow Up For     onychomycosis currently not using anything previously used Terbinafine and ciclopirox 8% solution          History of Present Illness:  Ms. Amina Pineda is a 58 year old female who presents as a follow-up for history on onychomychosis. The patient was last seen 6/26/18 when she started ciclopirox and terbinafine. Currently, she is not using terbinafine, and she only occasionally applies ciclopirox. However, she thinks that her nails have improved since the last visit.     The patient reports no other lesions of concern.    Past Medical History:   Patient Active Problem List   Diagnosis     Hypothyroidism     Esophageal reflux     Herpes simplex virus (HSV) infection     Generalized osteoarthrosis, unspecified site     Dysthymic disorder     CARDIOVASCULAR SCREENING; LDL GOAL LESS THAN 160     Female stress incontinence     Restless leg syndrome     Non morbid obesity due to excess calories     Ocular hypertension, right     Menopausal syndrome (hot flushes)     Daytime somnolence     Chronic low back pain, unspecified back pain laterality, with sciatica presence unspecified     Mixed incontinence     ANDREW (obstructive sleep apnea)     Acute dacryocystitis, right     Decreased libido     Impaired fasting glucose      Hyperlipidemia LDL goal <100     Morbid obesity (H)     Past Medical History:   Diagnosis Date     Decreased libido 11/6/2006     Depressive disorder, not elsewhere classified      Esophageal reflux      Generalized osteoarthrosis, unspecified site     R hip, on meds     Herpes simplex without mention of complication     oral     Intramural leiomyoma of uterus      Unspecified hypothyroidism      Past Surgical History:   Procedure Laterality Date     C LIGATE FALLOPIAN TUBE       C NONSPECIFIC PROCEDURE  5/02    rotator cuff surgery both shoulder     C NONSPECIFIC PROCEDURE      wrist surgery for cyst x 2     C VAGINAL HYSTERECTOMY  12/03    with BSO, 10-12 week size     COLONOSCOPY  4/24/2015     COLONOSCOPY WITH CO2 INSUFFLATION N/A 4/23/2015    Procedure: COLONOSCOPY WITH CO2 INSUFFLATION;  Surgeon: Bebeto Mortensen MD;  Location: [facility] OR     ELBOW SURGERY      Lt elbow repair.     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 She works in [employer] for [employer].  The patient denies use of tanning beds.  Kept in chart for convenience.       Family History:  Dad with AKs, no other family history of skin cancer.   Kept in chart for convenience.       Medications:  Current Outpatient Prescriptions   Medication Sig Dispense Refill     albuterol (PROAIR HFA/PROVENTIL HFA/VENTOLIN HFA) 108 (90 BASE) MCG/ACT Inhaler Inhale 2 puffs into the lungs every 6 hours as needed for shortness of breath / dyspnea or wheezing 1 Inhaler 11     Ascorbic Acid (VITAMIN C PO)        BIOTIN PO        buPROPion (WELLBUTRIN XL) 150 MG 24 hr tablet Take 1 tablet (150 mg) by mouth every morning 90 tablet 3     Cholecalciferol (VITAMIN D PO) Take by mouth daily       cyclobenzaprine (FLEXERIL) 10 MG tablet Take 0.5-1 tablets (5-10 mg) by mouth 3 times daily as needed for muscle spasms 90 tablet 1      famciclovir (FAMVIR) 500 MG tablet Take 1 tablet (500 mg) by mouth 2 times daily as needed 180 tablet 3     Ferrous Sulfate (IRON SUPPLEMENT PO)        hydroquinone 4 % CREA Externally apply topically At Bedtime Apply a thin layer to dark areas once nightly for no more than 8 weeks at a time. Take breaks between use. 56.8 g 0     levothyroxine (SYNTHROID/LEVOTHROID) 125 MCG tablet Take 1 tablet (125 mcg) by mouth daily 90 tablet 3     nystatin (MYCOSTATIN) [id number] UNIT/GM POWD Apply 30 g topically 3 times daily as needed 30 g 1     omeprazole (PRILOSEC) 20 MG CR capsule TAKE ONE CAPSULE BY MOUTH EVERY DAY 90 capsule 2     order for DME Equipment being ordered: Crutch, Adult, Aluminum 1 Device 0     PREMARIN 0.3 MG tablet TAKE ONE TABLET BY MOUTH EVERY DAY 90 tablet 1     Saccharomyces boulardii (PROBIOTIC) 250 MG CAPS Take by mouth daily       UNABLE TO FIND MEDICATION NAME: Lipoflavinoid+  For ringing in ears       cephALEXin (KEFLEX) 500 MG capsule Take 1 capsule (500 mg) by mouth 2 times daily (Patient not taking: Reported on 10/30/2018) 20 capsule 0     ciclopirox 8 % SOLN Apply to adjacent skin and affected nails daily. Remove with alcohol every 7 days (Patient not taking: Reported on 10/30/2018) 13.2 mL 5      Allergies   Allergen Reactions     No Known Drug Allergies      Review of Systems:  -Const: Nel is generally feeling well  -Skin: As above in HPI. No additional skin concerns.    Physical exam:  Vitals: There were no vitals taken for this visit.  GEN: This is a well developed, well-nourished female in no acute distress, in a pleasant mood.    SKIN: Focused exam of the feet was performed  - Yellowing and thickening of the toenails, primarily first and second on bilateral toes  - Normal fleshy papule on the chin  -No other lesions of concern on areas examined.     Impression/Plan:  1. Onychomycosis, KOH positive today.- has failed terbinafine twice, cicloprix not used regularly. Consider jubilia or itra  or fluconazole. Pt interested in oral medications    Continue ciclopirox 8% solution   Labs for today, BUN/creatinine, hepatic panel.  We will consider itraconazole or fluconazole for further treatment. Reviewed risks of heart or nails disease. Wll have Shaye Albarado chart check meds for safety prior to starting.   2.  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     Not addressed.     3. Skin colored flat papule on chin, unchanged as of 10/30/18.    We will continue to monitor. Patient to report changes.       Follow-up in 4 months.     Staff Involved:  Staff/Scribe    Scribe Disclosure  I, Tiago Hsu, am serving as a scribe to document services personally performed by Dr. Tiff Dunn MD, based on data collection and the provider's statements to me.     Provider Disclosure:   The documentation recorded by the scribe accurately reflects the services I personally performed and the decisions made by me.    Tiff Dunn MD    Department of Dermatology  [facility]: Phone: [phone], Fax:[phone]  [facility]: Phone: [phone], Fax: [phone]

## 2018-10-30 NOTE — MR AVS SNAPSHOT
After Visit Summary   10/30/2018    Amina Pineda    MRN: [id number]           Patient Information     Date Of Birth          1960        Visit Information        Provider Department      10/30/2018 3:30 PM Tiff Dunn MD [facility]        Today's Diagnoses     Medication monitoring encounter    -  1    Onychomycosis           Follow-ups after your visit        Follow-up notes from your care team     Return in about 4 months (around 2/28/2019).      Your next 10 appointments already scheduled     Mar 12, 2019  3:15 PM CDT   Return Visit with Tiff Dunn MD   [facility] ([facility]    [address] [place] [id number] [phone]              Who to contact     If you have questions or need follow up information about today's clinic visit or your schedule please contact [facility] directly at [phone].  Normal or non-critical lab and imaging results will be communicated to you by [employer], letter or phone within 4 business days after the clinic has received the results. If you do not hear from us within 7 days, please contact the clinic through [employer] or phone. If you have a critical or abnormal lab result, we will notify you by phone as soon as possible.  Submit refill requests through [employer] or call your pharmacy and they will forward the refill request to us. Please allow 3 business days for your refill to be completed.          Additional Information About Your Visit        Agillichart Information     [employer] gives you secure access to your electronic health record. If you see a primary care provider, you can also send messages to your care team and make appointments. If you have questions, please call your primary care clinic.  If you do not have a primary care provider, please call [phone] and they will assist you.      [employer] is an electronic gateway that provides easy, online access to  your medical records. With [employer], you can request a clinic appointment, read your test results, renew a prescription or communicate with your care team.     To access your existing account, please contact your [facility] or call [phone] for assistance.        Care EveryWhere ID     This is your Care EveryWhere ID. This could be used by other organizations to access your [place] medical records  [id number]         Blood Pressure from Last 3 Encounters:   09/28/18 132/78   09/13/18 123/76   08/28/18 151/87    Weight from Last 3 Encounters:   09/28/18 105.7 kg (233 lb)   08/28/18 105.9 kg (233 lb 6.4 oz)   03/01/18 108 kg (238 lb)              We Performed the Following     Creatinine     Hepatic panel     KOH prep POCT     Urea nitrogen          Today's Medication Changes          These changes are accurate as of 10/30/18  3:58 PM.  If you have any questions, ask your nurse or doctor.               Stop taking these medicines if you haven't already. Please contact your care team if you have questions.     BIOTIN PO   Stopped by:  Tiff Dunn MD           ciclopirox 8 % Soln   Stopped by:  Tiff Dunn MD                    Primary Care Provider Office Phone # Fax #    Briana Ludmila Melton [phone] [address] [facility] [address]        Equal Access to Services     [facility]: Hadii johnny ku hadasho Soblair, waaxda luqadaha, qaybta kaalmada liane sung. So [facility] [phone].    ATENCIÓN: Si habla español, tiene a aguilar disposición servicios gratuitos de asistencia lingüística. Llame al [phone].    We comply with applicable federal civil rights laws and [place] laws. We do not discriminate on the basis of race, color, national origin, age, disability, sex, sexual orientation, or gender identity.            Thank you!     Thank you for choosing [facility]  for your care. Our goal is  always to provide you with excellent care. Hearing back from our patients is one way we can continue to improve our services. Please take a few minutes to complete the written survey that you may receive in the mail after your visit with us. Thank you!             Your Updated Medication List - Protect others around you: Learn how to safely use, store and throw away your medicines at www.disposemymeds.org.          This list is accurate as of 10/30/18  3:58 PM.  Always use your most recent med list.                   Brand Name Dispense Instructions for use Diagnosis    albuterol 108 (90 Base) MCG/ACT inhaler    PROAIR HFA/PROVENTIL HFA/VENTOLIN HFA    1 Inhaler    Inhale 2 puffs into the lungs every 6 hours as needed for shortness of breath / dyspnea or wheezing    Acquired hypothyroidism, Menopausal syndrome (hot flushes), Gastroesophageal reflux disease, esophagitis presence not specified, Decreased libido, High risk medication use, Encounter for screening mammogram for breast cancer       buPROPion 150 MG 24 hr tablet    WELLBUTRIN XL    90 tablet    Take 1 tablet (150 mg) by mouth every morning    Acquired hypothyroidism, Menopausal syndrome (hot flushes), Gastroesophageal reflux disease, esophagitis presence not specified, Decreased libido, High risk medication use, Encounter for screening mammogram for breast cancer       cephALEXin 500 MG capsule    KEFLEX    20 capsule    Take 1 capsule (500 mg) by mouth 2 times daily    Tonsillitis       cyclobenzaprine 10 MG tablet    FLEXERIL    90 tablet    Take 0.5-1 tablets (5-10 mg) by mouth 3 times daily as needed for muscle spasms    Chronic low back pain, unspecified back pain laterality, with sciatica presence unspecified       famciclovir 500 MG tablet    FAMVIR    180 tablet    Take 1 tablet (500 mg) by mouth 2 times daily as needed    Herpes simplex virus (HSV) infection       hydroquinone 4 % Crea     56.8 g    Externally apply topically At Bedtime Apply a  thin layer to dark areas once nightly for no more than 8 weeks at a time. Take breaks between use.    Solar lentigo       IRON SUPPLEMENT PO           levothyroxine 125 MCG tablet    SYNTHROID/LEVOTHROID    90 tablet    Take 1 tablet (125 mcg) by mouth daily    Acquired hypothyroidism, Menopausal syndrome (hot flushes), Gastroesophageal reflux disease, esophagitis presence not specified, Decreased libido, High risk medication use, Encounter for screening mammogram for breast cancer       nystatin [id number] UNIT/GM Powd    MYCOSTATIN    30 g    Apply 30 g topically 3 times daily as needed    Yeast infection of the skin       omeprazole 20 MG CR capsule    priLOSEC    90 capsule    TAKE ONE CAPSULE BY MOUTH EVERY DAY    Gastroesophageal reflux disease, esophagitis presence not specified, Decreased libido, Acquired hypothyroidism, Menopausal syndrome (hot flushes), High risk medication use, Need for hepatitis C screening test, Encounter for screening mammogram for breast cancer       order for DME     1 Device    Equipment being ordered: Crutch, Adult, Aluminum    Great toe pain, right       PREMARIN 0.3 MG tablet   Generic drug:  estrogens (conjugated)     90 tablet    TAKE ONE TABLET BY MOUTH EVERY DAY    Menopausal syndrome (hot flushes), Acquired hypothyroidism, Gastroesophageal reflux disease, esophagitis presence not specified, Decreased libido, High risk medication use, Need for hepatitis C screening test, Encounter for screening mammogram for breast cancer       Probiotic 250 MG Caps      Take by mouth daily        UNABLE TO FIND      MEDICATION NAME: Lipoflavinoid+ For ringing in ears        VITAMIN C PO           VITAMIN D PO      Take by mouth daily

## 2018-11-08 ENCOUNTER — TELEPHONE (OUTPATIENT)
Dept: SURGERY | Facility: CLINIC | Age: 58
End: 2018-11-08

## 2018-11-08 DIAGNOSIS — B35.1 ONYCHOMYCOSIS: Primary | ICD-10-CM

## 2018-11-08 DIAGNOSIS — Z51.81 MEDICATION MONITORING ENCOUNTER: ICD-10-CM

## 2018-11-08 NOTE — TELEPHONE ENCOUNTER
----- Message from Tiff Dunn MD sent at 11/7/2018  9:43 PM CST -----  Regarding: FW: fluconazole or itraxonazole  See below. Call patient. See if she can hold her cyclobenzaprine. If so, then we can try fluconazole.     ----- Message -----     From: Virginie Dash [facility]     Sent: 10/31/2018   3:56 PM       To: Tiff Dunn MD  Subject: RE: fluconazole or itraxonazole                  Hi Dr. Dunn,  Fluconazole can interact with her cyclobenzaprine(can increase the QTc interval). Cyclobenzaprine is listed as as needed so not sure how much of an issue this will be for the patient. If she is not using the cyclobenzaprine she should be able to use fluconazole or itraconazole.  Stephanie  ----- Message -----     From: Tiff Dunn MD     Sent: 10/30/2018   3:47 PM       To: Virginie Dash [facility], Tiff Dunn MD  Subject: fluconazole or itraxonazole                      Can you chart check this patient. Make sure she is safe for itraconazole or fluconazole.     I can pulse them

## 2018-11-08 NOTE — TELEPHONE ENCOUNTER
Pt called, no answer. VM Id's pt. Left message for pt to call the [facility] clinic back at [phone]....Angelica Berry RN

## 2018-11-12 NOTE — TELEPHONE ENCOUNTER
Amina states she hasn't taken the cyclobenzaprine in quite a while and doesn't plan to.  She is okay holding it wants to try fluconazole.    If it's a 90 day supply send to [facility] mail order.  If it's less than 90 days send to [facility].  I notified her that I wound send her MyChart when the Rx was sent.  Sydney Jones RN

## 2018-11-13 RX ORDER — CICLOPIROX 80 MG/ML
SOLUTION TOPICAL
Qty: 13.2 ML | Refills: 5 | Status: SHIPPED | OUTPATIENT
Start: 2018-11-13 | End: 2020-04-23

## 2018-11-13 RX ORDER — FLUCONAZOLE 150 MG/1
300 TABLET ORAL WEEKLY
Qty: 32 TABLET | Refills: 0 | Status: SHIPPED | OUTPATIENT
Start: 2018-11-13 | End: 2019-04-02

## 2018-11-13 NOTE — TELEPHONE ENCOUNTER
"Per Dr. Dunn:  \"I sent in her once weekly fluconazole for 4 months and lab check in 4 weeks.Tell her this is an off label non FDA approved treatment so she is aware     She should also use the prescription polish to nail and surrounding skin at the same time.\"    MyChart message sent to patient.  Sydney Jones RN    "

## 2018-12-18 ENCOUNTER — OFFICE VISIT (OUTPATIENT)
Dept: OPTOMETRY | Facility: CLINIC | Age: 58
End: 2018-12-18
Payer: COMMERCIAL

## 2018-12-18 DIAGNOSIS — Z01.01 ENCOUNTER FOR EXAMINATION OF EYES AND VISION WITH ABNORMAL FINDINGS: Primary | ICD-10-CM

## 2018-12-18 DIAGNOSIS — H25.13 NUCLEAR AGE-RELATED CATARACT, BOTH EYES: ICD-10-CM

## 2018-12-18 DIAGNOSIS — H52.13 MYOPIA OF BOTH EYES: ICD-10-CM

## 2018-12-18 DIAGNOSIS — H35.371 MACULAR PUCKERING OF RETINA, RIGHT: ICD-10-CM

## 2018-12-18 DIAGNOSIS — H52.4 PRESBYOPIA: ICD-10-CM

## 2018-12-18 DIAGNOSIS — H52.223 REGULAR ASTIGMATISM OF BOTH EYES: ICD-10-CM

## 2018-12-18 PROCEDURE — 92015 DETERMINE REFRACTIVE STATE: CPT | Performed by: OPTOMETRIST

## 2018-12-18 PROCEDURE — 92014 COMPRE OPH EXAM EST PT 1/>: CPT | Performed by: OPTOMETRIST

## 2018-12-18 ASSESSMENT — TONOMETRY
OD_IOP_MMHG: 21
IOP_METHOD: APPLANATION
OS_IOP_MMHG: 20

## 2018-12-18 ASSESSMENT — VISUAL ACUITY
OD_SC+: -2
OS_SC: 20/40
OS_SC: 20/50
OS_CC+: -1
OD_SC: 20/60
OS_CC: 20/25
OD_SC: 20/80
OD_CC: 20/40
METHOD: SNELLEN - LINEAR
CORRECTION_TYPE: GLASSES
OS_CC: 20/30 -2
OD_CC: 20/20

## 2018-12-18 ASSESSMENT — EXTERNAL EXAM - RIGHT EYE: OD_EXAM: NORMAL

## 2018-12-18 ASSESSMENT — REFRACTION_MANIFEST
OD_SPHERE: -2.75
OS_SPHERE: -2.75
OD_CYLINDER: +2.00
OS_AXIS: 107
OS_CYLINDER: +1.00
OS_ADD: +2.75
OD_ADD: +2.75
OD_AXIS: 090

## 2018-12-18 ASSESSMENT — CUP TO DISC RATIO
OD_RATIO: 0.3
OS_RATIO: 0.3

## 2018-12-18 ASSESSMENT — SLIT LAMP EXAM - LIDS
COMMENTS: NORMAL
COMMENTS: NORMAL

## 2018-12-18 ASSESSMENT — REFRACTION_WEARINGRX
OS_CYLINDER: +1.50
SPECS_TYPE: PAL
OD_ADD: +2.50
OD_CYLINDER: +2.00
OS_ADD: +2.50
OD_AXIS: 087
OD_SPHERE: -2.50
OS_AXIS: 096
OS_SPHERE: -2.75

## 2018-12-18 ASSESSMENT — CONF VISUAL FIELD
OS_NORMAL: 1
OD_NORMAL: 1

## 2018-12-18 ASSESSMENT — EXTERNAL EXAM - LEFT EYE: OS_EXAM: NORMAL

## 2018-12-18 NOTE — LETTER
12/18/2018         RE: Amina Pineda  [address]  [place] [address]        Dear Colleague,    Thank you for referring your patient, Amina Pineda, to the [facility]. Please see a copy of my visit note below.    Chief Complaint   Patient presents with     Annual Eye Exam      Accompanied by self  Last Eye Exam: 2 years ago  Dilated Previously: Yes    What are you currently using to see?  Glasses-5+ years old       Distance Vision Acuity: Noticed gradual change in both eyes    Near Vision Acuity: Not satisfied     Eye Comfort: dry  Do you use eye drops? : Yes: systane when needed  Occupation or Hobbies: [employer]    Amanda Villeda, Optometric Tech          Medical, surgical and family histories reviewed and updated 12/18/2018.       OBJECTIVE: See Ophthalmology exam    ASSESSMENT:    ICD-10-CM    1. Encounter for examination of eyes and vision with abnormal findings Z01.01    2. Nuclear age-related cataract, both eyes H25.13    3. Macular puckering of retina, right H35.371    4. Myopia of both eyes H52.13    5. Regular astigmatism of both eyes H52.223    6. Presbyopia H52.4       PLAN:     Patient Instructions   You have an epiretinal membrane.  As we grow older, the thick vitreous gel in the middle of our eyes begins to shrink and pull away from the macula. As the vitreous pulls away, scar tissue may develop on the macula. Sometimes the scar tissue can warp and contract, causing the retina to wrinkle or become swollen or distorted.    You have the start of mild cataracts.  You may notice some blurred vision or glare with night driving.  It is important that you wear good sunglasses to protect your eyes from the ultraviolet light from the sun. I recommend that you return in 1 year for an eye exam unless there are any sudden changes in your vision.       Amina was advised of today's exam findings.  Optional to fill new glasses prescription, minimal change  Copy of glasses  Rx provided today.  Return in 1 year for eye exam, or sooner if needed.    The affects of the dilating drops last for 4- 6 hours.  You will be more sensitive to light and vision will be blurry up close.  Mydriatic sunglasses were given if needed.      Ronald Stewart O.D.  [address] [place]  [address]    [phone]           Again, thank you for allowing me to participate in the care of your patient.        Sincerely,        Ronald Stewart, OD

## 2018-12-18 NOTE — PATIENT INSTRUCTIONS
You have an epiretinal membrane.  As we grow older, the thick vitreous gel in the middle of our eyes begins to shrink and pull away from the macula. As the vitreous pulls away, scar tissue may develop on the macula. Sometimes the scar tissue can warp and contract, causing the retina to wrinkle or become swollen or distorted.    You have the start of mild cataracts.  You may notice some blurred vision or glare with night driving.  It is important that you wear good sunglasses to protect your eyes from the ultraviolet light from the sun. I recommend that you return in 1 year for an eye exam unless there are any sudden changes in your vision.       Amina was advised of today's exam findings.  Optional to fill new glasses prescription, minimal change  Copy of glasses Rx provided today.  Return in 1 year for eye exam, or sooner if needed.    The affects of the dilating drops last for 4- 6 hours.  You will be more sensitive to light and vision will be blurry up close.  Mydriatic sunglasses were given if needed.      Ronald Stewart O.D.  [address]. [place] [facility] [place] [place]  [address]    [phone]

## 2018-12-18 NOTE — PROGRESS NOTES
Chief Complaint   Patient presents with     Annual Eye Exam      Accompanied by self  Last Eye Exam: 2 years ago  Dilated Previously: Yes    What are you currently using to see?  Glasses-5+ years old       Distance Vision Acuity: Noticed gradual change in both eyes    Near Vision Acuity: Not satisfied     Eye Comfort: dry  Do you use eye drops? : Yes: systane when needed  Occupation or Hobbies: [employer]    Amanda Villeda, Optometric Tech          Medical, surgical and family histories reviewed and updated 12/18/2018.       OBJECTIVE: See Ophthalmology exam    ASSESSMENT:    ICD-10-CM    1. Encounter for examination of eyes and vision with abnormal findings Z01.01    2. Nuclear age-related cataract, both eyes H25.13    3. Macular puckering of retina, right H35.371    4. Myopia of both eyes H52.13    5. Regular astigmatism of both eyes H52.223    6. Presbyopia H52.4       PLAN:     Patient Instructions   You have an epiretinal membrane.  As we grow older, the thick vitreous gel in the middle of our eyes begins to shrink and pull away from the macula. As the vitreous pulls away, scar tissue may develop on the macula. Sometimes the scar tissue can warp and contract, causing the retina to wrinkle or become swollen or distorted.    You have the start of mild cataracts.  You may notice some blurred vision or glare with night driving.  It is important that you wear good sunglasses to protect your eyes from the ultraviolet light from the sun. I recommend that you return in 1 year for an eye exam unless there are any sudden changes in your vision.       Amina was advised of today's exam findings.  Optional to fill new glasses prescription, minimal change  Copy of glasses Rx provided today.  Return in 1 year for eye exam, or sooner if needed.    The affects of the dilating drops last for 4- 6 hours.  You will be more sensitive to light and vision will be blurry up close.  Mydriatic sunglasses were given if  needed.      Ronald Stewart O.D.  [address]  [place]  [address]    [phone]

## 2019-02-13 DIAGNOSIS — E03.9 ACQUIRED HYPOTHYROIDISM: ICD-10-CM

## 2019-02-13 DIAGNOSIS — K21.9 GASTROESOPHAGEAL REFLUX DISEASE, ESOPHAGITIS PRESENCE NOT SPECIFIED: ICD-10-CM

## 2019-02-13 DIAGNOSIS — N95.1 MENOPAUSAL SYNDROME (HOT FLUSHES): ICD-10-CM

## 2019-02-13 DIAGNOSIS — Z11.59 NEED FOR HEPATITIS C SCREENING TEST: ICD-10-CM

## 2019-02-13 DIAGNOSIS — R68.82 DECREASED LIBIDO: ICD-10-CM

## 2019-02-13 DIAGNOSIS — Z12.31 ENCOUNTER FOR SCREENING MAMMOGRAM FOR BREAST CANCER: ICD-10-CM

## 2019-02-13 DIAGNOSIS — Z79.899 HIGH RISK MEDICATION USE: ICD-10-CM

## 2019-03-11 ENCOUNTER — MYC REFILL (OUTPATIENT)
Dept: INTERNAL MEDICINE | Facility: CLINIC | Age: 59
End: 2019-03-11

## 2019-03-11 DIAGNOSIS — Z12.31 ENCOUNTER FOR SCREENING MAMMOGRAM FOR BREAST CANCER: ICD-10-CM

## 2019-03-11 DIAGNOSIS — K21.9 GASTROESOPHAGEAL REFLUX DISEASE, ESOPHAGITIS PRESENCE NOT SPECIFIED: ICD-10-CM

## 2019-03-11 DIAGNOSIS — N95.1 MENOPAUSAL SYNDROME (HOT FLUSHES): ICD-10-CM

## 2019-03-11 DIAGNOSIS — R68.82 DECREASED LIBIDO: ICD-10-CM

## 2019-03-11 DIAGNOSIS — Z79.899 HIGH RISK MEDICATION USE: ICD-10-CM

## 2019-03-11 DIAGNOSIS — E03.9 ACQUIRED HYPOTHYROIDISM: ICD-10-CM

## 2019-03-11 RX ORDER — LEVOTHYROXINE SODIUM 125 UG/1
125 TABLET ORAL DAILY
Qty: 90 TABLET | Refills: 3 | Status: SHIPPED | OUTPATIENT
Start: 2019-03-11 | End: 2020-03-18

## 2019-03-11 NOTE — TELEPHONE ENCOUNTER
"Routing refill request to provider for review/approval because:  Labs not current:  TSH    Requested Prescriptions   Pending Prescriptions Disp Refills     levothyroxine (SYNTHROID/LEVOTHROID) 125 MCG tablet 90 tablet 3     Sig: Take 1 tablet (125 mcg) by mouth daily    Thyroid Protocol Failed - 3/11/2019  9:48 AM       Failed - Normal TSH on file in past 12 months    Recent Labs   Lab Test 02/23/18  1454   TSH 0.40             Passed - Patient is 12 years or older       Passed - Recent (12 mo) or future (30 days) visit within the authorizing provider's specialty    Patient had office visit in the last 12 months or has a visit in the next 30 days with authorizing provider or within the authorizing provider's specialty.  See \"Patient Info\" tab in inbasket, or \"Choose Columns\" in Meds & Orders section of the refill encounter.             Passed - Medication is active on med list       Passed - No active pregnancy on record    If patient is pregnant or has had a positive pregnancy test, please check TSH.         Passed - No positive pregnancy test in past 12 months    If patient is pregnant or has had a positive pregnancy test, please check TSH.          Sofia Hu RN  "

## 2019-04-02 ENCOUNTER — ANCILLARY PROCEDURE (OUTPATIENT)
Dept: GENERAL RADIOLOGY | Facility: CLINIC | Age: 59
End: 2019-04-02
Attending: INTERNAL MEDICINE
Payer: COMMERCIAL

## 2019-04-02 ENCOUNTER — OFFICE VISIT (OUTPATIENT)
Dept: INTERNAL MEDICINE | Facility: CLINIC | Age: 59
End: 2019-04-02
Payer: COMMERCIAL

## 2019-04-02 VITALS
BODY MASS INDEX: 36.53 KG/M2 | TEMPERATURE: 96.8 F | SYSTOLIC BLOOD PRESSURE: 136 MMHG | DIASTOLIC BLOOD PRESSURE: 80 MMHG | HEART RATE: 83 BPM | HEIGHT: 69 IN | WEIGHT: 246.6 LBS | RESPIRATION RATE: 16 BRPM | OXYGEN SATURATION: 94 %

## 2019-04-02 DIAGNOSIS — E66.01 MORBID OBESITY (H): ICD-10-CM

## 2019-04-02 DIAGNOSIS — E78.5 HYPERLIPIDEMIA LDL GOAL <100: ICD-10-CM

## 2019-04-02 DIAGNOSIS — Z12.31 ENCOUNTER FOR SCREENING MAMMOGRAM FOR BREAST CANCER: ICD-10-CM

## 2019-04-02 DIAGNOSIS — N39.3 FEMALE STRESS INCONTINENCE: ICD-10-CM

## 2019-04-02 DIAGNOSIS — K21.9 GASTROESOPHAGEAL REFLUX DISEASE, ESOPHAGITIS PRESENCE NOT SPECIFIED: ICD-10-CM

## 2019-04-02 DIAGNOSIS — M54.5 CHRONIC LOW BACK PAIN, UNSPECIFIED BACK PAIN LATERALITY, WITH SCIATICA PRESENCE UNSPECIFIED: ICD-10-CM

## 2019-04-02 DIAGNOSIS — R68.82 DECREASED LIBIDO: ICD-10-CM

## 2019-04-02 DIAGNOSIS — R73.01 IMPAIRED FASTING GLUCOSE: ICD-10-CM

## 2019-04-02 DIAGNOSIS — N95.1 MENOPAUSAL SYNDROME (HOT FLUSHES): ICD-10-CM

## 2019-04-02 DIAGNOSIS — B00.9 HERPES SIMPLEX VIRUS (HSV) INFECTION: ICD-10-CM

## 2019-04-02 DIAGNOSIS — E03.9 ACQUIRED HYPOTHYROIDISM: Primary | Chronic | ICD-10-CM

## 2019-04-02 DIAGNOSIS — Z51.81 ENCOUNTER FOR THERAPEUTIC DRUG MONITORING: ICD-10-CM

## 2019-04-02 DIAGNOSIS — G89.29 CHRONIC LOW BACK PAIN, UNSPECIFIED BACK PAIN LATERALITY, WITH SCIATICA PRESENCE UNSPECIFIED: ICD-10-CM

## 2019-04-02 DIAGNOSIS — Z79.899 HIGH RISK MEDICATION USE: ICD-10-CM

## 2019-04-02 DIAGNOSIS — M25.551 HIP PAIN, RIGHT: ICD-10-CM

## 2019-04-02 DIAGNOSIS — R53.82 CHRONIC FATIGUE: ICD-10-CM

## 2019-04-02 PROCEDURE — 73502 X-RAY EXAM HIP UNI 2-3 VIEWS: CPT

## 2019-04-02 PROCEDURE — 99214 OFFICE O/P EST MOD 30 MIN: CPT | Performed by: INTERNAL MEDICINE

## 2019-04-02 RX ORDER — BUPROPION HYDROCHLORIDE 150 MG/1
150 TABLET ORAL EVERY MORNING
Qty: 90 TABLET | Refills: 3 | Status: SHIPPED | OUTPATIENT
Start: 2019-04-02 | End: 2020-05-01

## 2019-04-02 RX ORDER — ALBUTEROL SULFATE 90 UG/1
2 AEROSOL, METERED RESPIRATORY (INHALATION) EVERY 6 HOURS PRN
Qty: 18 G | Refills: 11 | Status: SHIPPED | OUTPATIENT
Start: 2019-04-02 | End: 2020-05-01

## 2019-04-02 RX ORDER — CYCLOBENZAPRINE HCL 10 MG
5-10 TABLET ORAL 3 TIMES DAILY PRN
Qty: 90 TABLET | Refills: 1 | Status: SHIPPED | OUTPATIENT
Start: 2019-04-02 | End: 2020-05-01

## 2019-04-02 RX ORDER — FAMCICLOVIR 500 MG/1
500 TABLET ORAL 2 TIMES DAILY PRN
Qty: 180 TABLET | Refills: 3 | Status: SHIPPED | OUTPATIENT
Start: 2019-04-02 | End: 2020-05-01

## 2019-04-02 ASSESSMENT — ANXIETY QUESTIONNAIRES
3. WORRYING TOO MUCH ABOUT DIFFERENT THINGS: NOT AT ALL
6. BECOMING EASILY ANNOYED OR IRRITABLE: NOT AT ALL
GAD7 TOTAL SCORE: 0
1. FEELING NERVOUS, ANXIOUS, OR ON EDGE: NOT AT ALL
5. BEING SO RESTLESS THAT IT IS HARD TO SIT STILL: NOT AT ALL
IF YOU CHECKED OFF ANY PROBLEMS ON THIS QUESTIONNAIRE, HOW DIFFICULT HAVE THESE PROBLEMS MADE IT FOR YOU TO DO YOUR WORK, TAKE CARE OF THINGS AT HOME, OR GET ALONG WITH OTHER PEOPLE: NOT DIFFICULT AT ALL
7. FEELING AFRAID AS IF SOMETHING AWFUL MIGHT HAPPEN: NOT AT ALL
2. NOT BEING ABLE TO STOP OR CONTROL WORRYING: NOT AT ALL

## 2019-04-02 ASSESSMENT — MIFFLIN-ST. JEOR: SCORE: 1748.98

## 2019-04-02 ASSESSMENT — PAIN SCALES - GENERAL: PAINLEVEL: MODERATE PAIN (5)

## 2019-04-02 ASSESSMENT — PATIENT HEALTH QUESTIONNAIRE - PHQ9
SUM OF ALL RESPONSES TO PHQ QUESTIONS 1-9: 3
5. POOR APPETITE OR OVEREATING: NOT AT ALL

## 2019-04-02 NOTE — PROGRESS NOTES
SUBJECTIVE:   Amina Pineda is a 58 year old female who presents to clinic today for the following health issues:      Medication Followup of all    Taking Medication as prescribed: yes    Side Effects:  None    Medication Helping Symptoms:  yes     The patient deniesnof typical reflux symptoms: burning sensation after heavy meals, especially when lying down, sometimes with true waterbrash. Denies dysphagia, black or bloody stools or abdominal pain.     Osteoarthritis in the hip is a prior diagnois- hurts when she wakes up.  It makes her waddle.  Xray was done years ago.  She has to lean over to help with low back pain.     She has not been able to lose weight.      She is on her estrogen every other day and finds that her hot flashes are present but under good control.    She continues to have libido issues.  She would like more intimacy.      She continues to have period low back pain for which flexeril helps.      hsv controlled with current meds.      Fatigue is significant but chronic.      She has lose of bladder control with coughing or straining.  No urgency      Problem list and histories reviewed & adjusted, as indicated.  Additional history: as documented    Patient Active Problem List   Diagnosis     Hypothyroidism     Esophageal reflux     Herpes simplex virus (HSV) infection     Generalized osteoarthrosis, unspecified site     Dysthymic disorder     CARDIOVASCULAR SCREENING; LDL GOAL LESS THAN 160     Female stress incontinence     Restless leg syndrome     Non morbid obesity due to excess calories     Ocular hypertension, right     Menopausal syndrome (hot flushes)     Daytime somnolence     Chronic low back pain, unspecified back pain laterality, with sciatica presence unspecified     Mixed incontinence     ANDREW (obstructive sleep apnea)     Acute dacryocystitis, right     Decreased libido     Impaired fasting glucose     Hyperlipidemia LDL goal <100     Morbid obesity (H)     Past Surgical  History:   Procedure Laterality Date     C LIGATE FALLOPIAN TUBE       C NONSPECIFIC PROCEDURE      rotator cuff surgery both shoulder     C NONSPECIFIC PROCEDURE      wrist surgery for cyst x 2     C VAGINAL HYSTERECTOMY      with BSO, 10-12 week size     COLONOSCOPY  2015     COLONOSCOPY WITH CO2 INSUFFLATION N/A 2015    Procedure: COLONOSCOPY WITH CO2 INSUFFLATION;  Surgeon: Bebeto Mortensen MD;  Location: [facility]     ELBOW SURGERY      Lt elbow repair.     HYSTERECTOMY, PAP NO LONGER INDICATED       ORTHOPEDIC SURGERY       SOFT TISSUE SURGERY      cyst, both shoulders and left elbow       Social History     Tobacco Use     Smoking status: Never Smoker     Smokeless tobacco: Never Used   Substance Use Topics     Alcohol use: No     Family History   Problem Relation Age of Onset     C.A.D. Maternal Grandfather      Coronary Artery Disease Maternal Grandfather         Heart Attack, ()     Coronary Artery Disease Father         High Cholesterol     Depression/Anxiety Father         Depression     Thyroid Disease Father         Hypo Thryoid     Retinal detachment Father      Thyroid Disease Father         Hypo     Hypertension Mother         High Blood Pressure     Breast Cancer Mother         Has spot being watching.  Checked every 6 months     Glaucoma Mother         glc surgery     Breast Cancer Paternal Grandmother         Breast removed ()     Asthma Paternal Grandmother         Emphysema ()     Cerebrovascular Disease Paternal Grandfather         Strokes ()     Known Genetic Syndrome Daughter         Whitaker's Syndrome     Skin Cancer No family hx of         no family hx of skin cancer         Current Outpatient Medications   Medication Sig Dispense Refill     albuterol (PROAIR HFA/PROVENTIL HFA/VENTOLIN HFA) 108 (90 Base) MCG/ACT inhaler Inhale 2 puffs into the lungs every 6 hours as needed for shortness of breath / dyspnea or wheezing 18 g 11      buPROPion (WELLBUTRIN XL) 150 MG 24 hr tablet Take 1 tablet (150 mg) by mouth every morning 90 tablet 3     Cholecalciferol (VITAMIN D PO) Take by mouth daily       cyclobenzaprine (FLEXERIL) 10 MG tablet Take 0.5-1 tablets (5-10 mg) by mouth 3 times daily as needed for muscle spasms 90 tablet 1     estrogen conj (PREMARIN) 0.3 MG tablet Take 1 tablet (0.3 mg) by mouth every other day 45 tablet 3     famciclovir (FAMVIR) 500 MG tablet Take 1 tablet (500 mg) by mouth 2 times daily as needed 180 tablet 3     Ferrous Sulfate (IRON SUPPLEMENT PO)        hydroquinone 4 % CREA Externally apply topically At Bedtime Apply a thin layer to dark areas once nightly for no more than 8 weeks at a time. Take breaks between use. 56.8 g 0     levothyroxine (SYNTHROID/LEVOTHROID) 125 MCG tablet Take 1 tablet (125 mcg) by mouth daily 90 tablet 3     nystatin (MYCOSTATIN) 397792 UNIT/GM POWD Apply 30 g topically 3 times daily as needed 30 g 1     omeprazole (PRILOSEC) 20 MG DR capsule Take 1 capsule (20 mg) by mouth daily 90 capsule 11     UNABLE TO FIND MEDICATION NAME: [employer] greens with probiotics       UNABLE TO FIND 2 times daily MEDICATION NAME: ThermoFit XL       UNABLE TO FIND MEDICATION NAME: [employer] Fat Fighter       UNABLE TO FIND MEDICATION NAME: Lipoflavinoid+  For ringing in ears       ciclopirox 8 % SOLN Apply to adjacent skin and affected nails daily. Remove with alcohol every 7 days (Patient not taking: Reported on 4/2/2019) 13.2 mL 5     Allergies   Allergen Reactions     No Known Drug Allergies        Reviewed and updated as needed this visit by clinical staff  Tobacco  Allergies  Meds  Problems  Med Hx  Surg Hx  Fam Hx  Soc Hx        Reviewed and updated as needed this visit by Provider  Tobacco  Allergies  Meds  Problems  Med Hx  Surg Hx  Fam Hx         ROS:   ROS: 10 point ROS neg other than the symptoms noted above in the HPI.     OBJECTIVE:     /80 (BP Location: Right arm, Cuff Size: Adult  "Large)   Pulse 83   Temp 96.8  F (36  C) (Oral)   Resp 16   Ht 1.746 m (5' 8.75\")   Wt 111.9 kg (246 lb 9.6 oz)   SpO2 94%   Breastfeeding? No   BMI 36.68 kg/m    Body mass index is 36.68 kg/m .  GENERAL APPEARANCE: healthy, alert and no distress  NECK: no adenopathy, no asymmetry, masses, or scars and thyroid normal to palpation  RESP: lungs clear to auscultation - no rales, rhonchi or wheezes  CV: regular rates and rhythm and normal S1 S2, no S3 or S4  ABDOMEN:  soft, nontender, no HSM or masses and bowel sounds normal  SKIN: no suspicious lesions or rashes  PSYCH: mentation appears normal. and affect normal/bright  No edema   Full range of motion of the right hip.     Diagnostic Test Results:  Results for orders placed or performed in visit on 04/02/19   XR Hip Right 2-3 Views    Narrative    XR RIGHT HIP TWO-THREE VIEWS  4/2/2019 4:21 PM      HISTORY: Hip pain, right.    COMPARISON: None.      Impression    IMPRESSION: No acute fracture or dislocation. Mild right hip  osteoarthritis.    ERIC CONTRERAS MD       ASSESSMENT/PLAN:             1. Acquired hypothyroidism  Well controlled with medications without side effects.   - buPROPion (WELLBUTRIN XL) 150 MG 24 hr tablet; Take 1 tablet (150 mg) by mouth every morning  Dispense: 90 tablet; Refill: 3  - albuterol (PROAIR HFA/PROVENTIL HFA/VENTOLIN HFA) 108 (90 Base) MCG/ACT inhaler; Inhale 2 puffs into the lungs every 6 hours as needed for shortness of breath / dyspnea or wheezing  Dispense: 18 g; Refill: 11  - omeprazole (PRILOSEC) 20 MG DR capsule; Take 1 capsule (20 mg) by mouth daily  Dispense: 90 capsule; Refill: 11  - estrogen conj (PREMARIN) 0.3 MG tablet; Take 1 tablet (0.3 mg) by mouth every other day  Dispense: 45 tablet; Refill: 3  - TSH with free T4 reflex; Future    2. Morbid obesity (H)  Increase in exercise needed. Patient is on her own dietary program    3. Hyperlipidemia LDL goal <100    - Lipid panel reflex to direct LDL Fasting; " Future    4. Impaired fasting glucose  Weight loss needed.    - Hemoglobin A1c; Future    5. Menopausal syndrome (hot flushes)  Per patient instructions.   - buPROPion (WELLBUTRIN XL) 150 MG 24 hr tablet; Take 1 tablet (150 mg) by mouth every morning  Dispense: 90 tablet; Refill: 3  - albuterol (PROAIR HFA/PROVENTIL HFA/VENTOLIN HFA) 108 (90 Base) MCG/ACT inhaler; Inhale 2 puffs into the lungs every 6 hours as needed for shortness of breath / dyspnea or wheezing  Dispense: 18 g; Refill: 11  - omeprazole (PRILOSEC) 20 MG DR capsule; Take 1 capsule (20 mg) by mouth daily  Dispense: 90 capsule; Refill: 11  - estrogen conj (PREMARIN) 0.3 MG tablet; Take 1 tablet (0.3 mg) by mouth every other day  Dispense: 45 tablet; Refill: 3    6. Gastroesophageal reflux disease, esophagitis presence not specified  Well controlled with medications without side effects.   - buPROPion (WELLBUTRIN XL) 150 MG 24 hr tablet; Take 1 tablet (150 mg) by mouth every morning  Dispense: 90 tablet; Refill: 3  - albuterol (PROAIR HFA/PROVENTIL HFA/VENTOLIN HFA) 108 (90 Base) MCG/ACT inhaler; Inhale 2 puffs into the lungs every 6 hours as needed for shortness of breath / dyspnea or wheezing  Dispense: 18 g; Refill: 11  - omeprazole (PRILOSEC) 20 MG DR capsule; Take 1 capsule (20 mg) by mouth daily  Dispense: 90 capsule; Refill: 11  - estrogen conj (PREMARIN) 0.3 MG tablet; Take 1 tablet (0.3 mg) by mouth every other day  Dispense: 45 tablet; Refill: 3    7. Decreased libido    - buPROPion (WELLBUTRIN XL) 150 MG 24 hr tablet; Take 1 tablet (150 mg) by mouth every morning  Dispense: 90 tablet; Refill: 3  - albuterol (PROAIR HFA/PROVENTIL HFA/VENTOLIN HFA) 108 (90 Base) MCG/ACT inhaler; Inhale 2 puffs into the lungs every 6 hours as needed for shortness of breath / dyspnea or wheezing  Dispense: 18 g; Refill: 11  - omeprazole (PRILOSEC) 20 MG DR capsule; Take 1 capsule (20 mg) by mouth daily  Dispense: 90 capsule; Refill: 11  - estrogen conj (PREMARIN)  0.3 MG tablet; Take 1 tablet (0.3 mg) by mouth every other day  Dispense: 45 tablet; Refill: 3  - [facility]    8. High risk medication use    - buPROPion (WELLBUTRIN XL) 150 MG 24 hr tablet; Take 1 tablet (150 mg) by mouth every morning  Dispense: 90 tablet; Refill: 3  - albuterol (PROAIR HFA/PROVENTIL HFA/VENTOLIN HFA) 108 (90 Base) MCG/ACT inhaler; Inhale 2 puffs into the lungs every 6 hours as needed for shortness of breath / dyspnea or wheezing  Dispense: 18 g; Refill: 11  - omeprazole (PRILOSEC) 20 MG DR capsule; Take 1 capsule (20 mg) by mouth daily  Dispense: 90 capsule; Refill: 11  - estrogen conj (PREMARIN) 0.3 MG tablet; Take 1 tablet (0.3 mg) by mouth every other day  Dispense: 45 tablet; Refill: 3    9. Encounter for screening mammogram for breast cancer    - buPROPion (WELLBUTRIN XL) 150 MG 24 hr tablet; Take 1 tablet (150 mg) by mouth every morning  Dispense: 90 tablet; Refill: 3  - albuterol (PROAIR HFA/PROVENTIL HFA/VENTOLIN HFA) 108 (90 Base) MCG/ACT inhaler; Inhale 2 puffs into the lungs every 6 hours as needed for shortness of breath / dyspnea or wheezing  Dispense: 18 g; Refill: 11  - omeprazole (PRILOSEC) 20 MG DR capsule; Take 1 capsule (20 mg) by mouth daily  Dispense: 90 capsule; Refill: 11  - estrogen conj (PREMARIN) 0.3 MG tablet; Take 1 tablet (0.3 mg) by mouth every other day  Dispense: 45 tablet; Refill: 3    10. Chronic low back pain, unspecified back pain laterality, with sciatica presence unspecified    - cyclobenzaprine (FLEXERIL) 10 MG tablet; Take 0.5-1 tablets (5-10 mg) by mouth 3 times daily as needed for muscle spasms  Dispense: 90 tablet; Refill: 1    11. Herpes simplex virus (HSV) infection    - famciclovir (FAMVIR) 500 MG tablet; Take 1 tablet (500 mg) by mouth 2 times daily as needed  Dispense: 180 tablet; Refill: 3    12. Chronic fatigue    - Comprehensive metabolic panel; Future  - CBC with platelets; Future  - TSH with free T4 reflex; Future    13.  Encounter for therapeutic drug monitoring  PPI use   - Vitamin B12; Future  - Magnesium; Future    14. Hip pain, right    - XR Hip Right 2-3 Views    15. Female stress incontinence  Per patient instructions.       Patient Instructions     Stop the Premarin and see if your hot flashes return.  [facility] with [facility].  Schedule a fasting lab draw.  Consider seeing physical therapy for your hip and Urology for your bladder control.    [facility]    If you have any questions regarding to your visit please contact your care team:     Team Pink:   Clinic Hours Telephone Number   Internal Medicine:  Dr. Briana Murphy, NP 7am-7pm  Monday - Thursday   7am-5pm  Fridays  [phone]  (Appointment scheduling available 24/7)   Urgent Care - [place] and [facility] - 11am-9pm Monday-Friday Saturday-Sunday- 9am-5pm   [place] - 5pm-9pm Monday-Friday Saturday-Sunday- 9am-5pm  [phone] - [place]  [phone] - [place]       What options do I have for a visit other than an office visit? We offer electronic visits (e-visits) and telephone visits, when medically appropriate.  Please check with your medical insurance to see if these types of visits are covered, as you will be responsible for any charges that are not paid by your insurance.      You can use [employer] (secure electronic communication) to access to your chart, send your primary care provider a message, or make an appointment. Ask a team member how to get started.     For a price quote for your services, please call our Consumer Price Line at [phone] or our Imaging Cost estimation line at [phone] (for imaging tests).     Briana Melton MD  [facility]

## 2019-04-02 NOTE — PATIENT INSTRUCTIONS
Stop the Premarin and see if your hot flashes return.  Schedule with [facility].  Schedule a fasting lab draw.  Consider seeing physical therapy for your hip and Urology for your bladder control.    [facility]    If you have any questions regarding to your visit please contact your care team:     Team Pink:   Clinic Hours Telephone Number   Internal Medicine:  Dr. Briana Murphy, NP 7am-7pm  Monday - Thursday   7am-5pm  Fridays  [phone]  (Appointment scheduling available 24/7)   Urgent Care - [place] and [facility] - 11am-9pm Monday-Friday Saturday-Sunday- 9am-5pm   [place] - 5pm-9pm Monday-Friday Saturday-Sunday- 9am-5pm  [phone] - [place]  [phone] - [place]       What options do I have for a visit other than an office visit? We offer electronic visits (e-visits) and telephone visits, when medically appropriate.  Please check with your medical insurance to see if these types of visits are covered, as you will be responsible for any charges that are not paid by your insurance.      You can use [employer] (secure electronic communication) to access to your chart, send your primary care provider a message, or make an appointment. Ask a team member how to get started.     For a price quote for your services, please call our Consumer Price Line at [phone] or our Imaging Cost estimation line at [phone] (for imaging tests).

## 2019-04-03 ASSESSMENT — ANXIETY QUESTIONNAIRES: GAD7 TOTAL SCORE: 0

## 2019-04-03 NOTE — RESULT ENCOUNTER NOTE
The arthritis is mild, as suspected.  I think physical therapy is the next step.  Let me know if that is something you would like to proceed with doing.

## 2019-04-05 DIAGNOSIS — R73.01 IMPAIRED FASTING GLUCOSE: ICD-10-CM

## 2019-04-05 DIAGNOSIS — E03.9 ACQUIRED HYPOTHYROIDISM: Chronic | ICD-10-CM

## 2019-04-05 DIAGNOSIS — R53.82 CHRONIC FATIGUE: ICD-10-CM

## 2019-04-05 DIAGNOSIS — Z51.81 ENCOUNTER FOR THERAPEUTIC DRUG MONITORING: ICD-10-CM

## 2019-04-05 DIAGNOSIS — E78.5 HYPERLIPIDEMIA LDL GOAL <100: ICD-10-CM

## 2019-04-05 LAB
ALBUMIN SERPL-MCNC: 3.5 G/DL (ref 3.4–5)
ALP SERPL-CCNC: 107 U/L (ref 40–150)
ALT SERPL W P-5'-P-CCNC: 34 U/L (ref 0–50)
ANION GAP SERPL CALCULATED.3IONS-SCNC: 6 MMOL/L (ref 3–14)
AST SERPL W P-5'-P-CCNC: 17 U/L (ref 0–45)
BILIRUB SERPL-MCNC: 0.5 MG/DL (ref 0.2–1.3)
BUN SERPL-MCNC: 15 MG/DL (ref 7–30)
CALCIUM SERPL-MCNC: 8.9 MG/DL (ref 8.5–10.1)
CHLORIDE SERPL-SCNC: 109 MMOL/L (ref 94–109)
CHOLEST SERPL-MCNC: 195 MG/DL
CO2 SERPL-SCNC: 28 MMOL/L (ref 20–32)
CREAT SERPL-MCNC: 0.79 MG/DL (ref 0.52–1.04)
ERYTHROCYTE [DISTWIDTH] IN BLOOD BY AUTOMATED COUNT: 13.6 % (ref 10–15)
GFR SERPL CREATININE-BSD FRML MDRD: 81 ML/MIN/{1.73_M2}
GLUCOSE SERPL-MCNC: 105 MG/DL (ref 70–99)
HBA1C MFR BLD: 5.8 % (ref 0–5.6)
HCT VFR BLD AUTO: 42.6 % (ref 35–47)
HDLC SERPL-MCNC: 54 MG/DL
HGB BLD-MCNC: 13.9 G/DL (ref 11.7–15.7)
LDLC SERPL CALC-MCNC: 124 MG/DL
MAGNESIUM SERPL-MCNC: 2 MG/DL (ref 1.6–2.3)
MCH RBC QN AUTO: 30.3 PG (ref 26.5–33)
MCHC RBC AUTO-ENTMCNC: 32.6 G/DL (ref 31.5–36.5)
MCV RBC AUTO: 93 FL (ref 78–100)
NONHDLC SERPL-MCNC: 141 MG/DL
PLATELET # BLD AUTO: 235 10E9/L (ref 150–450)
POTASSIUM SERPL-SCNC: 4.4 MMOL/L (ref 3.4–5.3)
PROT SERPL-MCNC: 6.8 G/DL (ref 6.8–8.8)
RBC # BLD AUTO: 4.58 10E12/L (ref 3.8–5.2)
SODIUM SERPL-SCNC: 143 MMOL/L (ref 133–144)
TRIGL SERPL-MCNC: 87 MG/DL
TSH SERPL DL<=0.005 MIU/L-ACNC: 1.42 MU/L (ref 0.4–4)
VIT B12 SERPL-MCNC: 647 PG/ML (ref 193–986)
WBC # BLD AUTO: 6.8 10E9/L (ref 4–11)

## 2019-04-05 PROCEDURE — 84443 ASSAY THYROID STIM HORMONE: CPT | Performed by: INTERNAL MEDICINE

## 2019-04-05 PROCEDURE — 80053 COMPREHEN METABOLIC PANEL: CPT | Performed by: INTERNAL MEDICINE

## 2019-04-05 PROCEDURE — 82607 VITAMIN B-12: CPT | Performed by: INTERNAL MEDICINE

## 2019-04-05 PROCEDURE — 80061 LIPID PANEL: CPT | Performed by: INTERNAL MEDICINE

## 2019-04-05 PROCEDURE — 83735 ASSAY OF MAGNESIUM: CPT | Performed by: INTERNAL MEDICINE

## 2019-04-05 PROCEDURE — 85027 COMPLETE CBC AUTOMATED: CPT | Performed by: INTERNAL MEDICINE

## 2019-04-05 PROCEDURE — 36415 COLL VENOUS BLD VENIPUNCTURE: CPT | Performed by: INTERNAL MEDICINE

## 2019-04-05 PROCEDURE — 83036 HEMOGLOBIN GLYCOSYLATED A1C: CPT | Performed by: INTERNAL MEDICINE

## 2019-04-05 NOTE — RESULT ENCOUNTER NOTE
Normal magnesium. Normal thyroid. Adequate cholesterol.   Normal electrolytes. Normal kidney function. Normal liver blood test.

## 2019-04-29 ENCOUNTER — THERAPY VISIT (OUTPATIENT)
Dept: CHIROPRACTIC MEDICINE | Facility: CLINIC | Age: 59
End: 2019-04-29
Payer: COMMERCIAL

## 2019-04-29 DIAGNOSIS — M99.05 SEGMENTAL DYSFUNCTION OF PELVIC REGION: Primary | ICD-10-CM

## 2019-04-29 DIAGNOSIS — M99.03 SEGMENTAL DYSFUNCTION OF LUMBAR REGION: ICD-10-CM

## 2019-04-29 DIAGNOSIS — M54.50 LUMBAGO: ICD-10-CM

## 2019-04-29 DIAGNOSIS — M99.02 THORACIC SEGMENT DYSFUNCTION: ICD-10-CM

## 2019-04-29 DIAGNOSIS — M62.838 SPASM OF MUSCLE: ICD-10-CM

## 2019-04-29 PROCEDURE — 98941 CHIROPRACT MANJ 3-4 REGIONS: CPT | Mod: AT | Performed by: CHIROPRACTOR

## 2019-04-29 PROCEDURE — 99211 OFF/OP EST MAY X REQ PHY/QHP: CPT | Mod: 25 | Performed by: CHIROPRACTOR

## 2019-04-29 NOTE — PROGRESS NOTES
Visit #:  1 in 2019  Subjective:  Amina Pineda is a 56 year old female who has been seen for:        Segmental dysfunction of pelvic region  Lumbago  Segmental dysfunction of lumbar region  Spasm of muscle  Segmental dysfunction of thoracic region  Cervicalgia  Cervical segment dysfunction.     Since last visit on 3/8/2018,  Amina Pineda reports the following changes: Pain immediately after last treatment: 1/10 and their pain level today 6/10.  Amina is feeling very stiff and sore in her low back.  She feels that this has been going on for a couple of months.  There has been no new trauma just a flare up of her past back issue.  She is having pain in her lower lumbar spine and for the past week has been having some radiating pain down her left buttock region on occasion.  She has been very busy in work and is under some stress.      Area of chief complaint:  Lumbar :  Symptoms are graded at 6/10. The quality is described as stiff, achey.  Motion has increased, but is still not normal. Patient feels that they are improved due to a reduction in symptoms.        Objective:  The following was observed:    P: palpatory tenderness, piriformis bilaterallu    A: static palpation demonstrates intersegmental asymmetry , pelvis, lumbar thoracic    R: motion palpation notes restricted motion, :    T10 Extension restriction  T11 Extension restriction  L3 Right rotation restricted  L5 Right rotation restricted  PSIS Right Extension restriction    Lumbar ROM  flexion limited by pain    Extension limited by pain    T: hypertonicity at: Lumbar erector spine, Piriformis and TFL R>>L      Assessment:    Segmental spinal dysfunction/restrictions found at:  T10  T11  T12  L4  L5  PSIS Right    Diagnoses:      1. Segmental dysfunction of pelvic region    2. Lumbago    3. Segmental dysfunction of lumbar region    4. Spasm of muscle    5. Segmental dysfunction of thoracic region                Patient's condition:  Patient had  restrictions pre-manipulation    Treatment effectiveness:  Post manipulation there is better intersegmental movement and Patient claims to feel looser post manipulation      Procedures:  Evaluation and Management:  89419 Low to Moderate exam established patient 10 min    CMT:  13584 Chiropractic manipulative treatment 3-4 regions performed   Thoracic  Activator, T10, T11, T12, , Prone  Lumbar: Activator, L4, L5,Prone  Pelvis: Drop Table, PSIS Right , Prone    Modalities:  83900: MSTM:  To Piriformis  for 5 min    Therapeutic procedures:  None      Prognosis: Good    Progress towards Goals: Patient is making progress towards the goal     Response to Treatment:   Reduction in symptoms as reported by patient      Recommendations:    Instructions:ice 20 minutes every other hour as needed    Follow-up:  Return to care in one week    Follow XR for foot

## 2019-05-02 ENCOUNTER — THERAPY VISIT (OUTPATIENT)
Dept: CHIROPRACTIC MEDICINE | Facility: CLINIC | Age: 59
End: 2019-05-02
Payer: COMMERCIAL

## 2019-05-02 DIAGNOSIS — M54.50 LUMBAGO: ICD-10-CM

## 2019-05-02 DIAGNOSIS — M62.838 SPASM OF MUSCLE: ICD-10-CM

## 2019-05-02 DIAGNOSIS — M99.03 SEGMENTAL DYSFUNCTION OF LUMBAR REGION: ICD-10-CM

## 2019-05-02 DIAGNOSIS — M99.02 THORACIC SEGMENT DYSFUNCTION: ICD-10-CM

## 2019-05-02 DIAGNOSIS — M99.05 SEGMENTAL DYSFUNCTION OF PELVIC REGION: Primary | ICD-10-CM

## 2019-05-02 PROCEDURE — 98941 CHIROPRACT MANJ 3-4 REGIONS: CPT | Mod: AT | Performed by: CHIROPRACTOR

## 2019-05-02 NOTE — PROGRESS NOTES
Visit #:  2 in 2019  Subjective:  Amina Pineda is a 56 year old female who has been seen for:        Segmental dysfunction of pelvic region  Lumbago  Segmental dysfunction of lumbar region  Spasm of muscle  Segmental dysfunction of thoracic region  Cervicalgia  Cervical segment dysfunction.     Since last visit on 4/29/2018,  Amina Pineda reports the following changes: Pain immediately after last treatment: 6/10 and their pain level today 2-3/10.  Amina reports that she is feeling better.  She is still having some pain and stiffness in her low back, but overall she is feeling improved and reports that she is walking better.      Area of chief complaint:  Lumbar :  Symptoms are graded at 2-3/10. The quality is described as stiff, achey.  Motion has increased, but is still not normal. Patient feels that they are improved due to a reduction in symptoms.        Objective:  The following was observed:    P: palpatory tenderness, piriformis bilaterallu    A: static palpation demonstrates intersegmental asymmetry , pelvis, lumbar thoracic    R: motion palpation notes restricted motion, : T10, T11, T12, L4, L5, R PSIS    T: hypertonicity at: Lumbar erector spine, Piriformis  R>>L      Assessment:    Segmental spinal dysfunction/restrictions found at:  T10, T11, T12, L4, L5, R PSIS    Diagnoses:      1. Segmental dysfunction of pelvic region    2. Lumbago    3. Segmental dysfunction of lumbar region    4. Spasm of muscle    5. Segmental dysfunction of thoracic region                Patient's condition:  Patient had restrictions pre-manipulation    Treatment effectiveness:  Post manipulation there is better intersegmental movement and Patient claims to feel looser post manipulation      Procedures:  CMT:  24633 Chiropractic manipulative treatment 3-4 regions performed   Thoracic  Activator, T10, T11, T12, , Prone  Lumbar: Activator, L4, L5,Prone  Pelvis: Drop Table, PSIS Right , Prone    Modalities:  70937: MSTM:   To Piriformis  for 5 min    Therapeutic procedures:  None      Prognosis: Good    Progress towards Goals: Patient is making progress towards the goal     Response to Treatment:   Reduction in symptoms as reported by patient      Recommendations:    Instructions:ice 20 minutes every other hour as needed    Follow-up:  Return to care in one week

## 2019-05-08 ENCOUNTER — THERAPY VISIT (OUTPATIENT)
Dept: CHIROPRACTIC MEDICINE | Facility: CLINIC | Age: 59
End: 2019-05-08
Payer: COMMERCIAL

## 2019-05-08 DIAGNOSIS — M99.03 SEGMENTAL DYSFUNCTION OF LUMBAR REGION: ICD-10-CM

## 2019-05-08 DIAGNOSIS — M99.02 THORACIC SEGMENT DYSFUNCTION: ICD-10-CM

## 2019-05-08 DIAGNOSIS — M54.50 LUMBAGO: ICD-10-CM

## 2019-05-08 DIAGNOSIS — M99.05 SEGMENTAL DYSFUNCTION OF PELVIC REGION: Primary | ICD-10-CM

## 2019-05-08 DIAGNOSIS — M62.838 SPASM OF MUSCLE: ICD-10-CM

## 2019-05-08 PROCEDURE — 98941 CHIROPRACT MANJ 3-4 REGIONS: CPT | Mod: AT | Performed by: CHIROPRACTOR

## 2019-05-08 NOTE — PROGRESS NOTES
Visit #:  3 in 2019  Subjective:  Amina Pineda is a 56 year old female who has been seen for:        Segmental dysfunction of pelvic region  Lumbago  Segmental dysfunction of lumbar region  Spasm of muscle  Segmental dysfunction of thoracic region  Cervicalgia  Cervical segment dysfunction.     Since last visit on 5/2/2019,  Amina Pineda reports the following changes: Pain immediately after last treatment: 2-3/10 and their pain level today 3/10.  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 The quality is described as stiff, achey.  Motion has increased, but is still not normal. Patient feels that they are improved due to a reduction in symptoms.        Objective:  The following was observed:    P: palpatory tenderness, piriformis bilaterallu    A: static palpation demonstrates intersegmental asymmetry , pelvis, lumbar thoracic    R: motion palpation notes restricted motion, : T10, T11, T12, L4, L5, R PSIS    T: hypertonicity at: Lumbar erector spine, Piriformis  R>>L      Assessment:    Segmental spinal dysfunction/restrictions found at:  T10, T11, T12, L4, L5, R PSIS    Diagnoses:      1. Segmental dysfunction of pelvic region    2. Lumbago    3. Segmental dysfunction of lumbar region    4. Spasm of muscle    5. Segmental dysfunction of thoracic region                Patient's condition:  Patient had restrictions pre-manipulation    Treatment effectiveness:  Post manipulation there is better intersegmental movement and Patient claims to feel looser post manipulation      Procedures:  CMT:  97754 Chiropractic manipulative treatment 3-4 regions performed   Thoracic  Activator, T10, T11, T12, , Prone  Lumbar: Activator, L4, L5,Prone  Pelvis: Drop Table, PSIS Right , Prone    Modalities:  48836: MSTM:  To Piriformis  for 5 min    Therapeutic  procedures:  None      Prognosis: Good    Progress towards Goals: Patient is making progress towards the goal     Response to Treatment:   Reduction in symptoms as reported by patient      Recommendations:    Instructions:ice 20 minutes every other hour as needed    Follow-up:  Return to care in one week

## 2019-05-15 ENCOUNTER — THERAPY VISIT (OUTPATIENT)
Dept: CHIROPRACTIC MEDICINE | Facility: CLINIC | Age: 59
End: 2019-05-15
Payer: COMMERCIAL

## 2019-05-15 DIAGNOSIS — M62.838 SPASM OF MUSCLE: ICD-10-CM

## 2019-05-15 DIAGNOSIS — M99.03 SEGMENTAL DYSFUNCTION OF LUMBAR REGION: ICD-10-CM

## 2019-05-15 DIAGNOSIS — M54.50 LUMBAGO: ICD-10-CM

## 2019-05-15 DIAGNOSIS — M99.05 SEGMENTAL DYSFUNCTION OF PELVIC REGION: Primary | ICD-10-CM

## 2019-05-15 DIAGNOSIS — M99.02 THORACIC SEGMENT DYSFUNCTION: ICD-10-CM

## 2019-05-15 PROCEDURE — 98941 CHIROPRACT MANJ 3-4 REGIONS: CPT | Mod: AT | Performed by: CHIROPRACTOR

## 2019-05-15 NOTE — PROGRESS NOTES
Visit #:  4 in 2019  Subjective:  Amina Pineda is a 56 year old female who has been seen for:        Segmental dysfunction of pelvic region  Lumbago  Segmental dysfunction of lumbar region  Spasm of muscle  Segmental dysfunction of thoracic region  Cervicalgia  Cervical segment dysfunction.     Since last visit on 5/8/2019,  Amina Pineda reports the following changes: Pain immediately after last treatment: 2-3/10 and their pain level today 1/10.  Amina reports that she was feeling better.  She was able to go camping this past weekend and is still feeling pretty good.overall.  She will be leaving for a trip tomorrow.    Area of chief complaint:  Lumbar :  Symptoms are graded at  1/10. The quality is described as stiff, achey.  Motion has increased, but is still not normal. Patient feels that they are improved due to a reduction in symptoms.        Objective:  The following was observed:    P: palpatory tenderness, piriformis bilaterallu    A: static palpation demonstrates intersegmental asymmetry , pelvis, lumbar thoracic    R: motion palpation notes restricted motion, : T10, T11, T12, L4, L5, R PSIS    T: hypertonicity at: Lumbar erector spine, Piriformis  R>>L      Assessment:    Segmental spinal dysfunction/restrictions found at:  T10, T11, T12, L4, L5, R PSIS    Diagnoses:      1. Segmental dysfunction of pelvic region    2. Lumbago    3. Segmental dysfunction of lumbar region    4. Spasm of muscle    5. Segmental dysfunction of thoracic region                Patient's condition:  Patient had restrictions pre-manipulation    Treatment effectiveness:  Post manipulation there is better intersegmental movement and Patient claims to feel looser post manipulation      Procedures:  CMT:  31560 Chiropractic manipulative treatment 3-4 regions performed   Thoracic  Activator, T10, T11, T12, , Prone  Lumbar: Activator, L4, L5,Prone  Pelvis: Drop Table, PSIS Right , Prone    Modalities:  46032: MSTM:  To  Piriformis  for 5 min    Therapeutic procedures:  None      Prognosis: Good    Progress towards Goals: Patient is making progress towards the goal     Response to Treatment:   Reduction in symptoms as reported by patient      Recommendations:    Instructions:ice 20 minutes every other hour as needed    Follow-up:  Return to care in one week

## 2019-08-13 ENCOUNTER — THERAPY VISIT (OUTPATIENT)
Dept: CHIROPRACTIC MEDICINE | Facility: CLINIC | Age: 59
End: 2019-08-13
Payer: COMMERCIAL

## 2019-08-13 DIAGNOSIS — M99.05 SEGMENTAL DYSFUNCTION OF PELVIC REGION: Primary | ICD-10-CM

## 2019-08-13 DIAGNOSIS — M62.838 SPASM OF MUSCLE: ICD-10-CM

## 2019-08-13 DIAGNOSIS — M99.02 THORACIC SEGMENT DYSFUNCTION: ICD-10-CM

## 2019-08-13 DIAGNOSIS — M54.2 CERVICALGIA: ICD-10-CM

## 2019-08-13 DIAGNOSIS — M99.03 SEGMENTAL DYSFUNCTION OF LUMBAR REGION: ICD-10-CM

## 2019-08-13 DIAGNOSIS — M54.50 LUMBAGO: ICD-10-CM

## 2019-08-13 DIAGNOSIS — M99.01 CERVICAL SEGMENT DYSFUNCTION: ICD-10-CM

## 2019-08-13 PROCEDURE — 98941 CHIROPRACT MANJ 3-4 REGIONS: CPT | Mod: AT | Performed by: CHIROPRACTOR

## 2019-08-13 NOTE — PROGRESS NOTES
Visit #:  5 in 2019    Subjective:  Amina Pineda is a 56 year old female who has been seen for:        Segmental dysfunction of pelvic region  Lumbago  Segmental dysfunction of lumbar region  Spasm of muscle  Segmental dysfunction of thoracic region  Cervicalgia  Cervical segment dysfunction.     Since last visit on 5/15/2019,  Amina Pineda reports the following changes: Pain immediately after last treatment: 1/10 and their pain level today 4-5/10.  Amina reports that she was feeling better, then went on the bumper cars with her grand kids last weekend.  Since that time she has been having neck and low back stiffness and pain.    Area of chief complaint:  Lumbar :  Symptoms are graded at  4-5/10. The quality is described as stiff, achey.  Motion has increased, but is still not normal. Patient feels that they are improved due to a reduction in symptoms.        Objective:  The following was observed:    P: palpatory tenderness, piriformis bilaterally    A: static palpation demonstrates intersegmental asymmetry , pelvis, lumbar thoracic    R: motion palpation notes restricted motion, : Occ, C1, C6, C7, T1, T2,  T10, T11, T12, L4, L5, RPSIS    T: hypertonicity at: Lumbar erector spine, Piriformis  R>>L      Assessment:    Segmental spinal dysfunction/restrictions found at:  Occ, C1, C6, C7, T1, T2, T10, T11, T12, L4, L5, R PSIS    Diagnoses:      1. Segmental dysfunction of pelvic region    2. Lumbago    3. Segmental dysfunction of lumbar region    4. Spasm of muscle    5. Segmental dysfunction of thoracic region    6. Cervicalgia   7. Segmental dysfunction of cervical       Patient's condition:  Patient had restrictions pre-manipulation    Treatment effectiveness:  Post manipulation there is better intersegmental movement and Patient claims to feel looser post manipulation      Procedures:  CMT:  98534 Chiropractic manipulative treatment 3-4 regions performed   Cervical: Activator, Occ, C1, C6, C7  prone  Thoracic  Activator, T1, T2, T10, T11, T12, , Prone  Lumbar: Activator, L4, L5,Prone  Pelvis: Drop Table, PSIS Right , Prone    Modalities:  30801: MSTM:  To Piriformis  for 5 min    Therapeutic procedures:  None      Prognosis: Good    Progress towards Goals: Patient is making progress towards the goal     Response to Treatment:   Reduction in symptoms as reported by patient      Recommendations:    Instructions:ice 20 minutes every other hour as needed    Follow-up:  Return to care in one week

## 2019-09-09 ENCOUNTER — THERAPY VISIT (OUTPATIENT)
Dept: CHIROPRACTIC MEDICINE | Facility: CLINIC | Age: 59
End: 2019-09-09
Payer: COMMERCIAL

## 2019-09-09 DIAGNOSIS — G89.29 CHRONIC LOW BACK PAIN: ICD-10-CM

## 2019-09-09 DIAGNOSIS — G89.29 CHRONIC NECK PAIN: ICD-10-CM

## 2019-09-09 DIAGNOSIS — M99.01 CERVICAL SEGMENT DYSFUNCTION: ICD-10-CM

## 2019-09-09 DIAGNOSIS — M54.50 CHRONIC LOW BACK PAIN: ICD-10-CM

## 2019-09-09 DIAGNOSIS — M62.838 SPASM OF MUSCLE: ICD-10-CM

## 2019-09-09 DIAGNOSIS — M99.02 THORACIC SEGMENT DYSFUNCTION: ICD-10-CM

## 2019-09-09 DIAGNOSIS — M99.03 SEGMENTAL DYSFUNCTION OF LUMBAR REGION: ICD-10-CM

## 2019-09-09 DIAGNOSIS — M99.05 SEGMENTAL DYSFUNCTION OF PELVIC REGION: Primary | ICD-10-CM

## 2019-09-09 DIAGNOSIS — M54.2 CHRONIC NECK PAIN: ICD-10-CM

## 2019-09-09 PROCEDURE — 98941 CHIROPRACT MANJ 3-4 REGIONS: CPT | Mod: AT | Performed by: CHIROPRACTOR

## 2019-09-09 NOTE — PROGRESS NOTES
Visit #:  6 in 2019    Subjective:  Amina Pineda is a 56 year old female who has been seen for:        Segmental dysfunction of pelvic region  Lumbago  Segmental dysfunction of lumbar region  Spasm of muscle  Segmental dysfunction of thoracic region  Cervicalgia  Cervical segment dysfunction.     Since last visit on 8/13/2019,  Amina Pineda reports the following changes: Pain immediately after last treatment: 1/10 and their pain level today 4-5/10.  Amina reports that she was feeling better, then he low back and neck started stiffening up a week or so ago.  She is unsure why this started but she has been having neck and low back stiffness and pain.    Area of chief complaint:  Lumbar :  Symptoms are graded at  4-5/10. The quality is described as stiff, achey.  Motion has increased, but is still not normal. Patient feels that they are improved due to a reduction in symptoms.        Objective:  The following was observed:    P: palpatory tenderness, piriformis bilaterally    A: static palpation demonstrates intersegmental asymmetry , pelvis, lumbar thoracic    R: motion palpation notes restricted motion, : Occ, C1, C6, C7, T1, T2,  T10, T11, T12, L4, L5, RPSIS    T: hypertonicity at: Lumbar erector spine, Piriformis  R>>L      Assessment:    Segmental spinal dysfunction/restrictions found at:  Occ, C1, C6, C7, T1, T2, T10, T11, T12, L4, L5, R PSIS    Diagnoses:      1. Segmental dysfunction of pelvic region    2. Lumbago    3. Segmental dysfunction of lumbar region    4. Spasm of muscle    5. Segmental dysfunction of thoracic region    6. Cervicalgia   7. Segmental dysfunction of cervical       Patient's condition:  Patient had restrictions pre-manipulation    Treatment effectiveness:  Post manipulation there is better intersegmental movement and Patient claims to feel looser post manipulation      Procedures:  CMT:  96922 Chiropractic manipulative treatment 3-4 regions performed   Cervical: Activator,  Occ, C1, C6, C7 prone  Thoracic  Activator, T1, T2, T10, T11, T12, , Prone  Lumbar: Activator, L4, L5,Prone  Pelvis: Drop Table, PSIS Right , Prone    Modalities:  26583: MSTM:  To Piriformis  for 5 min    Therapeutic procedures:  None      Prognosis: Good    Progress towards Goals: Patient is making progress towards the goal     Response to Treatment:   Reduction in symptoms as reported by patient      Recommendations:    Instructions:ice 20 minutes every other hour as needed    Follow-up:  Return to care in one week

## 2019-09-24 ENCOUNTER — THERAPY VISIT (OUTPATIENT)
Dept: CHIROPRACTIC MEDICINE | Facility: CLINIC | Age: 59
End: 2019-09-24
Payer: COMMERCIAL

## 2019-09-24 DIAGNOSIS — M99.05 SEGMENTAL DYSFUNCTION OF PELVIC REGION: Primary | ICD-10-CM

## 2019-09-24 DIAGNOSIS — M54.2 CERVICALGIA: ICD-10-CM

## 2019-09-24 DIAGNOSIS — M54.50 LUMBAGO: ICD-10-CM

## 2019-09-24 DIAGNOSIS — M99.03 SEGMENTAL DYSFUNCTION OF LUMBAR REGION: ICD-10-CM

## 2019-09-24 DIAGNOSIS — M99.02 THORACIC SEGMENT DYSFUNCTION: ICD-10-CM

## 2019-09-24 DIAGNOSIS — M62.838 SPASM OF MUSCLE: ICD-10-CM

## 2019-09-24 DIAGNOSIS — M99.01 CERVICAL SEGMENT DYSFUNCTION: ICD-10-CM

## 2019-09-24 PROCEDURE — 98941 CHIROPRACT MANJ 3-4 REGIONS: CPT | Mod: AT | Performed by: CHIROPRACTOR

## 2019-10-01 ENCOUNTER — HEALTH MAINTENANCE LETTER (OUTPATIENT)
Age: 59
End: 2019-10-01

## 2019-11-06 ENCOUNTER — THERAPY VISIT (OUTPATIENT)
Dept: CHIROPRACTIC MEDICINE | Facility: CLINIC | Age: 59
End: 2019-11-06
Payer: COMMERCIAL

## 2019-11-06 DIAGNOSIS — M62.838 SPASM OF MUSCLE: ICD-10-CM

## 2019-11-06 DIAGNOSIS — M99.02 THORACIC SEGMENT DYSFUNCTION: ICD-10-CM

## 2019-11-06 DIAGNOSIS — M54.50 LUMBAGO: ICD-10-CM

## 2019-11-06 DIAGNOSIS — M99.03 SEGMENTAL DYSFUNCTION OF LUMBAR REGION: ICD-10-CM

## 2019-11-06 DIAGNOSIS — M99.05 SEGMENTAL DYSFUNCTION OF PELVIC REGION: Primary | ICD-10-CM

## 2019-11-06 PROCEDURE — 98941 CHIROPRACT MANJ 3-4 REGIONS: CPT | Mod: AT | Performed by: CHIROPRACTOR

## 2019-11-06 NOTE — PROGRESS NOTES
Visit #:  8 in 2019    Subjective:  Amina Pineda is a 56 year old female who has been seen for:        Segmental dysfunction of pelvic region  Lumbago  Segmental dysfunction of lumbar region  Spasm of muscle  Segmental dysfunction of thoracic region  Cervicalgia  Cervical segment dysfunction.     Since last visit on 9/24/2019,  Amina Pineda reports the following changes: Pain immediately after last treatment: 1/10 and their pain level today 4-5/10.  Amina reports that she was feeling better, then her low back and neck started stiffening up a week or so ago.  She has started to pack up her house getting ready to move.  This has caused some neck and back pain and stiffness.    Area of chief complaint:  Lumbar :  Symptoms are graded at  4-5/10. The quality is described as stiff, achey.  Motion has increased, but is still not normal. Patient feels that they are improved due to a reduction in symptoms.        Objective:  The following was observed:    P: palpatory tenderness, piriformis bilaterally    A: static palpation demonstrates intersegmental asymmetry , pelvis, lumbar thoracic    R: motion palpation notes restricted motion, : C6, C7, T1, T2,  T10, T11, T12, L4, L5, RPSIS    T: hypertonicity at: Lumbar erector spine, Piriformis  R>>L      Assessment:    Segmental spinal dysfunction/restrictions found at:  C6, C7, T1, T2, T10, T11, T12, L4, L5, R PSIS    Diagnoses:      1. Segmental dysfunction of pelvic region    2. Lumbago    3. Segmental dysfunction of lumbar region    4. Spasm of muscle    5. Segmental dysfunction of thoracic region    6. Cervicalgia   7. Segmental dysfunction of cervical       Patient's condition:  Patient had restrictions pre-manipulation    Treatment effectiveness:  Post manipulation there is better intersegmental movement and Patient claims to feel looser post manipulation      Procedures:  CMT:  54586 Chiropractic manipulative treatment 3-4 regions performed   Cervical:  Activator,  C6, C7 prone  Thoracic  Activator, T1, T2, T10, T11, T12, , Prone  Lumbar: Activator, L4, L5,Prone  Pelvis: Drop Table, PSIS Right , Prone    Modalities:  71464: MSTM:  To Piriformis  for 5 min    Therapeutic procedures:  None      Prognosis: Good    Progress towards Goals: Patient is making progress towards the goal     Response to Treatment:   Reduction in symptoms as reported by patient      Recommendations:    Instructions:ice 20 minutes every other hour as needed    Follow-up:  Return to care in one week

## 2019-12-23 DIAGNOSIS — B37.2 YEAST INFECTION OF THE SKIN: ICD-10-CM

## 2019-12-24 RX ORDER — NYSTATIN 100000 [USP'U]/G
30 POWDER TOPICAL 3 TIMES DAILY PRN
Qty: 30 G | Refills: 1 | Status: SHIPPED | OUTPATIENT
Start: 2019-12-24 | End: 2021-05-13

## 2020-02-03 ENCOUNTER — THERAPY VISIT (OUTPATIENT)
Dept: CHIROPRACTIC MEDICINE | Facility: CLINIC | Age: 60
End: 2020-02-03
Payer: COMMERCIAL

## 2020-02-03 DIAGNOSIS — M99.03 SEGMENTAL DYSFUNCTION OF LUMBAR REGION: ICD-10-CM

## 2020-02-03 DIAGNOSIS — M99.01 CERVICAL SEGMENT DYSFUNCTION: ICD-10-CM

## 2020-02-03 DIAGNOSIS — M54.50 LUMBAGO: ICD-10-CM

## 2020-02-03 DIAGNOSIS — M99.05 SEGMENTAL DYSFUNCTION OF PELVIC REGION: Primary | ICD-10-CM

## 2020-02-03 DIAGNOSIS — M54.2 CERVICALGIA: ICD-10-CM

## 2020-02-03 DIAGNOSIS — M62.838 SPASM OF MUSCLE: ICD-10-CM

## 2020-02-03 DIAGNOSIS — M99.02 THORACIC SEGMENT DYSFUNCTION: ICD-10-CM

## 2020-02-03 PROCEDURE — 98941 CHIROPRACT MANJ 3-4 REGIONS: CPT | Mod: AT | Performed by: CHIROPRACTOR

## 2020-02-03 NOTE — PROGRESS NOTES
Visit #: 1 in 2020    Subjective:  Amina Pineda is a 56 year old female who has been seen for:        Segmental dysfunction of pelvic region  Lumbago  Segmental dysfunction of lumbar region  Spasm of muscle  Segmental dysfunction of thoracic region  Cervicalgia  Cervical segment dysfunction.     Since last visit on 11/06/2019,  Amina Pineda reports the following changes: Pain immediately after last treatment: 1/10 and their pain level today 7-8/10.  Amina reports that she was feeling better, then her low back and neck started stiffening up a week or so ago.  She has been packing up her house as she prepares to move in a few days.  This has caused some neck and back pain and stiffness.    Area of chief complaint:  Lumbar :  Symptoms are graded at  7-8/10. The quality is described as stiff, achey.  Motion has increased, but is still not normal. Patient feels that they are improved due to a reduction in symptoms.        Objective:  The following was observed:    P: palpatory tenderness, piriformis bilaterally    A: static palpation demonstrates intersegmental asymmetry , pelvis, lumbar thoracic    R: motion palpation notes restricted motion, : C6, C7, T1, T2,  T10, T11, T12, L4, L5, RPSIS    T: hypertonicity at: Lumbar erector spine, Piriformis  R>>L      Assessment:    Segmental spinal dysfunction/restrictions found at:  C6, C7, T1, T2, T10, T11, T12, L4, L5, R PSIS    Diagnoses:      1. Segmental dysfunction of pelvic region    2. Lumbago    3. Segmental dysfunction of lumbar region    4. Spasm of muscle    5. Segmental dysfunction of thoracic region    6. Cervicalgia   7. Segmental dysfunction of cervical       Patient's condition:  Patient had restrictions pre-manipulation    Treatment effectiveness:  Post manipulation there is better intersegmental movement and Patient claims to feel looser post manipulation      Procedures:  CMT:  99659 Chiropractic manipulative treatment 3-4 regions performed    Cervical: Activator,  C6, C7 prone  Thoracic  Activator, T1, T2, T10, T11, T12, , Prone  Lumbar: Activator, L4, L5,Prone  Pelvis: Drop Table, PSIS Right , Prone    Modalities:  33997: MSTM:  To Piriformis  for 5 min    Therapeutic procedures:  None      Prognosis: Good    Progress towards Goals: Patient is making progress towards the goal     Response to Treatment:   Reduction in symptoms as reported by patient      Recommendations:    Instructions:ice 20 minutes every other hour as needed    Follow-up:  Return to care in one week

## 2020-03-17 DIAGNOSIS — R68.82 DECREASED LIBIDO: ICD-10-CM

## 2020-03-17 DIAGNOSIS — N95.1 MENOPAUSAL SYNDROME (HOT FLUSHES): ICD-10-CM

## 2020-03-17 DIAGNOSIS — E03.9 ACQUIRED HYPOTHYROIDISM: ICD-10-CM

## 2020-03-17 DIAGNOSIS — Z79.899 HIGH RISK MEDICATION USE: ICD-10-CM

## 2020-03-17 DIAGNOSIS — K21.9 GASTROESOPHAGEAL REFLUX DISEASE, ESOPHAGITIS PRESENCE NOT SPECIFIED: ICD-10-CM

## 2020-03-17 DIAGNOSIS — Z12.31 ENCOUNTER FOR SCREENING MAMMOGRAM FOR BREAST CANCER: ICD-10-CM

## 2020-03-17 NOTE — TELEPHONE ENCOUNTER
Disp  Refills  Start  End  MOO    levothyroxine (SYNTHROID/LEVOTHROID) 125 MCG tablet  90 tablet  3  3/11/2019   No    Sig - Route: Take 1 tablet (125 mcg) by mouth daily - Oral    Sent to pharmacy as: levothyroxine (SYNTHROID/LEVOTHROID) 125 MCG tablet    Class: E-Prescribe    Order: [id number]    E-Prescribing Status: Receipt confirmed by pharmacy (3/11/2019  2:14 PM CDT)      Kamla Ruano MA on 3/17/2020 at 12:18 PM

## 2020-03-18 RX ORDER — LEVOTHYROXINE SODIUM 125 UG/1
TABLET ORAL
Qty: 30 TABLET | Refills: 0 | Status: SHIPPED | OUTPATIENT
Start: 2020-03-18 | End: 2020-04-20

## 2020-03-18 NOTE — TELEPHONE ENCOUNTER
"Pending Prescriptions:                       Disp   Refills    levothyroxine (SYNTHROID/LEVOTHROID) 125 *90 tab*3            Sig: TAKE ONE TABLET BY MOUTH ONCE DAILY    RN refilled medication per [facility] Refill Protocol.     Cathy Coombs RN      Requested Prescriptions   Pending Prescriptions Disp Refills     levothyroxine (SYNTHROID/LEVOTHROID) 125 MCG tablet [Pharmacy Med Name: LEVOTHYROXINE SODIUM 125MCG TABS] 90 tablet 3     Sig: TAKE ONE TABLET BY MOUTH ONCE DAILY       Thyroid Protocol Passed - 3/17/2020 12:18 PM        Passed - Patient is 12 years or older        Passed - Recent (12 mo) or future (30 days) visit within the authorizing provider's specialty     Patient has had an office visit with the authorizing provider or a provider within the authorizing providers department within the previous 12 mos or has a future within next 30 days. See \"Patient Info\" tab in inbasket, or \"Choose Columns\" in Meds & Orders section of the refill encounter.              Passed - Medication is active on med list        Passed - Normal TSH on file in past 12 months     Recent Labs   Lab Test 04/05/19  0821   TSH 1.42              Passed - No active pregnancy on record     If patient is pregnant or has had a positive pregnancy test, please check TSH.          Passed - No positive pregnancy test in past 12 months     If patient is pregnant or has had a positive pregnancy test, please check TSH.               "

## 2020-04-17 DIAGNOSIS — Z79.899 HIGH RISK MEDICATION USE: ICD-10-CM

## 2020-04-17 DIAGNOSIS — Z12.31 ENCOUNTER FOR SCREENING MAMMOGRAM FOR BREAST CANCER: ICD-10-CM

## 2020-04-17 DIAGNOSIS — K21.9 GASTROESOPHAGEAL REFLUX DISEASE, ESOPHAGITIS PRESENCE NOT SPECIFIED: ICD-10-CM

## 2020-04-17 DIAGNOSIS — E03.9 ACQUIRED HYPOTHYROIDISM: ICD-10-CM

## 2020-04-17 DIAGNOSIS — N95.1 MENOPAUSAL SYNDROME (HOT FLUSHES): ICD-10-CM

## 2020-04-17 DIAGNOSIS — R68.82 DECREASED LIBIDO: ICD-10-CM

## 2020-04-17 NOTE — TELEPHONE ENCOUNTER
"Routing refill request to provider for review/approval because:  Labs not current:  TSH    Requested Prescriptions   Pending Prescriptions Disp Refills     levothyroxine (SYNTHROID/LEVOTHROID) 125 MCG tablet [Pharmacy Med Name: LEVOTHYROXINE SODIUM 125MCG TABS] 30 tablet 0     Sig: TAKE ONE TABLET BY MOUTH ONCE DAILY       Thyroid Protocol Failed - 4/17/2020  3:03 PM        Failed - Recent (12 mo) or future (30 days) visit within the authorizing provider's specialty     Patient has had an office visit with the authorizing provider or a provider within the authorizing providers department within the previous 12 mos or has a future within next 30 days. See \"Patient Info\" tab in inbasket, or \"Choose Columns\" in Meds & Orders section of the refill encounter.              Failed - Normal TSH on file in past 12 months     Recent Labs   Lab Test 04/05/19  0821   TSH 1.42              Passed - Patient is 12 years or older        Passed - Medication is active on med list        Passed - No active pregnancy on record     If patient is pregnant or has had a positive pregnancy test, please check TSH.          Passed - No positive pregnancy test in past 12 months     If patient is pregnant or has had a positive pregnancy test, please check TSH.             Sofia White RN  "

## 2020-04-20 RX ORDER — LEVOTHYROXINE SODIUM 125 UG/1
TABLET ORAL
Qty: 30 TABLET | Refills: 0 | Status: SHIPPED | OUTPATIENT
Start: 2020-04-20 | End: 2020-05-01

## 2020-04-23 ENCOUNTER — APPOINTMENT (OUTPATIENT)
Dept: ULTRASOUND IMAGING | Facility: CLINIC | Age: 60
End: 2020-04-23
Attending: PHYSICIAN ASSISTANT
Payer: COMMERCIAL

## 2020-04-23 ENCOUNTER — HOSPITAL ENCOUNTER (OUTPATIENT)
Facility: CLINIC | Age: 60
Setting detail: OBSERVATION
Discharge: HOME | End: 2020-04-24
Attending: PHYSICIAN ASSISTANT | Admitting: INTERNAL MEDICINE
Payer: COMMERCIAL

## 2020-04-23 ENCOUNTER — APPOINTMENT (OUTPATIENT)
Dept: GENERAL RADIOLOGY | Facility: CLINIC | Age: 60
End: 2020-04-23
Attending: PHYSICIAN ASSISTANT
Payer: COMMERCIAL

## 2020-04-23 DIAGNOSIS — K80.10 CHOLECYSTITIS WITH CHOLELITHIASIS: ICD-10-CM

## 2020-04-23 DIAGNOSIS — K80.00 ACUTE CALCULOUS CHOLECYSTITIS: Primary | ICD-10-CM

## 2020-04-23 LAB
ALBUMIN SERPL-MCNC: 3.4 G/DL (ref 3.4–5)
ALBUMIN UR-MCNC: 10 MG/DL
ALP SERPL-CCNC: 134 U/L (ref 40–150)
ALT SERPL W P-5'-P-CCNC: 27 U/L (ref 0–50)
ANION GAP SERPL CALCULATED.3IONS-SCNC: 4 MMOL/L (ref 3–14)
APPEARANCE UR: CLEAR
AST SERPL W P-5'-P-CCNC: 19 U/L (ref 0–45)
BACTERIA #/AREA URNS HPF: ABNORMAL /HPF
BASOPHILS # BLD AUTO: 0.1 10E9/L (ref 0–0.2)
BASOPHILS NFR BLD AUTO: 0.6 %
BILIRUB SERPL-MCNC: 0.5 MG/DL (ref 0.2–1.3)
BILIRUB UR QL STRIP: NEGATIVE
BUN SERPL-MCNC: 14 MG/DL (ref 7–30)
CALCIUM SERPL-MCNC: 9.1 MG/DL (ref 8.5–10.1)
CHLORIDE SERPL-SCNC: 106 MMOL/L (ref 94–109)
CO2 SERPL-SCNC: 28 MMOL/L (ref 20–32)
COLOR UR AUTO: YELLOW
CREAT SERPL-MCNC: 0.94 MG/DL (ref 0.52–1.04)
D DIMER PPP FEU-MCNC: <0.3 UG/ML FEU (ref 0–0.5)
DIFFERENTIAL METHOD BLD: NORMAL
EOSINOPHIL # BLD AUTO: 0.2 10E9/L (ref 0–0.7)
EOSINOPHIL NFR BLD AUTO: 2.5 %
ERYTHROCYTE [DISTWIDTH] IN BLOOD BY AUTOMATED COUNT: 12.9 % (ref 10–15)
GFR SERPL CREATININE-BSD FRML MDRD: 65 ML/MIN/{1.73_M2}
GLUCOSE SERPL-MCNC: 113 MG/DL (ref 70–99)
GLUCOSE UR STRIP-MCNC: NEGATIVE MG/DL
HCT VFR BLD AUTO: 44.7 % (ref 35–47)
HGB BLD-MCNC: 14.3 G/DL (ref 11.7–15.7)
HGB UR QL STRIP: NEGATIVE
IMM GRANULOCYTES # BLD: 0 10E9/L (ref 0–0.4)
IMM GRANULOCYTES NFR BLD: 0.5 %
INTERPRETATION ECG - MUSE: NORMAL
KETONES UR STRIP-MCNC: NEGATIVE MG/DL
LEUKOCYTE ESTERASE UR QL STRIP: NEGATIVE
LIPASE SERPL-CCNC: 117 U/L (ref 73–393)
LYMPHOCYTES # BLD AUTO: 1.3 10E9/L (ref 0.8–5.3)
LYMPHOCYTES NFR BLD AUTO: 16.4 %
MCH RBC QN AUTO: 29.5 PG (ref 26.5–33)
MCHC RBC AUTO-ENTMCNC: 32 G/DL (ref 31.5–36.5)
MCV RBC AUTO: 92 FL (ref 78–100)
MONOCYTES # BLD AUTO: 0.5 10E9/L (ref 0–1.3)
MONOCYTES NFR BLD AUTO: 6.4 %
MUCOUS THREADS #/AREA URNS LPF: PRESENT /LPF
NEUTROPHILS # BLD AUTO: 5.9 10E9/L (ref 1.6–8.3)
NEUTROPHILS NFR BLD AUTO: 73.6 %
NITRATE UR QL: NEGATIVE
NRBC # BLD AUTO: 0 10*3/UL
NRBC BLD AUTO-RTO: 0 /100
PH UR STRIP: 6 PH (ref 5–7)
PLATELET # BLD AUTO: 263 10E9/L (ref 150–450)
POTASSIUM SERPL-SCNC: 4 MMOL/L (ref 3.4–5.3)
PROT SERPL-MCNC: 7.5 G/DL (ref 6.8–8.8)
RBC # BLD AUTO: 4.84 10E12/L (ref 3.8–5.2)
RBC #/AREA URNS AUTO: <1 /HPF (ref 0–2)
SODIUM SERPL-SCNC: 138 MMOL/L (ref 133–144)
SOURCE: ABNORMAL
SP GR UR STRIP: 1.03 (ref 1–1.03)
SQUAMOUS #/AREA URNS AUTO: 4 /HPF (ref 0–1)
TROPONIN I SERPL-MCNC: <0.015 UG/L (ref 0–0.04)
TROPONIN I SERPL-MCNC: <0.015 UG/L (ref 0–0.04)
UROBILINOGEN UR STRIP-MCNC: 2 MG/DL (ref 0–2)
WBC # BLD AUTO: 8 10E9/L (ref 4–11)
WBC #/AREA URNS AUTO: <1 /HPF (ref 0–5)

## 2020-04-23 PROCEDURE — G0378 HOSPITAL OBSERVATION PER HR: HCPCS

## 2020-04-23 PROCEDURE — 71046 X-RAY EXAM CHEST 2 VIEWS: CPT

## 2020-04-23 PROCEDURE — 84484 ASSAY OF TROPONIN QUANT: CPT | Performed by: PHYSICIAN ASSISTANT

## 2020-04-23 PROCEDURE — 93005 ELECTROCARDIOGRAM TRACING: CPT

## 2020-04-23 PROCEDURE — 76705 ECHO EXAM OF ABDOMEN: CPT

## 2020-04-23 PROCEDURE — 83690 ASSAY OF LIPASE: CPT | Performed by: PHYSICIAN ASSISTANT

## 2020-04-23 PROCEDURE — 99204 OFFICE O/P NEW MOD 45 MIN: CPT | Mod: 57 | Performed by: SURGERY

## 2020-04-23 PROCEDURE — 96365 THER/PROPH/DIAG IV INF INIT: CPT

## 2020-04-23 PROCEDURE — 25000128 H RX IP 250 OP 636: Performed by: INTERNAL MEDICINE

## 2020-04-23 PROCEDURE — 25000128 H RX IP 250 OP 636: Performed by: PHYSICIAN ASSISTANT

## 2020-04-23 PROCEDURE — 99285 EMERGENCY DEPT VISIT HI MDM: CPT | Mod: 25

## 2020-04-23 PROCEDURE — 96376 TX/PRO/DX INJ SAME DRUG ADON: CPT

## 2020-04-23 PROCEDURE — 85379 FIBRIN DEGRADATION QUANT: CPT | Performed by: PHYSICIAN ASSISTANT

## 2020-04-23 PROCEDURE — 80053 COMPREHEN METABOLIC PANEL: CPT | Performed by: PHYSICIAN ASSISTANT

## 2020-04-23 PROCEDURE — 99220 ZZC INITIAL OBSERVATION CARE,LEVL III: CPT | Performed by: INTERNAL MEDICINE

## 2020-04-23 PROCEDURE — 25800030 ZZH RX IP 258 OP 636: Performed by: PHYSICIAN ASSISTANT

## 2020-04-23 PROCEDURE — 85025 COMPLETE CBC W/AUTO DIFF WBC: CPT | Performed by: PHYSICIAN ASSISTANT

## 2020-04-23 PROCEDURE — 84484 ASSAY OF TROPONIN QUANT: CPT | Mod: 91 | Performed by: INTERNAL MEDICINE

## 2020-04-23 PROCEDURE — 96361 HYDRATE IV INFUSION ADD-ON: CPT

## 2020-04-23 PROCEDURE — 81001 URINALYSIS AUTO W/SCOPE: CPT | Performed by: PHYSICIAN ASSISTANT

## 2020-04-23 PROCEDURE — 25800030 ZZH RX IP 258 OP 636: Performed by: INTERNAL MEDICINE

## 2020-04-23 RX ORDER — ALBUTEROL SULFATE 90 UG/1
2 AEROSOL, METERED RESPIRATORY (INHALATION) EVERY 6 HOURS PRN
Status: DISCONTINUED | OUTPATIENT
Start: 2020-04-23 | End: 2020-04-24 | Stop reason: HOSPADM

## 2020-04-23 RX ORDER — ACETAMINOPHEN 650 MG/1
650 SUPPOSITORY RECTAL EVERY 4 HOURS PRN
Status: DISCONTINUED | OUTPATIENT
Start: 2020-04-23 | End: 2020-04-24 | Stop reason: HOSPADM

## 2020-04-23 RX ORDER — ACETAMINOPHEN 325 MG/1
650 TABLET ORAL EVERY 4 HOURS PRN
Status: DISCONTINUED | OUTPATIENT
Start: 2020-04-23 | End: 2020-04-24 | Stop reason: HOSPADM

## 2020-04-23 RX ORDER — ASPIRIN 81 MG/1
81 TABLET ORAL AT BEDTIME
Status: ON HOLD | COMMUNITY
End: 2020-04-24

## 2020-04-23 RX ORDER — ONDANSETRON 4 MG/1
4 TABLET, ORALLY DISINTEGRATING ORAL EVERY 6 HOURS PRN
Status: DISCONTINUED | OUTPATIENT
Start: 2020-04-23 | End: 2020-04-24 | Stop reason: HOSPADM

## 2020-04-23 RX ORDER — BRAN 5 G
1 WAFER ORAL 2 TIMES DAILY
COMMUNITY

## 2020-04-23 RX ORDER — HYDROMORPHONE HYDROCHLORIDE 1 MG/ML
.2-.4 INJECTION, SOLUTION INTRAMUSCULAR; INTRAVENOUS; SUBCUTANEOUS EVERY 4 HOURS PRN
Status: DISCONTINUED | OUTPATIENT
Start: 2020-04-23 | End: 2020-04-24 | Stop reason: HOSPADM

## 2020-04-23 RX ORDER — ONDANSETRON 2 MG/ML
4 INJECTION INTRAMUSCULAR; INTRAVENOUS EVERY 30 MIN PRN
Status: DISCONTINUED | OUTPATIENT
Start: 2020-04-23 | End: 2020-04-23

## 2020-04-23 RX ORDER — HYDROMORPHONE HYDROCHLORIDE 1 MG/ML
0.5 INJECTION, SOLUTION INTRAMUSCULAR; INTRAVENOUS; SUBCUTANEOUS
Status: DISCONTINUED | OUTPATIENT
Start: 2020-04-23 | End: 2020-04-24 | Stop reason: HOSPADM

## 2020-04-23 RX ORDER — CYCLOBENZAPRINE HCL 5 MG
5-10 TABLET ORAL 3 TIMES DAILY PRN
Status: DISCONTINUED | OUTPATIENT
Start: 2020-04-23 | End: 2020-04-24 | Stop reason: HOSPADM

## 2020-04-23 RX ORDER — SODIUM CHLORIDE 9 MG/ML
1000 INJECTION, SOLUTION INTRAVENOUS CONTINUOUS
Status: DISCONTINUED | OUTPATIENT
Start: 2020-04-23 | End: 2020-04-23

## 2020-04-23 RX ORDER — ONDANSETRON 2 MG/ML
4 INJECTION INTRAMUSCULAR; INTRAVENOUS EVERY 6 HOURS PRN
Status: DISCONTINUED | OUTPATIENT
Start: 2020-04-23 | End: 2020-04-24 | Stop reason: HOSPADM

## 2020-04-23 RX ORDER — FERROUS SULFATE 325(65) MG
325 TABLET ORAL
COMMUNITY

## 2020-04-23 RX ORDER — SODIUM CHLORIDE 9 MG/ML
INJECTION, SOLUTION INTRAVENOUS CONTINUOUS
Status: DISCONTINUED | OUTPATIENT
Start: 2020-04-23 | End: 2020-04-24 | Stop reason: HOSPADM

## 2020-04-23 RX ORDER — NALOXONE HYDROCHLORIDE 0.4 MG/ML
.1-.4 INJECTION, SOLUTION INTRAMUSCULAR; INTRAVENOUS; SUBCUTANEOUS
Status: DISCONTINUED | OUTPATIENT
Start: 2020-04-23 | End: 2020-04-24 | Stop reason: HOSPADM

## 2020-04-23 RX ORDER — BUPROPION HYDROCHLORIDE 150 MG/1
150 TABLET ORAL EVERY MORNING
Status: DISCONTINUED | OUTPATIENT
Start: 2020-04-24 | End: 2020-04-24 | Stop reason: HOSPADM

## 2020-04-23 RX ORDER — KETOROLAC TROMETHAMINE 30 MG/ML
30 INJECTION, SOLUTION INTRAMUSCULAR; INTRAVENOUS EVERY 6 HOURS PRN
Status: DISCONTINUED | OUTPATIENT
Start: 2020-04-23 | End: 2020-04-24 | Stop reason: HOSPADM

## 2020-04-23 RX ADMIN — SODIUM CHLORIDE 1000 ML: 9 INJECTION, SOLUTION INTRAVENOUS at 11:49

## 2020-04-23 RX ADMIN — SODIUM CHLORIDE 125 ML/HR: 9 INJECTION, SOLUTION INTRAVENOUS at 16:51

## 2020-04-23 RX ADMIN — TAZOBACTAM SODIUM AND PIPERACILLIN SODIUM 3.38 G: 375; 3 INJECTION, SOLUTION INTRAVENOUS at 19:45

## 2020-04-23 RX ADMIN — TAZOBACTAM SODIUM AND PIPERACILLIN SODIUM 3.38 G: 375; 3 INJECTION, SOLUTION INTRAVENOUS at 13:32

## 2020-04-23 ASSESSMENT — ENCOUNTER SYMPTOMS
DIAPHORESIS: 1
FREQUENCY: 0
BACK PAIN: 1
COUGH: 0
CHILLS: 1
ABDOMINAL PAIN: 1
FEVER: 0
SHORTNESS OF BREATH: 0
VOMITING: 0
DIARRHEA: 0

## 2020-04-23 ASSESSMENT — MIFFLIN-ST. JEOR: SCORE: 1689.88

## 2020-04-23 NOTE — ED NOTES
[facility]  ED Nurse Handoff Report    Amina Pineda is a 60 year old female   ED Chief complaint: chest pain  . ED Diagnosis:   Final diagnoses:   Cholecystitis with cholelithiasis     Allergies:   Allergies   Allergen Reactions     No Known Drug Allergies        Code Status: Full Code  Activity level - Baseline/Home:  Independent. Activity Level - Current:   Independent. Lift room needed: No. Bariatric: No   Needed: No   Isolation: No. Infection: Not Applicable.     Vital Signs:   Vitals:    04/23/20 1122 04/23/20 1130 04/23/20 1145 04/23/20 1230   BP: (!) 156/107 133/83 138/71 (!) 148/74   Pulse:  77 68 73   Resp:       Temp:       TempSrc:       SpO2:  98% 98% 97%       Cardiac Rhythm:  ,   Cardiac  Cardiac Rhythm: Normal sinus rhythm  Pain level: 0-10 Pain Scale: 4  Patient confused: No. Patient Falls Risk: Yes.   Elimination Status: Has voided   Patient Report - Initial Complaint: chest and back pain. Focused Assessment:      Cardiac Cardiac - Cardiac WDL: -WDL except   Review of Systems (Cardiac) - Cardiovascular Symptoms/Conditions: chest pain   Chest Pain Assessment - Chest Pain Location: epigastric; midsternal (pt states pain wraps around to back) Duration: Pt states that pain began yesterday  Precipitating Factors: unknown  Associated Signs/Symptoms:  (Denies sob, states that she becomes sweaty, clammy at times)  Cardiac Monitoring - EKG Monitoring: Yes  Cardiac Regularity: Regular  Cardiac Rhythm: NSR        Tests Performed:   XR Chest 2 Views   Preliminary Result   IMPRESSION: No radiographic evidence of acute chest abnormality.       US Abdomen Limited (RUQ)   Final Result   IMPRESSION: Large immobile stone in the neck of the gallbladder with   questionable mild gallbladder wall thickening and a sonographic   Rivera's sign. Findings suggestive of early acute cholecystitis.      ROMELIA FISHER MD        Labs Ordered and Resulted from Time of ED Arrival Up to the Time of  Departure from the ED   COMPREHENSIVE METABOLIC PANEL - Abnormal; Notable for the following components:       Result Value    Glucose 113 (*)     All other components within normal limits   ROUTINE UA WITH MICROSCOPIC - Abnormal; Notable for the following components:    Protein Albumin Urine 10 (*)     Bacteria Urine Few (*)     Squamous Epithelial /HPF Urine 4 (*)     Mucous Urine Present (*)     All other components within normal limits   CBC WITH PLATELETS DIFFERENTIAL   D DIMER QUANTITATIVE   LIPASE   TROPONIN I   CARDIAC CONTINUOUS MONITORING       Treatments provided: monitoring  Family Comments: no family present  OBS brochure/video discussed/provided to patient:  Yes  ED Medications:   Medications   0.9% sodium chloride BOLUS (1,000 mLs Intravenous New Bag 4/23/20 1149)     Followed by   sodium chloride 0.9% infusion (has no administration in time range)     Drips infusing:  No  For the majority of the shift, the patient's behavior Green. Interventions performed were frequent rounding.    Sepsis treatment initiated: No       ED Nurse Name/Phone Number: Heydi Sierra RN,   1:14 PM    RECEIVING UNIT ED HANDOFF REVIEW    Above ED Nurse Handoff Report was reviewed: Yes  Reviewed by: Ana Savage RN on April 23, 2020 at 2:29 PM

## 2020-04-23 NOTE — ED PROVIDER NOTES
History     Chief Complaint:    Chest Pain    The history is provided by the patient.      Amina Pineda is a 60 year old female with a history of hyperlipidemia and esophageal reflux who presents with chest pain. The patient states that at 0700 this morning she began experiencing right-sided upper back pain. She thought she twisted in her sleep, but around 0800 the pain radiated to her anterior chest. It is on both sides beneath her ribcage in the epigastric region of her abdomen. The pain has been constant and nothing seems to worsen or alleviate the pain. She endorses hot flashes, diaphoresis, and chills. She reports that it feels similar to her esophageal reflux, but in a different location. The patient denies any shortness of breath, fevers, nausea, cough or coughing up blood, vomiting, diarrhea, urinary frequency or other urinary symptoms, leg pain, or leg swelling. The patient denies any trauma. As for abdominal surgeries, the patient has had a hysterectomy, but notes she still has her gallbladder. The patient took her regular medications this morning, but has not had any pain medications yet. Of note: the patient had coffee containing caffeine this morning.    Cardiac/PE/DVT Risk Factors:  The patient has a history of hyperlipidemia, but not hypertension, diabetes, or smoking. She reports a family history of hyperlipidemia, coronary artery disease, hypertension, and breast cancer. The patient denies any personal or familial history of PE, DVT, or clotting disorder. The patient reports no recent travel, surgery, or other immobilizations.     Allergies:  No Known Drug Allergies     Medications:    Albuterol inhaler  Wellbutrin XL  Cholecalciferol  Ciclopirox   Flexeril  Famvir  Ferrous Sulfate  Hydroquinone cream  Synthroid  Mycostatin   Omeprazole     Past Medical History:    Hyperlipidemia  Impaired fasting glucose  Acute dacryocystitis, right  Obstructive sleep apnea   Ocular hypertension,  right  Chronic low back pain  Non morbid obesity  Dysthymic disorder  Restless leg syndrome  Mixed incontinence   Hypothyroidism  Intramural leiomyoma of uterus  Herpes simplex  Decreased libido   Generalized osteoarthrosis  Esophageal reflux  Depression    Past Surgical History:    Rotator cuff surgery, bilateral  Wrist surgery for cyst x 2  Vaginal hysterectomy with BSO  Colonoscopy x 2  Left elbow repair  Soft tissue surgery, cyst, both shoulders and left elbow    Family History:    Coronary artery disease, hyperlipidemia, depression, anxiety, hypothyroidism, retinal detachment - father  Hypertension, breast cancer, glaucoma - mother  Whitaker's syndrome - daughter    Social History:  Negative for tobacco use.  Negative for alcohol use.  Negative for drug use.  Marital Status:   [2]     Review of Systems   Constitutional: Positive for chills and diaphoresis. Negative for fever.        Reports hot flashes   Respiratory: Negative for cough and shortness of breath.    Cardiovascular: Positive for chest pain. Negative for leg swelling.   Gastrointestinal: Positive for abdominal pain. Negative for diarrhea and vomiting.   Genitourinary: Negative for frequency.        Denies urinary symptoms   Musculoskeletal: Positive for back pain.        Denies leg pain   All other systems reviewed and are negative.      Physical Exam     Patient Vitals for the past 24 hrs:   BP Temp Temp src Pulse Heart Rate Resp SpO2   04/23/20 1230 (!) 148/74 -- -- 73 74 -- 97 %   04/23/20 1145 138/71 -- -- 68 70 -- 98 %   04/23/20 1130 133/83 -- -- 77 79 -- 98 %   04/23/20 1122 (!) 156/107 -- -- -- -- -- --   04/23/20 1121 -- 97.9  F (36.6  C) Oral 80 -- 18 94 %     Physical Exam  Constitutional: Pleasant. Cooperative.   Eyes: Pupils equally round and reactive  HENT: Head is normal in appearance. Oropharynx is normal with moist mucus membranes.  Cardiovascular: Regular rate and rhythm and without murmurs.  Respiratory: Normal respiratory  effort, lungs are clear bilaterally.  GI: TTP of RUQ, otherwise non-tender, soft, non-distended. No guarding, rebound, or rigidity. Positive Rivera's sign.  Musculoskeletal: No asymmetry of the lower extremities, no tenderness to palpation. No CVA tenderness.  Skin: Normal, without rash.  Neurologic: Cranial nerves grossly intact, normal cognition, no focal deficits. Alert and oriented x 3.   Psychiatric: Normal affect.  Nursing notes and vital signs reviewed.    Emergency Department Course     ECG (11:24:58):  Rate 78 bpm. CA interval 148. QRS duration 86. QT/QTc 414/471. P-R-T axes 60 29 13. Normal sinus rhythm. Possible Left atrial enlargement. Borderline ECG. Interpreted at 1130 by Ludwin Lawrence MD.    Imaging:  Radiology findings were communicated with the patient who voiced understanding of the findings.    US Abdomen Limited (RUQ):  Large immobile stone in the neck of the gallbladder with questionable mild gallbladder wall thickening and a sonographic Rivera's sign. Findings suggestive of early acute cholecystitis, as per radiology.     XR  Chest 2 Views:   No radiographic evidence of acute chest abnormality, as per radiology.     Laboratory:  Laboratory findings were communicated with the patient who voiced understanding of the findings.    Troponin I (1209): <0.015  Lipase: 117  CMP: Glucose 113 H o/w WNL (Creatinine 0.94)  D dimer quantitative: <0.3  CBC: AWNL (WBC 8.0, HGB 14.3, )    UA: clear, yellow urine, protein albumin 10, bacteria few, squamous epithelial 4 H, mucous present o/w negative     Interventions:  1149: 0.9% sodium chloride BOLUS 1 L IV  1332: Zosyn 3.375 g IV    Emergency Department Course:  Past medical records, nursing notes, and vitals reviewed.    1122: I performed an exam of the patient as documented above.     EKG obtained in the ED, see results above.     IV was inserted and blood was drawn for laboratory testing, results above.    The patient provided a urine sample here in  the emergency department. This was sent for laboratory testing, findings above.    The patient was sent for a limited RUQ abdomen ultrasound and a chest x-ray while in the emergency department, results above.     1310: I spoke with Dr. Ludwin Lawrence about the patient.    1312: I rechecked the patient and discussed the results of her workup thus far.     I personally reviewed the laboratory, EKG, and imaging results with the Patient and answered all related questions prior to admission.     Findings and plan explained to the Patient who consents to admission.     1339: Discussed the patient with Dr. Dela Cruz, who will admit the patient to an adult med/surg bed for further monitoring, evaluation, and treatment.     1358: I consulted Dr. Tolliver from general surgery regarding the patient.     1403: Patient rechecked and updated.    Impression & Plan     Medical Decision Making:  Amina Pineda is a 60 year old female who presents to the emergency department for evaluation of RUQ abdominal pain. Pain began this morning.  See HPI as above for additional details.  Vitals and physical exam as above.  Differential included ACS, Dissection, PE, PTX, GERD, MSK, PNA, Gallbladder pathology, Pancreatitis, Hepatitis, Kidney stone, among others.  The above work-up was obtained.  Work-up consistent with cholecystitis with cholelithiasis. Fluids and antibiotics started in the ED. Patient's pain controlled with above interventions. Doubt remainder of ddx on basis of history, physical exam, and studies as above. Will admit for pain control and in-person surgery consultation. All questions answered. Patient admitted in stable condition.    Diagnosis:    ICD-10-CM    1. Cholecystitis with cholelithiasis  K80.10      Disposition:  Admitted to med/surg.    Scribe Disclosure:  Tomasa GALEANO, am serving as a scribe at 11:26 AM on 4/23/2020 to document services personally performed by Fredrick Troncoso PA-C based on my observations and the  provider's statements to me.     Tomasa Saunders  4/23/2020   [facility] EMERGENCY DEPARTMENT       Fredrick Troncoso PA-C  04/23/20 [id number]

## 2020-04-23 NOTE — PLAN OF CARE
ROOM # 202-2    Living Situation (if not independent, order SW consult): Lives with    Facility name:  : [facility] () [phone]    Activity level at baseline: Independent   Activity level on admit: Independent       Patient registered to observation; given Patient Bill of Rights; given the opportunity to ask questions about observation status and their plan of care.  Patient has been oriented to the observation room, bathroom and call light is in place.    Discussed discharge goals and expectations with patient/family.

## 2020-04-23 NOTE — CONSULTS
Chief complaint:  Abdominal pain, epigastric  Back pain  HPI:  This patient is a 60 year old female who presents with back and epigastric pain which began last night.  This became more severe this morning, but has improved somewhat since she came to the hospital.  Patient does note that she has had some episodic diarrhea over the last 6 months or so.  She has occasionally noticed dark stools and occasionally noticed very light-colored stools.    Past Medical History:   has a past medical history of Decreased libido (11/6/2006), Depressive disorder, not elsewhere classified, Esophageal reflux, Generalized osteoarthrosis, unspecified site, Herpes simplex without mention of complication, Intramural leiomyoma of uterus, and Unspecified hypothyroidism.    Past Surgical History:  Past Surgical History:   Procedure Laterality Date     C LIGATE FALLOPIAN TUBE       C NONSPECIFIC PROCEDURE  5/02    rotator cuff surgery both shoulder     C NONSPECIFIC PROCEDURE      wrist surgery for cyst x 2     C VAGINAL HYSTERECTOMY  12/03    with BSO, 10-12 week size     COLONOSCOPY  4/24/2015     COLONOSCOPY WITH CO2 INSUFFLATION N/A 4/23/2015    Procedure: COLONOSCOPY WITH CO2 INSUFFLATION;  Surgeon: Bebeto Mortensen MD;  Location: [facility] OR     ELBOW SURGERY      Lt elbow repair.     HYSTERECTOMY, PAP NO LONGER INDICATED       ORTHOPEDIC SURGERY       SOFT TISSUE SURGERY      cyst, both shoulders and left elbow        Social History:  Social History     Socioeconomic History     Marital status:      Spouse name: Not on file     Number of children: Not on file     Years of education: Not on file     Highest education level: Not on file   Occupational History     Not on file   Social Needs     Financial resource strain: Not on file     Food insecurity     Worry: Not on file     Inability: Not on file     Transportation needs     Medical: Not on file     Non-medical: Not on file   Tobacco Use     Smoking status: Never Smoker      Smokeless tobacco: Never Used   Substance and Sexual Activity     Alcohol use: No     Drug use: No     Sexual activity: Yes     Partners: Male   Lifestyle     Physical activity     Days per week: Not on file     Minutes per session: Not on file     Stress: Not on file   Relationships     Social connections     Talks on phone: Not on file     Gets together: Not on file     Attends Christian service: Not on file     Active member of club or organization: Not on file     Attends meetings of clubs or organizations: Not on file     Relationship status: Not on file     Intimate partner violence     Fear of current or ex partner: Not on file     Emotionally abused: Not on file     Physically abused: Not on file     Forced sexual activity: Not on file   Other Topics Concern     Parent/sibling w/ CABG, MI or angioplasty before 65F 55M? No   Social History Narrative    Dairy/d 3-4 servings/d.     Caffeine 2 servings/d    Exercise 0 x week    Sunscreen used - Yes    Seatbelts used - Yes    Working smoke/CO detectors in the home - Yes    Guns stored in the home - Yes    Self Breast Exams - No    Self Testicular Exam - NA    Eye Exam up to date - Yes     Dental Exam up to date - Yes     Pap Smear up to date - Yes  Dr. Whitaker    Mammogram up to date - Yes     PSA up to date - NA    Dexa Scan up to date - NA    Flex Sig / Colonoscopy up to date - NA    Immunizations up to date - Yes Today    Abuse: Current or Past(Physical, Sexual or Emotional)- No    Do you feel safe in your environment - Yes            Family History:  Family History   Problem Relation Age of Onset     C.A.D. Maternal Grandfather      Coronary Artery Disease Maternal Grandfather         Heart Attack, ()     Coronary Artery Disease Father         High Cholesterol     Depression/Anxiety Father         Depression     Thyroid Disease Father         Hypo Thryoid     Retinal detachment Father      Thyroid Disease Father         Hypo      Hypertension Mother         High Blood Pressure     Breast Cancer Mother         Has spot being watching.  Checked every 6 months     Glaucoma Mother         glc surgery     Breast Cancer Paternal Grandmother         Breast removed ()     Asthma Paternal Grandmother         Emphysema ()     Cerebrovascular Disease Paternal Grandfather         Strokes ()     Known Genetic Syndrome Daughter         Whitaker's Syndrome     Skin Cancer No family hx of         no family hx of skin cancer       Review of Systems:  The 10 point Review of Systems is negative other than noted in the HPI and above.    Physical Exam:  General - This is a well developed, well nourished female in no apparent distress.  HEENT - Normocephalic, atraumatic.  No scleral icterus.  Neck - supple without masses  Lungs -breathing is nonlabored.  Heart - Regular rate & rhythm  Abdomen:   soft, non-distended and nontender at this time.  Extremities - warm without edema  Neurologic - nonfocal  Skin shows no evidence of jaundice.  Psychiatric-the patient shows good insight into her situation.    Relevant labs:  Normal liver function tests.  White blood cell count is 8000.    Imaging:  Ultrasound reveals a large immobile stone in the neck of the gallbladder with questionable mild gallbladder wall thickening.  There is a positive sonographic Rivera's sign.  There is no evidence of ductal dilatation.    Assessment and Plan:  This is a patient with upper abdominal pain and ultrasound findings concerning for early cholecystitis.  Patient is actually improved a bit since she presented.  We discussed laparoscopic cholecystectomy, along with its risks and complications, in detail.  I do feel that the patient will eventually need cholecystectomy.  At this point, we will see how she does overnight.  I would like to recheck her liver function tests to be sure that she does not have any abnormality there, as her history of light stools raises  at least the possibility of intermittent common duct stones.  If the patient has continued discomfort, it may be worth considering laparoscopic cholecystectomy sooner rather than later.  If she has had complete resolution of her symptoms, it might be more appropriate to discharge her to home and plan on cholecystectomy following the pandemic.  We will check back on the patient tomorrow.      Robin Tolliver MD  [facility]

## 2020-04-23 NOTE — ED PROVIDER NOTES
Emergency Department Attending Supervision Note  4/23/2020  1:12 PM    I evaluated this patient in conjunction with Fredrick Troncoso PA-C    Briefly, the patient presented with right sided upper back pain with radiation into her anterior chest and epigastric region after waking up this morning. She states that the pain has been constant and does not get worse or better with anything. She has associated hot flashes, diaphoresis, and chills. She denies any shortness of breath, fevers, nausea, cough or coughing up blood, vomiting, diarrhea, urinary frequency or other urinary symptoms, leg pain, or leg swelling    On my exam,   General: Alert, appears well-developed and well-nourished. Cooperative.     In mild distress  HEENT:  Head:  Atraumatic  Ears:  External ears are normal  Mouth/Throat:  Oropharynx is without erythema or exudate and mucous membranes are moist.   Eyes:   Conjunctivae normal and EOM are normal. No scleral icterus.  CV:  Normal rate, regular rhythm, normal heart sounds and radial pulses are 2+ and symmetric.  No murmur.  Resp:  Breath sounds are clear bilaterally    Non-labored, no retractions or accessory muscle use  GI:  Abdomen is soft, no distension, RUQ tenderness. No rebound or guarding.  No CVA tenderness bilaterally  MS:  Normal range of motion. No edema.    Normal strength in all 4 extremities.     Back atraumatic.    No midline cervical, thoracic, or lumbar tenderness  Skin:  Warm and dry.  No rash or lesions noted.  Neuro: Alert. Normal strength.  GCS: 15  Psych:  Normal mood and affect.    Results:  Labs Ordered and Resulted from Time of ED Arrival Up to the Time of Departure from the ED   COMPREHENSIVE METABOLIC PANEL - Abnormal; Notable for the following components:       Result Value    Glucose 113 (*)     All other components within normal limits   ROUTINE UA WITH MICROSCOPIC - Abnormal; Notable for the following components:    Protein Albumin Urine 10 (*)     Bacteria Urine Few (*)      Squamous Epithelial /HPF Urine 4 (*)     Mucous Urine Present (*)     All other components within normal limits   CBC WITH PLATELETS DIFFERENTIAL   D DIMER QUANTITATIVE   LIPASE   TROPONIN I     XR Chest 2 Views   Final Result   IMPRESSION: No radiographic evidence of acute chest abnormality.       RMOELIA FISHER MD      US Abdomen Limited (RUQ)   Final Result   IMPRESSION: Large immobile stone in the neck of the gallbladder with   questionable mild gallbladder wall thickening and a sonographic   Rivera's sign. Findings suggestive of early acute cholecystitis.      ROMELIA FISHER MD          ED course:    My impression is Amina Pineda is a 60 year old female who presents with abdominal pain.  The workup in the Emergency Room is concerning for early acute cholecystitis.  Antibiotics have been started and the patient will be admitted to the medicine service for further evaluation and treatment with a likely surgical consultation   There is no evidence at this point of serious complications of cholecystitis such as gangrenous cholecystitis, septic shock, etc.  No signs of other complications such as CBD stone, ascending cholangitis, gallstone pancreatitis.  Given constellation of symptoms, lab data and ultrasound I doubt GERD, gastritis, PUD, perforated ulcer, diverticulitis, colitis.      Diagnosis    ICD-10-CM    1. Cholecystitis with cholelithiasis  K80.10          Scribe disclosure:   WALESKA, Salvador Kimble, am serving as a scribe at 1:12 PM on 4/23/2020 to document services personally performed by Ludwin Lawrence MD based on my observations and the provider's statements to me.         Ludwin Lawrence MD  04/23/20 [id number]

## 2020-04-23 NOTE — PHARMACY-ADMISSION MEDICATION HISTORY
Admission medication history interview status for this patient is complete. See [facility] admission navigator for allergy information, prior to admission medications and immunization status.     Medication history interview done via telephone during Covid-19 pandemic, indicate source(s): Patient  Medication history resources (including written lists, pill bottles, clinic record):[facility] list  Primary pharmacy:    Changes made to PTA medication list:  Added: iron supplement, asa, lipoflavanoid  Deleted: ciclopirox  Changed: None    Actions taken by pharmacist (provider contacted, etc):None     Additional medication history information:None    Medication reconciliation/reorder completed by provider prior to medication history?  N   (Y/N)     For patients on insulin therapy: N  (Y/N)    Prior to Admission medications    Medication Sig Last Dose Taking? Auth Provider   albuterol (PROAIR HFA/PROVENTIL HFA/VENTOLIN HFA) 108 (90 Base) MCG/ACT inhaler Inhale 2 puffs into the lungs every 6 hours as needed for shortness of breath / dyspnea or wheezing  Yes Briana Melton MD   aspirin 81 MG EC tablet Take 81 mg by mouth At Bedtime 4/22/2020 at Unknown time Yes Unknown, Entered By History   buPROPion (WELLBUTRIN XL) 150 MG 24 hr tablet Take 1 tablet (150 mg) by mouth every morning 4/23/2020 at Unknown time Yes Briana Melton MD   cyclobenzaprine (FLEXERIL) 10 MG tablet Take 0.5-1 tablets (5-10 mg) by mouth 3 times daily as needed for muscle spasms  Yes Briana Melton MD   estrogen conj (PREMARIN) 0.3 MG tablet Take 1 tablet (0.3 mg) by mouth every other day 4/23/2020 at Unknown time Yes Briana Melton MD   famciclovir (FAMVIR) 500 MG tablet Take 1 tablet (500 mg) by mouth 2 times daily as needed  Yes Briana Melton MD   ferrous sulfate (FEROSUL) 325 (65 Fe) MG tablet Take 325 mg by mouth daily (with breakfast) 4/23/2020 at Unknown time Yes Unknown, Entered By History   hydroquinone 4 % CREA Externally apply topically  At Bedtime Apply a thin layer to dark areas once nightly for no more than 8 weeks at a time. Take breaks between use.  Yes Tiff Dunn MD   levothyroxine (SYNTHROID/LEVOTHROID) 125 MCG tablet TAKE ONE TABLET BY MOUTH ONCE DAILY 4/23/2020 at Unknown time Yes Delfin Herrera MD   nystatin (MYCOSTATIN) 633840 UNIT/GM external powder Apply 30 g topically 3 times daily as needed  Yes Seema Murphy APRN CNP   omeprazole (PRILOSEC) 20 MG DR capsule Take 1 capsule (20 mg) by mouth daily 4/23/2020 at Unknown time Yes Briana Melton MD   Vitamins-Lipotropics (LIPOFLAVONOID) TABS Take 1 tablet by mouth 2 times daily 4/23/2020 at Unknown time Yes Unknown, Entered By History

## 2020-04-23 NOTE — PLAN OF CARE
"PRIMARY DIAGNOSIS: BILIARY COLIC/UNCOMPLICATED EARLY ACUTE CHOLECYSTITIS    OUTPATIENT/OBSERVATION GOALS TO BE MET BEFORE DISCHARGE:    1. Pain status:  Pain currently tolerable  2. Stable vital signs and labs (if performed) at disposition: Yes  3. Tolerating adequate PO diet: Yes  4. Successful cholecystectomy or clear follow up plan with General Surgery team if immediate surgery not performed :  Awaiting general surgery consult  5. ADLs back to baseline?  No  6. Activity and level of assistance: Ambulating independently.  7. Barriers to discharge noted Yes, unless medically and surgically cleared    Blood pressure 126/49, pulse 75, temperature 98.5  F (36.9  C), temperature source Oral, resp. rate 18, height 1.727 m (5' 8\"), weight 108.1 kg (238 lb 6.4 oz), SpO2 96 %, not currently breastfeeding.    VSS.  LS clear, adequate sats on room air.  Patient states that her pain is currently tolerable. Patient currently NPO.  Maintenance IV continues at 125 ccs/hour.  Patient is independent in room.    Troponin pending.  Plan:  Continue to provide support cares.  Patient teaching regarding pain medication and administration.  General Surgery to see, Gastro consult pending.      Discharge Planner Nurse   Safe discharge environment identified: Yes  Barriers to discharge: Yes, unless medically and surgically cleared       Entered by: Karen M. Lesch 04/23/2020  16:00 PM     Please review provider order for any additional goals.   Nurse to notify provider when observation goals have been met and patient is ready for discharge.  "

## 2020-04-23 NOTE — H&P
[facility]    History and Physical  Hospitalist       Date of Admission:  4/23/2020    Assessment & Plan   Amina Pineda is a 60 year old woman with PMH significant for GERD, hypothyroidism, obesity, ANDREW, depression, osteoarthritis, restless leg syndrome, and hyperlipidemia. She presented to the emergency department on 4/23/20 for pain present on waking up around 7 AM. Patient reported pain in her mid-right back, radiating around her right side to her chest and epigastric region of her abdomen. Patient reported associated diaphoresis and chills. Patient underwent EKG in the ER without evidence of ischemia. Initial troponin and d-dimer were negative. Chest x-ray without abnormality. Patient underwent US RUQ abdomen which revealed a large (2.3 cm), immobile stone in the neck of the gallbladder with wall thickening and sonographic Rivera's sign. Findings were suggestive of early acute calculous cholecystitis. Patient was given IV fluids and IV zosyn in the ER. At time of request for admission, patient had not been given any medications for pain in the ER. Patient will be admitted to observation unit with general surgery consulted.     Early acute calculous cholecystitis  Patient reports intermittent diarrhea and light-colored stools since September. Did report small amount of unintentional weight loss, but stated that it occurred when she was moving and more physically active.   Onset of pain unbearable today.   US RUQ abdomen revealed a large (2.3 cm), immobile stone in the neck of the gallbladder with wall thickening and sonographic Rivera's sign.  Will continue IV fluids and IV zosyn started in the ER.   General surgery consulted.   Given > 6 months history of diarrhea and light-colored stools, will also consult GI in case ERCP is needed (and could be coordinated with cholecystectomy).   PRN IV ketorolac and dilaudid will be available for pain.   PRN zofran.  Incentive spirometry ordered.   Note  that patient takes aspirin 81 mg HS PTA. Will hold this initially for surgery.   Will allow for clear liquid diet as tolerated and NPO after midnight.   Will appreciate general surgery recommendations.     Chest pain, back pain, epigastric pain  Suspect that acute cholecystitis as above is the etiology of pain. Will check a second troponin for completeness sake, but have low suspicion for cardiac etiology given presentation.     GERD  Continue PTA PPI.     Hypothyroidism  Continue PTA levothyroxine.     ANDREW  Patient does not wear CPAP.    Depression  Continue PTA Wellbutrin.     Restless leg syndrome  Continue PTA PRN flexeril.    Hyperlipidemia  Patient is not on any medication for this. Follow with primary care.     DVT Prophylaxis: Pneumatic Compression Devices   Code Status: Full Code after discussion with patient, though she notes that she would not want to remain on life support for prolonged period.   Expected discharge: Possible that patient would be able to discharge as early as tomorrow pending plan from surgery and/or GI.     Dayan Esteban MD [facility]  Hospitalist Service  [facility]  Text Page (8am - 5pm)      Primary Care Physician   Briana Melton    Chief Complaint   Right sided back, chest, and epigastric pain associated with diaphoresis and chills    History is obtained from the patient    History of Present Illness   Amina Pineda is a 60 year old woman with PMH significant for GERD, hypothyroidism, obesity, ANDREW, depression, osteoarthritis, restless leg syndrome, and hyperlipidemia. She presented to the emergency department on 4/23/20 for pain present on waking up around 7 AM. Patient reported pain in her mid-right back, radiating around her right side to her chest and epigastric region of her abdomen. Patient reported associated diaphoresis and chills. Patient underwent EKG in the ER without evidence of ischemia. Initial troponin and d-dimer were negative. Chest x-ray without  abnormality. Patient underwent US RUQ abdomen which revealed a large (2.3 cm), immobile stone in the neck of the gallbladder with wall thickening and sonographic Rivera's sign. Findings were suggestive of early acute calculous cholecystitis. Patient was given IV fluids and IV zosyn in the ER. At time of request for admission, patient had not been given any medications for pain in the ER. Patient will be admitted to observation unit with general surgery consulted.     On further discussion, patient notes that she has been having intermittent diarrhea and light-colored stools since roughly September 2019. Did report small amount of unintentional weight loss, but stated that it occurred when she was moving and more physically active.     Past Medical History    I have reviewed this patient's medical history and updated it with pertinent information if needed.   Past Medical History:   Diagnosis Date     Decreased libido 11/6/2006     Depressive disorder, not elsewhere classified      Esophageal reflux      Generalized osteoarthrosis, unspecified site     R hip, on meds     Herpes simplex without mention of complication     oral     Intramural leiomyoma of uterus      Unspecified hypothyroidism        Past Surgical History   I have reviewed this patient's surgical history and updated it with pertinent information if needed.  Past Surgical History:   Procedure Laterality Date     C LIGATE FALLOPIAN TUBE       C NONSPECIFIC PROCEDURE  5/02    rotator cuff surgery both shoulder     C NONSPECIFIC PROCEDURE      wrist surgery for cyst x 2     C VAGINAL HYSTERECTOMY  12/03    with BSO, 10-12 week size     COLONOSCOPY  4/24/2015     COLONOSCOPY WITH CO2 INSUFFLATION N/A 4/23/2015    Procedure: COLONOSCOPY WITH CO2 INSUFFLATION;  Surgeon: Bebeto Mortensen MD;  Location: [facility] OR     ELBOW SURGERY      Lt elbow repair.     HYSTERECTOMY, PAP NO LONGER INDICATED       ORTHOPEDIC SURGERY       SOFT TISSUE SURGERY      cyst,  both shoulders and left elbow       Prior to Admission Medications   Prior to Admission Medications   Prescriptions Last Dose Informant Patient Reported? Taking?   Vitamins-Lipotropics (LIPOFLAVONOID) TABS 4/23/2020 at Unknown time  Yes Yes   Sig: Take 1 tablet by mouth 2 times daily   albuterol (PROAIR HFA/PROVENTIL HFA/VENTOLIN HFA) 108 (90 Base) MCG/ACT inhaler   No Yes   Sig: Inhale 2 puffs into the lungs every 6 hours as needed for shortness of breath / dyspnea or wheezing   aspirin 81 MG EC tablet 4/22/2020 at Unknown time  Yes Yes   Sig: Take 81 mg by mouth At Bedtime   buPROPion (WELLBUTRIN XL) 150 MG 24 hr tablet 4/23/2020 at Unknown time  No Yes   Sig: Take 1 tablet (150 mg) by mouth every morning   cyclobenzaprine (FLEXERIL) 10 MG tablet   No Yes   Sig: Take 0.5-1 tablets (5-10 mg) by mouth 3 times daily as needed for muscle spasms   estrogen conj (PREMARIN) 0.3 MG tablet 4/23/2020 at Unknown time  No Yes   Sig: Take 1 tablet (0.3 mg) by mouth every other day   famciclovir (FAMVIR) 500 MG tablet   No Yes   Sig: Take 1 tablet (500 mg) by mouth 2 times daily as needed   ferrous sulfate (FEROSUL) 325 (65 Fe) MG tablet 4/23/2020 at Unknown time  Yes Yes   Sig: Take 325 mg by mouth daily (with breakfast)   hydroquinone 4 % CREA   No Yes   Sig: Externally apply topically At Bedtime Apply a thin layer to dark areas once nightly for no more than 8 weeks at a time. Take breaks between use.   levothyroxine (SYNTHROID/LEVOTHROID) 125 MCG tablet 4/23/2020 at Unknown time  No Yes   Sig: TAKE ONE TABLET BY MOUTH ONCE DAILY   nystatin (MYCOSTATIN) 189451 UNIT/GM external powder   No Yes   Sig: Apply 30 g topically 3 times daily as needed   omeprazole (PRILOSEC) 20 MG DR capsule 4/23/2020 at Unknown time  No Yes   Sig: Take 1 capsule (20 mg) by mouth daily      Facility-Administered Medications: None     Allergies   Allergies   Allergen Reactions     No Known Drug Allergies        Social History   Patient lives at home  with her . Never smoker. Rare alcohol. Denies illicit drugs.     Family History   Patient reports that her parents are both still living at age 82 and relatively healthy.     I have reviewed this patient's family history:     Family History   Problem Relation Age of Onset     C.A.D. Maternal Grandfather      Coronary Artery Disease Maternal Grandfather         Heart Attack, ()     Coronary Artery Disease Father         High Cholesterol     Depression/Anxiety Father         Depression     Thyroid Disease Father         Hypo Thryoid     Retinal detachment Father      Thyroid Disease Father         Hypo     Hypertension Mother         High Blood Pressure     Breast Cancer Mother         Has spot being watching.  Checked every 6 months     Glaucoma Mother         glc surgery     Breast Cancer Paternal Grandmother         Breast removed ()     Asthma Paternal Grandmother         Emphysema ()     Cerebrovascular Disease Paternal Grandfather         Strokes ()     Known Genetic Syndrome Daughter         Whitaker's Syndrome     Skin Cancer No family hx of         no family hx of skin cancer       Review of Systems   Patient reported pain in her mid-right back, radiating around her right side to her chest and epigastric region of her abdomen. Patient reported associated diaphoresis and chills. Reports occasional hot flashes. Patient notes that she has been having intermittent diarrhea and light-colored stools since roughly 2019. Did report small amount of unintentional weight loss, but stated that it occurred when she was moving and more physically active. Patient denies any cough or shortness of breath. Denies fevers, nausea, vomiting. Unless otherwise stated above, remainder of 10 point review of systems is negative.     Physical Exam   Temp: 97.9  F (36.6  C) Temp src: Oral BP: (!) 148/74 Pulse: 73 Heart Rate: 74 Resp: 18 SpO2: 97 % O2 Device: None (Room air)    Vital Signs  with Ranges  Temp:  [97.9  F (36.6  C)] 97.9  F (36.6  C)  Pulse:  [68-80] 73  Heart Rate:  [70-79] 74  Resp:  [18] 18  BP: (133-156)/() 148/74  SpO2:  [94 %-98 %] 97 %  0 lbs 0 oz    Constitutional: Obese woman resting in bed at time of exam. Alert and oriented x3. Mild distress secondary to pain. Non-toxic. Pleasant.    HEENT: NCAT. EOMI. Moist oral mucosa.  Respiratory: Clear to auscultation bilaterally. No crackles or wheezes.  Cardiovascular: Regular rate and rhythm at time of exam. No murmur. No reproducible chest pain on exam.   GI: Obese, soft, non-distended. Patient does have reproducible RUQ pain on deep palpation. No rebound or guarding. Normoactive bowel sounds.   Musculoskeletal: No gross deformities. No peripheral edema.   Neurologic: Alert and oriented x3. No focal neurologic deficits.     Data   Data reviewed today:  I personally reviewed EKG, labs, and imaging reports.     Recent Labs   Lab 04/23/20  1134   WBC 8.0   HGB 14.3   MCV 92         POTASSIUM 4.0   CHLORIDE 106   CO2 28   BUN 14   CR 0.94   ANIONGAP 4   JOSE 9.1   *   ALBUMIN 3.4   PROTTOTAL 7.5   BILITOTAL 0.5   ALKPHOS 134   ALT 27   AST 19   LIPASE 117   TROPI <0.015       Recent Results (from the past 24 hour(s))   US Abdomen Limited (RUQ)    Narrative    ULTRASOUND ABDOMEN LIMITED April 23, 2020 12:26 PM    CLINICAL HISTORY: Right upper quadrant tenderness, positive Rivera's  sign.    TECHNIQUE: Limited abdominal ultrasound.    COMPARISON: None.    FINDINGS:    GALLBLADDER: Shadowing gallstone in the neck of the gallbladder  measures 2.3 cm. Questionable mild gallbladder wall thickening. No  pericholecystic fluid. Patient reports tenderness with direct  transducer pressure over the gallbladder.    BILE DUCTS: There is no biliary dilatation. The common duct measures 5  mm.    LIVER: Normal where seen.    RIGHT KIDNEY: No hydronephrosis.    PANCREAS: The visualized portions of the pancreas are normal.    No  ascites.      Impression    IMPRESSION: Large immobile stone in the neck of the gallbladder with  questionable mild gallbladder wall thickening and a sonographic  Rivera's sign. Findings suggestive of early acute cholecystitis.    ROMELIA FISHER MD   XR Chest 2 Views    Narrative    CHEST TWO VIEWS April 23, 2020 1:02 PM     HISTORY: Chest pain.    COMPARISON: 9/30/2014.    FINDINGS: Heart size and pulmonary vascularity are within normal  limits. The lungs are clear. No pneumothorax or pleural effusion.       Impression    IMPRESSION: No radiographic evidence of acute chest abnormality.     ROMELIA FISHER MD

## 2020-04-24 ENCOUNTER — ANESTHESIA EVENT (OUTPATIENT)
Dept: SURGERY | Facility: CLINIC | Age: 60
End: 2020-04-24
Payer: COMMERCIAL

## 2020-04-24 ENCOUNTER — SURGERY (OUTPATIENT)
Age: 60
End: 2020-04-24
Payer: COMMERCIAL

## 2020-04-24 ENCOUNTER — ANESTHESIA (OUTPATIENT)
Dept: SURGERY | Facility: CLINIC | Age: 60
End: 2020-04-24
Payer: COMMERCIAL

## 2020-04-24 VITALS
WEIGHT: 238.4 LBS | HEART RATE: 69 BPM | SYSTOLIC BLOOD PRESSURE: 141 MMHG | BODY MASS INDEX: 36.13 KG/M2 | RESPIRATION RATE: 14 BRPM | OXYGEN SATURATION: 95 % | DIASTOLIC BLOOD PRESSURE: 75 MMHG | HEIGHT: 68 IN | TEMPERATURE: 97.7 F

## 2020-04-24 LAB
ALBUMIN SERPL-MCNC: 2.8 G/DL (ref 3.4–5)
ALP SERPL-CCNC: 96 U/L (ref 40–150)
ALT SERPL W P-5'-P-CCNC: 21 U/L (ref 0–50)
ANION GAP SERPL CALCULATED.3IONS-SCNC: 3 MMOL/L (ref 3–14)
AST SERPL W P-5'-P-CCNC: 14 U/L (ref 0–45)
BASOPHILS # BLD AUTO: 0 10E9/L (ref 0–0.2)
BASOPHILS NFR BLD AUTO: 0.7 %
BILIRUB SERPL-MCNC: 1 MG/DL (ref 0.2–1.3)
BUN SERPL-MCNC: 10 MG/DL (ref 7–30)
CALCIUM SERPL-MCNC: 8 MG/DL (ref 8.5–10.1)
CHLORIDE SERPL-SCNC: 111 MMOL/L (ref 94–109)
CO2 SERPL-SCNC: 28 MMOL/L (ref 20–32)
CREAT SERPL-MCNC: 0.88 MG/DL (ref 0.52–1.04)
DIFFERENTIAL METHOD BLD: ABNORMAL
EOSINOPHIL # BLD AUTO: 0.2 10E9/L (ref 0–0.7)
EOSINOPHIL NFR BLD AUTO: 3.6 %
ERYTHROCYTE [DISTWIDTH] IN BLOOD BY AUTOMATED COUNT: 13.2 % (ref 10–15)
GFR SERPL CREATININE-BSD FRML MDRD: 71 ML/MIN/{1.73_M2}
GLUCOSE SERPL-MCNC: 98 MG/DL (ref 70–99)
HCT VFR BLD AUTO: 39.9 % (ref 35–47)
HGB BLD-MCNC: 12.5 G/DL (ref 11.7–15.7)
IMM GRANULOCYTES # BLD: 0 10E9/L (ref 0–0.4)
IMM GRANULOCYTES NFR BLD: 0.4 %
LYMPHOCYTES # BLD AUTO: 1.3 10E9/L (ref 0.8–5.3)
LYMPHOCYTES NFR BLD AUTO: 23.3 %
MCH RBC QN AUTO: 29.1 PG (ref 26.5–33)
MCHC RBC AUTO-ENTMCNC: 31.3 G/DL (ref 31.5–36.5)
MCV RBC AUTO: 93 FL (ref 78–100)
MONOCYTES # BLD AUTO: 0.4 10E9/L (ref 0–1.3)
MONOCYTES NFR BLD AUTO: 7.7 %
NEUTROPHILS # BLD AUTO: 3.6 10E9/L (ref 1.6–8.3)
NEUTROPHILS NFR BLD AUTO: 64.3 %
NRBC # BLD AUTO: 0 10*3/UL
NRBC BLD AUTO-RTO: 0 /100
PLATELET # BLD AUTO: 212 10E9/L (ref 150–450)
POTASSIUM SERPL-SCNC: 3.8 MMOL/L (ref 3.4–5.3)
PROT SERPL-MCNC: 6.1 G/DL (ref 6.8–8.8)
RBC # BLD AUTO: 4.3 10E12/L (ref 3.8–5.2)
SODIUM SERPL-SCNC: 142 MMOL/L (ref 133–144)
WBC # BLD AUTO: 5.6 10E9/L (ref 4–11)

## 2020-04-24 PROCEDURE — 37000009 ZZH ANESTHESIA TECHNICAL FEE, EACH ADDTL 15 MIN: Performed by: SURGERY

## 2020-04-24 PROCEDURE — 25000132 ZZH RX MED GY IP 250 OP 250 PS 637: Performed by: INTERNAL MEDICINE

## 2020-04-24 PROCEDURE — 47562 LAPAROSCOPIC CHOLECYSTECTOMY: CPT | Mod: AS | Performed by: PHYSICIAN ASSISTANT

## 2020-04-24 PROCEDURE — 25800025 ZZH RX 258: Performed by: SURGERY

## 2020-04-24 PROCEDURE — 37000008 ZZH ANESTHESIA TECHNICAL FEE, 1ST 30 MIN: Performed by: SURGERY

## 2020-04-24 PROCEDURE — 96376 TX/PRO/DX INJ SAME DRUG ADON: CPT

## 2020-04-24 PROCEDURE — 25800030 ZZH RX IP 258 OP 636: Performed by: INTERNAL MEDICINE

## 2020-04-24 PROCEDURE — G0378 HOSPITAL OBSERVATION PER HR: HCPCS

## 2020-04-24 PROCEDURE — 99217 ZZC OBSERVATION CARE DISCHARGE: CPT | Performed by: PHYSICIAN ASSISTANT

## 2020-04-24 PROCEDURE — 25000128 H RX IP 250 OP 636: Performed by: ANESTHESIOLOGY

## 2020-04-24 PROCEDURE — 25000125 ZZHC RX 250: Performed by: NURSE ANESTHETIST, CERTIFIED REGISTERED

## 2020-04-24 PROCEDURE — 25800030 ZZH RX IP 258 OP 636: Performed by: ANESTHESIOLOGY

## 2020-04-24 PROCEDURE — 25000132 ZZH RX MED GY IP 250 OP 250 PS 637: Performed by: SURGERY

## 2020-04-24 PROCEDURE — 25000128 H RX IP 250 OP 636: Performed by: SURGERY

## 2020-04-24 PROCEDURE — 36000060 ZZH SURGERY LEVEL 3 W FLUORO 1ST 30 MIN: Performed by: SURGERY

## 2020-04-24 PROCEDURE — 27210794 ZZH OR GENERAL SUPPLY STERILE: Performed by: SURGERY

## 2020-04-24 PROCEDURE — 40000306 ZZH STATISTIC PRE PROC ASSESS II: Performed by: SURGERY

## 2020-04-24 PROCEDURE — 25000128 H RX IP 250 OP 636: Performed by: NURSE ANESTHETIST, CERTIFIED REGISTERED

## 2020-04-24 PROCEDURE — 88304 TISSUE EXAM BY PATHOLOGIST: CPT | Mod: 26 | Performed by: INTERNAL MEDICINE

## 2020-04-24 PROCEDURE — 71000027 ZZH RECOVERY PHASE 2 EACH 15 MINS: Performed by: SURGERY

## 2020-04-24 PROCEDURE — 71000015 ZZH RECOVERY PHASE 1 LEVEL 2 EA ADDTL HR: Performed by: SURGERY

## 2020-04-24 PROCEDURE — 80053 COMPREHEN METABOLIC PANEL: CPT | Performed by: INTERNAL MEDICINE

## 2020-04-24 PROCEDURE — 88304 TISSUE EXAM BY PATHOLOGIST: CPT | Performed by: INTERNAL MEDICINE

## 2020-04-24 PROCEDURE — 47562 LAPAROSCOPIC CHOLECYSTECTOMY: CPT | Performed by: SURGERY

## 2020-04-24 PROCEDURE — 25000128 H RX IP 250 OP 636: Performed by: INTERNAL MEDICINE

## 2020-04-24 PROCEDURE — 85025 COMPLETE CBC W/AUTO DIFF WBC: CPT | Performed by: INTERNAL MEDICINE

## 2020-04-24 PROCEDURE — 36000058 ZZH SURGERY LEVEL 3 EA 15 ADDTL MIN: Performed by: SURGERY

## 2020-04-24 PROCEDURE — 71000014 ZZH RECOVERY PHASE 1 LEVEL 2 FIRST HR: Performed by: SURGERY

## 2020-04-24 PROCEDURE — 25000125 ZZHC RX 250: Performed by: SURGERY

## 2020-04-24 RX ORDER — BUPIVACAINE HYDROCHLORIDE 5 MG/ML
INJECTION, SOLUTION EPIDURAL; INTRACAUDAL PRN
Status: DISCONTINUED | OUTPATIENT
Start: 2020-04-24 | End: 2020-04-24 | Stop reason: HOSPADM

## 2020-04-24 RX ORDER — ACETAMINOPHEN 325 MG/1
650 TABLET ORAL
Status: DISCONTINUED | OUTPATIENT
Start: 2020-04-24 | End: 2020-04-24 | Stop reason: HOSPADM

## 2020-04-24 RX ORDER — ONDANSETRON 2 MG/ML
4 INJECTION INTRAMUSCULAR; INTRAVENOUS EVERY 30 MIN PRN
Status: DISCONTINUED | OUTPATIENT
Start: 2020-04-24 | End: 2020-04-24 | Stop reason: HOSPADM

## 2020-04-24 RX ORDER — NALOXONE HYDROCHLORIDE 0.4 MG/ML
.1-.4 INJECTION, SOLUTION INTRAMUSCULAR; INTRAVENOUS; SUBCUTANEOUS
Status: DISCONTINUED | OUTPATIENT
Start: 2020-04-24 | End: 2020-04-24 | Stop reason: HOSPADM

## 2020-04-24 RX ORDER — DEXAMETHASONE SODIUM PHOSPHATE 4 MG/ML
INJECTION, SOLUTION INTRA-ARTICULAR; INTRALESIONAL; INTRAMUSCULAR; INTRAVENOUS; SOFT TISSUE PRN
Status: DISCONTINUED | OUTPATIENT
Start: 2020-04-24 | End: 2020-04-24

## 2020-04-24 RX ORDER — ONDANSETRON 2 MG/ML
INJECTION INTRAMUSCULAR; INTRAVENOUS PRN
Status: DISCONTINUED | OUTPATIENT
Start: 2020-04-24 | End: 2020-04-24

## 2020-04-24 RX ORDER — AMOXICILLIN 250 MG
1-2 CAPSULE ORAL 2 TIMES DAILY
Qty: 30 TABLET | Refills: 0 | Status: SHIPPED | OUTPATIENT
Start: 2020-04-24 | End: 2020-09-14

## 2020-04-24 RX ORDER — MEPERIDINE HYDROCHLORIDE 50 MG/ML
12.5 INJECTION INTRAMUSCULAR; INTRAVENOUS; SUBCUTANEOUS
Status: DISCONTINUED | OUTPATIENT
Start: 2020-04-24 | End: 2020-04-24 | Stop reason: HOSPADM

## 2020-04-24 RX ORDER — CEFAZOLIN SODIUM 1 G/3ML
1 INJECTION, POWDER, FOR SOLUTION INTRAMUSCULAR; INTRAVENOUS SEE ADMIN INSTRUCTIONS
Status: DISCONTINUED | OUTPATIENT
Start: 2020-04-24 | End: 2020-04-24 | Stop reason: HOSPADM

## 2020-04-24 RX ORDER — ACETAMINOPHEN 325 MG/1
975 TABLET ORAL ONCE
Status: COMPLETED | OUTPATIENT
Start: 2020-04-24 | End: 2020-04-24

## 2020-04-24 RX ORDER — CEFAZOLIN SODIUM 2 G/100ML
2 INJECTION, SOLUTION INTRAVENOUS
Status: COMPLETED | OUTPATIENT
Start: 2020-04-24 | End: 2020-04-24

## 2020-04-24 RX ORDER — FENTANYL CITRATE 50 UG/ML
25-50 INJECTION, SOLUTION INTRAMUSCULAR; INTRAVENOUS EVERY 5 MIN PRN
Status: DISCONTINUED | OUTPATIENT
Start: 2020-04-24 | End: 2020-04-24 | Stop reason: HOSPADM

## 2020-04-24 RX ORDER — SODIUM CHLORIDE, SODIUM LACTATE, POTASSIUM CHLORIDE, CALCIUM CHLORIDE 600; 310; 30; 20 MG/100ML; MG/100ML; MG/100ML; MG/100ML
INJECTION, SOLUTION INTRAVENOUS CONTINUOUS
Status: DISCONTINUED | OUTPATIENT
Start: 2020-04-24 | End: 2020-04-24 | Stop reason: HOSPADM

## 2020-04-24 RX ORDER — ALBUTEROL SULFATE 0.83 MG/ML
2.5 SOLUTION RESPIRATORY (INHALATION) EVERY 4 HOURS PRN
Status: DISCONTINUED | OUTPATIENT
Start: 2020-04-24 | End: 2020-04-24 | Stop reason: HOSPADM

## 2020-04-24 RX ORDER — IBUPROFEN 600 MG/1
600 TABLET, FILM COATED ORAL ONCE
Status: COMPLETED | OUTPATIENT
Start: 2020-04-24 | End: 2020-04-24

## 2020-04-24 RX ORDER — IBUPROFEN 600 MG/1
600 TABLET, FILM COATED ORAL EVERY 6 HOURS PRN
Qty: 50 TABLET | Refills: 0 | Status: SHIPPED | OUTPATIENT
Start: 2020-04-24 | End: 2020-05-29 | Stop reason: ALTCHOICE

## 2020-04-24 RX ORDER — ACETAMINOPHEN 325 MG/1
1000 TABLET ORAL EVERY 6 HOURS PRN
Qty: 100 TABLET | Refills: 0 | Status: SHIPPED | OUTPATIENT
Start: 2020-04-24 | End: 2020-05-29

## 2020-04-24 RX ORDER — OXYCODONE HYDROCHLORIDE 5 MG/1
5-10 TABLET ORAL EVERY 4 HOURS PRN
Qty: 12 TABLET | Refills: 0 | Status: SHIPPED | OUTPATIENT
Start: 2020-04-24 | End: 2020-05-13

## 2020-04-24 RX ORDER — PROPOFOL 10 MG/ML
INJECTION, EMULSION INTRAVENOUS PRN
Status: DISCONTINUED | OUTPATIENT
Start: 2020-04-24 | End: 2020-04-24

## 2020-04-24 RX ORDER — NEOSTIGMINE METHYLSULFATE 1 MG/ML
VIAL (ML) INJECTION PRN
Status: DISCONTINUED | OUTPATIENT
Start: 2020-04-24 | End: 2020-04-24

## 2020-04-24 RX ORDER — FENTANYL CITRATE 50 UG/ML
25-50 INJECTION, SOLUTION INTRAMUSCULAR; INTRAVENOUS
Status: DISCONTINUED | OUTPATIENT
Start: 2020-04-24 | End: 2020-04-24 | Stop reason: HOSPADM

## 2020-04-24 RX ORDER — HYDROMORPHONE HYDROCHLORIDE 1 MG/ML
.3-.5 INJECTION, SOLUTION INTRAMUSCULAR; INTRAVENOUS; SUBCUTANEOUS EVERY 10 MIN PRN
Status: DISCONTINUED | OUTPATIENT
Start: 2020-04-24 | End: 2020-04-24 | Stop reason: HOSPADM

## 2020-04-24 RX ORDER — GLYCOPYRROLATE 0.2 MG/ML
INJECTION, SOLUTION INTRAMUSCULAR; INTRAVENOUS PRN
Status: DISCONTINUED | OUTPATIENT
Start: 2020-04-24 | End: 2020-04-24

## 2020-04-24 RX ORDER — LIDOCAINE HYDROCHLORIDE 10 MG/ML
INJECTION, SOLUTION INFILTRATION; PERINEURAL PRN
Status: DISCONTINUED | OUTPATIENT
Start: 2020-04-24 | End: 2020-04-24

## 2020-04-24 RX ORDER — ONDANSETRON 4 MG/1
4 TABLET, ORALLY DISINTEGRATING ORAL EVERY 30 MIN PRN
Status: DISCONTINUED | OUTPATIENT
Start: 2020-04-24 | End: 2020-04-24 | Stop reason: HOSPADM

## 2020-04-24 RX ORDER — FENTANYL CITRATE 50 UG/ML
INJECTION, SOLUTION INTRAMUSCULAR; INTRAVENOUS PRN
Status: DISCONTINUED | OUTPATIENT
Start: 2020-04-24 | End: 2020-04-24

## 2020-04-24 RX ORDER — LIDOCAINE 40 MG/G
CREAM TOPICAL
Status: DISCONTINUED | OUTPATIENT
Start: 2020-04-24 | End: 2020-04-24 | Stop reason: HOSPADM

## 2020-04-24 RX ORDER — OXYCODONE HYDROCHLORIDE 5 MG/1
10 TABLET ORAL
Status: COMPLETED | OUTPATIENT
Start: 2020-04-24 | End: 2020-04-24

## 2020-04-24 RX ADMIN — OMEPRAZOLE 20 MG: 20 CAPSULE, DELAYED RELEASE ORAL at 08:00

## 2020-04-24 RX ADMIN — ACETAMINOPHEN 650 MG: 325 TABLET, FILM COATED ORAL at 08:00

## 2020-04-24 RX ADMIN — ONDANSETRON HYDROCHLORIDE 4 MG: 2 INJECTION, SOLUTION INTRAVENOUS at 13:19

## 2020-04-24 RX ADMIN — FENTANYL CITRATE 50 MCG: 50 INJECTION, SOLUTION INTRAMUSCULAR; INTRAVENOUS at 13:45

## 2020-04-24 RX ADMIN — Medication 100 MG: at 12:56

## 2020-04-24 RX ADMIN — DEXAMETHASONE SODIUM PHOSPHATE 4 MG: 4 INJECTION, SOLUTION INTRA-ARTICULAR; INTRALESIONAL; INTRAMUSCULAR; INTRAVENOUS; SOFT TISSUE at 12:56

## 2020-04-24 RX ADMIN — FENTANYL CITRATE 50 MCG: 50 INJECTION, SOLUTION INTRAMUSCULAR; INTRAVENOUS at 13:38

## 2020-04-24 RX ADMIN — FENTANYL CITRATE 50 MCG: 50 INJECTION, SOLUTION INTRAMUSCULAR; INTRAVENOUS at 15:00

## 2020-04-24 RX ADMIN — FENTANYL CITRATE 100 MCG: 50 INJECTION, SOLUTION INTRAMUSCULAR; INTRAVENOUS at 12:56

## 2020-04-24 RX ADMIN — GLYCOPYRROLATE 0.8 MG: 0.2 INJECTION, SOLUTION INTRAMUSCULAR; INTRAVENOUS at 14:07

## 2020-04-24 RX ADMIN — BUPROPION HYDROCHLORIDE 150 MG: 150 TABLET, FILM COATED, EXTENDED RELEASE ORAL at 08:00

## 2020-04-24 RX ADMIN — MIDAZOLAM 2 MG: 1 INJECTION INTRAMUSCULAR; INTRAVENOUS at 12:51

## 2020-04-24 RX ADMIN — BUPIVACAINE HYDROCHLORIDE 30 ML: 5 INJECTION, SOLUTION EPIDURAL; INTRACAUDAL at 14:02

## 2020-04-24 RX ADMIN — SODIUM CHLORIDE: 9 INJECTION, SOLUTION INTRAVENOUS at 09:07

## 2020-04-24 RX ADMIN — TAZOBACTAM SODIUM AND PIPERACILLIN SODIUM 3.38 G: 375; 3 INJECTION, SOLUTION INTRAVENOUS at 01:00

## 2020-04-24 RX ADMIN — PROPOFOL 160 MG: 10 INJECTION, EMULSION INTRAVENOUS at 12:56

## 2020-04-24 RX ADMIN — OXYCODONE HYDROCHLORIDE 10 MG: 5 TABLET ORAL at 15:50

## 2020-04-24 RX ADMIN — TAZOBACTAM SODIUM AND PIPERACILLIN SODIUM 3.38 G: 375; 3 INJECTION, SOLUTION INTRAVENOUS at 06:54

## 2020-04-24 RX ADMIN — SODIUM CHLORIDE, POTASSIUM CHLORIDE, SODIUM LACTATE AND CALCIUM CHLORIDE: 600; 310; 30; 20 INJECTION, SOLUTION INTRAVENOUS at 12:51

## 2020-04-24 RX ADMIN — LEVOTHYROXINE SODIUM 125 MCG: 25 TABLET ORAL at 08:00

## 2020-04-24 RX ADMIN — LIDOCAINE HYDROCHLORIDE 50 MG: 10 INJECTION, SOLUTION INFILTRATION; PERINEURAL at 12:56

## 2020-04-24 RX ADMIN — SODIUM CHLORIDE, POTASSIUM CHLORIDE, SODIUM LACTATE AND CALCIUM CHLORIDE: 600; 310; 30; 20 INJECTION, SOLUTION INTRAVENOUS at 14:30

## 2020-04-24 RX ADMIN — IBUPROFEN 600 MG: 600 TABLET ORAL at 15:48

## 2020-04-24 RX ADMIN — ROCURONIUM BROMIDE 10 MG: 10 INJECTION INTRAVENOUS at 13:45

## 2020-04-24 RX ADMIN — ROCURONIUM BROMIDE 20 MG: 10 INJECTION INTRAVENOUS at 13:23

## 2020-04-24 RX ADMIN — Medication 5 MG: at 14:07

## 2020-04-24 RX ADMIN — ACETAMINOPHEN 975 MG: 325 TABLET, FILM COATED ORAL at 16:41

## 2020-04-24 RX ADMIN — CEFAZOLIN SODIUM 2 G: 2 INJECTION, SOLUTION INTRAVENOUS at 12:59

## 2020-04-24 RX ADMIN — ONDANSETRON HYDROCHLORIDE 4 MG: 2 INJECTION, SOLUTION INTRAVENOUS at 14:07

## 2020-04-24 RX ADMIN — SODIUM CHLORIDE 750 ML: 900 IRRIGANT IRRIGATION at 14:02

## 2020-04-24 RX ADMIN — SODIUM CHLORIDE: 9 INJECTION, SOLUTION INTRAVENOUS at 01:00

## 2020-04-24 SDOH — HEALTH STABILITY: MENTAL HEALTH: CURRENT SMOKER: 0

## 2020-04-24 NOTE — ANESTHESIA CARE TRANSFER NOTE
Patient: Amina Pineda    Procedure(s):  CHOLECYSTECTOMY, LAPAROSCOPIC    Diagnosis: Cholecystitis [K81.9]  Diagnosis Additional Information: No value filed.    Anesthesia Type:   General     Note:  Airway :Face Mask  Patient transferred to:PACU  Comments: VSS.  Spontaneously breathing O2 per simple face mask.  Report given to RN.Handoff Report: Identifed the Patient, Identified the Reponsible Provider, Reviewed the pertinent medical history, Discussed the surgical course, Reviewed Intra-OP anesthesia mangement and issues during anesthesia, Set expectations for post-procedure period and Allowed opportunity for questions and acknowledgement of understanding      Vitals: (Last set prior to Anesthesia Care Transfer)    CRNA VITALS  4/24/2020 1402 - 4/24/2020 1436      4/24/2020             SpO2:  100 %                Electronically Signed By: GAEL Fragoso CRNA  April 24, 2020  2:36 PM

## 2020-04-24 NOTE — DISCHARGE SUMMARY
[facility] Outpatient / Observation Unit  Discharge Summary        Amina Pineda MRN# [id number]   YOB: 1960 Age: 60 year old     Date of Admission:  4/23/2020  Date of Discharge:  4/24/2020  Admitting Physician:  Dayan Dela Cruz MD  Discharge Physician: Marcela Navarro PA-C  Discharging Service: Hospitalist      Primary Provider: Briana Melton  Primary Care Physician Phone Number: [phone]         Primary Discharge Diagnoses:    Amina Pineda is a 60 year old woman with PMH significant for GERD, hypothyroidism, obesity, ANDREW, depression, osteoarthritis, restless leg syndrome, and hyperlipidemia who was admitted on 4/23/2020 for intractable acute cholecystitis.     #Early acute calculous cholecystitis   #S/p laparoscopic cholecystectomy     #Chronic diarrhea: ongoing since 09/2019, function versus organic, GI evaluated patient for possible CBD during this hospital stay and felt since the patient had an incomplete colonoscopy in 2015 she would benefit from have this scheduled in the outpatient setting (will be contacted by GI to set this up).        Secondary Discharge Diagnoses:     Past Medical History:   Diagnosis Date     Decreased libido 11/6/2006     Depressive disorder, not elsewhere classified      Esophageal reflux      Generalized osteoarthrosis, unspecified site     R hip, on meds     Herpes simplex without mention of complication     oral     Intramural leiomyoma of uterus      Unspecified hypothyroidism             Code Status:      Full Code        Brief Hospital Summary:        Reason for your hospital stay      You were admitted for concerns of stomach pain and nausea/vomiting   related to your gallbladder. Imaging in the ER showed that your   gallbladder was inflamed due to a large gallstone. There was not evidence   of a stone in the biliary tract. You were treated with antibiotics   initially. Your pain and nausea was controlled with narcotics and anti   nausea  medications. You were seen by surgery who opted to remove your   gallbladder. You were doing fine post operatively to allowed to discharge   home.           Please refer to initial admission history and physical for further details.   Briefly, Amina Pineda was admitted on 4/23/2020 with intractable biliary colic/acute cholecystitis. Initial work up in the ED did not reveal evidence of sepsis to require inpatient hospitalization. Pt was registered to the Observation Unit for pain control and supportive measures.     Pt was resuscitated with IVF, and anti-emetics. Laboratory and imaging results indicated: large (2.3 cm) immobile stone in the neck of the gallbladder, positive Rivera's sign with normal LFTs. General surgery was consulted. Patient underwent laparoscopic cholecystectomy (please see detailed operative report). Post-op, pt did well and stable to discharge home from recovery. Pt is instructed to follow up as below.             Significant Lab During Hospitalization:      Recent Labs   Lab 04/24/20  0526 04/23/20  1134   WBC 5.6 8.0   HGB 12.5 14.3   HCT 39.9 44.7   MCV 93 92    263     Recent Labs   Lab 04/24/20  0526 04/23/20  1134    138   POTASSIUM 3.8 4.0   CHLORIDE 111* 106   CO2 28 28   ANIONGAP 3 4   GLC 98 113*   BUN 10 14   CR 0.88 0.94   GFRESTIMATED 71 65   GFRESTBLACK 82 75   JOSE 8.0* 9.1   PROTTOTAL 6.1* 7.5   ALBUMIN 2.8* 3.4   BILITOTAL 1.0 0.5   ALKPHOS 96 134   AST 14 19   ALT 21 27            Significant Imaging During Hospitalization:      Results for orders placed or performed during the hospital encounter of 04/23/20   US Abdomen Limited (RUQ)    Narrative    ULTRASOUND ABDOMEN LIMITED April 23, 2020 12:26 PM    CLINICAL HISTORY: Right upper quadrant tenderness, positive Rivera's  sign.    TECHNIQUE: Limited abdominal ultrasound.    COMPARISON: None.    FINDINGS:    GALLBLADDER: Shadowing gallstone in the neck of the gallbladder  measures 2.3 cm. Questionable mild  gallbladder wall thickening. No  pericholecystic fluid. Patient reports tenderness with direct  transducer pressure over the gallbladder.    BILE DUCTS: There is no biliary dilatation. The common duct measures 5  mm.    LIVER: Normal where seen.    RIGHT KIDNEY: No hydronephrosis.    PANCREAS: The visualized portions of the pancreas are normal.    No ascites.      Impression    IMPRESSION: Large immobile stone in the neck of the gallbladder with  questionable mild gallbladder wall thickening and a sonographic  Rivera's sign. Findings suggestive of early acute cholecystitis.    ROMELIA FISHER MD   XR Chest 2 Views    Narrative    CHEST TWO VIEWS April 23, 2020 1:02 PM     HISTORY: Chest pain.    COMPARISON: 9/30/2014.    FINDINGS: Heart size and pulmonary vascularity are within normal  limits. The lungs are clear. No pneumothorax or pleural effusion.       Impression    IMPRESSION: No radiographic evidence of acute chest abnormality.     ROMELIA FISHER MD              Pending Results:      None        Consultations This Hospital Stay:      Consultation during this admission received from gastroenterology and surgery         Discharge Instructions and Follow-Up:      Follow-up Appointments     Follow-up and recommended labs and tests       Follow up with primary care provider, Briana Melton, within 7 days for   hospital follow- up.  No follow up labs or test are needed.  Follow up with general surgery per their recommendations.               Discharge Disposition:      Discharged to home         Discharge Medications:        Current Discharge Medication List      CONTINUE these medications which have NOT CHANGED    Details   albuterol (PROAIR HFA/PROVENTIL HFA/VENTOLIN HFA) 108 (90 Base) MCG/ACT inhaler Inhale 2 puffs into the lungs every 6 hours as needed for shortness of breath / dyspnea or wheezing  Qty: 18 g, Refills: 11    Associated Diagnoses: Acquired hypothyroidism; Menopausal syndrome (hot flushes);  Gastroesophageal reflux disease, esophagitis presence not specified; Decreased libido; High risk medication use; Encounter for screening mammogram for breast cancer      buPROPion (WELLBUTRIN XL) 150 MG 24 hr tablet Take 1 tablet (150 mg) by mouth every morning  Qty: 90 tablet, Refills: 3    Associated Diagnoses: Acquired hypothyroidism; Menopausal syndrome (hot flushes); Gastroesophageal reflux disease, esophagitis presence not specified; Decreased libido; High risk medication use; Encounter for screening mammogram for breast cancer      cyclobenzaprine (FLEXERIL) 10 MG tablet Take 0.5-1 tablets (5-10 mg) by mouth 3 times daily as needed for muscle spasms  Qty: 90 tablet, Refills: 1    Associated Diagnoses: Chronic low back pain, unspecified back pain laterality, with sciatica presence unspecified      estrogen conj (PREMARIN) 0.3 MG tablet Take 1 tablet (0.3 mg) by mouth every other day  Qty: 45 tablet, Refills: 3    Associated Diagnoses: Menopausal syndrome (hot flushes); Acquired hypothyroidism; Gastroesophageal reflux disease, esophagitis presence not specified; Decreased libido; High risk medication use; Encounter for screening mammogram for breast cancer      famciclovir (FAMVIR) 500 MG tablet Take 1 tablet (500 mg) by mouth 2 times daily as needed  Qty: 180 tablet, Refills: 3    Associated Diagnoses: Herpes simplex virus (HSV) infection      ferrous sulfate (FEROSUL) 325 (65 Fe) MG tablet Take 325 mg by mouth daily (with breakfast)      hydroquinone 4 % CREA Externally apply topically At Bedtime Apply a thin layer to dark areas once nightly for no more than 8 weeks at a time. Take breaks between use.  Qty: 56.8 g, Refills: 0    Associated Diagnoses: Solar lentigo      levothyroxine (SYNTHROID/LEVOTHROID) 125 MCG tablet TAKE ONE TABLET BY MOUTH ONCE DAILY  Qty: 30 tablet, Refills: 0    Associated Diagnoses: Acquired hypothyroidism; Menopausal syndrome (hot flushes); Gastroesophageal reflux disease,  "esophagitis presence not specified; Decreased libido; High risk medication use; Encounter for screening mammogram for breast cancer      nystatin (MYCOSTATIN) 307730 UNIT/GM external powder Apply 30 g topically 3 times daily as needed  Qty: 30 g, Refills: 1    Associated Diagnoses: Yeast infection of the skin      omeprazole (PRILOSEC) 20 MG DR capsule Take 1 capsule (20 mg) by mouth daily  Qty: 90 capsule, Refills: 11    Associated Diagnoses: Gastroesophageal reflux disease, esophagitis presence not specified; Decreased libido; Acquired hypothyroidism; Menopausal syndrome (hot flushes); High risk medication use; Encounter for screening mammogram for breast cancer      Vitamins-Lipotropics (LIPOFLAVONOID) TABS Take 1 tablet by mouth 2 times daily         STOP taking these medications       aspirin 81 MG EC tablet Comments:   Reason for Stopping:                   Allergies:         Allergies   Allergen Reactions     No Known Drug Allergies          Condition and Physical on Discharge:      Discharge condition: Stable   Vitals: Blood pressure 109/40, pulse 62, temperature 97.4  F (36.3  C), temperature source Oral, resp. rate 16, height 1.727 m (5' 8\"), weight 108.1 kg (238 lb 6.4 oz), SpO2 95 %, not currently breastfeeding.  238 lbs 6.4 oz      GENERAL:  Comfortable.  PSYCH: pleasant, oriented, No acute distress.  HEENT:  PERRLA. Normal conjunctiva, normal hearing, nasal mucosa and oropharynx are normal.  NECK:  Supple, no neck vein distention, adenopathy or bruits, normal thyroid.  HEART:  Normal S1, S2 with no murmur, no pericardial rub, gallops or S3 or S4.  LUNGS:  Clear to auscultation, normal respiratory effort. No wheezing, rales or ronchi.  ABDOMEN:  Soft, no hepatosplenomegaly, normal bowel sounds. Mild RUQ tenderness prior to surgery.  EXTREMITIES:  No pedal edema, +2 pulses bilateral and equal.  SKIN:  Dry to touch, No rash, wound or ulcerations.  NEUROLOGIC:  CN 2-12 intact, BL 5/5 symmetric upper and " lower extremity strength, sensation is intact with no focal deficits.     Marcela Navarro PA-C

## 2020-04-24 NOTE — OP NOTE
General Surgery Operative Note    PREOPERATIVE DIAGNOSIS:  Cholecystitis [K81.9]    POSTOPERATIVE DIAGNOSIS:  Same    PROCEDURE:  Laparoscopic Cholecystectomy    ANESTHESIA:  General    PREOPERATIVE MEDICATIONS:  Ancef IV.    SURGEON:  Janett Chan MD    ASSISTANT:  Malgorzata Trejo PA-C    ESTIMATED BLOOD LOSS:  70 ml    INDICATIONS:  Amina Pineda is a 60 year old female who has been experiencing episodes of RUQ abdominal pain for the past day associated with nausea.  Abdominal imaging has revealed a large gallstone impacted in the neck of the gallbladder with gallbladder wall thickening.  She now presents for laparoscopic cholecystectomy after having risks and benefits reviewed in detail.    PROCEDURE:   A supraumbilical incision was made and the abdomen was entered using a Hu trocar technique.  Secondary trocars were placed under laparoscopic guidance.  We proceeded in the usual fashion first finding the gallbladder neck cystic duct junction.  The cystic duct was then identified and cleared of inflammatory adhesions. The cystic artery was similarly identified.  Once a critical window of safety was achieved, the cystic duct was triply clipped and divided.  The cystic artery was also triply clipped and divided. An additional posterior branch of the cystic artery, entering directed into the gallbladder wall, was also clipped and divided. The gallbladder was removed from the gallbladder bed using cautery.  Once free, the gallbladder was extracted from the supraumbilical site in an EndoCatch bag.  The camera was repositioned and the right upper quadrant inspected.  The right upper quadrant was irrigated with the saline solution.  Returns were clear.  Trocar sites were then infiltrated with 0.5% Marcaine plain. The supraumbilical fascial opening was closed with interrupted 0 Vicryl. The skin was closed using 4-0 subcuticular vicryl. Steri-Strips were placed on the incisions. The patient was transferred to  recovery in good condition.        INTRAOPERATIVE FINDINGS: Moderately inflamed gallbladder    Specimens:   ID Type Source Tests Collected by Time Destination   A : Gallbladder and Contents  Tissue Gallbladder and Contents SURGICAL PATHOLOGY EXAM Janett Chan MD 4/24/2020  2:01 PM        Janett Chan MD

## 2020-04-24 NOTE — ANESTHESIA POSTPROCEDURE EVALUATION
Patient: Amina Pineda    Procedure(s):  CHOLECYSTECTOMY, LAPAROSCOPIC    Diagnosis:Cholecystitis [K81.9]  Diagnosis Additional Information: No value filed.    Anesthesia Type:  General    Note:  Anesthesia Post Evaluation    Patient location during evaluation: PACU  Patient participation: Able to fully participate in evaluation  Level of consciousness: awake and alert  Pain management: adequate  Airway patency: patent  Cardiovascular status: acceptable  Respiratory status: acceptable  Hydration status: acceptable  PONV: controlled             Last vitals:  Vitals:    04/24/20 1500 04/24/20 1515 04/24/20 1530   BP: (!) 154/78 (!) 147/72 138/79   Pulse: 69     Resp: 15 18 13   Temp:      SpO2: 99% 98% 94%         Electronically Signed By: Jules Huerta MD  April 24, 2020  3:46 PM

## 2020-04-24 NOTE — PLAN OF CARE
"PRIMARY DIAGNOSIS: BILIARY COLIC/UNCOMPLICATED EARLY ACUTE CHOLECYSTITIS    OUTPATIENT/OBSERVATION GOALS TO BE MET BEFORE DISCHARGE:    1. Pain status:  Pain currently tolerable  2. Stable vital signs and labs (if performed) at disposition: Yes  3. Tolerating adequate PO diet: Yes, clear liquid, NPO after 2400  4. Successful cholecystectomy or clear follow up plan with General Surgery team if immediate surgery not performed :  General Surgery Consult completed  5. ADLs back to baseline?  No  6. Activity and level of assistance: Ambulating independently.  7. Barriers to discharge noted Yes, unless medically and surgically cleared    Blood pressure 127/52, pulse 75, temperature 98  F (36.7  C), temperature source Oral, resp. rate 24, height 1.727 m (5' 8\"), weight 108.1 kg (238 lb 6.4 oz), SpO2 95 %, not currently breastfeeding.    Vital signs remain stable.  Adequate sats on room air.   Patient appears comfortable.  Patient tolerating clear liquid meal tray without nausea or emesis.  Maintenance IV continues at 125 ccs/hour. Troponin negative. Patient is independent in room.  Plan:  Continue to provide supportive cares.           Discharge Planner Nurse   Safe discharge environment identified: Yes  Barriers to discharge: Yes, unless medically and surgically cleared       Entered by: Karen M. Lesch 04/23/2020  20:00 PM     Please review provider order for any additional goals.   Nurse to notify provider when observation goals have been met and patient is ready for discharge.  "

## 2020-04-24 NOTE — CONSULTS
[facility] Consultation    We were asked to see this patient in consultation by Dr. Dela Cruz for evaluation of cholecystitis.    HPI: Amina Pineda is a 60 year old female with obesity, hypothyroidism, GERD, admitted on 4/23/2020 for abdominal pain.   She started having escalating epigastric and back pain yesterday. She also reported diaphoresis and chills, denies N/V. She has been having intermittent diarrhea and light colored stools since last 6 months. US abdomen showed cholelithiasis with possible cholecystitis. LFTs were normal. She was seen and evaluated by surgery. She is NPO. Her pain is improving.   She had incomplete colonoscopy in 2015 d/t sharp turn in sigmoid. This was followed by barium enema.     ROS:   no chest pain, no SOB, all other ROS negative except for what is noted in HPI  Past Medical History:   Diagnosis Date     Decreased libido 11/6/2006     Depressive disorder, not elsewhere classified      Esophageal reflux      Generalized osteoarthrosis, unspecified site     R hip, on meds     Herpes simplex without mention of complication     oral     Intramural leiomyoma of uterus      Unspecified hypothyroidism      SHx:  Denies smoking or drugs  FHx:  No fh/o CRC    Home medications:   Medications Prior to Admission   Medication Sig Dispense Refill Last Dose     albuterol (PROAIR HFA/PROVENTIL HFA/VENTOLIN HFA) 108 (90 Base) MCG/ACT inhaler Inhale 2 puffs into the lungs every 6 hours as needed for shortness of breath / dyspnea or wheezing 18 g 11      aspirin 81 MG EC tablet Take 81 mg by mouth At Bedtime   4/22/2020 at Unknown time     buPROPion (WELLBUTRIN XL) 150 MG 24 hr tablet Take 1 tablet (150 mg) by mouth every morning 90 tablet 3 4/23/2020 at Unknown time     cyclobenzaprine (FLEXERIL) 10 MG tablet Take 0.5-1 tablets (5-10 mg) by mouth 3 times daily as needed for muscle spasms 90 tablet 1      estrogen conj (PREMARIN) 0.3 MG tablet Take 1 tablet (0.3 mg) by mouth every other  "day 45 tablet 3 4/23/2020 at Unknown time     famciclovir (FAMVIR) 500 MG tablet Take 1 tablet (500 mg) by mouth 2 times daily as needed 180 tablet 3      ferrous sulfate (FEROSUL) 325 (65 Fe) MG tablet Take 325 mg by mouth daily (with breakfast)   4/23/2020 at Unknown time     hydroquinone 4 % CREA Externally apply topically At Bedtime Apply a thin layer to dark areas once nightly for no more than 8 weeks at a time. Take breaks between use. 56.8 g 0      levothyroxine (SYNTHROID/LEVOTHROID) 125 MCG tablet TAKE ONE TABLET BY MOUTH ONCE DAILY 30 tablet 0 4/23/2020 at Unknown time     nystatin (MYCOSTATIN) 716517 UNIT/GM external powder Apply 30 g topically 3 times daily as needed 30 g 1      omeprazole (PRILOSEC) 20 MG DR capsule Take 1 capsule (20 mg) by mouth daily 90 capsule 11 4/23/2020 at Unknown time     Vitamins-Lipotropics (LIPOFLAVONOID) TABS Take 1 tablet by mouth 2 times daily   4/23/2020 at Unknown time     Allergies:  Allergies   Allergen Reactions     No Known Drug Allergies        PHYSICAL EXAM:   Appearance:   well developed well nourished female in no acute distress, alert and oriented X 3  /40 (BP Location: Left arm)   Pulse 62   Temp 97.4  F (36.3  C) (Oral)   Resp 16   Ht 1.727 m (5' 8\")   Wt 108.1 kg (238 lb 6.4 oz)   SpO2 95%   BMI 36.25 kg/m    Eyes:    no slceral icterus  Ears, nose, throat:  within normal limits for age  Lymph nodes (cervical):   not enlarged, nontender  Respirations:   unlabored  Heart: regular rate and rhythm  Musculo-skelatal (lower extremities):   no edema, no joint swelling, no erythema, no increased warmth  Gastrointestinal:   nondistended, +BS, soft,   Skin:   clear, without lesions    LABS:    [unfilled]  [unfilled]  [unfilled]  [unfilled]  Radiology:     US IMPRESSION: Large immobile stone in the neck of the gallbladder with  questionable mild gallbladder wall thickening and a sonographic  Rivera's sign. Findings suggestive of early acute " cholecystitis.       Consultation Assessment & Plan:     Symptomatic cholelithiasis with possible cholecystitis: No evidence of biliary obstruction or cholangitis. She has non-dilated duct and normal LFTs. Her pain is improving.   Diarrhea: Chronic diarrhea: functional vs organic ( microscopic colitis, celiac sprure, SIBO, IBD or etc.). She had an incomplete colonoscopy in 2015 due to sharp turn in sigmoid colon, this was followed by barium enema.     -- Proceed with cholecystectomy ( timing per surgery).  -- No need for any additional biliary imaging/procedure at this point.  -- I will schedule for colonoscopy with [facility] as outpatient.   GI will sign off.      Thank you for the opportunity in letting us participate in the care of Amina Pineda.    Venancio Rodgers MD

## 2020-04-24 NOTE — ANESTHESIA PREPROCEDURE EVALUATION
Anesthesia Pre-Procedure Evaluation    Patient: Amina Pineda   MRN: [id number] : 1960          Preoperative Diagnosis: Cholecystitis [K81.9]    Procedure(s):  CHOLECYSTECTOMY, LAPAROSCOPIC    Past Medical History:   Diagnosis Date     Decreased libido 2006     Depressive disorder, not elsewhere classified      Esophageal reflux      Generalized osteoarthrosis, unspecified site     R hip, on meds     Herpes simplex without mention of complication     oral     Intramural leiomyoma of uterus      Unspecified hypothyroidism      Past Surgical History:   Procedure Laterality Date     C LIGATE FALLOPIAN TUBE       C NONSPECIFIC PROCEDURE      rotator cuff surgery both shoulder     C NONSPECIFIC PROCEDURE      wrist surgery for cyst x 2     C VAGINAL HYSTERECTOMY      with BSO, 10-12 week size     COLONOSCOPY  2015     COLONOSCOPY WITH CO2 INSUFFLATION N/A 2015    Procedure: COLONOSCOPY WITH CO2 INSUFFLATION;  Surgeon: Bebeto Mortensen MD;  Location: [facility] OR     ELBOW SURGERY      Lt elbow repair.     HYSTERECTOMY, PAP NO LONGER INDICATED       ORTHOPEDIC SURGERY       SOFT TISSUE SURGERY      cyst, both shoulders and left elbow     Anesthesia Evaluation     .             ROS/MED HX    ENT/Pulmonary:     (+)sleep apnea, uses CPAP , . .    Neurologic:  - neg neurologic ROS    (-) Other neuro hx and Neuropathy   Cardiovascular:        (-) hypertension, syncope and irregular heartbeat/palpitations   METS/Exercise Tolerance:     Hematologic:  - neg hematologic  ROS      (-) anemia   Musculoskeletal:   (+) arthritis,  -       GI/Hepatic:     (+) GERD cholecystitis/cholelithiasis,       Renal/Genitourinary:  - ROS Renal section negative       Endo:     (+) thyroid problem hypothyroidism, Obesity ( 34), .      Psychiatric:     (+) psychiatric history depression      Infectious Disease:  - neg infectious disease ROS       Malignancy:         Other:                          Physical  "Exam  Normal systems: cardiovascular, pulmonary and dental    Airway   Mallampati: II  TM distance: >3 FB  Neck ROM: full    Dental     Cardiovascular       Pulmonary             Lab Results   Component Value Date    WBC 5.6 04/24/2020    HGB 12.5 04/24/2020    HCT 39.9 04/24/2020     04/24/2020    SED 10 12/21/2011     04/24/2020    POTASSIUM 3.8 04/24/2020    CHLORIDE 111 (H) 04/24/2020    CO2 28 04/24/2020    BUN 10 04/24/2020    CR 0.88 04/24/2020    GLC 98 04/24/2020    JOSE 8.0 (L) 04/24/2020    MAG 2.0 04/05/2019    ALBUMIN 2.8 (L) 04/24/2020    PROTTOTAL 6.1 (L) 04/24/2020    ALT 21 04/24/2020    AST 14 04/24/2020    ALKPHOS 96 04/24/2020    BILITOTAL 1.0 04/24/2020    LIPASE 117 04/23/2020    TSH 1.42 04/05/2019    T4 1.16 07/12/2016       Preop Vitals  BP Readings from Last 3 Encounters:   04/24/20 (!) 141/76   04/02/19 136/80   09/28/18 132/78    Pulse Readings from Last 3 Encounters:   04/24/20 70   04/02/19 83   09/13/18 72      Resp Readings from Last 3 Encounters:   04/24/20 16   04/02/19 16   02/23/18 18    SpO2 Readings from Last 3 Encounters:   04/24/20 98%   04/02/19 94%   09/13/18 96%      Temp Readings from Last 1 Encounters:   04/24/20 97.3  F (36.3  C) (Temporal)    Ht Readings from Last 1 Encounters:   04/23/20 1.727 m (5' 8\")      Wt Readings from Last 1 Encounters:   04/23/20 108.1 kg (238 lb 6.4 oz)    Estimated body mass index is 36.25 kg/m  as calculated from the following:    Height as of this encounter: 1.727 m (5' 8\").    Weight as of this encounter: 108.1 kg (238 lb 6.4 oz).       Anesthesia Plan      History & Physical Review  History and physical reviewed and following examination; no interval change.    ASA Status:  2 .    NPO Status:  > 8 hours    Plan for General with Intravenous induction. Maintenance will be Balanced.    PONV prophylaxis:  Ondansetron (or other 5HT-3) and Dexamethasone or Solumedrol  Additional equipment: Videolaryngoscope   The patient is not a " current smoker  and patient did not smoke on day of surgery     Postoperative Care  Postoperative pain management:  IV analgesics.      Consents  Anesthetic plan, risks, benefits and alternatives discussed with:  Patient..                 Jules Huerta MD                    .

## 2020-04-24 NOTE — PROGRESS NOTES
"[facility]  General Surgery Progress Note           Assessment and Plan:   Assessment:   Symptomatic cholelithiasis/early cholecystitis      Plan:   - To OR today for lap leonel  - Likely discharge from recovery [facility]         Interval History:   Pain is better today, but still having occasional \"twinges\" in RUQ. Afebrile. No nausea.         Physical Exam:   Blood pressure 109/40, pulse 62, temperature 97.4  F (36.3  C), temperature source Oral, resp. rate 16, height 1.727 m (5' 8\"), weight 108.1 kg (238 lb 6.4 oz), SpO2 95 %, not currently breastfeeding.    I/O last 3 completed shifts:  In: 1643.75 [I.V.:1643.75]  Out: -     Constitutional:   awake, alert, cooperative, no apparent distress, and appears stated age     Abdomen:   Minimal tenderness in RUQ             Data:     Recent Labs   Lab 04/24/20  0526 04/23/20  1134   WBC 5.6 8.0   HGB 12.5 14.3   HCT 39.9 44.7   MCV 93 92    263     Recent Labs   Lab 04/24/20  0526 04/23/20  1134    138   POTASSIUM 3.8 4.0   CHLORIDE 111* 106   CO2 28 28   ANIONGAP 3 4   GLC 98 113*   BUN 10 14   CR 0.88 0.94   GFRESTIMATED 71 65   GFRESTBLACK 82 75   JOSE 8.0* 9.1   PROTTOTAL 6.1* 7.5   ALBUMIN 2.8* 3.4   BILITOTAL 1.0 0.5   ALKPHOS 96 134   AST 14 19   ALT 21 27     Abdominal ultrasound:   Borderline gallbladder wall thickening, large 2.3 cm gallstone at the neck of the gallbladder, 5 mm CBD       Janett Chan MD       "

## 2020-04-24 NOTE — PROGRESS NOTES
"PRIMARY DIAGNOSIS: BILIARY COLIC/UNCOMPLICATED EARLY ACUTE CHOLECYSTITIS  OUTPATIENT/OBSERVATION GOALS TO BE MET BEFORE DISCHARGE:    1. Pain status: Just taking Tylenol   2. Stable vital signs and labs (if performed) at disposition: Yes  3. Tolerating adequate PO diet: NPO  4. Successful cholecystectomy or clear follow up plan with General Surgery team if immediate surgery not performed: Surgery at 1109  5. ADLs back to baseline?  Yes  6. Activity and level of assistance: Ambulating independently.  7. Barriers to discharge noted Yes surgery at 1109    Discharge Planner Nurse   Safe discharge environment identified: Yes  Barriers to discharge: Yes       Entered by: Ana Savage 04/24/2020 8:54 AM     Please review provider order for any additional goals.   Nurse to notify provider when observation goals have been met and patient is ready for discharge.    Patient is alert and oriented x4. VS WNL and documented on the FS. Lung sounds clear in all lobes and patient is on RA. Denies SOB. Active bowel sounds in all 4 quadrants. Patient denies any numbness or tingling. IV fluids infusing at 125 ml/hr. Continues on Zosyn. NPO. Independent in room. Taking Tylenol for pain. Plan: OR at 1109.    /40 (BP Location: Left arm)   Pulse 62   Temp 97.4  F (36.3  C) (Oral)   Resp 16   Ht 1.727 m (5' 8\")   Wt 108.1 kg (238 lb 6.4 oz)   SpO2 95%   BMI 36.25 kg/m      "

## 2020-04-24 NOTE — PLAN OF CARE
PRIMARY DIAGNOSIS: BILIARY COLIC/UNCOMPLICATED EARLY ACUTE CHOLECYSTITIS  OUTPATIENT/OBSERVATION GOALS TO BE MET BEFORE DISCHARGE:    1. Pain status: Pain free.  2. Stable vital signs and labs (if performed) at disposition: Yes  3. Tolerating adequate PO diet: No, NPO  4. Successful cholecystectomy or clear follow up plan with General Surgery team if immediate surgery not performed No, awaiting final surgical decision  5. ADLs back to baseline?  Yes  6. Activity and level of assistance: Ambulating independently.  7. Barriers to discharge noted No    Discharge Planner Nurse   Safe discharge environment identified: Yes  Barriers to discharge: No       Entered by: Amanda Wynne 04/24/2020 1:45 AM     Please review provider order for any additional goals.   Nurse to notify provider when observation goals have been met and patient is ready for discharge.      Pt is alert and oriented, up ad priya in the room. Pt denies pain. NPO for GI consult and final planning

## 2020-04-24 NOTE — PLAN OF CARE
PRIMARY DIAGNOSIS: BILIARY COLIC/UNCOMPLICATED EARLY ACUTE CHOLECYSTITIS  OUTPATIENT/OBSERVATION GOALS TO BE MET BEFORE DISCHARGE:     1. Pain status: Pain free.  2. Stable vital signs and labs (if performed) at disposition: Yes  3. Tolerating adequate PO diet: No, NPO  4. Successful cholecystectomy or clear follow up plan with General Surgery team if immediate surgery not performed No, awaiting final surgical decision  5. ADLs back to baseline?  Yes  6. Activity and level of assistance: Ambulating independently.  7. Barriers to discharge noted No     Discharge Planner Nurse   Safe discharge environment identified: Yes  Barriers to discharge: No       Please review provider order for any additional goals.   Nurse to notify provider when observation goals have been met and patient is ready for discharge.        Pt is alert and oriented, up ad priya in the room. Pt denies pain. NPO for GI consult and final planning

## 2020-04-27 LAB — COPATH REPORT: NORMAL

## 2020-05-01 ENCOUNTER — VIRTUAL VISIT (OUTPATIENT)
Dept: INTERNAL MEDICINE | Facility: CLINIC | Age: 60
End: 2020-05-01
Payer: COMMERCIAL

## 2020-05-01 DIAGNOSIS — R68.82 DECREASED LIBIDO: ICD-10-CM

## 2020-05-01 DIAGNOSIS — M54.50 CHRONIC LOW BACK PAIN WITHOUT SCIATICA, UNSPECIFIED BACK PAIN LATERALITY: ICD-10-CM

## 2020-05-01 DIAGNOSIS — E03.9 ACQUIRED HYPOTHYROIDISM: Chronic | ICD-10-CM

## 2020-05-01 DIAGNOSIS — B00.9 HERPES SIMPLEX VIRUS (HSV) INFECTION: ICD-10-CM

## 2020-05-01 DIAGNOSIS — B37.31 CANDIDIASIS OF VAGINA: ICD-10-CM

## 2020-05-01 DIAGNOSIS — N95.1 MENOPAUSAL SYNDROME (HOT FLUSHES): ICD-10-CM

## 2020-05-01 DIAGNOSIS — R73.01 IMPAIRED FASTING GLUCOSE: ICD-10-CM

## 2020-05-01 DIAGNOSIS — K21.9 GASTROESOPHAGEAL REFLUX DISEASE, ESOPHAGITIS PRESENCE NOT SPECIFIED: ICD-10-CM

## 2020-05-01 DIAGNOSIS — G89.29 CHRONIC LOW BACK PAIN WITHOUT SCIATICA, UNSPECIFIED BACK PAIN LATERALITY: ICD-10-CM

## 2020-05-01 DIAGNOSIS — R19.7 DIARRHEA, UNSPECIFIED TYPE: ICD-10-CM

## 2020-05-01 DIAGNOSIS — Z90.49 STATUS POST CHOLECYSTECTOMY: Primary | ICD-10-CM

## 2020-05-01 DIAGNOSIS — Z79.899 HIGH RISK MEDICATION USE: ICD-10-CM

## 2020-05-01 DIAGNOSIS — Z12.31 VISIT FOR SCREENING MAMMOGRAM: ICD-10-CM

## 2020-05-01 DIAGNOSIS — Z12.31 ENCOUNTER FOR SCREENING MAMMOGRAM FOR BREAST CANCER: ICD-10-CM

## 2020-05-01 PROCEDURE — 99442 ZZC PHYSICIAN TELEPHONE EVALUATION 11-20 MIN: CPT | Mod: TEL | Performed by: INTERNAL MEDICINE

## 2020-05-01 RX ORDER — FAMCICLOVIR 500 MG/1
500 TABLET ORAL 2 TIMES DAILY PRN
Qty: 180 TABLET | Refills: 3 | Status: SHIPPED | OUTPATIENT
Start: 2020-05-01 | End: 2022-02-09

## 2020-05-01 RX ORDER — FLUCONAZOLE 150 MG/1
150 TABLET ORAL ONCE
Qty: 1 TABLET | Refills: 0 | Status: SHIPPED | OUTPATIENT
Start: 2020-05-01 | End: 2020-05-01

## 2020-05-01 RX ORDER — ALBUTEROL SULFATE 90 UG/1
2 AEROSOL, METERED RESPIRATORY (INHALATION) EVERY 6 HOURS PRN
Qty: 18 G | Refills: 11 | Status: SHIPPED | OUTPATIENT
Start: 2020-05-01 | End: 2022-02-09

## 2020-05-01 RX ORDER — CYCLOBENZAPRINE HCL 10 MG
5-10 TABLET ORAL 3 TIMES DAILY PRN
Qty: 90 TABLET | Refills: 1 | Status: SHIPPED | OUTPATIENT
Start: 2020-05-01 | End: 2021-03-05

## 2020-05-01 RX ORDER — BUPROPION HYDROCHLORIDE 150 MG/1
150 TABLET ORAL EVERY MORNING
Qty: 90 TABLET | Refills: 3 | Status: SHIPPED | OUTPATIENT
Start: 2020-05-01 | End: 2021-07-14

## 2020-05-01 RX ORDER — LEVOTHYROXINE SODIUM 125 UG/1
125 TABLET ORAL DAILY
Qty: 90 TABLET | Refills: 3 | Status: SHIPPED | OUTPATIENT
Start: 2020-05-01 | End: 2020-05-07

## 2020-05-01 NOTE — PROGRESS NOTES
"Amina Pineda is a 60 year old female who is being evaluated via a billable telephone visit.      The patient has been notified of following:     \"This telephone visit will be conducted via a call between you and your physician/provider. We have found that certain health care needs can be provided without the need for a physical exam.  This service lets us provide the care you need with a short phone conversation.  If a prescription is necessary we can send it directly to your pharmacy.  If lab work is needed we can place an order for that and you can then stop by our lab to have the test done at a later time.    Telephone visits are billed at different rates depending on your insurance coverage. During this emergency period, for some insurers they may be billed the same as an in-person visit.  Please reach out to your insurance provider with any questions.    If during the course of the call the physician/provider feels a telephone visit is not appropriate, you will not be charged for this service.\"    Patient has given verbal consent for Telephone visit?  Yes    How would you like to obtain your AVS? MyChart    Subjective     Amina Pineda is a 60 year old female who presents to clinic today for the following health issues:    HPI   She is status post leonel and is doing well.  Labs were normal.    Her abdomen feels sore.  She is eating low fat.  She is having watery stools as well.  It looks like \"baby poop\".  Happens 3 times per day.  Urgency.  No fever/chills.    She has no history of c diff but was on antibiotics.    She thinks she has a yeast infection too.  She is having itching as well in the vaginal area.           HIST REVIEW/ LINKS 2 (Optional):[id number]}    Reviewed and updated as needed this visit by Provider         Review of Systems    ROS: 10 point ROS neg other than the symptoms noted above in the HPI.        Objective   Reported vitals:  There were no vitals taken for this visit.   healthy, " alert and no distress  PSYCH: Alert and oriented times 3; coherent speech, normal   rate and volume, able to articulate logical thoughts, able   to abstract reason, no tangential thoughts, no hallucinations   or delusions  Her affect is normal  RESP: No cough, no audible wheezing, able to talk in full sentences  Remainder of exam unable to be completed due to telephone visits    Diagnostic Test Results:  Labs reviewed in Epic        Assessment/Plan:  1. Status post cholecystectomy  Doing well.      2. Diarrhea, unspecified type  Likely from cholecystectomy and might improve.  Continue low fat diet.  Will rule out c diff.  If negative stool sample and diarrhea doesn't improve, would add on cholestyramine.    - Clostridium difficile Toxin B PCR; Future    3. Candidiasis of vagina  Will try over the counter and if doesn't work will add on diflucan   - fluconazole (DIFLUCAN) 150 MG tablet; Take 1 tablet (150 mg) by mouth once for 1 dose  Dispense: 1 tablet; Refill: 0    4. Impaired fasting glucose    - **A1C FUTURE anytime; Future    5. Acquired hypothyroidism    - **TSH with free T4 reflex FUTURE anytime; Future  - buPROPion (WELLBUTRIN XL) 150 MG 24 hr tablet; Take 1 tablet (150 mg) by mouth every morning  Dispense: 90 tablet; Refill: 3  - albuterol (PROAIR HFA/PROVENTIL HFA/VENTOLIN HFA) 108 (90 Base) MCG/ACT inhaler; Inhale 2 puffs into the lungs every 6 hours as needed for shortness of breath / dyspnea or wheezing  Dispense: 18 g; Refill: 11  - estrogen conj (PREMARIN) 0.3 MG tablet; Take 1 tablet (0.3 mg) by mouth every other day  Dispense: 45 tablet; Refill: 3  - levothyroxine (SYNTHROID/LEVOTHROID) 125 MCG tablet; Take 1 tablet (125 mcg) by mouth daily  Dispense: 90 tablet; Refill: 3  - omeprazole (PRILOSEC) 20 MG DR capsule; Take 1 capsule (20 mg) by mouth daily  Dispense: 90 capsule; Refill: 11    6. Menopausal syndrome (hot flushes)    - buPROPion (WELLBUTRIN XL) 150 MG 24 hr tablet; Take 1 tablet (150 mg) by  mouth every morning  Dispense: 90 tablet; Refill: 3  - albuterol (PROAIR HFA/PROVENTIL HFA/VENTOLIN HFA) 108 (90 Base) MCG/ACT inhaler; Inhale 2 puffs into the lungs every 6 hours as needed for shortness of breath / dyspnea or wheezing  Dispense: 18 g; Refill: 11  - estrogen conj (PREMARIN) 0.3 MG tablet; Take 1 tablet (0.3 mg) by mouth every other day  Dispense: 45 tablet; Refill: 3  - levothyroxine (SYNTHROID/LEVOTHROID) 125 MCG tablet; Take 1 tablet (125 mcg) by mouth daily  Dispense: 90 tablet; Refill: 3  - omeprazole (PRILOSEC) 20 MG DR capsule; Take 1 capsule (20 mg) by mouth daily  Dispense: 90 capsule; Refill: 11    7. Gastroesophageal reflux disease, esophagitis presence not specified    - buPROPion (WELLBUTRIN XL) 150 MG 24 hr tablet; Take 1 tablet (150 mg) by mouth every morning  Dispense: 90 tablet; Refill: 3  - albuterol (PROAIR HFA/PROVENTIL HFA/VENTOLIN HFA) 108 (90 Base) MCG/ACT inhaler; Inhale 2 puffs into the lungs every 6 hours as needed for shortness of breath / dyspnea or wheezing  Dispense: 18 g; Refill: 11  - estrogen conj (PREMARIN) 0.3 MG tablet; Take 1 tablet (0.3 mg) by mouth every other day  Dispense: 45 tablet; Refill: 3  - levothyroxine (SYNTHROID/LEVOTHROID) 125 MCG tablet; Take 1 tablet (125 mcg) by mouth daily  Dispense: 90 tablet; Refill: 3  - omeprazole (PRILOSEC) 20 MG DR capsule; Take 1 capsule (20 mg) by mouth daily  Dispense: 90 capsule; Refill: 11    8. Decreased libido    - buPROPion (WELLBUTRIN XL) 150 MG 24 hr tablet; Take 1 tablet (150 mg) by mouth every morning  Dispense: 90 tablet; Refill: 3  - albuterol (PROAIR HFA/PROVENTIL HFA/VENTOLIN HFA) 108 (90 Base) MCG/ACT inhaler; Inhale 2 puffs into the lungs every 6 hours as needed for shortness of breath / dyspnea or wheezing  Dispense: 18 g; Refill: 11  - estrogen conj (PREMARIN) 0.3 MG tablet; Take 1 tablet (0.3 mg) by mouth every other day  Dispense: 45 tablet; Refill: 3  - levothyroxine (SYNTHROID/LEVOTHROID) 125 MCG  tablet; Take 1 tablet (125 mcg) by mouth daily  Dispense: 90 tablet; Refill: 3  - omeprazole (PRILOSEC) 20 MG DR capsule; Take 1 capsule (20 mg) by mouth daily  Dispense: 90 capsule; Refill: 11    9. High risk medication use    - buPROPion (WELLBUTRIN XL) 150 MG 24 hr tablet; Take 1 tablet (150 mg) by mouth every morning  Dispense: 90 tablet; Refill: 3  - albuterol (PROAIR HFA/PROVENTIL HFA/VENTOLIN HFA) 108 (90 Base) MCG/ACT inhaler; Inhale 2 puffs into the lungs every 6 hours as needed for shortness of breath / dyspnea or wheezing  Dispense: 18 g; Refill: 11  - estrogen conj (PREMARIN) 0.3 MG tablet; Take 1 tablet (0.3 mg) by mouth every other day  Dispense: 45 tablet; Refill: 3  - levothyroxine (SYNTHROID/LEVOTHROID) 125 MCG tablet; Take 1 tablet (125 mcg) by mouth daily  Dispense: 90 tablet; Refill: 3  - omeprazole (PRILOSEC) 20 MG DR capsule; Take 1 capsule (20 mg) by mouth daily  Dispense: 90 capsule; Refill: 11    10. Encounter for screening mammogram for breast cancer    - buPROPion (WELLBUTRIN XL) 150 MG 24 hr tablet; Take 1 tablet (150 mg) by mouth every morning  Dispense: 90 tablet; Refill: 3  - albuterol (PROAIR HFA/PROVENTIL HFA/VENTOLIN HFA) 108 (90 Base) MCG/ACT inhaler; Inhale 2 puffs into the lungs every 6 hours as needed for shortness of breath / dyspnea or wheezing  Dispense: 18 g; Refill: 11  - estrogen conj (PREMARIN) 0.3 MG tablet; Take 1 tablet (0.3 mg) by mouth every other day  Dispense: 45 tablet; Refill: 3  - levothyroxine (SYNTHROID/LEVOTHROID) 125 MCG tablet; Take 1 tablet (125 mcg) by mouth daily  Dispense: 90 tablet; Refill: 3  - omeprazole (PRILOSEC) 20 MG DR capsule; Take 1 capsule (20 mg) by mouth daily  Dispense: 90 capsule; Refill: 11    11. Herpes simplex virus (HSV) infection    - famciclovir (FAMVIR) 500 MG tablet; Take 1 tablet (500 mg) by mouth 2 times daily as needed  Dispense: 180 tablet; Refill: 3    12. Chronic low back pain without sciatica, unspecified back pain  laterality    - cyclobenzaprine (FLEXERIL) 10 MG tablet; Take 0.5-1 tablets (5-10 mg) by mouth 3 times daily as needed for muscle spasms  Dispense: 90 tablet; Refill: 1    13. Visit for screening mammogram  Discussed with patient risk of breast cancer with continued estrogen treatment   - MA Screen Bilateral w/Yasir; Future    No follow-ups on file.      Phone call duration:  17 minutes  Start 3:03 PM   Stop 3:20    Briana Melton MD

## 2020-05-06 DIAGNOSIS — E03.9 ACQUIRED HYPOTHYROIDISM: Chronic | ICD-10-CM

## 2020-05-06 DIAGNOSIS — R73.01 IMPAIRED FASTING GLUCOSE: ICD-10-CM

## 2020-05-06 LAB
HBA1C MFR BLD: 5.7 % (ref 0–5.6)
T4 FREE SERPL-MCNC: 1.63 NG/DL (ref 0.76–1.46)
TSH SERPL DL<=0.005 MIU/L-ACNC: 0.21 MU/L (ref 0.4–4)

## 2020-05-06 PROCEDURE — 84439 ASSAY OF FREE THYROXINE: CPT | Performed by: INTERNAL MEDICINE

## 2020-05-06 PROCEDURE — 36415 COLL VENOUS BLD VENIPUNCTURE: CPT | Performed by: INTERNAL MEDICINE

## 2020-05-06 PROCEDURE — 84443 ASSAY THYROID STIM HORMONE: CPT | Performed by: INTERNAL MEDICINE

## 2020-05-06 PROCEDURE — 83036 HEMOGLOBIN GLYCOSYLATED A1C: CPT | Performed by: INTERNAL MEDICINE

## 2020-05-07 NOTE — RESULT ENCOUNTER NOTE
Thyroid is over replaced.  Reduce Levothyroxine to 100 mcg daily and recheck labs in 8 weeks.    TSH with reflex, 1 month supply with 3 refills.

## 2020-05-09 ENCOUNTER — NURSE TRIAGE (OUTPATIENT)
Dept: NURSING | Facility: CLINIC | Age: 60
End: 2020-05-09

## 2020-05-09 NOTE — TELEPHONE ENCOUNTER
"Caller is post op  15 days laparoscopic cholecystectomy;  Caller reportss tht she has developed sharp pain  In left upper abdomen that feels like pin poking her and  Pain is radiating  Up under ribs and around to back; pain is constant  And moderate; worse with movement;  Normal bowel function, no fever;   Wounds are healing well   Triage protocol reviewed   Caller advised to be seen  In ED for evaluation of new symptoms; caller is reluctant to return to hospital   Caller is also offered option of contacting her surgeon (non [facility]) for phone consult prior to ED  Evaluation   Caller understands and will comply   Kerry Portillo RN  [facility]          Reason for Disposition    [1] Caller has URGENT question AND [2] triager unable to answer question    [1] Pain lasts > 10 minutes AND [2] age > 50    Additional Information    Negative: Shock suspected (e.g., cold/pale/clammy skin, too weak to stand, low BP, rapid pulse)    Negative: Difficult to awaken or acting confused (e.g., disoriented, slurred speech)    Negative: Passed out (i.e., lost consciousness, collapsed and was not responding)    Negative: Sounds like a life-threatening emergency to the triager    Pain is mainly in upper abdomen  (if needed ask: \"is it mainly above the belly button?\")    Negative: Severe difficulty breathing (e.g., struggling for each breath, speaks in single words)    Negative: Shock suspected (e.g., cold/pale/clammy skin, too weak to stand, low BP, rapid pulse)    Negative: Difficult to awaken or acting confused (e.g., disoriented, slurred speech)    Negative: Passed out (i.e., lost consciousness, collapsed and was not responding)    Negative: Visible sweat on face or sweat dripping down face    Negative: Sounds like a life-threatening emergency to the triager    Negative: Followed an abdomen (stomach) injury    Negative: Chest pain    Negative: [1] SEVERE headache AND [2] after spinal (epidural) anesthesia    Negative: [1] Vomiting AND [2] " persists > 4 hours    Negative: [1] Vomiting AND [2] abdomen looks much more swollen than usual    Negative: [1] Drinking very little AND [2] dehydration suspected (e.g., no urine > 12 hours, very dry mouth, very lightheaded)    Negative: Patient sounds very sick or weak to the triager    Negative: Sounds like a serious complication to the triager    Negative: Fever > 100.5 F (38.1 C)    Negative: [1] Widespread rash AND [2] bright red, sunburn-like    Protocols used: POST-OP SYMPTOMS AND [id number], ABDOMINAL PAIN - UPPER-A-AH, ABDOMINAL PAIN - FEMALE-A-AH

## 2020-05-10 ENCOUNTER — APPOINTMENT (OUTPATIENT)
Dept: CT IMAGING | Facility: CLINIC | Age: 60
End: 2020-05-10
Attending: EMERGENCY MEDICINE
Payer: COMMERCIAL

## 2020-05-10 ENCOUNTER — HOSPITAL ENCOUNTER (EMERGENCY)
Facility: CLINIC | Age: 60
Discharge: HOME | End: 2020-05-10
Attending: EMERGENCY MEDICINE | Admitting: EMERGENCY MEDICINE
Payer: COMMERCIAL

## 2020-05-10 VITALS
DIASTOLIC BLOOD PRESSURE: 63 MMHG | OXYGEN SATURATION: 99 % | SYSTOLIC BLOOD PRESSURE: 123 MMHG | TEMPERATURE: 98.1 F | HEART RATE: 57 BPM | RESPIRATION RATE: 18 BRPM

## 2020-05-10 DIAGNOSIS — R10.12 ABDOMINAL PAIN, LEFT UPPER QUADRANT: ICD-10-CM

## 2020-05-10 DIAGNOSIS — N83.201 OVARIAN CYST, RIGHT: ICD-10-CM

## 2020-05-10 DIAGNOSIS — Z90.49 STATUS POST CHOLECYSTECTOMY: ICD-10-CM

## 2020-05-10 DIAGNOSIS — B02.9 HERPES ZOSTER WITHOUT COMPLICATION: ICD-10-CM

## 2020-05-10 LAB
ALBUMIN SERPL-MCNC: 3.5 G/DL (ref 3.4–5)
ALBUMIN UR-MCNC: NEGATIVE MG/DL
ALP SERPL-CCNC: 108 U/L (ref 40–150)
ALT SERPL W P-5'-P-CCNC: 31 U/L (ref 0–50)
ANION GAP SERPL CALCULATED.3IONS-SCNC: 3 MMOL/L (ref 3–14)
APPEARANCE UR: CLEAR
AST SERPL W P-5'-P-CCNC: 22 U/L (ref 0–45)
BACTERIA #/AREA URNS HPF: ABNORMAL /HPF
BASOPHILS # BLD AUTO: 0 10E9/L (ref 0–0.2)
BASOPHILS NFR BLD AUTO: 0.7 %
BILIRUB SERPL-MCNC: 0.6 MG/DL (ref 0.2–1.3)
BILIRUB UR QL STRIP: NEGATIVE
BUN SERPL-MCNC: 9 MG/DL (ref 7–30)
CALCIUM SERPL-MCNC: 9.4 MG/DL (ref 8.5–10.1)
CHLORIDE SERPL-SCNC: 107 MMOL/L (ref 94–109)
CO2 SERPL-SCNC: 29 MMOL/L (ref 20–32)
COLOR UR AUTO: ABNORMAL
CREAT SERPL-MCNC: 0.8 MG/DL (ref 0.52–1.04)
DIFFERENTIAL METHOD BLD: NORMAL
EOSINOPHIL # BLD AUTO: 0.2 10E9/L (ref 0–0.7)
EOSINOPHIL NFR BLD AUTO: 2.8 %
ERYTHROCYTE [DISTWIDTH] IN BLOOD BY AUTOMATED COUNT: 12.7 % (ref 10–15)
GFR SERPL CREATININE-BSD FRML MDRD: 79 ML/MIN/{1.73_M2}
GLUCOSE SERPL-MCNC: 99 MG/DL (ref 70–99)
GLUCOSE UR STRIP-MCNC: NEGATIVE MG/DL
HCT VFR BLD AUTO: 42.1 % (ref 35–47)
HGB BLD-MCNC: 13.4 G/DL (ref 11.7–15.7)
HGB UR QL STRIP: NEGATIVE
IMM GRANULOCYTES # BLD: 0 10E9/L (ref 0–0.4)
IMM GRANULOCYTES NFR BLD: 0.2 %
KETONES UR STRIP-MCNC: NEGATIVE MG/DL
LEUKOCYTE ESTERASE UR QL STRIP: NEGATIVE
LIPASE SERPL-CCNC: 94 U/L (ref 73–393)
LYMPHOCYTES # BLD AUTO: 1.2 10E9/L (ref 0.8–5.3)
LYMPHOCYTES NFR BLD AUTO: 22.1 %
MCH RBC QN AUTO: 29.5 PG (ref 26.5–33)
MCHC RBC AUTO-ENTMCNC: 31.8 G/DL (ref 31.5–36.5)
MCV RBC AUTO: 93 FL (ref 78–100)
MONOCYTES # BLD AUTO: 0.4 10E9/L (ref 0–1.3)
MONOCYTES NFR BLD AUTO: 7.5 %
NEUTROPHILS # BLD AUTO: 3.6 10E9/L (ref 1.6–8.3)
NEUTROPHILS NFR BLD AUTO: 66.7 %
NITRATE UR QL: NEGATIVE
NRBC # BLD AUTO: 0 10*3/UL
NRBC BLD AUTO-RTO: 0 /100
PH UR STRIP: 6 PH (ref 5–7)
PLATELET # BLD AUTO: 248 10E9/L (ref 150–450)
POTASSIUM SERPL-SCNC: 3.7 MMOL/L (ref 3.4–5.3)
PROT SERPL-MCNC: 7.1 G/DL (ref 6.8–8.8)
RBC # BLD AUTO: 4.55 10E12/L (ref 3.8–5.2)
RBC #/AREA URNS AUTO: 1 /HPF (ref 0–2)
SODIUM SERPL-SCNC: 139 MMOL/L (ref 133–144)
SOURCE: ABNORMAL
SP GR UR STRIP: 1.01 (ref 1–1.03)
SQUAMOUS #/AREA URNS AUTO: <1 /HPF (ref 0–1)
UROBILINOGEN UR STRIP-MCNC: NORMAL MG/DL (ref 0–2)
WBC # BLD AUTO: 5.4 10E9/L (ref 4–11)
WBC #/AREA URNS AUTO: <1 /HPF (ref 0–5)

## 2020-05-10 PROCEDURE — 83690 ASSAY OF LIPASE: CPT | Performed by: EMERGENCY MEDICINE

## 2020-05-10 PROCEDURE — 81001 URINALYSIS AUTO W/SCOPE: CPT | Performed by: EMERGENCY MEDICINE

## 2020-05-10 PROCEDURE — 99285 EMERGENCY DEPT VISIT HI MDM: CPT | Mod: 25

## 2020-05-10 PROCEDURE — 80053 COMPREHEN METABOLIC PANEL: CPT | Performed by: EMERGENCY MEDICINE

## 2020-05-10 PROCEDURE — 25000125 ZZHC RX 250: Performed by: EMERGENCY MEDICINE

## 2020-05-10 PROCEDURE — 74177 CT ABD & PELVIS W/CONTRAST: CPT

## 2020-05-10 PROCEDURE — 25000128 H RX IP 250 OP 636: Performed by: EMERGENCY MEDICINE

## 2020-05-10 PROCEDURE — 85025 COMPLETE CBC W/AUTO DIFF WBC: CPT | Performed by: EMERGENCY MEDICINE

## 2020-05-10 RX ORDER — HYDROMORPHONE HYDROCHLORIDE 1 MG/ML
0.5 INJECTION, SOLUTION INTRAMUSCULAR; INTRAVENOUS; SUBCUTANEOUS
Status: DISCONTINUED | OUTPATIENT
Start: 2020-05-10 | End: 2020-05-10 | Stop reason: HOSPADM

## 2020-05-10 RX ORDER — VALACYCLOVIR HYDROCHLORIDE 1 G/1
1000 TABLET, FILM COATED ORAL 3 TIMES DAILY
Qty: 21 TABLET | Refills: 0 | Status: SHIPPED | OUTPATIENT
Start: 2020-05-10 | End: 2020-05-29

## 2020-05-10 RX ORDER — HYDROCODONE BITARTRATE AND ACETAMINOPHEN 5; 325 MG/1; MG/1
1 TABLET ORAL EVERY 6 HOURS PRN
Qty: 12 TABLET | Refills: 0 | Status: SHIPPED | OUTPATIENT
Start: 2020-05-10 | End: 2020-05-13

## 2020-05-10 RX ORDER — IOPAMIDOL 755 MG/ML
500 INJECTION, SOLUTION INTRAVASCULAR ONCE
Status: COMPLETED | OUTPATIENT
Start: 2020-05-10 | End: 2020-05-10

## 2020-05-10 RX ADMIN — IOPAMIDOL 100 ML: 755 INJECTION, SOLUTION INTRAVENOUS at 11:40

## 2020-05-10 RX ADMIN — SODIUM CHLORIDE 65 ML: 9 INJECTION, SOLUTION INTRAVENOUS at 11:40

## 2020-05-10 ASSESSMENT — ENCOUNTER SYMPTOMS
FLANK PAIN: 1
HEMATURIA: 0
ABDOMINAL PAIN: 1
COLOR CHANGE: 0

## 2020-05-10 NOTE — ED AVS SNAPSHOT
[facility] Emergency Department  [address]  [facility] [address]  Phone:  [phone]  Fax:  [phone]                                    Amina Pineda   MRN: [id number]    Department:  [facility] Emergency Department   Date of Visit:  5/10/2020           After Visit Summary Signature Page    I have received my discharge instructions, and my questions have been answered. I have discussed any challenges I see with this plan with the nurse or doctor.    ..........................................................................................................................................  Patient/Patient Representative Signature      ..........................................................................................................................................  Patient Representative Print Name and Relationship to Patient    ..................................................               ................................................  Date                                   Time    ..........................................................................................................................................  Reviewed by Signature/Title    ...................................................              ..............................................  Date                                               Time          22EPIC Rev 08/18

## 2020-05-10 NOTE — ED NOTES
Patient continues to report left side pain with some redness noted at painful site. No wounds or vesicles present at this time. MD in to see patient and discuss diagnosis, test results, and discharge plan. Discussed shingles diagnosis and contagiousness. Patient meets discharge criteria. Discussed AVS with patient. Questions answered. Patient verbalized understanding. Patient reports being ready to go home. Patient discharged home with spouse by car with all necessary information.

## 2020-05-10 NOTE — DISCHARGE INSTRUCTIONS
Diagnosis: Left flank pain - suspect shingles (herpes zoster) that hasn't yet broken out in a rash.   Incidental ovarian cyst - this will need follow-up ultrasound. Please discuss timing with your obgyn or primary care physician.   Plan: Start valacyclovir (valtrex) for treatment of shingles. Norco prescribed for pain control.   Return Precautions: Worsening/uncontrollable pain, fever > 100.4, chest pain, shortness of breath, or for any other concerns.     Please read the remainder of your discharge instructions for more information.

## 2020-05-10 NOTE — ED PROVIDER NOTES
"  History     Chief Complaint:  Abdominal Pain and Flank Pain      HPI   Amina Pineda is a 60 year old female, 16 days s/p laparoscopic cholecystectomy by Dr. Chan with a history of esophageal reflux, morbid obesity, hyperlipidemia, and chronic low back pain who presents with abdominal pain and flank pain. Patient was admitted 4/23 with acute calculous cholecystitis requiring cholecystectomy which was completed without complication. Today, she comes in left sided abdominal pain wrapping around to her left flank. The pain is described as a \"pin poking\" her in a band formation around to her back. She states that the skin is quite tender to the touch and seems to be more sensitive. However, she also feels a pain deeper inside, under the skin. The pain is aggravated with movement. She denies visible rash or skin change. For her pain she has been alternating ibuprofen and tylenol without significant alleviation. In addition to these symptoms, she describes that her urine appears \"more clear\" despite knowing that she is not drinking a significant amount of water. She denies hematuria or other urinary symptoms.     Allergies:  No Known Drug Allergies      Medications:    Albuterol inhaler   Flexeril   Wellbutrin XL   Premarin cream   Famiciclovir   Levothyroxine   Omeprazole     Senna-docusate      Past Medical History:    Acute calculous cholecystitis (4/23/2020)  Decreased libido   Depressive disorder  Esophageal reflux   Generalized osteoarthrosis   Herpes simplex   Intramural leiomyoma of uterus   Hypothyroidism   Morbid obesity   Obstructive Sleep Apnea   Daytime somnolence   Female stress incontinence   Chronic low back pain   Hyperlipidemia      Past Surgical History:    Ligate fallopian tube   Rotator cuff surgery both shoulder   Wrist surgery for cyst x2   Vaginal hysterectomy with BSO   Colonoscopy   Left elbow repair   Orthopedic surgery   Laparoscopic cholecystectomy      Family History:    Father - " coronary artery disease, depression, anxiety, thyroid disease, retinal detachment   Mother - hypertension, breast cancer, glaucoma   Daughter - Whitaker's syndrome     Social History:  The patient was alone.  Smoking Status: Never  Smokeless Tobacco: Never  Alcohol Use: No    Marital Status:       Review of Systems   Gastrointestinal: Positive for abdominal pain.   Genitourinary: Positive for flank pain. Negative for hematuria.        (+) clear urine   Skin: Negative for color change and rash.   All other systems reviewed and are negative.        Physical Exam     Patient Vitals for the past 24 hrs:   BP Pulse SpO2   05/10/20 1230 123/63 57 99 %   05/10/20 1215 -- -- 97 %   05/10/20 1200 136/73 65 99 %   05/10/20 1130 137/70 62 --   05/10/20 1045 (!) 153/83 66 99 %       Physical Exam  Nursing note and vitals reviewed.  Constitutional: Cooperative.   HENT:   Mouth/Throat: Moist mucous membranes.   Eyes: EOMI, nonicteric sclera  Cardiovascular: Normal rate, regular rhythm, no murmurs, rubs, or gallops  Pulmonary/Chest: Effort normal and breath sounds normal. No respiratory distress. No wheezes. No rales.   Abdominal: Soft. Nontender, nondistended, no guarding or rigidity.  Musculoskeletal: Normal range of motion.   Neurological: Alert. Moves all extremities spontaneously.   Skin: Skin is warm and dry. Small erythematous papules noted to left lower abdomen. No vesicles or scarring.   Psychiatric: Normal mood and affect.     Emergency Department Course     Imaging:  Radiology findings were communicated with the patient who voiced understanding of the findings.    Abd/pelvis CT,  IV  contrast only TRAUMA / AAA  Final Result  IMPRESSION:   1.  Slight soft tissue thickening in the cholecystectomy bed is likely  resolving postoperative change. No associated fluid collection.  2.  Otherwise, no acute abnormalities in the abdomen or pelvis.  3.  1.8 cm right ovarian cyst. See follow-up guidelines below.  CRISTINA UNDERWOOD,  MD    Imaging independently reviewed and agree with radiologist interpretation.     Laboratory:  Laboratory findings were communicated with the patient who voiced understanding of the findings.    CBC: AWNL. (WBC 5.4, HGB 13.4, )   CMP: AWNL (Creatinine 0.80)     Lipase: 94   UA: Light yellow, clear urine. Bacteria urine few o/w WNL      Emergency Department Course:  Past medical records, nursing notes, and vitals reviewed.    1042 I performed an exam of the patient as documented above.     IV was inserted and blood was drawn for laboratory testing, results above.  The patient provided a urine sample here in the emergency department. This was sent for laboratory testing, findings above.  The patient was sent for a Abd/pelvis CT, IV contrast only TRAUMA/AAA while in the emergency department, results above.     1248 I rechecked the patient and discussed the results of her workup thus far.     Findings and plan explained to the Patient. Patient discharged home with instructions regarding supportive care, medications, and reasons to return. The importance of close follow-up was reviewed. The patient was prescribed Norco, Valtrex.     I personally reviewed the laboratory and imaging results with the Patient and answered all related questions prior to discharge.     Impression & Plan     Medical Decision Making:  Pt presents with left abdominal and flank pain in context of recent surgery. This pain is quite poorly localized, but pt notes it is quite severe and underneath the skin. CT obtained given recent surgery and severity and is fortunately normal apart form incidental ovarian cyst. Pt reports complete hysterectomy in the past, therefore this will require follow-up. Signs symptoms highly suspicious for zoster, though pt denied a rash, and I was unable to see one on initial exam. On repeat exam, I did identify several papules in pt's lower abdomen around her pants line which she does report is painful. Discussed  course of shingles and rash including contagiousness and how long to expect symptoms. Valacyclovir ordered to reduce incidence of post-herpetic neuralgia. Pt declined pain medication here, but rx norco written for home. She is in stable condition at the time of discharge, indications for return to the ED were discussed as well as follow up. All questions were answered and she is in agreement with the plan.      Diagnosis:    ICD-10-CM    1. Abdominal pain, left upper quadrant  R10.12    2. Herpes zoster without complication  B02.9     No current rash - treating based on symptoms.   3. Status post cholecystectomy  Z90.49    4. Ovarian cyst, right  N83.201        Disposition:  Discharged to home.    Discharge Medications:  New Prescriptions    HYDROCODONE-ACETAMINOPHEN (NORCO) 5-325 MG TABLET    Take 1 tablet by mouth every 6 hours as needed for severe pain    VALACYCLOVIR (VALTREX) 1000 MG TABLET    Take 1 tablet (1,000 mg) by mouth 3 times daily for 7 days       Scribe Disclosure:  Trinh GALEANO, am serving as a scribe at 10:42 AM on 5/10/2020 to document services personally performed by Jaziel Hines MD based on my observations and the provider's statements to me. 5/10/2020   [facility] EMERGENCY DEPARTMENT       Jaziel Hines MD  05/10/20 0149

## 2020-05-10 NOTE — ED TRIAGE NOTES
Patient arrived at around 10:40 complaining of sharp superficial pain on left upper abdomen that started Wednesday. Reports pain then radiated to back to spine. Denies rash or fevers. Recent surgery for gallbladder removal  2 weeks ago. Reports some diarrhea since being on antibiotics at the hospital. Reports improvement in diarrhea over the past couple days. ABCs intact. Alert and oriented X4. Oriented to room and call light. Patient educated about hand hygiene practices.

## 2020-05-11 ENCOUNTER — TELEPHONE (OUTPATIENT)
Dept: SURGERY | Facility: CLINIC | Age: 60
End: 2020-05-11

## 2020-05-11 ENCOUNTER — MYC MEDICAL ADVICE (OUTPATIENT)
Dept: INTERNAL MEDICINE | Facility: CLINIC | Age: 60
End: 2020-05-11

## 2020-05-11 DIAGNOSIS — Z98.890 POSTOPERATIVE STATE: Primary | ICD-10-CM

## 2020-05-11 NOTE — TELEPHONE ENCOUNTER
"[facility]   Postoperative Follow-up Phone Call  -Call to patient to review recent procedure and recovery    Procedure Date:  April/24  Surgeon:  Dr. Chan  Procedure:  Laparoscopic cholecystectomy  Pathology, reviewed with patient:  Chronic cholecystitis, stone    She is now over 2 weeks postop.   She is feeling well and denies incision concerns.   Diet:  Low Fat  Bowel function: Soft; had been loose stools, improving  Following restrictions per surgeon; 30lb weight limit for 3 weeks postop.    Additional findings on Ultrasound in ED:  none.      Pt concerns:  yes, recent ER eval for \"pins and needles\" sensation at the left abd.  ER notes reviewed and tho she didn't have much of a rash at the time of the ER eval, she now does have a rash in this distribution consistent with shingles.  She has meds to take for this and has f/u planned with PCP on Wednesday.     Recommendations reviewed, related to postop lap leonel:    She may increase activity as tolerated.  She may continue to utilize OTC pain management options as well as use of ice/heat to sites for comfort.  She should expect progressive resolution of the healing ridge along the incisional sites, normalizing bowel function, and improvement of food intolerances over the following 2-3 months.      Pt is recommended to contact the office if worsening pain, onset of fever/redness at any inc site, or new drainage from the area.  Pt also recommended to call office at any time if ongoing questions/concerns during recovery, but otherwise may follow-up on a prn basis.  Amina is in agreement with this plan.    Malgorzata Trejo PA-C  "

## 2020-05-11 NOTE — TELEPHONE ENCOUNTER
Dr. Melton,  Please see Virally message below and advise. Thanks.Do you recommend ER follow up appt?    Virginie Martino RN  [facility]

## 2020-05-11 NOTE — TELEPHONE ENCOUNTER
Yes, let's do virtual ER follow up so I can discuss her pain and future options as well as order the imaging and discuss timing.

## 2020-05-13 ENCOUNTER — VIRTUAL VISIT (OUTPATIENT)
Dept: INTERNAL MEDICINE | Facility: CLINIC | Age: 60
End: 2020-05-13
Payer: COMMERCIAL

## 2020-05-13 DIAGNOSIS — B02.9 HERPES ZOSTER WITHOUT COMPLICATION: Primary | ICD-10-CM

## 2020-05-13 DIAGNOSIS — E03.9 ACQUIRED HYPOTHYROIDISM: Chronic | ICD-10-CM

## 2020-05-13 DIAGNOSIS — N83.201 RIGHT OVARIAN CYST: ICD-10-CM

## 2020-05-13 PROCEDURE — 99214 OFFICE O/P EST MOD 30 MIN: CPT | Mod: TEL | Performed by: INTERNAL MEDICINE

## 2020-05-13 RX ORDER — GABAPENTIN 300 MG/1
300 CAPSULE ORAL 3 TIMES DAILY
Qty: 90 CAPSULE | Refills: 2 | Status: SHIPPED | OUTPATIENT
Start: 2020-05-13 | End: 2020-05-29

## 2020-05-13 ASSESSMENT — PATIENT HEALTH QUESTIONNAIRE - PHQ9: SUM OF ALL RESPONSES TO PHQ QUESTIONS 1-9: 0

## 2020-05-13 NOTE — PATIENT INSTRUCTIONS
Gabapentin up to 3 times daily.  You can start out with just one tab at bedtime and work up to three times daily as needed.  Schedule the ovarian ultrasound.   Thyroid labs in 6 weeks.

## 2020-05-13 NOTE — PROGRESS NOTES
"Amina Pineda is a 60 year old female who is being evaluated via a billable telephone visit.      The patient has been notified of following:     \"This telephone visit will be conducted via a call between you and your physician/provider. We have found that certain health care needs can be provided without the need for a physical exam.  This service lets us provide the care you need with a short phone conversation.  If a prescription is necessary we can send it directly to your pharmacy.  If lab work is needed we can place an order for that and you can then stop by our lab to have the test done at a later time.    Telephone visits are billed at different rates depending on your insurance coverage. During this emergency period, for some insurers they may be billed the same as an in-person visit.  Please reach out to your insurance provider with any questions.    If during the course of the call the physician/provider feels a telephone visit is not appropriate, you will not be charged for this service.\"    Patient has given verbal consent for Telephone visit?  Yes    What phone number would you like to be contacted at? [phone]    How would you like to obtain your AVS? MyChart    Subjective     Amina Pineda is a 60 year old female who presents to clinic today for the following health issues:    HPI  ED/UC Followup:    Facility:  [facility]  Date of visit: 5/10/20  Reason for visit: Herpes zoster  Current Status: still hurting and pain is moving to the left shoulder blade            She has norco for the shingles pain.  It is effective.  She is almost out and feels she will need more pain medication.  She has a dermatomal rash from her umbilicus to her spine.  It is 3 inches wide.        Reviewed and updated as needed this visit by Provider         Review of Systems    ROS: 10 point ROS neg other than the symptoms noted above in the HPI.        Objective   Reported vitals:  There were no " vitals taken for this visit.   healthy, alert and no distress  PSYCH: Alert and oriented times 3; coherent speech, normal   rate and volume, able to articulate logical thoughts, able   to abstract reason, no tangential thoughts, no hallucinations   or delusions  Her affect is normal  RESP: No cough, no audible wheezing, able to talk in full sentences  Remainder of exam unable to be completed due to telephone visits    Diagnostic Test Results:  Labs reviewed in Epic        Assessment/Plan:  1. Herpes zoster without complication  Will stop narcotics and transition over to gabapentin.    - gabapentin (NEURONTIN) 300 MG capsule; Take 1 capsule (300 mg) by mouth 3 times daily  Dispense: 90 capsule; Refill: 2    2. Right ovarian cyst  Will schedule after shingles has healed.   - US Pelvic Complete w Transvaginal; Future    3. Acquired hypothyroidism  Needs lab in 6 weeks.       No follow-ups on file.      Phone call duration:  14 minutes    Briana Melton MD    Start 2:58 PM     Stop 3:12 PM

## 2020-05-29 ENCOUNTER — OFFICE VISIT (OUTPATIENT)
Dept: INTERNAL MEDICINE | Facility: CLINIC | Age: 60
End: 2020-05-29
Payer: COMMERCIAL

## 2020-05-29 VITALS
RESPIRATION RATE: 18 BRPM | WEIGHT: 237.4 LBS | BODY MASS INDEX: 36.1 KG/M2 | TEMPERATURE: 98.1 F | HEART RATE: 74 BPM | SYSTOLIC BLOOD PRESSURE: 132 MMHG | DIASTOLIC BLOOD PRESSURE: 64 MMHG | OXYGEN SATURATION: 97 %

## 2020-05-29 DIAGNOSIS — B02.9 HERPES ZOSTER WITHOUT COMPLICATION: ICD-10-CM

## 2020-05-29 DIAGNOSIS — B02.29 POST HERPETIC NEURALGIA: Primary | ICD-10-CM

## 2020-05-29 PROCEDURE — 99213 OFFICE O/P EST LOW 20 MIN: CPT | Performed by: INTERNAL MEDICINE

## 2020-05-29 RX ORDER — VALACYCLOVIR HYDROCHLORIDE 1 G/1
1000 TABLET, FILM COATED ORAL 3 TIMES DAILY
Qty: 30 TABLET | Refills: 0 | Status: SHIPPED | OUTPATIENT
Start: 2020-05-29 | End: 2020-09-14

## 2020-05-29 RX ORDER — NORTRIPTYLINE HCL 10 MG
10 CAPSULE ORAL AT BEDTIME
Qty: 30 CAPSULE | Refills: 0 | Status: SHIPPED | OUTPATIENT
Start: 2020-05-29 | End: 2020-09-14

## 2020-05-29 RX ORDER — GABAPENTIN 300 MG/1
CAPSULE ORAL
Qty: 120 CAPSULE | Refills: 2 | Status: SHIPPED | OUTPATIENT
Start: 2020-05-29 | End: 2020-09-14

## 2020-05-29 RX ORDER — HYDROCODONE BITARTRATE AND ACETAMINOPHEN 5; 325 MG/1; MG/1
1 TABLET ORAL EVERY 6 HOURS PRN
Qty: 18 TABLET | Refills: 0 | Status: SHIPPED | OUTPATIENT
Start: 2020-05-29 | End: 2020-06-01

## 2020-05-29 NOTE — PATIENT INSTRUCTIONS
Nortriptyline 1 tab daily.  Increase gabapentin by taking 2 at bedtime and 1 in the morning and afternoon.  Norco for temporary pain relief.  Complete another 10 day course of Valtrex.

## 2020-05-29 NOTE — PROGRESS NOTES
Subjective     Amina Pineda is a 60 year old female who presents to clinic today for the following health issues:    HPI   Chief Complaint   Patient presents with     Shingles     shingles left side                  Reviewed and updated as needed this visit by Provider         Review of Systems    ROS: 4 point ROS neg other than the symptoms noted above in the HPI.        Objective    Pulse 74   Temp 98.1  F (36.7  C) (Oral)   Resp 18   Wt 107.7 kg (237 lb 6.4 oz)   SpO2 97%   BMI 36.10 kg/m    Body mass index is 36.1 kg/m .  Physical Exam   GENERAL APPEARANCE: healthy, alert and mild distress  Skin on the left breast and just below the breast with small papules present.  No blisters.  Skin is very tender to the touch.    PSYCH: mentation appears normal. and affect normal/bright      Diagnostic Test Results:  Labs reviewed in Epic        Assessment & Plan     1. Post herpetic neuralgia  Per patient instructions.   - nortriptyline (PAMELOR) 10 MG capsule; Take 1 capsule (10 mg) by mouth At Bedtime  Dispense: 30 capsule; Refill: 0  - HYDROcodone-acetaminophen (NORCO) 5-325 MG tablet; Take 1 tablet by mouth every 6 hours as needed for pain  Dispense: 18 tablet; Refill: 0    2. Herpes zoster without complication  Appears to have some dissemination to another dermatome.    - valACYclovir (VALTREX) 1000 mg tablet; Take 1 tablet (1,000 mg) by mouth 3 times daily for 10 days  Dispense: 30 tablet; Refill: 0  - gabapentin (NEURONTIN) 300 MG capsule; Take 1 capsule in the morning, 1 in the afternoon and 2 at bedtime.  Dispense: 120 capsule; Refill: 2     Patient Instructions   Nortriptyline 1 tab daily.  Increase gabapentin by taking 2 at bedtime and 1 in the morning and afternoon.  Norco for temporary pain relief.  Complete another 10 day course of Valtrex.             No follow-ups on file.    Briana Melton MD  [facility]

## 2020-06-06 ENCOUNTER — MYC MEDICAL ADVICE (OUTPATIENT)
Dept: INTERNAL MEDICINE | Facility: CLINIC | Age: 60
End: 2020-06-06

## 2020-06-08 NOTE — TELEPHONE ENCOUNTER
Patient was seen on 5/29/2020 for post herpetic neuralgia  She is under the impression that she needed a f/u phone visit  OV does not have any f/u instructions  Patient reports that pain is improving but still a lot of itching  Dr. Melton is out of the office until September now    Please advise on f/u    Ariel Sultana RN

## 2020-06-08 NOTE — TELEPHONE ENCOUNTER
Post herpetic neuralgia is a varied clinical process. Most of the time this fully resolves on it's own but it can be rather slow, up to 1-3 months on average. For rare patients [ less then 5%] if can last a lot longer. There's really no specific treatment beyond trying Gabapentin [Neurontin] if the tingling and numbess type sensation or burning symptoms are bad.    A follow up telephone MD visit is entirely optional. If this is what patient seeks, schedule her with me. Otherwise, the message above is likely going to suffice    Laron Morgan MD

## 2020-06-10 ENCOUNTER — HOSPITAL ENCOUNTER (OUTPATIENT)
Dept: ULTRASOUND IMAGING | Facility: CLINIC | Age: 60
Discharge: HOME | End: 2020-06-10
Attending: INTERNAL MEDICINE | Admitting: INTERNAL MEDICINE
Payer: COMMERCIAL

## 2020-06-10 DIAGNOSIS — N83.201 RIGHT OVARIAN CYST: Primary | ICD-10-CM

## 2020-06-10 DIAGNOSIS — N83.201 RIGHT OVARIAN CYST: ICD-10-CM

## 2020-06-10 PROCEDURE — 76830 TRANSVAGINAL US NON-OB: CPT

## 2020-06-10 NOTE — RESULT ENCOUNTER NOTE
Briseida,    The right sided lesion could be a cyst. Follow-up ultrasound is recommended in 6 months. Call the imaging center at [phone] or  [phone] to schedule your ultrasound.     Nanette Wright MD

## 2020-06-24 DIAGNOSIS — E03.9 ACQUIRED HYPOTHYROIDISM: Chronic | ICD-10-CM

## 2020-06-24 PROCEDURE — 36415 COLL VENOUS BLD VENIPUNCTURE: CPT | Performed by: INTERNAL MEDICINE

## 2020-06-24 PROCEDURE — 84443 ASSAY THYROID STIM HORMONE: CPT | Performed by: INTERNAL MEDICINE

## 2020-06-24 PROCEDURE — 84439 ASSAY OF FREE THYROXINE: CPT | Performed by: INTERNAL MEDICINE

## 2020-06-25 DIAGNOSIS — E03.9 ACQUIRED HYPOTHYROIDISM: Primary | ICD-10-CM

## 2020-06-25 LAB
T4 FREE SERPL-MCNC: 1.1 NG/DL (ref 0.76–1.46)
TSH SERPL DL<=0.005 MIU/L-ACNC: 4.95 MU/L (ref 0.4–4)

## 2020-06-25 NOTE — RESULT ENCOUNTER NOTE
Please call patient:    If you are feeling well, I recommend recheck of your thyroid blood test in 6 weeks. In some cases, we need to use alternating doses every other day.     Nanette Wright MD

## 2020-07-28 ENCOUNTER — HOSPITAL ENCOUNTER (OUTPATIENT)
Dept: MAMMOGRAPHY | Facility: CLINIC | Age: 60
Discharge: HOME | End: 2020-07-28
Attending: INTERNAL MEDICINE | Admitting: INTERNAL MEDICINE
Payer: COMMERCIAL

## 2020-07-28 DIAGNOSIS — Z12.31 VISIT FOR SCREENING MAMMOGRAM: ICD-10-CM

## 2020-07-28 PROCEDURE — 77063 BREAST TOMOSYNTHESIS BI: CPT

## 2020-08-24 DIAGNOSIS — E03.9 ACQUIRED HYPOTHYROIDISM: Chronic | ICD-10-CM

## 2020-08-25 ENCOUNTER — VIRTUAL VISIT (OUTPATIENT)
Dept: DERMATOLOGY | Facility: CLINIC | Age: 60
End: 2020-08-25
Payer: COMMERCIAL

## 2020-08-25 DIAGNOSIS — L85.9 HYPERKERATOSIS: ICD-10-CM

## 2020-08-25 DIAGNOSIS — B35.1 NAIL FUNGUS: Primary | ICD-10-CM

## 2020-08-25 PROCEDURE — 99213 OFFICE O/P EST LOW 20 MIN: CPT | Mod: 95 | Performed by: DERMATOLOGY

## 2020-08-25 RX ORDER — EFINACONAZOLE 100 MG/ML
1 SOLUTION TOPICAL DAILY
Qty: 8 ML | Refills: 11 | Status: SHIPPED | OUTPATIENT
Start: 2020-08-25 | End: 2020-09-14

## 2020-08-25 RX ORDER — LEVOTHYROXINE SODIUM 100 UG/1
TABLET ORAL
Qty: 30 TABLET | Refills: 0 | Status: SHIPPED | OUTPATIENT
Start: 2020-08-25 | End: 2020-09-14

## 2020-08-25 RX ORDER — UREA 40 %
CREAM (GRAM) TOPICAL
Qty: 28 G | Refills: 3 | Status: SHIPPED | OUTPATIENT
Start: 2020-08-25 | End: 2022-06-03

## 2020-08-25 ASSESSMENT — PAIN SCALES - GENERAL: PAINLEVEL: NO PAIN (0)

## 2020-08-25 NOTE — PROGRESS NOTES
[facility] Dermatology Record:  Store and Forward and Telephone [phone]       Dermatology Problem List:  1. Lesion on left nose, biopsied in [place] ~2006, pt reports showed lupus versus lymphoma and saw heme onc who reported lesion was not worrisome. Unable to obtain record  2. Onychomycosis, KOH positve  - Current Tx:cicloprix  -Previous Tx: terbinafine x2 (initiated 2nd round 3/14/2016), ciclopirox 8% solution   3. Hyperkeratosis, right great toe  -Previous Tx: Urea 40% cream (initiated 3/14/2016) with resolution  3. Photoaging/idiopathic guttate hypomelanosis  -Previous Tx: tretinoin 0.05% cream and hydroquinone       Encounter Date: Aug 25, 2020    CC:   Chief Complaint   Patient presents with     Derm Problem     Lesions      1. Back - raised. No changes ti shape size or color. X Noticed it about a year ago.   2. [place] - raised, scaly and dry. Denies itching, pain or bleeding. Lesion has grown in size and darker in color. X Noticed it 2 years now.   No past or current treatments.       History of Present Illness:  Amina Pineda is a 60 year old female who presents for lesion on back that is raised. No changes in size or color noted 1 year ago. Lesion on templed, raised,s scaly and dry. Denies ithcing, pain or bleeding. Grown in size, present X2 years.     Has not treated these.     Nails improving overall she reports, no photos           ROS: Patient is generally well    Physical Examination:  General: Well-soudning , appropriately-developed individual.  Skin: Focused examination including back, temple was performed.   -stuck on skin colored papule, left temple, well demarcated  -fleshy pedunculated papule, lwoer back    Labs:  NA    Past Medical History:   Patient Active Problem List   Diagnosis     Hypothyroidism     Esophageal reflux     Herpes simplex virus (HSV) infection     Generalized osteoarthrosis, unspecified site     Dysthymic disorder     CARDIOVASCULAR SCREENING; LDL GOAL LESS THAN 160      Female stress incontinence     Restless leg syndrome     Non morbid obesity due to excess calories     Ocular hypertension, right     Menopausal syndrome (hot flushes)     Daytime somnolence     Chronic low back pain, unspecified back pain laterality, with sciatica presence unspecified     Mixed incontinence     ANDREW (obstructive sleep apnea)     Acute dacryocystitis, right     Decreased libido     Impaired fasting glucose     Hyperlipidemia LDL goal <100     Morbid obesity (H)     Acute calculous cholecystitis     Right ovarian cyst     Herpes zoster without complication     Acquired hypothyroidism     Past Medical History:   Diagnosis Date     Decreased libido 11/6/2006     Depressive disorder, not elsewhere classified      Esophageal reflux      Generalized osteoarthrosis, unspecified site     R hip, on meds     Herpes simplex without mention of complication     oral     Intramural leiomyoma of uterus      Unspecified hypothyroidism      Past Surgical History:   Procedure Laterality Date     C LIGATE FALLOPIAN TUBE       C NONSPECIFIC PROCEDURE  5/02    rotator cuff surgery both shoulder     C NONSPECIFIC PROCEDURE      wrist surgery for cyst x 2     C VAGINAL HYSTERECTOMY  12/03    with BSO, 10-12 week size     CHOLECYSTECTOMY       COLONOSCOPY  4/24/2015     COLONOSCOPY WITH CO2 INSUFFLATION N/A 4/23/2015    Procedure: COLONOSCOPY WITH CO2 INSUFFLATION;  Surgeon: Bebeto Mortensen MD;  Location: [facility] OR     ELBOW SURGERY      Lt elbow repair.     HYSTERECTOMY, PAP NO LONGER INDICATED       LAPAROSCOPIC CHOLECYSTECTOMY N/A 4/24/2020    Procedure: CHOLECYSTECTOMY, LAPAROSCOPIC;  Surgeon: Janett Chan MD;  Location: [facility] OR     ORTHOPEDIC SURGERY       SOFT TISSUE SURGERY      cyst, both shoulders and left elbow       Social History:  Patient reports that she has never smoked. She has never used smokeless tobacco. She reports that she does not drink alcohol or use drugs.    Family History:  Family History    Problem Relation Age of Onset     C.A.D. Maternal Grandfather      Coronary Artery Disease Maternal Grandfather         Heart Attack, ()     Coronary Artery Disease Father         High Cholesterol     Depression/Anxiety Father         Depression     Thyroid Disease Father         Hypo Thryoid     Retinal detachment Father      Thyroid Disease Father         Hypo     Hypertension Mother         High Blood Pressure     Breast Cancer Mother         Has spot being watching.  Checked every 6 months     Glaucoma Mother         glc surgery     Breast Cancer Paternal Grandmother         Breast removed ()     Asthma Paternal Grandmother         Emphysema ()     Cerebrovascular Disease Paternal Grandfather         Strokes ()     Known Genetic Syndrome Daughter         Whitaker's Syndrome     Skin Cancer No family hx of         no family hx of skin cancer       Medications:  Current Outpatient Medications   Medication     albuterol (PROAIR HFA/PROVENTIL HFA/VENTOLIN HFA) 108 (90 Base) MCG/ACT inhaler     buPROPion (WELLBUTRIN XL) 150 MG 24 hr tablet     cyclobenzaprine (FLEXERIL) 10 MG tablet     estrogen conj (PREMARIN) 0.3 MG tablet     famciclovir (FAMVIR) 500 MG tablet     ferrous sulfate (FEROSUL) 325 (65 Fe) MG tablet     gabapentin (NEURONTIN) 300 MG capsule     levothyroxine (SYNTHROID/LEVOTHROID) 100 MCG tablet     nortriptyline (PAMELOR) 10 MG capsule     nystatin (MYCOSTATIN) 684379 UNIT/GM external powder     omeprazole (PRILOSEC) 20 MG DR capsule     senna-docusate (SENOKOT-S/PERICOLACE) 8.6-50 MG tablet     valACYclovir (VALTREX) 1000 mg tablet     Vitamins-Lipotropics (LIPOFLAVONOID) TABS     No current facility-administered medications for this visit.           Allergies   Allergen Reactions     No Known Drug Allergies            Impression and Recommendations (Patient Counseled on the Following):  1. Onychomycosis, KOH positive today.- has failed terbinafine twice, cicloprix not  used regularly. Consider jubilia or itra or fluconazole. Pt interested in oral medications    hold ciclopirox 8% solution , start jubilya    We will consider itraconazole or fluconazole for further treatment. Reviewed risks of heart or nails disease. Wll have Shaye Albarado chart check meds for safety prior to starting. (kept in chart for safety)  2.  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   ? Recheck at follow     3. Skin colored flat papule on chin, unchanged as of 10/30/18.    Recheck at follow up   4. Hyperkeratosis/Roughness, right great toe  -requests urea refill    5. Papule, left temple-most likely SK, schedule, removal  -pt wants removed, will scheduled    6. Pedunculated papule, lower back- skin tag or nvus    Follow-up:   Follow-up with dermatology in approximately within 12 weeks.       Staff only    Tiff Dunn MD    Department of Dermatology  [facility]: Phone: [phone], Fax:[phone]  [facility]: Phone: [phone], Fax: [phone]      _____________________________________________________________________________    Teledermatology information:  - Location of patient: [place]  - Patient presented as: return  - Location of teledermatologist:  ([facility] )  - Reason teledermatology is appropriate:  of National Emergency Regarding Coronavirus disease (COVID 19) Outbreak  - Image quality and interpretability: acceptable  - Physician has received verbal consent for a Video/Photos Visit from the patient? Yes  - In-person dermatology visit recommendation: yes - for biopsy  - Date of images:  8/21/2020  - Service start time:9:08am  - Service end time:9:19am  - Date of report: 8/25/2020         Teledermatology Nurse Call for RETURN patients seen within the last 3 years:      The patient was contacted by phone and we reviewed they  "have a visit in teledermatology upcoming with an MD or HONEY;  Importantly, \"a teledermatology visit may not be as thorough as an in-person visit, and the quality of the photograph and/or video sent may not be of the same quality as that taken by the dermatology clinic.\"     We have found that certain health care needs can be provided without the need for an in-person physical exam.   If a prescription is necessary we can send it directly to your pharmacy.  If lab work is needed we can place an order for that and you can then stop by our lab to have the test done at a later time.Visits are billed at different rates depending on your insurance coverage. Please reach out to your insurance provider with any questions.    The patient chose to:                                                                                                                                                                                                                 Consent to a teledermatology visit with [employer] photos. Patient told by nursing these are already uploaded. Instructions sent to patient via [employer]. They verified they will be in the state of [place] at the time of the encounter.                                                                                                                                                                                                                                     The patient denied skin pain, fever, mucosal symptoms(lesions, blisters, sores in the mouth, nose, eyes, or genitals)  IF PATIENT ENDORSES ANY OF THESE STOP AND PAGE ON CALL ATTENDING                                                                                                                                                                                                                               "

## 2020-08-25 NOTE — LETTER
8/25/2020         RE: Amina Pineda  [address] [facility] [address]        Dear Colleague,    Thank you for referring your patient, Amina Pineda, to the [facility]. Please see a copy of my visit note below.    [facility] Dermatology Record:  Store and Forward and Telephone [phone]       Dermatology Problem List:  1. Lesion on left nose, biopsied in [place] ~2006, pt reports showed lupus versus lymphoma and saw heme onc who reported lesion was not worrisome. Unable to obtain record  2. Onychomycosis, KOH positve  - Current Tx:cicloprix  -Previous Tx: terbinafine x2 (initiated 2nd round 3/14/2016), ciclopirox 8% solution   3. Hyperkeratosis, right great toe  -Previous Tx: Urea 40% cream (initiated 3/14/2016) with resolution  3. Photoaging/idiopathic guttate hypomelanosis  -Previous Tx: tretinoin 0.05% cream and hydroquinone       Encounter Date: Aug 25, 2020    CC:   Chief Complaint   Patient presents with     Derm Problem     Lesions      1. Back - raised. No changes ti shape size or color. X Noticed it about a year ago.   2. [place] - raised, scaly and dry. Denies itching, pain or bleeding. Lesion has grown in size and darker in color. X Noticed it 2 years now.   No past or current treatments.       History of Present Illness:  Amina Pineda is a 60 year old female who presents for lesion on back that is raised. No changes in size or color noted 1 year ago. Lesion on templed, raised,s scaly and dry. Denies ithcing, pain or bleeding. Grown in size, present X2 years.     Has not treated these.     Nails improving overall she reports, no photos           ROS: Patient is generally well    Physical Examination:  General: Well-soudning , appropriately-developed individual.  Skin: Focused examination including back, temple was performed.   -stuck on skin colored papule, left temple, well demarcated  -fleshy pedunculated papule, lwoer back    Labs:  NA    Past Medical  History:   Patient Active Problem List   Diagnosis     Hypothyroidism     Esophageal reflux     Herpes simplex virus (HSV) infection     Generalized osteoarthrosis, unspecified site     Dysthymic disorder     CARDIOVASCULAR SCREENING; LDL GOAL LESS THAN 160     Female stress incontinence     Restless leg syndrome     Non morbid obesity due to excess calories     Ocular hypertension, right     Menopausal syndrome (hot flushes)     Daytime somnolence     Chronic low back pain, unspecified back pain laterality, with sciatica presence unspecified     Mixed incontinence     ANDREW (obstructive sleep apnea)     Acute dacryocystitis, right     Decreased libido     Impaired fasting glucose     Hyperlipidemia LDL goal <100     Morbid obesity (H)     Acute calculous cholecystitis     Right ovarian cyst     Herpes zoster without complication     Acquired hypothyroidism     Past Medical History:   Diagnosis Date     Decreased libido 11/6/2006     Depressive disorder, not elsewhere classified      Esophageal reflux      Generalized osteoarthrosis, unspecified site     R hip, on meds     Herpes simplex without mention of complication     oral     Intramural leiomyoma of uterus      Unspecified hypothyroidism      Past Surgical History:   Procedure Laterality Date     C LIGATE FALLOPIAN TUBE       C NONSPECIFIC PROCEDURE  5/02    rotator cuff surgery both shoulder     C NONSPECIFIC PROCEDURE      wrist surgery for cyst x 2     C VAGINAL HYSTERECTOMY  12/03    with BSO, 10-12 week size     CHOLECYSTECTOMY       COLONOSCOPY  4/24/2015     COLONOSCOPY WITH CO2 INSUFFLATION N/A 4/23/2015    Procedure: COLONOSCOPY WITH CO2 INSUFFLATION;  Surgeon: Bebeto Mortensen MD;  Location: [facility] OR     ELBOW SURGERY      Lt elbow repair.     HYSTERECTOMY, PAP NO LONGER INDICATED       LAPAROSCOPIC CHOLECYSTECTOMY N/A 4/24/2020    Procedure: CHOLECYSTECTOMY, LAPAROSCOPIC;  Surgeon: Janett Cahn MD;  Location: [facility] OR     ORTHOPEDIC SURGERY        SOFT TISSUE SURGERY      cyst, both shoulders and left elbow       Social History:  Patient reports that she has never smoked. She has never used smokeless tobacco. She reports that she does not drink alcohol or use drugs.    Family History:  Family History   Problem Relation Age of Onset     C.A.D. Maternal Grandfather      Coronary Artery Disease Maternal Grandfather         Heart Attack, ()     Coronary Artery Disease Father         High Cholesterol     Depression/Anxiety Father         Depression     Thyroid Disease Father         Hypo Thryoid     Retinal detachment Father      Thyroid Disease Father         Hypo     Hypertension Mother         High Blood Pressure     Breast Cancer Mother         Has spot being watching.  Checked every 6 months     Glaucoma Mother         glc surgery     Breast Cancer Paternal Grandmother         Breast removed ()     Asthma Paternal Grandmother         Emphysema ()     Cerebrovascular Disease Paternal Grandfather         Strokes ()     Known Genetic Syndrome Daughter         Whitaker's Syndrome     Skin Cancer No family hx of         no family hx of skin cancer       Medications:  Current Outpatient Medications   Medication     albuterol (PROAIR HFA/PROVENTIL HFA/VENTOLIN HFA) 108 (90 Base) MCG/ACT inhaler     buPROPion (WELLBUTRIN XL) 150 MG 24 hr tablet     cyclobenzaprine (FLEXERIL) 10 MG tablet     estrogen conj (PREMARIN) 0.3 MG tablet     famciclovir (FAMVIR) 500 MG tablet     ferrous sulfate (FEROSUL) 325 (65 Fe) MG tablet     gabapentin (NEURONTIN) 300 MG capsule     levothyroxine (SYNTHROID/LEVOTHROID) 100 MCG tablet     nortriptyline (PAMELOR) 10 MG capsule     nystatin (MYCOSTATIN) 503284 UNIT/GM external powder     omeprazole (PRILOSEC) 20 MG DR capsule     senna-docusate (SENOKOT-S/PERICOLACE) 8.6-50 MG tablet     valACYclovir (VALTREX) 1000 mg tablet     Vitamins-Lipotropics (LIPOFLAVONOID) TABS     No current  facility-administered medications for this visit.           Allergies   Allergen Reactions     No Known Drug Allergies            Impression and Recommendations (Patient Counseled on the Following):  1. Onychomycosis, KOH positive today.- has failed terbinafine twice, cicloprix not used regularly. Consider jubilia or itra or fluconazole. Pt interested in oral medications    hold ciclopirox 8% solution , start jubilya    We will consider itraconazole or fluconazole for further treatment. Reviewed risks of heart or nails disease. Wll have Shaye Albarado chart check meds for safety prior to starting. (kept in chart for safety)  2.  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   ? Recheck at follow     3. Skin colored flat papule on chin, unchanged as of 10/30/18.    Recheck at follow up   4. Hyperkeratosis/Roughness, right great toe  -requests urea refill    5. Papule, left temple-most likely SK, schedule, removal  -pt wants removed, will scheduled    6. Pedunculated papule, lower back- skin tag or nvus    Follow-up:   Follow-up with dermatology in approximately within 12 weeks.       Staff only    Tiff Dunn MD    Department of Dermatology  [facility]: Phone: [phone], Fax:[phone]  [facility]: Phone: [phone], Fax: [phone]      _____________________________________________________________________________    Teledermatology information:  - Location of patient: [place]  - Patient presented as: return  - Location of teledermatologist:  ([facility] )  - Reason teledermatology is appropriate:  of National Emergency Regarding Coronavirus disease (COVID 19) Outbreak  - Image quality and interpretability: acceptable  - Physician has received verbal consent for a Video/Photos Visit from the patient? Yes  - In-person dermatology visit  "recommendation: yes - for biopsy  - Date of images:  8/21/2020  - Service start time:9:08am  - Service end time:9:19am  - Date of report: 8/25/2020         Teledermatology Nurse Call for RETURN patients seen within the last 3 years:      The patient was contacted by phone and we reviewed they have a visit in teledermatology upcoming with an MD or HONEY;  Importantly, \"a teledermatology visit may not be as thorough as an in-person visit, and the quality of the photograph and/or video sent may not be of the same quality as that taken by the dermatology clinic.\"     We have found that certain health care needs can be provided without the need for an in-person physical exam.   If a prescription is necessary we can send it directly to your pharmacy.  If lab work is needed we can place an order for that and you can then stop by our lab to have the test done at a later time.Visits are billed at different rates depending on your insurance coverage. Please reach out to your insurance provider with any questions.    The patient chose to:                                                                                                                                                                                                                 Consent to a teledermatology visit with [employer] photos. Patient told by nursing these are already uploaded. Instructions sent to patient via [employer]. They verified they will be in the state of [place] at the time of the encounter.                                                                                                                                                                                                                                     The patient denied skin pain, fever, mucosal symptoms(lesions, blisters, sores in the mouth, nose, eyes, or genitals)  IF PATIENT ENDORSES ANY OF THESE STOP AND PAGE ON CALL ATTENDING                                                              "                                                                                                                                                                    Again, thank you for allowing me to participate in the care of your patient.        Sincerely,        Tiff Dunn MD

## 2020-08-25 NOTE — PATIENT INSTRUCTIONS
[facility] Dermatology Visit    Thank you for allowing us to participate in your care.     When should I call my doctor?    If you are worsening or not improving, please, contact us or seek urgent care as noted below.     Who should I call with questions (adults)?    [facility] (adult and pediatric): [phone]     [facility] (adult): [phone]    For urgent needs outside of business hours call the [facility] at [phone] and ask for the dermatology resident on call    If this is a medical emergency and you are unable to reach an ER, Call 911      Who should I call with questions (pediatric)?  [facility]- Pediatric Dermatology  Dr. Alisia Laughlin, Dr. Vi Flannery, Dr. Mylene Lei, Karma Arellano, JOSE ANGEL Ruiz, Dr. Serenity Quigley & Dr. Octavio Malone  Non Urgent  Nurse Triage Line; [phone]- Corrine and Gabriela CHANDLER Care Coordinatorsamuel Esparza (/Complex ) [phone]    If you need a prescription refill, please contact your pharmacy. Refills are approved or denied by our Physicians during normal business hours, Monday through Fridays  Per office policy, refills will not be granted if you have not been seen within the past year (or sooner depending on your child's condition)    Scheduling Information:  Pediatric Appointment Scheduling and Call Center [phone]  Radiology Scheduling- [phone]  Sedation Unit Scheduling- [phone]  [place] Scheduling- General [phone]; Pediatric Dermatology [phone]  Main  Services: [phone]  [place]: [phone]  Zimbabwean: [phone]  Hmong/Lino/Sierra Leonean: [phone]  Preadmission Nursing Department Fax Number: [phone] (Fax all pre-operative paperwork to this number)    For urgent matters arising during evenings, weekends, or holidays that cannot wait for normal business hours  please call [phone] and ask for the Dermatology Resident On-Call to be paged.        JUBLIA (efinaconazole) Topical Solution    10% is a prescription medicine used to treat fungal infections of the toenails.    Apply JUBLIA to affected toenails once daily for 48 weeks using the integrated flow-through brush applicator    When applying JUBLIA, ensure the toenail, the toenail folds, toenail bed, hyponychium,and the undersurface of the toenail plate, are completely covered.       IMPORTANT SAFETY INFORMATION    JUBLIA is for use on nails and surrounding skin only. Do not use JUBLIA in your mouth, eyes, or vagina. Use it exactly as instructed by your doctor.    It is not known whether JUBLIA is effective in children.    Before you use JUBLIA, tell your doctor about all your medical conditions, including if you are or plan to become pregnant, are breastfeeding, or plan to breastfeed, because it is not known whether JUBLIA can harm an unborn fetus or nursing infant. Tell your doctor about all medications you are taking, and whether you have any other nail infections.    JUBLIA is flammable. Avoid heat and flame while applying JUBLIA to your toenail.    JUBLIA may cause irritation at the treated site. The most common side effects include: ingrown toenail, redness, itching, swelling, burning or stinging, blisters, and pain. Tell your doctor about any side effects that bother you or do not go away.      Who should I call with questions?    [facility]: [phone]     [facility]: [phone]    For urgent needs outside of business hours call the [facility] at [phone] and ask for the dermatology resident on call

## 2020-09-03 ENCOUNTER — TELEPHONE (OUTPATIENT)
Dept: DERMATOLOGY | Facility: CLINIC | Age: 60
End: 2020-09-03

## 2020-09-03 NOTE — TELEPHONE ENCOUNTER
[facility] Call Center    Phone Message    May a detailed message be left on voicemail: yes     Reason for Call: Other: Patient had appt mirian solis 8/25 - need to sched mole removal. no response from clinic at time of call.     Action Taken: Message routed to:  Adult Clinics: Dermatology p [phone]    Travel Screening: Not Applicable

## 2020-09-04 NOTE — TELEPHONE ENCOUNTER
MD note: Papule, left temple-most likely SK, schedule, removal  -pt wants removed, will scheduled         Spoke with patient, she is scheduled for 9/18/2020.  [id number], MEDICAL ASSISTANT

## 2020-09-14 ENCOUNTER — OFFICE VISIT (OUTPATIENT)
Dept: INTERNAL MEDICINE | Facility: CLINIC | Age: 60
End: 2020-09-14
Payer: COMMERCIAL

## 2020-09-14 VITALS
OXYGEN SATURATION: 97 % | HEIGHT: 69 IN | SYSTOLIC BLOOD PRESSURE: 128 MMHG | RESPIRATION RATE: 18 BRPM | WEIGHT: 243.4 LBS | DIASTOLIC BLOOD PRESSURE: 72 MMHG | TEMPERATURE: 98.2 F | BODY MASS INDEX: 36.05 KG/M2 | HEART RATE: 77 BPM

## 2020-09-14 DIAGNOSIS — Z12.11 COLON CANCER SCREENING: ICD-10-CM

## 2020-09-14 DIAGNOSIS — T14.8XXA HEMATOMA OF SKIN: ICD-10-CM

## 2020-09-14 DIAGNOSIS — N83.209 CYST OF OVARY, UNSPECIFIED LATERALITY: ICD-10-CM

## 2020-09-14 DIAGNOSIS — Z00.00 ROUTINE GENERAL MEDICAL EXAMINATION AT A HEALTH CARE FACILITY: Primary | ICD-10-CM

## 2020-09-14 DIAGNOSIS — E03.9 ACQUIRED HYPOTHYROIDISM: Chronic | ICD-10-CM

## 2020-09-14 PROCEDURE — 99396 PREV VISIT EST AGE 40-64: CPT | Performed by: INTERNAL MEDICINE

## 2020-09-14 PROCEDURE — 99213 OFFICE O/P EST LOW 20 MIN: CPT | Mod: 25 | Performed by: INTERNAL MEDICINE

## 2020-09-14 PROCEDURE — 36415 COLL VENOUS BLD VENIPUNCTURE: CPT | Performed by: FAMILY MEDICINE

## 2020-09-14 PROCEDURE — 84439 ASSAY OF FREE THYROXINE: CPT | Performed by: FAMILY MEDICINE

## 2020-09-14 PROCEDURE — 84443 ASSAY THYROID STIM HORMONE: CPT | Performed by: FAMILY MEDICINE

## 2020-09-14 RX ORDER — LEVOTHYROXINE SODIUM 100 UG/1
100 TABLET ORAL DAILY
Qty: 90 TABLET | Refills: 3 | Status: SHIPPED | OUTPATIENT
Start: 2020-09-14 | End: 2020-09-16

## 2020-09-14 ASSESSMENT — ENCOUNTER SYMPTOMS
HEADACHES: 0
DYSURIA: 0
NERVOUS/ANXIOUS: 0
FEVER: 0
BREAST MASS: 0
HEARTBURN: 0
SHORTNESS OF BREATH: 0
EYE PAIN: 0
CONSTIPATION: 0
SORE THROAT: 0
JOINT SWELLING: 0
PARESTHESIAS: 0
CHILLS: 0
HEMATURIA: 0
ARTHRALGIAS: 0
ABDOMINAL PAIN: 0
HEMATOCHEZIA: 0
WEAKNESS: 0
DIZZINESS: 0
FREQUENCY: 0
MYALGIAS: 0
DIARRHEA: 0
PALPITATIONS: 0
NAUSEA: 0
COUGH: 0

## 2020-09-14 ASSESSMENT — PAIN SCALES - GENERAL: PAINLEVEL: NO PAIN (0)

## 2020-09-14 ASSESSMENT — MIFFLIN-ST. JEOR: SCORE: 1720.56

## 2020-09-14 NOTE — PROGRESS NOTES
SUBJECTIVE:   CC: Amina Pineda is an 60 year old woman who presents for preventive health visit.   Healthy Habits:     Getting at least 3 servings of Calcium per day:  Yes    Bi-annual eye exam:  Yes    Dental care twice a year:  NO    Sleep apnea or symptoms of sleep apnea:  Sleep apnea    Diet:  Regular (no restrictions)    Frequency of exercise:  None    Taking medications regularly:  Yes    Medication side effects:  Not applicable    PHQ-2 Total Score: 0    Additional concerns today:  No    Hyperlipidemia Follow-Up    Are you regularly taking any medication or supplement to lower your cholesterol?   No    Are you having muscle aches or other side effects that you think could be caused by your cholesterol lowering medication?  No    Hypothyroidism Follow-up    Since last visit, patient describes the following symptoms: hair loss    She had an elevated TSH at her last appointment.  She is not clear as to what dose that she should be on but she thinks she is taking 100 mcg of levothyroxine daily.    Also she had a pelvic ultrasound over the summer which revealed that she has an ovarian cyst and she is wondering when this should get rechecked per the provider and radiologist this was to be rechecked in December 2020    She has a significant amount of stress in her life with a grandson.    She is also having problems with her toenails.  On her right big toe she has a toenail that is starting to fall off and she is wondering if she should completely pull this off.    This summer she was working with her  and she accidentally had a large piece of metal plant on top of her hand.  Later that night she had significant swelling.  It has since then improved but is  to the touch and she does have some bruising on the dorsum of the hand.    Today's PHQ-2 Score:   PHQ-2 ( 1999 Pfizer) 9/14/2020   Q1: Little interest or pleasure in doing things 0   Q2: Feeling down, depressed or hopeless 0   PHQ-2  Score 0   Q1: Little interest or pleasure in doing things Not at all   Q2: Feeling down, depressed or hopeless Not at all   PHQ-2 Score 0   Abuse: Current or Past (Physical, Sexual or Emotional) - No  Do you feel safe in your environment? Yes    Have you ever done Advance Care Planning? (For example, a Health Directive, POLST, or a discussion with a medical provider or your loved ones about your wishes): No, advance care planning information given to patient to review.  Advanced care planning was discussed at today's visit.  Social History     Tobacco Use     Smoking status: Never Smoker     Smokeless tobacco: Never Used   Substance Use Topics     Alcohol use: No   If you drink alcohol do you typically have >3 drinks per day or >7 drinks per week? No  Alcohol Use 9/14/2020   Prescreen: >3 drinks/day or >7 drinks/week? No   Prescreen: >3 drinks/day or >7 drinks/week? -   No flowsheet data found.  Reviewed orders with patient.  Reviewed health maintenance and updated orders accordingly - Yes  Lab work is in process    Mammogram Screening: Patient over age 50, mutual decision to screen reflected in health maintenance.    Pertinent mammograms are reviewed under the imaging tab.  History of abnormal Pap smear: Status post benign hysterectomy. Health Maintenance and Surgical History updated.     Reviewed and updated as needed this visit by clinical staff  Tobacco  Allergies  Meds  Problems  Med Hx  Surg Hx  Fam Hx  Soc Hx          Reviewed and updated as needed this visit by Provider  Tobacco  Allergies  Meds  Problems  Med Hx  Surg Hx  Fam Hx            Review of Systems   Constitutional: Negative for chills and fever.   HENT: Negative for congestion, ear pain, hearing loss and sore throat.    Eyes: Negative for pain and visual disturbance.   Respiratory: Negative for cough and shortness of breath.    Cardiovascular: Negative for chest pain, palpitations and peripheral edema.   Gastrointestinal: Negative  "for abdominal pain, constipation, diarrhea, heartburn, hematochezia and nausea.   Breasts:  Negative for tenderness, breast mass and discharge.   Genitourinary: Positive for urgency. Negative for dysuria, frequency, genital sores, hematuria, pelvic pain, vaginal bleeding and vaginal discharge.   Musculoskeletal: Negative for arthralgias, joint swelling and myalgias.   Skin: Negative for rash.   Neurological: Negative for dizziness, weakness, headaches and paresthesias.   Psychiatric/Behavioral: Negative for mood changes. The patient is not nervous/anxious.           OBJECTIVE:   /72 (BP Location: Right arm, Cuff Size: Adult Regular)   Pulse 77   Temp 98.2  F (36.8  C) (Oral)   Resp 18   Ht 1.74 m (5' 8.5\")   Wt 110.4 kg (243 lb 6.4 oz)   SpO2 97%   BMI 36.47 kg/m    Physical Exam  GENERAL APPEARANCE: healthy, alert and no distress  EYES: Eyes grossly normal to inspection, PERRL and conjunctivae and sclerae normal  HENT: ear canals and TM's normal and nose and mouth without ulcers or lesions  NECK: no adenopathy, no asymmetry, masses, or scars and thyroid normal to palpation  RESP: lungs clear to auscultation - no rales, rhonchi or wheezes  BREAST: normal without masses, tenderness or nipple discharge and no palpable axillary masses or adenopathy  CV: regular rates and rhythm and normal S1 S2, no S3 or S4  LYMPHATICS: normal ant/post cervical and supraclavicular nodes  ABDOMEN: soft, nontender, without hepatosplenomegaly or masses and bowel sounds normal  Toenail on her right large toe is partially avulsed.  There is no erythema or drainage.  MS: extremities normal- no gross deformities noted  SKIN: no suspicious lesions or rashes  NEURO: Normal strength and tone, mentation intact and speech normal  PSYCH: mentation appears normal and affect normal/bright  No edema   1+ posterior tibial pulses bilateral    Right hand does reveal a small hematoma.  There is surrounding ecchymosis.        Diagnostic Test " "Results:  Labs reviewed in Epic    ASSESSMENT/PLAN:   1. Routine general medical examination at a health care facility      2. Colon cancer screening    - GASTROENTEROLOGY ADULT REF PROCEDURE ONLY; Future    3. Acquired hypothyroidism  She will check which dose she is on.  I will recheck her TSH today.  She does have some mild symptoms and would like to be treated for any subclinical hypothyroidism.  - levothyroxine (SYNTHROID/LEVOTHROID) 100 MCG tablet; Take 1 tablet (100 mcg) by mouth daily  Dispense: 90 tablet; Refill: 3  - TSH with free T4 reflex    4. Hematoma of skin  She will continue to ice.  This appears to be getting better.    5. Cyst of ovary, unspecified laterality  Repeat ultrasound needed in December.  - US Pelvic Complete with Transvaginal; Future    COUNSELING:  Reviewed preventive health counseling, as reflected in patient instructions    Estimated body mass index is 36.47 kg/m  as calculated from the following:    Height as of this encounter: 1.74 m (5' 8.5\").    Weight as of this encounter: 110.4 kg (243 lb 6.4 oz).    Weight management plan: Discussed healthy diet and exercise guidelines    She reports that she has never smoked. She has never used smokeless tobacco.      Counseling Resources:  ATP IV Guidelines  Pooled Cohorts Equation Calculator  Breast Cancer Risk Calculator  BRCA-Related Cancer Risk Assessment: FHS-7 Tool  FRAX Risk Assessment  ICSI Preventive Guidelines  Dietary Guidelines for Americans, 2010  USDA's MyPlate  ASA Prophylaxis  Lung CA Screening    Briana Melton MD  [facility]  "

## 2020-09-15 LAB
T4 FREE SERPL-MCNC: 1.02 NG/DL (ref 0.76–1.46)
TSH SERPL DL<=0.005 MIU/L-ACNC: 24.49 MU/L (ref 0.4–4)

## 2020-09-16 NOTE — RESULT ENCOUNTER NOTE
Briseida Smith.  It looks like your thyroid medication could be increased but I know there was some confusion about the dose that you were taking.  Can you clarify your dose and how you take it (ie with food, vitamins, etc)?    Thanks,  Dr. Melton

## 2020-09-24 ENCOUNTER — HOSPITAL ENCOUNTER (OUTPATIENT)
Facility: CLINIC | Age: 60
End: 2020-09-24
Attending: INTERNAL MEDICINE | Admitting: INTERNAL MEDICINE
Payer: COMMERCIAL

## 2020-09-24 DIAGNOSIS — Z11.59 ENCOUNTER FOR SCREENING FOR OTHER VIRAL DISEASES: Primary | ICD-10-CM

## 2020-11-13 DIAGNOSIS — E03.9 ACQUIRED HYPOTHYROIDISM: Chronic | ICD-10-CM

## 2020-11-13 LAB — TSH SERPL DL<=0.005 MIU/L-ACNC: 2.83 MU/L (ref 0.4–4)

## 2020-11-13 PROCEDURE — 36415 COLL VENOUS BLD VENIPUNCTURE: CPT | Performed by: INTERNAL MEDICINE

## 2020-11-13 PROCEDURE — 84443 ASSAY THYROID STIM HORMONE: CPT | Performed by: INTERNAL MEDICINE

## 2021-03-05 ENCOUNTER — VIRTUAL VISIT (OUTPATIENT)
Dept: FAMILY MEDICINE | Facility: CLINIC | Age: 61
End: 2021-03-05
Payer: COMMERCIAL

## 2021-03-05 DIAGNOSIS — M54.50 CHRONIC LOW BACK PAIN WITHOUT SCIATICA, UNSPECIFIED BACK PAIN LATERALITY: Primary | ICD-10-CM

## 2021-03-05 DIAGNOSIS — G89.29 CHRONIC LOW BACK PAIN WITHOUT SCIATICA, UNSPECIFIED BACK PAIN LATERALITY: Primary | ICD-10-CM

## 2021-03-05 DIAGNOSIS — R19.00 PELVIC MASS: ICD-10-CM

## 2021-03-05 DIAGNOSIS — M89.8X1 SHOULDER BLADE PAIN: ICD-10-CM

## 2021-03-05 PROCEDURE — 99214 OFFICE O/P EST MOD 30 MIN: CPT | Mod: 95 | Performed by: FAMILY MEDICINE

## 2021-03-05 RX ORDER — CYCLOBENZAPRINE HCL 10 MG
10 TABLET ORAL 2 TIMES DAILY PRN
Qty: 60 TABLET | Refills: 4 | Status: SHIPPED | OUTPATIENT
Start: 2021-03-05 | End: 2022-02-09

## 2021-03-05 RX ORDER — METHYLPREDNISOLONE 4 MG
TABLET, DOSE PACK ORAL
Qty: 21 TABLET | Refills: 0 | Status: SHIPPED | OUTPATIENT
Start: 2021-03-05 | End: 2022-02-09

## 2021-03-05 ASSESSMENT — ANXIETY QUESTIONNAIRES
1. FEELING NERVOUS, ANXIOUS, OR ON EDGE: NOT AT ALL
5. BEING SO RESTLESS THAT IT IS HARD TO SIT STILL: SEVERAL DAYS
GAD7 TOTAL SCORE: 1
2. NOT BEING ABLE TO STOP OR CONTROL WORRYING: NOT AT ALL
IF YOU CHECKED OFF ANY PROBLEMS ON THIS QUESTIONNAIRE, HOW DIFFICULT HAVE THESE PROBLEMS MADE IT FOR YOU TO DO YOUR WORK, TAKE CARE OF THINGS AT HOME, OR GET ALONG WITH OTHER PEOPLE: NOT DIFFICULT AT ALL
3. WORRYING TOO MUCH ABOUT DIFFERENT THINGS: NOT AT ALL
7. FEELING AFRAID AS IF SOMETHING AWFUL MIGHT HAPPEN: NOT AT ALL
6. BECOMING EASILY ANNOYED OR IRRITABLE: NOT AT ALL

## 2021-03-05 ASSESSMENT — PATIENT HEALTH QUESTIONNAIRE - PHQ9
SUM OF ALL RESPONSES TO PHQ QUESTIONS 1-9: 0
5. POOR APPETITE OR OVEREATING: NOT AT ALL

## 2021-03-05 NOTE — PROGRESS NOTES
"Briseida is a 60 year old who is being evaluated via a billable video visit.      How would you like to obtain your AVS? MyChart  If the video visit is dropped, the invitation should be resent by: Text to cell phone: [phone]  Will anyone else be joining your video visit? No    Video Start Time: 12:17    Assessment & Plan     Chronic low back pain without sciatica, unspecified back pain laterality  On and off, worse since covid and working from home, is going to go to [facility], get more exercise and use flexeril 1/2 tab in morning and 1 at bedtime  - cyclobenzaprine (FLEXERIL) 10 MG tablet; Take 1 tablet (10 mg) by mouth 2 times daily as needed for muscle spasms    Shoulder blade pain  New, suspect bursitis, vs pinch nerve in neck, will use flexeril, tennis ball and stretches, medrol dose pack. Potential medication side effects were discussed with the patient; let me know if any occur.    - methylPREDNISolone (MEDROL DOSEPAK) 4 MG tablet therapy pack; Follow Package Directions    Pelvic mass  Ovarian remnant cyst, on the right, due for recheck, pt will get that done, questions answered               BMI:   Estimated body mass index is 36.47 kg/m  as calculated from the following:    Height as of 9/14/20: 1.74 m (5' 8.5\").    Weight as of 9/14/20: 110.4 kg (243 lb 6.4 oz).       Regular exercise    Return in about 6 months (around 9/5/2021) for Preventive Visit.    Damari Baltazar MD  [facility]    Subjective   Briseida is a 60 year old who presents for the following health issues     HPI       New Patient/Transfer of Care  Chronic/Recurring Back Pain Follow Up      Where is your back pain located? (Select all that apply) low back bilateral, and left side shoulder blade-no injury, no idea what she did, she stretches one day and it started hurting. Neck is stiff, worse to turn to the left.    Arthritis of the right hip, pressure across the low back from hip to hip, trying a different chair now " and hopes that will work.     How would you describe your back pain?  burning, shooting and stabbing    Where does your back pain spread? nowhere    Since your last clinic visit for back pain, how has your pain changed? always present, but gets better and worse    Does your back pain interfere with your job? No    Since your last visit, have you tried any new treatment? No     Flexeril has helped in the past      How many servings of fruits and vegetables do you eat daily?  2-3    On average, how many sweetened beverages do you drink each day (Examples: soda, juice, sweet tea, etc.  Do NOT count diet or artificially sweetened beverages)?   0    How many days per week do you exercise enough to make your heart beat faster? 0    How many minutes a day do you exercise enough to make your heart beat faster? 0    How many days per week do you miss taking your medication? 0  PHQ 4/2/2019 5/13/2020 3/5/2021   PHQ-9 Total Score 3 0 0   Q9: Thoughts of better off dead/self-harm past 2 weeks Not at all Not at all Not at all     VERONICA-7 SCORE 2/10/2017 4/2/2019 3/5/2021   Total Score 0 0 1     Works for [employer], likes living in [place]. Kids  Live in . Does see her kids. Her  also is home.     Gallbladder attack last yr 4/24th, and 5/9 got shingles, along the waist band and under the breasts, 2 patches, it was bad. Needs the shingles.    Has a covid shot 2nd shot soon. asking how long to wait between the 2.    Hx of hysterectomy and no ovaries, CT scan last year for gallbladder surgery showing ovarian cyst. Pelvic ultrasound confirmed and they recommend Follow up in 6 months. She has questions, as she does not have any ovaries....            Review of Systems   CONSTITUTIONAL: NEGATIVE for fever, chills, change in weight  ENT/MOUTH: NEGATIVE for ear, mouth and throat problems  RESP: NEGATIVE for significant cough or SOB  CV: NEGATIVE for chest pain, palpitations or peripheral edema      Objective    Vitals -  "Patient Reported  Weight (Patient Reported): 106.6 kg (235 lb)  Height (Patient Reported): 172.7 cm (5' 8\")  BMI (Based on Pt Reported Ht/Wt): 35.73      Vitals:  No vitals were obtained today due to virtual visit.    Physical Exam   GENERAL: Healthy, alert and no distress  EYES: Eyes grossly normal to inspection.  No discharge or erythema, or obvious scleral/conjunctival abnormalities.  RESP: No audible wheeze, cough, or visible cyanosis.  No visible retractions or increased work of breathing.    SKIN: Visible skin clear. No significant rash, abnormal pigmentation or lesions.  NEURO: Cranial nerves grossly intact.  Mentation and speech appropriate for age.  PSYCH: Mentation appears normal, affect normal/bright, judgement and insight intact, normal speech and appearance well-groomed.                Video-Visit Details    Type of service:  Video Visit    Video End Time:12:43 PM    Originating Location (pt. Location): Home    [facility]     Platform used for Video Visit: Ilda  "

## 2021-03-06 ASSESSMENT — ANXIETY QUESTIONNAIRES: GAD7 TOTAL SCORE: 1

## 2021-04-01 ENCOUNTER — ANCILLARY PROCEDURE (OUTPATIENT)
Dept: ULTRASOUND IMAGING | Facility: CLINIC | Age: 61
End: 2021-04-01
Attending: FAMILY MEDICINE
Payer: COMMERCIAL

## 2021-04-01 PROCEDURE — 76830 TRANSVAGINAL US NON-OB: CPT | Performed by: OBSTETRICS & GYNECOLOGY

## 2021-04-01 PROCEDURE — 76856 US EXAM PELVIC COMPLETE: CPT | Performed by: OBSTETRICS & GYNECOLOGY

## 2021-05-06 ENCOUNTER — THERAPY VISIT (OUTPATIENT)
Dept: PHYSICAL THERAPY | Facility: CLINIC | Age: 61
End: 2021-05-06
Payer: COMMERCIAL

## 2021-05-06 DIAGNOSIS — M25.551 HIP PAIN, RIGHT: ICD-10-CM

## 2021-05-06 PROCEDURE — 97035 APP MDLTY 1+ULTRASOUND EA 15: CPT | Mod: GP | Performed by: PHYSICAL THERAPIST

## 2021-05-06 PROCEDURE — 97110 THERAPEUTIC EXERCISES: CPT | Mod: GP | Performed by: PHYSICAL THERAPIST

## 2021-05-06 PROCEDURE — 97161 PT EVAL LOW COMPLEX 20 MIN: CPT | Mod: GP | Performed by: PHYSICAL THERAPIST

## 2021-05-06 ASSESSMENT — ACTIVITIES OF DAILY LIVING (ADL)
HOS_ADL_COUNT: 17
GETTING_INTO_AND_OUT_OF_AN_AVERAGE_CAR: MODERATE DIFFICULTY
GOING_DOWN_1_FLIGHT_OF_STAIRS: SLIGHT DIFFICULTY
STANDING_FOR_15_MINUTES: SLIGHT DIFFICULTY
WALKING_APPROXIMATELY_10_MINUTES: NO DIFFICULTY AT ALL
WALKING_UP_STEEP_HILLS: SLIGHT DIFFICULTY
HOS_ADL_ITEM_SCORE_TOTAL: 48
WALKING_INITIALLY: NO DIFFICULTY AT ALL
STEPPING_UP_AND_DOWN_CURBS: SLIGHT DIFFICULTY
WALKING_DOWN_STEEP_HILLS: SLIGHT DIFFICULTY
HOS_ADL_HIGHEST_POTENTIAL_SCORE: 68
HEAVY_WORK: MODERATE DIFFICULTY
ROLLING_OVER_IN_BED: SLIGHT DIFFICULTY
SITTING_FOR_15_MINUTES: NO DIFFICULTY AT ALL
RECREATIONAL_ACTIVITIES: MODERATE DIFFICULTY
LIGHT_TO_MODERATE_WORK: SLIGHT DIFFICULTY
WALKING_15_MINUTES_OR_GREATER: NO DIFFICULTY AT ALL
PUTTING_ON_SOCKS_AND_SHOES: MODERATE DIFFICULTY
TWISTING/PIVOTING_ON_INVOLVED_LEG: MODERATE DIFFICULTY
DEEP_SQUATTING: MODERATE DIFFICULTY
GETTING_INTO_AND_OUT_OF_A_BATHTUB: MODERATE DIFFICULTY
GOING_UP_1_FLIGHT_OF_STAIRS: SLIGHT DIFFICULTY
HOS_ADL_SCORE(%): 70.59

## 2021-05-06 NOTE — PROGRESS NOTES
Physical Therapy Initial Evaluation  Subjective:  Pt describes intermittent right hip pain and stiffness.  She states that her right hip has been symptomatic for 20 years, but has been worse the past 3-4 weeks.  She states that she has OA of the hip.  No recent imaging.  Locates the pain in the ant to lat hip.  Felt with getting in/out of the car and walking.  She also describes right lateral hip pain when lying on her right side that is impacting her ability to sleep on her right side.  She has also been having intermittent LBP for which she has been seeing a chiropractor.  She was adjusted yesterday and is not having pain today.      The history is provided by the patient.   Therapist Generated HPI Evaluation         Type of problem:  Right hip.    This is a chronic condition.  Condition occurred with:  Degenerative joint disease.    Patient reports pain:  Anterior, lateral and greater trochanter.  Pain is described as aching and sharp and is intermittent.  Pain radiates to:  No radiation. Pain is the same all the time.  Since onset symptoms are gradually worsening.  Associated symptoms:  Loss of motion/stiffness. Symptoms are exacerbated by sitting, walking, lying on extremity and transfers  and relieved by activity/movement.  Imaging testing: none.  Past treatment: none.   Restrictions due to condition include:  Working in normal job without restrictions.  Barriers include:  None as reported by patient.    Patient Health History             Pertinent medical history includes: overweight, sleep disorder/apnea and thyroid problems.        Surgeries include:  Orthopedic surgery (shoulder x2 L&R elbow).    Current medications:  Anti-depressants, hormone replacement therapy, pain medication and thyroid medication.    Current occupation is [employer] .   Primary job tasks include:  Computer work and prolonged sitting.                                    Objective:  Standing Alignment:          Pelvic:   Normal              Flexibility/Screens:   Positive screens:  HipNegative screens: SI Joint     Lower Extremity:  Decreased left lower extremity flexibility:Hip ER's and Hip Flexors    Decreased right lower extremity flexibility:  Hip ER's and Hip Flexors                                                 Hip Evaluation  Hip PROM:    Flexion: Left: 100   Right: 100        Internal Rotation: Left: 10    Right: 10 with pain  External Rotation: Left: 80    Right: 70 with pain              Hip Strength:    Flexion:   Left: 5-/5   Pain:  Right: 5-/5   Pain:                      Abduction:  Left: 4+/5     Pain:Right: 4+/5    Pain:                  Hip Special Testing:      Left hip negative for the following special tests:  Rudi   Right hip positive for the following special tests:  Rudi    Hip Palpation:      Left hip tenderness not present at:  Greater Trachanter  Right hip tenderness present at:  Greater Trachanter             General     ROS    Assessment/Plan:    Patient is a 61 year old female with right side hip complaints.    Patient has the following significant findings with corresponding treatment plan.                Diagnosis 1:  Right hip OA and greater trochanteric bursitis  Pain -  hot/cold therapy, US, manual therapy, education and home program  Decreased ROM/flexibility - manual therapy, therapeutic exercise and home program  Decreased strength - therapeutic exercise, therapeutic activities and home program    Therapy Evaluation Codes:   1) History comprised of:   Personal factors that impact the plan of care:      None.    Comorbidity factors that impact the plan of care are:      None.     Medications impacting care: None.  2) Examination of Body Systems comprised of:   Body structures and functions that impact the plan of care:      Hip.   Activity limitations that impact the plan of care are:      Driving, Sitting and Walking.  3) Clinical presentation characteristics  are:   Stable/Uncomplicated.  4) Decision-Making    Low complexity using standardized patient assessment instrument and/or measureable assessment of functional outcome.  Cumulative Therapy Evaluation is: Low complexity.    Previous and current functional limitations:  (See Goal Flow Sheet for this information)    Short term and Long term goals: (See Goal Flow Sheet for this information)     Communication ability:  Patient appears to be able to clearly communicate and understand verbal and written communication and follow directions correctly.  Treatment Explanation - The following has been discussed with the patient:   RX ordered/plan of care  Anticipated outcomes  Possible risks and side effects  This patient would benefit from PT intervention to resume normal activities.   Rehab potential is good.    Frequency:  1 X week, once daily  Duration:  for 6 weeks  Discharge Plan:  Achieve all LTG.  Independent in home treatment program.  Reach maximal therapeutic benefit.    Please refer to the daily flowsheet for treatment today, total treatment time and time spent performing 1:1 timed codes.

## 2021-05-11 DIAGNOSIS — B37.2 YEAST INFECTION OF THE SKIN: ICD-10-CM

## 2021-05-12 ENCOUNTER — THERAPY VISIT (OUTPATIENT)
Dept: PHYSICAL THERAPY | Facility: CLINIC | Age: 61
End: 2021-05-12
Payer: COMMERCIAL

## 2021-05-12 DIAGNOSIS — M25.551 HIP PAIN, RIGHT: ICD-10-CM

## 2021-05-12 PROCEDURE — 97110 THERAPEUTIC EXERCISES: CPT | Mod: GP | Performed by: PHYSICAL THERAPIST

## 2021-05-12 PROCEDURE — 97140 MANUAL THERAPY 1/> REGIONS: CPT | Mod: GP | Performed by: PHYSICAL THERAPIST

## 2021-05-12 PROCEDURE — 97035 APP MDLTY 1+ULTRASOUND EA 15: CPT | Mod: GP | Performed by: PHYSICAL THERAPIST

## 2021-05-13 RX ORDER — NYSTATIN 100000 [USP'U]/G
30 POWDER TOPICAL 3 TIMES DAILY PRN
Qty: 30 G | Refills: 1 | Status: SHIPPED | OUTPATIENT
Start: 2021-05-13 | End: 2024-03-14

## 2021-05-19 ENCOUNTER — THERAPY VISIT (OUTPATIENT)
Dept: PHYSICAL THERAPY | Facility: CLINIC | Age: 61
End: 2021-05-19
Payer: COMMERCIAL

## 2021-05-19 DIAGNOSIS — M25.551 HIP PAIN, RIGHT: ICD-10-CM

## 2021-05-19 PROCEDURE — 97110 THERAPEUTIC EXERCISES: CPT | Performed by: PHYSICAL THERAPIST

## 2021-05-19 PROCEDURE — 97010 HOT OR COLD PACKS THERAPY: CPT | Performed by: PHYSICAL THERAPIST

## 2021-05-19 PROCEDURE — 97035 APP MDLTY 1+ULTRASOUND EA 15: CPT | Performed by: PHYSICAL THERAPIST

## 2021-05-26 ENCOUNTER — THERAPY VISIT (OUTPATIENT)
Dept: PHYSICAL THERAPY | Facility: CLINIC | Age: 61
End: 2021-05-26
Payer: COMMERCIAL

## 2021-05-26 DIAGNOSIS — M25.551 HIP PAIN, RIGHT: ICD-10-CM

## 2021-05-26 PROCEDURE — 97140 MANUAL THERAPY 1/> REGIONS: CPT | Performed by: PHYSICAL THERAPIST

## 2021-05-26 PROCEDURE — 97035 APP MDLTY 1+ULTRASOUND EA 15: CPT | Performed by: PHYSICAL THERAPIST

## 2021-05-26 PROCEDURE — 97110 THERAPEUTIC EXERCISES: CPT | Performed by: PHYSICAL THERAPIST

## 2021-05-26 PROCEDURE — 97010 HOT OR COLD PACKS THERAPY: CPT | Performed by: PHYSICAL THERAPIST

## 2021-06-02 ENCOUNTER — THERAPY VISIT (OUTPATIENT)
Dept: PHYSICAL THERAPY | Facility: CLINIC | Age: 61
End: 2021-06-02
Payer: COMMERCIAL

## 2021-06-02 DIAGNOSIS — M25.551 HIP PAIN, RIGHT: ICD-10-CM

## 2021-06-02 PROCEDURE — 97110 THERAPEUTIC EXERCISES: CPT | Mod: GP | Performed by: PHYSICAL THERAPIST

## 2021-06-02 PROCEDURE — 97010 HOT OR COLD PACKS THERAPY: CPT | Mod: GP | Performed by: PHYSICAL THERAPIST

## 2021-06-02 PROCEDURE — 97035 APP MDLTY 1+ULTRASOUND EA 15: CPT | Mod: GP | Performed by: PHYSICAL THERAPIST

## 2021-06-02 PROCEDURE — 97140 MANUAL THERAPY 1/> REGIONS: CPT | Mod: GP | Performed by: PHYSICAL THERAPIST

## 2021-07-13 DIAGNOSIS — E03.9 ACQUIRED HYPOTHYROIDISM: Chronic | ICD-10-CM

## 2021-07-13 DIAGNOSIS — Z12.31 ENCOUNTER FOR SCREENING MAMMOGRAM FOR BREAST CANCER: ICD-10-CM

## 2021-07-13 DIAGNOSIS — K21.9 GASTROESOPHAGEAL REFLUX DISEASE: ICD-10-CM

## 2021-07-13 DIAGNOSIS — R68.82 DECREASED LIBIDO: ICD-10-CM

## 2021-07-13 DIAGNOSIS — N95.1 MENOPAUSAL SYNDROME (HOT FLUSHES): ICD-10-CM

## 2021-07-13 DIAGNOSIS — Z79.899 HIGH RISK MEDICATION USE: ICD-10-CM

## 2021-07-14 RX ORDER — BUPROPION HYDROCHLORIDE 150 MG/1
150 TABLET ORAL EVERY MORNING
Qty: 90 TABLET | Refills: 3 | Status: SHIPPED | OUTPATIENT
Start: 2021-07-14 | End: 2022-02-09

## 2021-07-19 ENCOUNTER — TRANSFERRED RECORDS (OUTPATIENT)
Dept: HEALTH INFORMATION MANAGEMENT | Facility: CLINIC | Age: 61
End: 2021-07-19

## 2021-08-06 ENCOUNTER — THERAPY VISIT (OUTPATIENT)
Dept: PHYSICAL THERAPY | Facility: CLINIC | Age: 61
End: 2021-08-06
Payer: COMMERCIAL

## 2021-08-06 DIAGNOSIS — M25.551 HIP PAIN, RIGHT: ICD-10-CM

## 2021-08-06 PROCEDURE — 97035 APP MDLTY 1+ULTRASOUND EA 15: CPT | Mod: GP | Performed by: PHYSICAL THERAPIST

## 2021-08-06 PROCEDURE — 97164 PT RE-EVAL EST PLAN CARE: CPT | Mod: GP | Performed by: PHYSICAL THERAPIST

## 2021-08-06 PROCEDURE — 97110 THERAPEUTIC EXERCISES: CPT | Mod: GP | Performed by: PHYSICAL THERAPIST

## 2021-08-06 NOTE — PROGRESS NOTES
Subjective:    Patient Health History  Amina Pineda being seen for SI Joint pain Troch Bursitis.       Problem occurred: It just started.  I have seen Charlie on this previously   Pain is reported as 3/10 on pain scale.  General health as reported by patient is good.  Pertinent medical history includes: depression, overweight, osteoarthritis, pain at night/rest and thyroid problems.     Medical allergies: none.   Surgeries include:  Orthopedic surgery and other. Other surgery history details: cyst on wrist.    Current medications:  Anti-depressants, hormone replacement therapy, pain medication and thyroid medication.    Current occupation is .   Primary job tasks include:  Computer work and prolonged sitting.                  Physical Exam                    Objective:  System    Physical Exam    General     ROS    Assessment/Plan:    PROGRESS  REPORT    Progress reporting period is from 6/2/21 to 8/6/21.       SUBJECTIVE  Subjective changes noted by patient:    Subjective: Pt returns to PT with primary concern with intermittent pain in the right LB running to her distal right buttock.  Began insidiously about 2 months ago.  Felt when standing too long, transitional movements.  She is not having right lateral hip pain when lying on her side now, but does still have intermittent right anterior hip pain with walking, pivoting, transfers.      Current pain level is 5/10  .     Previous pain level was  NA  .   Changes in function:  Yes (See Goal flowsheet attached for changes in current functional level)  Adverse reaction to treatment or activity: None    OBJECTIVE  Changes noted in objective findings:    Objective: No pain to palp of the gr troch.  Pain with NED test on right, but near full ER.  Pain to palp of the right PSIS.  Pelvic alignment checked, but too difficult to accurately assess due to body fat.       ASSESSMENT/PLAN  Updated problem list and treatment plan: Diagnosis 1:  Right SI  pain  Pain -  hot/cold therapy, US, manual therapy, education and home program  Decreased strength - therapeutic exercise, therapeutic activities and home program  Diagnosis 2:  Right hip OA   Pain -  hot/cold therapy, manual therapy and home program  Decreased ROM/flexibility - manual therapy, therapeutic exercise and home program  Decreased strength - therapeutic exercise, therapeutic activities and home program  STG/LTGs have been met or progress has been made towards goals:  Yes (See Goal flow sheet completed today.)  Assessment of Progress: The patient's condition has potential to improve.  Self Management Plans:  Patient has been instructed in a home treatment program.    Amina continues to require the following intervention to meet STG and LTG's:  PT    Recommendations:  This patient would benefit from continued therapy.     Frequency:  1 X week, once daily  Duration:  for 8 weeks        Please refer to the daily flowsheet for treatment today, total treatment time and time spent performing 1:1 timed codes.

## 2021-08-11 ENCOUNTER — THERAPY VISIT (OUTPATIENT)
Dept: PHYSICAL THERAPY | Facility: CLINIC | Age: 61
End: 2021-08-11
Payer: COMMERCIAL

## 2021-08-11 DIAGNOSIS — M25.551 HIP PAIN, RIGHT: ICD-10-CM

## 2021-08-11 PROCEDURE — 97110 THERAPEUTIC EXERCISES: CPT | Mod: GP | Performed by: PHYSICAL THERAPIST

## 2021-08-11 PROCEDURE — 97140 MANUAL THERAPY 1/> REGIONS: CPT | Mod: GP | Performed by: PHYSICAL THERAPIST

## 2021-08-11 PROCEDURE — 97035 APP MDLTY 1+ULTRASOUND EA 15: CPT | Mod: GP | Performed by: PHYSICAL THERAPIST

## 2021-08-25 ENCOUNTER — THERAPY VISIT (OUTPATIENT)
Dept: PHYSICAL THERAPY | Facility: CLINIC | Age: 61
End: 2021-08-25
Payer: COMMERCIAL

## 2021-08-25 DIAGNOSIS — M25.551 HIP PAIN, RIGHT: ICD-10-CM

## 2021-08-25 PROCEDURE — 97140 MANUAL THERAPY 1/> REGIONS: CPT | Mod: GP | Performed by: PHYSICAL THERAPIST

## 2021-08-25 PROCEDURE — 97110 THERAPEUTIC EXERCISES: CPT | Mod: GP | Performed by: PHYSICAL THERAPIST

## 2021-08-25 PROCEDURE — 97035 APP MDLTY 1+ULTRASOUND EA 15: CPT | Mod: GP | Performed by: PHYSICAL THERAPIST

## 2021-09-01 ENCOUNTER — THERAPY VISIT (OUTPATIENT)
Dept: PHYSICAL THERAPY | Facility: CLINIC | Age: 61
End: 2021-09-01
Payer: COMMERCIAL

## 2021-09-01 DIAGNOSIS — M25.551 HIP PAIN, RIGHT: ICD-10-CM

## 2021-09-01 PROCEDURE — 97140 MANUAL THERAPY 1/> REGIONS: CPT | Mod: GP | Performed by: PHYSICAL THERAPIST

## 2021-09-01 PROCEDURE — 97035 APP MDLTY 1+ULTRASOUND EA 15: CPT | Mod: GP | Performed by: PHYSICAL THERAPIST

## 2021-09-01 PROCEDURE — 97110 THERAPEUTIC EXERCISES: CPT | Mod: GP | Performed by: PHYSICAL THERAPIST

## 2021-09-04 ENCOUNTER — HEALTH MAINTENANCE LETTER (OUTPATIENT)
Age: 61
End: 2021-09-04

## 2021-09-08 ASSESSMENT — ACTIVITIES OF DAILY LIVING (ADL)
WALKING_UP_STEEP_HILLS: NO DIFFICULTY AT ALL
LIGHT_TO_MODERATE_WORK: NO DIFFICULTY AT ALL
ROLLING_OVER_IN_BED: NO DIFFICULTY AT ALL
HOS_ADL_COUNT: 16
SITTING_FOR_15_MINUTES: NO DIFFICULTY AT ALL
TWISTING/PIVOTING_ON_INVOLVED_LEG: SLIGHT DIFFICULTY
GETTING_INTO_AND_OUT_OF_AN_AVERAGE_CAR: NO DIFFICULTY AT ALL
PUTTING_ON_SOCKS_AND_SHOES: NO DIFFICULTY AT ALL
WALKING_APPROXIMATELY_10_MINUTES: NO DIFFICULTY AT ALL
HOS_ADL_ITEM_SCORE_TOTAL: 59
HEAVY_WORK: NO DIFFICULTY AT ALL
DEEP_SQUATTING: SLIGHT DIFFICULTY
WALKING_DOWN_STEEP_HILLS: NO DIFFICULTY AT ALL
RECREATIONAL_ACTIVITIES: SLIGHT DIFFICULTY
STANDING_FOR_15_MINUTES: SLIGHT DIFFICULTY
GOING_UP_1_FLIGHT_OF_STAIRS: NO DIFFICULTY AT ALL
HOS_ADL_HIGHEST_POTENTIAL_SCORE: 64
GOING_DOWN_1_FLIGHT_OF_STAIRS: NO DIFFICULTY AT ALL
WALKING_15_MINUTES_OR_GREATER: SLIGHT DIFFICULTY
HOS_ADL_SCORE(%): 92.19
WALKING_INITIALLY: NO DIFFICULTY AT ALL
STEPPING_UP_AND_DOWN_CURBS: NO DIFFICULTY AT ALL

## 2021-09-28 ENCOUNTER — HOSPITAL ENCOUNTER (OUTPATIENT)
Dept: MAMMOGRAPHY | Facility: CLINIC | Age: 61
Discharge: HOME | End: 2021-09-28
Attending: FAMILY MEDICINE | Admitting: FAMILY MEDICINE
Payer: COMMERCIAL

## 2021-09-28 DIAGNOSIS — Z12.31 VISIT FOR SCREENING MAMMOGRAM: ICD-10-CM

## 2021-09-28 PROCEDURE — 77063 BREAST TOMOSYNTHESIS BI: CPT

## 2021-10-06 PROBLEM — M25.551 HIP PAIN, RIGHT: Status: RESOLVED | Noted: 2021-05-06 | Resolved: 2021-10-06

## 2021-10-06 NOTE — PROGRESS NOTES
Discharge Note        Amina failed to follow up and current status is unknown.  Please see information below for last relevant information on current status.  Patient seen for 4 visits.    SUBJECTIVE  Subjective changes noted by patient:  Doing better again.  Able to garden and do yardwork last weekend with minimal pain afterward.  No pain today.    .  Current pain level is  .     Previous pain level was   .   Changes in function:  Yes (See Goal flowsheet attached for changes in current functional level)  Adverse reaction to treatment or activity: None    OBJECTIVE  Changes noted in objective findings: Pelvic alignment is good.       ASSESSMENT/PLAN  Diagnosis: Right hip OA, greater troch bursitis   Updated problem list and treatment plan:   Pain - HEP  Decreased ROM/flexibility - HEP  Decreased strength - HEP  STG/LTGs have been met or progress has been made towards goals:  Yes, please see goal flowsheet for most current information  Assessment of Progress: current status is unknown.    Last current status:     Self Management Plans:  HEP  I have re-evaluated this patient and find that the nature, scope, duration and intensity of the therapy is appropriate for the medical condition of the patient.  Amina continues to require the following intervention to meet STG and LTG's:  HEP.    Recommendations:  Discharge with current home program.  Patient to follow up with MD as needed.    Please refer to the daily flowsheet for treatment today, total treatment time and time spent performing 1:1 timed codes.

## 2021-10-30 ENCOUNTER — HEALTH MAINTENANCE LETTER (OUTPATIENT)
Age: 61
End: 2021-10-30

## 2021-11-09 DIAGNOSIS — E03.9 ACQUIRED HYPOTHYROIDISM: Chronic | ICD-10-CM

## 2021-11-11 RX ORDER — LEVOTHYROXINE SODIUM 125 UG/1
TABLET ORAL
Qty: 90 TABLET | Refills: 0 | Status: SHIPPED | OUTPATIENT
Start: 2021-11-11 | End: 2022-02-09

## 2021-11-11 NOTE — TELEPHONE ENCOUNTER
"Prescription approved per [facility] Refill Protocol.  Requested Prescriptions   Pending Prescriptions Disp Refills     levothyroxine (SYNTHROID/LEVOTHROID) 125 MCG tablet 90 tablet 3       Thyroid Protocol Passed - 11/11/2021 11:40 AM        Passed - Patient is 12 years or older        Passed - Recent (12 mo) or future (30 days) visit within the authorizing provider's specialty     Patient has had an office visit with the authorizing provider or a provider within the authorizing providers department within the previous 12 mos or has a future within next 30 days. See \"Patient Info\" tab in inbasket, or \"Choose Columns\" in Meds & Orders section of the refill encounter.              Passed - Medication is active on med list        Passed - Normal TSH on file in past 12 months     Recent Labs   Lab Test 11/13/20  1220   TSH 2.83              Passed - No active pregnancy on record     If patient is pregnant or has had a positive pregnancy test, please check TSH.          Passed - No positive pregnancy test in past 12 months     If patient is pregnant or has had a positive pregnancy test, please check TSH.             Dayan Zee RN on 11/11/2021 at 11:42 AM    "

## 2022-02-03 ENCOUNTER — DOCUMENTATION ONLY (OUTPATIENT)
Dept: LAB | Facility: CLINIC | Age: 62
End: 2022-02-03
Payer: COMMERCIAL

## 2022-02-03 DIAGNOSIS — G25.81 RESTLESS LEG SYNDROME: ICD-10-CM

## 2022-02-03 DIAGNOSIS — E78.5 HYPERLIPIDEMIA LDL GOAL <100: ICD-10-CM

## 2022-02-03 DIAGNOSIS — R73.01 IMPAIRED FASTING GLUCOSE: ICD-10-CM

## 2022-02-03 DIAGNOSIS — E03.8 OTHER SPECIFIED HYPOTHYROIDISM: Primary | ICD-10-CM

## 2022-02-03 NOTE — PROGRESS NOTES
Briseida  has an upcoming lab only appt and currently has no future orders in his chart. Please place future orders as needed. Thanks!

## 2022-02-05 ENCOUNTER — LAB (OUTPATIENT)
Dept: LAB | Facility: CLINIC | Age: 62
End: 2022-02-05
Payer: COMMERCIAL

## 2022-02-05 DIAGNOSIS — G25.81 RESTLESS LEG SYNDROME: ICD-10-CM

## 2022-02-05 DIAGNOSIS — E78.5 HYPERLIPIDEMIA LDL GOAL <100: ICD-10-CM

## 2022-02-05 DIAGNOSIS — R73.01 IMPAIRED FASTING GLUCOSE: ICD-10-CM

## 2022-02-05 DIAGNOSIS — E03.8 OTHER SPECIFIED HYPOTHYROIDISM: ICD-10-CM

## 2022-02-05 LAB
ALBUMIN SERPL-MCNC: 3.6 G/DL (ref 3.4–5)
ALP SERPL-CCNC: 111 U/L (ref 40–150)
ALT SERPL W P-5'-P-CCNC: 38 U/L (ref 0–50)
ANION GAP SERPL CALCULATED.3IONS-SCNC: 1 MMOL/L (ref 3–14)
AST SERPL W P-5'-P-CCNC: 21 U/L (ref 0–45)
BILIRUB SERPL-MCNC: 0.8 MG/DL (ref 0.2–1.3)
BUN SERPL-MCNC: 14 MG/DL (ref 7–30)
CALCIUM SERPL-MCNC: 9.8 MG/DL (ref 8.5–10.1)
CHLORIDE BLD-SCNC: 106 MMOL/L (ref 94–109)
CHOLEST SERPL-MCNC: 198 MG/DL
CO2 SERPL-SCNC: 31 MMOL/L (ref 20–32)
CREAT SERPL-MCNC: 0.99 MG/DL (ref 0.52–1.04)
ERYTHROCYTE [DISTWIDTH] IN BLOOD BY AUTOMATED COUNT: 13.1 % (ref 10–15)
FASTING STATUS PATIENT QL REPORTED: NO
GFR SERPL CREATININE-BSD FRML MDRD: 64 ML/MIN/1.73M2
GLUCOSE BLD-MCNC: 112 MG/DL (ref 70–99)
HBA1C MFR BLD: 5.8 % (ref 0–5.6)
HCT VFR BLD AUTO: 44.1 % (ref 35–47)
HDLC SERPL-MCNC: 50 MG/DL
HGB BLD-MCNC: 13.9 G/DL (ref 11.7–15.7)
IRON SATN MFR SERPL: 20 % (ref 15–46)
IRON SERPL-MCNC: 69 UG/DL (ref 35–180)
LDLC SERPL CALC-MCNC: 126 MG/DL
MCH RBC QN AUTO: 29.3 PG (ref 26.5–33)
MCHC RBC AUTO-ENTMCNC: 31.5 G/DL (ref 31.5–36.5)
MCV RBC AUTO: 93 FL (ref 78–100)
NONHDLC SERPL-MCNC: 148 MG/DL
PLATELET # BLD AUTO: 282 10E3/UL (ref 150–450)
POTASSIUM BLD-SCNC: 4.7 MMOL/L (ref 3.4–5.3)
PROT SERPL-MCNC: 7.1 G/DL (ref 6.8–8.8)
RBC # BLD AUTO: 4.75 10E6/UL (ref 3.8–5.2)
SODIUM SERPL-SCNC: 138 MMOL/L (ref 133–144)
TIBC SERPL-MCNC: 340 UG/DL (ref 240–430)
TRIGL SERPL-MCNC: 110 MG/DL
TSH SERPL DL<=0.005 MIU/L-ACNC: 1.02 MU/L (ref 0.4–4)
WBC # BLD AUTO: 5.9 10E3/UL (ref 4–11)

## 2022-02-05 PROCEDURE — 36415 COLL VENOUS BLD VENIPUNCTURE: CPT

## 2022-02-05 PROCEDURE — 80053 COMPREHEN METABOLIC PANEL: CPT

## 2022-02-05 PROCEDURE — 83036 HEMOGLOBIN GLYCOSYLATED A1C: CPT

## 2022-02-05 PROCEDURE — 83550 IRON BINDING TEST: CPT

## 2022-02-05 PROCEDURE — 85027 COMPLETE CBC AUTOMATED: CPT

## 2022-02-05 PROCEDURE — 84443 ASSAY THYROID STIM HORMONE: CPT

## 2022-02-05 PROCEDURE — 80061 LIPID PANEL: CPT

## 2022-02-08 ASSESSMENT — ENCOUNTER SYMPTOMS
WEAKNESS: 1
HEMATURIA: 0
COUGH: 0
PARESTHESIAS: 0
DYSURIA: 0
ABDOMINAL PAIN: 0
DIZZINESS: 0
HEADACHES: 0
SHORTNESS OF BREATH: 1
PALPITATIONS: 0
BREAST MASS: 0
HEMATOCHEZIA: 0
JOINT SWELLING: 0
CONSTIPATION: 0
CHILLS: 0
FREQUENCY: 1
DIARRHEA: 1
ARTHRALGIAS: 0
NAUSEA: 0
FEVER: 0
HEARTBURN: 0
EYE PAIN: 0
NERVOUS/ANXIOUS: 1
SORE THROAT: 0
MYALGIAS: 0

## 2022-02-09 ENCOUNTER — OFFICE VISIT (OUTPATIENT)
Dept: FAMILY MEDICINE | Facility: CLINIC | Age: 62
End: 2022-02-09
Payer: COMMERCIAL

## 2022-02-09 VITALS
TEMPERATURE: 98 F | DIASTOLIC BLOOD PRESSURE: 72 MMHG | HEART RATE: 68 BPM | HEIGHT: 69 IN | OXYGEN SATURATION: 100 % | BODY MASS INDEX: 36.87 KG/M2 | RESPIRATION RATE: 16 BRPM | SYSTOLIC BLOOD PRESSURE: 134 MMHG | WEIGHT: 248.9 LBS

## 2022-02-09 DIAGNOSIS — N39.3 FEMALE STRESS INCONTINENCE: ICD-10-CM

## 2022-02-09 DIAGNOSIS — M54.50 CHRONIC LOW BACK PAIN WITHOUT SCIATICA, UNSPECIFIED BACK PAIN LATERALITY: ICD-10-CM

## 2022-02-09 DIAGNOSIS — R05.9 COUGH: ICD-10-CM

## 2022-02-09 DIAGNOSIS — Z00.00 ROUTINE GENERAL MEDICAL EXAMINATION AT A HEALTH CARE FACILITY: Primary | ICD-10-CM

## 2022-02-09 DIAGNOSIS — N95.1 MENOPAUSAL SYNDROME (HOT FLUSHES): ICD-10-CM

## 2022-02-09 DIAGNOSIS — R73.03 PREDIABETES: ICD-10-CM

## 2022-02-09 DIAGNOSIS — E03.9 ACQUIRED HYPOTHYROIDISM: Chronic | ICD-10-CM

## 2022-02-09 DIAGNOSIS — F33.0 MAJOR DEPRESSIVE DISORDER, RECURRENT EPISODE, MILD (H): ICD-10-CM

## 2022-02-09 DIAGNOSIS — G89.29 CHRONIC LOW BACK PAIN WITHOUT SCIATICA, UNSPECIFIED BACK PAIN LATERALITY: ICD-10-CM

## 2022-02-09 DIAGNOSIS — Z12.11 SCREEN FOR COLON CANCER: ICD-10-CM

## 2022-02-09 DIAGNOSIS — E66.01 MORBID OBESITY (H): ICD-10-CM

## 2022-02-09 DIAGNOSIS — B00.9 HERPES SIMPLEX VIRUS (HSV) INFECTION: ICD-10-CM

## 2022-02-09 DIAGNOSIS — Z12.31 ENCOUNTER FOR SCREENING MAMMOGRAM FOR BREAST CANCER: ICD-10-CM

## 2022-02-09 DIAGNOSIS — K21.9 GASTROESOPHAGEAL REFLUX DISEASE, UNSPECIFIED WHETHER ESOPHAGITIS PRESENT: Chronic | ICD-10-CM

## 2022-02-09 DIAGNOSIS — B37.9 CANDIDA INFECTION: ICD-10-CM

## 2022-02-09 LAB
ALBUMIN UR-MCNC: NEGATIVE MG/DL
APPEARANCE UR: CLEAR
BACTERIA #/AREA URNS HPF: ABNORMAL /HPF
BILIRUB UR QL STRIP: NEGATIVE
COLOR UR AUTO: ABNORMAL
GLUCOSE UR STRIP-MCNC: NEGATIVE MG/DL
HGB UR QL STRIP: NEGATIVE
KETONES UR STRIP-MCNC: NEGATIVE MG/DL
LEUKOCYTE ESTERASE UR QL STRIP: NEGATIVE
NITRATE UR QL: NEGATIVE
PH UR STRIP: 5.5 [PH] (ref 5–7)
RBC #/AREA URNS AUTO: ABNORMAL /HPF
SP GR UR STRIP: >=1.03 (ref 1–1.03)
SQUAMOUS #/AREA URNS AUTO: ABNORMAL /LPF
UROBILINOGEN UR STRIP-ACNC: 0.2 E.U./DL
WBC #/AREA URNS AUTO: ABNORMAL /HPF

## 2022-02-09 PROCEDURE — 99214 OFFICE O/P EST MOD 30 MIN: CPT | Mod: 25 | Performed by: FAMILY MEDICINE

## 2022-02-09 PROCEDURE — 99396 PREV VISIT EST AGE 40-64: CPT | Mod: 25 | Performed by: FAMILY MEDICINE

## 2022-02-09 PROCEDURE — 90750 HZV VACC RECOMBINANT IM: CPT | Performed by: FAMILY MEDICINE

## 2022-02-09 PROCEDURE — 81001 URINALYSIS AUTO W/SCOPE: CPT | Performed by: FAMILY MEDICINE

## 2022-02-09 PROCEDURE — 90471 IMMUNIZATION ADMIN: CPT | Performed by: FAMILY MEDICINE

## 2022-02-09 RX ORDER — CYCLOBENZAPRINE HCL 10 MG
10 TABLET ORAL 2 TIMES DAILY PRN
Qty: 60 TABLET | Refills: 4 | Status: SHIPPED | OUTPATIENT
Start: 2022-02-09 | End: 2023-05-16

## 2022-02-09 RX ORDER — NYSTATIN AND TRIAMCINOLONE ACETONIDE 100000; 1 [USP'U]/G; MG/G
OINTMENT TOPICAL 2 TIMES DAILY
Qty: 30 G | Refills: 0 | Status: SHIPPED | OUTPATIENT
Start: 2022-02-09 | End: 2023-05-10

## 2022-02-09 RX ORDER — LEVOTHYROXINE SODIUM 125 UG/1
TABLET ORAL
Qty: 90 TABLET | Refills: 0 | Status: CANCELLED | OUTPATIENT
Start: 2022-02-09

## 2022-02-09 RX ORDER — LEVOTHYROXINE SODIUM 125 UG/1
125 TABLET ORAL DAILY
Qty: 90 TABLET | Refills: 3 | Status: SHIPPED | OUTPATIENT
Start: 2022-02-09 | End: 2023-01-31

## 2022-02-09 RX ORDER — METFORMIN HCL 500 MG
500 TABLET, EXTENDED RELEASE 24 HR ORAL
Qty: 90 TABLET | Refills: 3 | Status: SHIPPED | OUTPATIENT
Start: 2022-02-09 | End: 2023-05-08

## 2022-02-09 RX ORDER — BUPROPION HYDROCHLORIDE 150 MG/1
150 TABLET ORAL EVERY MORNING
Qty: 90 TABLET | Refills: 3 | Status: SHIPPED | OUTPATIENT
Start: 2022-02-09 | End: 2023-05-08

## 2022-02-09 RX ORDER — TOLTERODINE 4 MG/1
4 CAPSULE, EXTENDED RELEASE ORAL DAILY
Qty: 90 CAPSULE | Refills: 3 | Status: SHIPPED | OUTPATIENT
Start: 2022-02-09 | End: 2023-01-31

## 2022-02-09 RX ORDER — ALBUTEROL SULFATE 90 UG/1
2 AEROSOL, METERED RESPIRATORY (INHALATION) EVERY 6 HOURS PRN
Qty: 18 G | Refills: 11 | Status: SHIPPED | OUTPATIENT
Start: 2022-02-09 | End: 2024-01-10

## 2022-02-09 RX ORDER — FAMCICLOVIR 500 MG/1
500 TABLET ORAL 2 TIMES DAILY PRN
Qty: 60 TABLET | Refills: 11 | Status: SHIPPED | OUTPATIENT
Start: 2022-02-09 | End: 2024-01-10

## 2022-02-09 ASSESSMENT — ENCOUNTER SYMPTOMS
ABDOMINAL PAIN: 0
MYALGIAS: 0
WEAKNESS: 1
HEMATOCHEZIA: 0
DIZZINESS: 0
DIARRHEA: 1
CHILLS: 0
HEARTBURN: 0
PARESTHESIAS: 0
BREAST MASS: 0
DYSURIA: 0
CONSTIPATION: 0
SORE THROAT: 0
PALPITATIONS: 0
FREQUENCY: 1
NERVOUS/ANXIOUS: 1
SHORTNESS OF BREATH: 1
ARTHRALGIAS: 0
COUGH: 0
HEADACHES: 0
EYE PAIN: 0
HEMATURIA: 0
JOINT SWELLING: 0
FEVER: 0
NAUSEA: 0

## 2022-02-09 ASSESSMENT — ANXIETY QUESTIONNAIRES
1. FEELING NERVOUS, ANXIOUS, OR ON EDGE: NOT AT ALL
5. BEING SO RESTLESS THAT IT IS HARD TO SIT STILL: NOT AT ALL
6. BECOMING EASILY ANNOYED OR IRRITABLE: SEVERAL DAYS
7. FEELING AFRAID AS IF SOMETHING AWFUL MIGHT HAPPEN: SEVERAL DAYS
GAD7 TOTAL SCORE: 3
3. WORRYING TOO MUCH ABOUT DIFFERENT THINGS: SEVERAL DAYS
IF YOU CHECKED OFF ANY PROBLEMS ON THIS QUESTIONNAIRE, HOW DIFFICULT HAVE THESE PROBLEMS MADE IT FOR YOU TO DO YOUR WORK, TAKE CARE OF THINGS AT HOME, OR GET ALONG WITH OTHER PEOPLE: NOT DIFFICULT AT ALL
2. NOT BEING ABLE TO STOP OR CONTROL WORRYING: NOT AT ALL

## 2022-02-09 ASSESSMENT — PATIENT HEALTH QUESTIONNAIRE - PHQ9
5. POOR APPETITE OR OVEREATING: NOT AT ALL
SUM OF ALL RESPONSES TO PHQ QUESTIONS 1-9: 5

## 2022-02-09 ASSESSMENT — MIFFLIN-ST. JEOR: SCORE: 1740.44

## 2022-02-09 ASSESSMENT — PAIN SCALES - GENERAL: PAINLEVEL: NO PAIN (0)

## 2022-02-09 NOTE — PROGRESS NOTES
SUBJECTIVE:   CC: Amina Pineda is an 61 year old woman who presents for preventive health visit.     Is Patient Fasting: Yes         Patient has been advised of split billing requirements and indicates understanding: Yes  Healthy Habits:     Getting at least 3 servings of Calcium per day:  Yes    Bi-annual eye exam:  Yes    Dental care twice a year:  Yes    Sleep apnea or symptoms of sleep apnea:  Daytime drowsiness, Excessive snoring and Sleep apnea    Diet:  Regular (no restrictions) and Breakfast skipped    Frequency of exercise:  1 day/week    Duration of exercise:  Less than 15 minutes    Taking medications regularly:  Yes    Medication side effects:  None, No muscle aches and No significant flushing    PHQ-2 Total Score: 1    Additional concerns today:  Yes (incontinence, derm problem under breast (current medication not effective) )      Premarin since hysterectomy  And stopped 6 month ago, feels fine.  mammo UTD 9/21    Under breast skin fold, maybe yeast, itchy, moist, no rash really-essential oil helped that, nystatin not helping    Amina is seen today for follow-up of her hypothyroidism,   She has been doing well, noting no tremor, insomnia, hair loss or changes in skin texture. She continues to take her meds as directed, without adverse reactions or side effects.  TSH   Date Value Ref Range Status   02/05/2022 1.02 0.40 - 4.00 mU/L Final   11/13/2020 2.83 0.40 - 4.00 mU/L Final     In the past it has happened where the TSH gets higher and gets irritable, and now her hair is thinning.   Was taking it in the morning    Hair loss-gradual loss,  In general, now feels thin.    Prediabetes-for a few yrs now, her mom is also prediabetic    Obesity,  is doing cardiac rehab, she does the cooking, not exercising much, has lost some weight. Working from home and no longer getting any activity.    Blisters in her mouth, on and off, taking famvir, she says it is not cold sore    gerd-doing well, but  needs to take prilosec daily.    RLS-taking iron sulfate OTC, not anemic    Low back pain, flexeril helps, not needing every night, if she over does it, needs to take it, standing can make it worse, or sitting to much.     Needs inhaler,  was a smoker and exposed to 2nd hand smoke, was having a problem after inhaling some pool water, at times she has tightness in the lungs, wheezing rarely. Never dx with asthma.    Due for colonoscopy, could not get it done due to a curve in colon, went to the [facility] and did barium one. 2015, it was normal, but she is over due now.        Today's PHQ-2 Score:   PHQ-2 ( 1999 Pf[employer]) 2/8/2022   Q1: Little interest or pleasure in doing things 1   Q2: Feeling down, depressed or hopeless 0   PHQ-2 Score 1   PHQ-2 Total Score (12-17 Years)- Positive if 3 or more points; Administer PHQ-A if positive -   Q1: Little interest or pleasure in doing things Several days   Q2: Feeling down, depressed or hopeless Not at all   PHQ-2 Score 1       Abuse: Current or Past (Physical, Sexual or Emotional) - No  Do you feel safe in your environment? Yes        Social History     Tobacco Use     Smoking status: Never Smoker     Smokeless tobacco: Never Used   Substance Use Topics     Alcohol use: No     If you drink alcohol do you typically have >3 drinks per day or >7 drinks per week? Not applicable    Alcohol Use 2/8/2022   Prescreen: >3 drinks/day or >7 drinks/week? No   Prescreen: >3 drinks/day or >7 drinks/week? -       Reviewed orders with patient.  Reviewed health maintenance and updated orders accordingly - Yes  BP Readings from Last 3 Encounters:   02/09/22 134/72   09/14/20 128/72   05/29/20 132/64    Wt Readings from Last 3 Encounters:   02/09/22 112.9 kg (248 lb 14.4 oz)   09/14/20 110.4 kg (243 lb 6.4 oz)   05/29/20 107.7 kg (237 lb 6.4 oz)                  Recent Labs   Lab Test 02/05/22  1006 11/13/20  1220 06/24/20  1443 05/10/20  1055 05/06/20  0943 04/24/20  0526 04/23/20  1134  04/05/19  0821 10/30/18  1601 04/17/18  0819   A1C 5.8*  --   --   --  5.7*  --   --  5.8*  --  5.7*   *  --   --   --   --   --   --  124*  --  105*   HDL 50  --   --   --   --   --   --  54  --  59   TRIG 110  --   --   --   --   --   --  87  --  107   ALT 38  --   --  31  --  21   < > 34   < >  --    CR 0.99  --   --  0.80  --  0.88   < > 0.79   < >  --    GFRESTIMATED 64  --   --  79  --  71   < > 81   < >  --    GFRESTBLACK  --   --   --  >90  --  82   < > >90   < >  --    POTASSIUM 4.7  --   --  3.7  --  3.8   < > 4.4  --   --    TSH 1.02 2.83   < >  --  0.21*  --   --  1.42  --   --     < > = values in this interval not displayed.        Breast Cancer Screening:    FHS-7:   Breast CA Risk Assessment (FHS-7) 9/28/2021 2/8/2022   Did any of your first-degree relatives have breast or ovarian cancer? No No   Did any of your relatives have bilateral breast cancer? No No   Did any man in your family have breast cancer? No No   Did any woman in your family have breast and ovarian cancer? No Yes   Did any woman in your family have breast cancer before age 50 y? No No   Do you have 2 or more relatives with breast and/or ovarian cancer? No No   Do you have 2 or more relatives with breast and/or bowel cancer? No No       Mammogram Screening: Recommended mammography every 1-2 years with patient discussion and risk factor consideration  Pertinent mammograms are reviewed under the imaging tab.    History of abnormal Pap smear: Status post benign hysterectomy. Health Maintenance and Surgical History updated.     Reviewed and updated as needed this visit by clinical staff                Reviewed and updated as needed this visit by Provider                   Review of Systems   Constitutional: Negative for chills and fever.   HENT: Negative for congestion, ear pain, hearing loss and sore throat.    Eyes: Negative for pain and visual disturbance.   Respiratory: Positive for shortness of breath. Negative for cough.     Cardiovascular: Negative for chest pain, palpitations and peripheral edema.   Gastrointestinal: Positive for diarrhea. Negative for abdominal pain, constipation, heartburn, hematochezia and nausea.   Breasts:  Negative for tenderness, breast mass and discharge.   Genitourinary: Positive for frequency, genital sores and urgency. Negative for dysuria, hematuria, pelvic pain, vaginal bleeding and vaginal discharge.   Musculoskeletal: Negative for arthralgias, joint swelling and myalgias.   Skin: Negative for rash.   Neurological: Positive for weakness. Negative for dizziness, headaches and paresthesias.   Psychiatric/Behavioral: Negative for mood changes. The patient is nervous/anxious.           OBJECTIVE:   There were no vitals taken for this visit.  Physical Exam  GENERAL: healthy, alert and no distress  EYES: Eyes grossly normal to inspection, PERRL and conjunctivae and sclerae normal  HENT: ear canals and TM's normal, nose and mouth without ulcers or lesions  NECK: no adenopathy, no asymmetry, masses, or scars and thyroid normal to palpation  RESP: lungs clear to auscultation - no rales, rhonchi or wheezes  BREAST: normal without masses, tenderness or nipple discharge and no palpable axillary masses or adenopathy  CV: regular rate and rhythm, normal S1 S2, no S3 or S4, no murmur, click or rub, no peripheral edema and peripheral pulses strong  ABDOMEN: soft, nontender, no hepatosplenomegaly, no masses and bowel sounds normal  MS: no gross musculoskeletal defects noted, no edema  SKIN: no suspicious lesions or rashes  NEURO: Normal strength and tone, mentation intact and speech normal  PSYCH: mentation appears normal, affect normal/bright    Diagnostic Test Results:  Labs reviewed in Epic    ASSESSMENT/PLAN:   (Z00.00) Routine general medical examination at a health care facility  (primary encounter diagnosis)  Comment: pt wanting shingrix  Plan: REVIEW OF HEALTH MAINTENANCE PROTOCOL ORDERS,         ZOSTER VACCINE  RECOMBINANT (Shingrix)            (E66.01) Morbid obesity (H)  Comment: working on weight loss, prediabetes will improve is able to loose weight,will add metformin, as this may help boht weight and prediabetes  Plan: metFORMIN (GLUCOPHAGE-XR) 500 MG 24 hr tablet        Potential medication side effects were discussed with the patient; let me know if any occur.      (B00.9) Herpes simplex virus (HSV) infection  Comment: refilled  Plan: famciclovir (FAMVIR) 500 MG tablet            (E03.9) Acquired hypothyroidism  Comment: refilled  Plan: levothyroxine (SYNTHROID/LEVOTHROID) 125 MCG         tablet        Labs stable    (N95.1) Menopausal syndrome (hot flushes)  Comment: resolved  Plan: off HRT    (Z12.31) Encounter for screening mammogram for breast cancer  Comment:   Plan: normal mammo in sept    (M54.50,  G89.29) Chronic low back pain without sciatica, unspecified back pain laterality  Comment: refilled, doing ok currently  Plan: cyclobenzaprine (FLEXERIL) 10 MG tablet            (R73.03) Prediabetes  Comment: diet, exercise, weight loss goals discussed  Plan: metFORMIN (GLUCOPHAGE-XR) 500 MG 24 hr tablet            (R05.9) Cough  Comment: on and off, no hx of asthma, uncertain dx, normal exam  Plan: albuterol (PROAIR HFA/PROVENTIL HFA/VENTOLIN         HFA) 108 (90 Base) MCG/ACT inhaler            (Z12.11) Screen for colon cancer  Comment: agrees  Plan: Adult Gastro Ref - Procedure Only            (K21.9) Gastroesophageal reflux disease, unspecified whether esophagitis present  Comment:   Plan: omeprazole (PRILOSEC) 20 MG DR capsule        Refilled, doing well, but needs the med    (F33.0) Major depressive disorder, recurrent episode, mild (H)  Comment: refilled, cont same  Plan: buPROPion (WELLBUTRIN XL) 150 MG 24 hr tablet            (N39.3) Female stress incontinence  Comment: will add new med.  Potential medication side effects were discussed with the patient; let me know if any occur.  Offered referral, pt  "declined for now  Plan: UA Macro with Reflex to Micro and Culture - lab        collect, tolterodine ER (DETROL LA) 4 MG 24 hr         capsule, Urine Microscopic            (B37.9) Candida infection  Comment: under breast fold  Plan: nystatin-triamcinolone (MYCOLOG) 276923-5.1         UNIT/GM-% external ointment        Do no use longer than 2 weeks      Patient has been advised of split billing requirements and indicates understanding: Yes    COUNSELING:  Reviewed preventive health counseling, as reflected in patient instructions       Regular exercise       Healthy diet/nutrition    Estimated body mass index is 36.47 kg/m  as calculated from the following:    Height as of 9/14/20: 1.74 m (5' 8.5\").    Weight as of 9/14/20: 110.4 kg (243 lb 6.4 oz).    Weight management plan: Discussed healthy diet and exercise guidelines    She reports that she has never smoked. She has never used smokeless tobacco.      Counseling Resources:  ATP IV Guidelines  Pooled Cohorts Equation Calculator  Breast Cancer Risk Calculator  BRCA-Related Cancer Risk Assessment: FHS-7 Tool  FRAX Risk Assessment  ICSI Preventive Guidelines  Dietary Guidelines for Americans, 2010  USDA's MyPlate  ASA Prophylaxis  Lung CA Screening    Damari Baltazar MD  [facility]  "

## 2022-02-09 NOTE — TELEPHONE ENCOUNTER
Patient forgot to mention that she needs refills of this ASAP during today's OV with PCP.  -Chanelle Bain

## 2022-02-10 ASSESSMENT — ANXIETY QUESTIONNAIRES: GAD7 TOTAL SCORE: 3

## 2022-04-14 ENCOUNTER — APPOINTMENT (OUTPATIENT)
Dept: GENERAL RADIOLOGY | Facility: CLINIC | Age: 62
End: 2022-04-14
Attending: EMERGENCY MEDICINE
Payer: COMMERCIAL

## 2022-04-14 ENCOUNTER — APPOINTMENT (OUTPATIENT)
Dept: MRI IMAGING | Facility: CLINIC | Age: 62
End: 2022-04-14
Attending: EMERGENCY MEDICINE
Payer: COMMERCIAL

## 2022-04-14 ENCOUNTER — HOSPITAL ENCOUNTER (EMERGENCY)
Facility: CLINIC | Age: 62
Discharge: HOME | End: 2022-04-14
Attending: EMERGENCY MEDICINE | Admitting: EMERGENCY MEDICINE
Payer: COMMERCIAL

## 2022-04-14 VITALS
WEIGHT: 248.24 LBS | HEART RATE: 75 BPM | OXYGEN SATURATION: 95 % | DIASTOLIC BLOOD PRESSURE: 78 MMHG | SYSTOLIC BLOOD PRESSURE: 141 MMHG | BODY MASS INDEX: 37.2 KG/M2 | RESPIRATION RATE: 22 BRPM | TEMPERATURE: 97.6 F

## 2022-04-14 DIAGNOSIS — R42 DIZZINESS: ICD-10-CM

## 2022-04-14 DIAGNOSIS — R07.89 BURNING CHEST PAIN: ICD-10-CM

## 2022-04-14 LAB
ALBUMIN SERPL-MCNC: 3.6 G/DL (ref 3.4–5)
ALP SERPL-CCNC: 120 U/L (ref 40–150)
ALT SERPL W P-5'-P-CCNC: 24 U/L (ref 0–50)
ANION GAP SERPL CALCULATED.3IONS-SCNC: 4 MMOL/L (ref 3–14)
AST SERPL W P-5'-P-CCNC: 15 U/L (ref 0–45)
ATRIAL RATE - MUSE: 81 BPM
BASOPHILS # BLD AUTO: 0.1 10E3/UL (ref 0–0.2)
BASOPHILS NFR BLD AUTO: 1 %
BILIRUB SERPL-MCNC: 0.7 MG/DL (ref 0.2–1.3)
BUN SERPL-MCNC: 13 MG/DL (ref 7–30)
CALCIUM SERPL-MCNC: 9.2 MG/DL (ref 8.5–10.1)
CHLORIDE BLD-SCNC: 106 MMOL/L (ref 94–109)
CO2 SERPL-SCNC: 29 MMOL/L (ref 20–32)
CREAT SERPL-MCNC: 0.75 MG/DL (ref 0.52–1.04)
DIASTOLIC BLOOD PRESSURE - MUSE: NORMAL MMHG
EOSINOPHIL # BLD AUTO: 0.2 10E3/UL (ref 0–0.7)
EOSINOPHIL NFR BLD AUTO: 3 %
ERYTHROCYTE [DISTWIDTH] IN BLOOD BY AUTOMATED COUNT: 12.9 % (ref 10–15)
GFR SERPL CREATININE-BSD FRML MDRD: 89 ML/MIN/1.73M2
GLUCOSE BLD-MCNC: 99 MG/DL (ref 70–99)
HCT VFR BLD AUTO: 42.8 % (ref 35–47)
HGB BLD-MCNC: 13.7 G/DL (ref 11.7–15.7)
IMM GRANULOCYTES # BLD: 0 10E3/UL
IMM GRANULOCYTES NFR BLD: 0 %
INTERPRETATION ECG - MUSE: NORMAL
LIPASE SERPL-CCNC: 87 U/L (ref 73–393)
LYMPHOCYTES # BLD AUTO: 1.5 10E3/UL (ref 0.8–5.3)
LYMPHOCYTES NFR BLD AUTO: 24 %
MCH RBC QN AUTO: 28.8 PG (ref 26.5–33)
MCHC RBC AUTO-ENTMCNC: 32 G/DL (ref 31.5–36.5)
MCV RBC AUTO: 90 FL (ref 78–100)
MONOCYTES # BLD AUTO: 0.5 10E3/UL (ref 0–1.3)
MONOCYTES NFR BLD AUTO: 8 %
NEUTROPHILS # BLD AUTO: 4 10E3/UL (ref 1.6–8.3)
NEUTROPHILS NFR BLD AUTO: 64 %
NRBC # BLD AUTO: 0 10E3/UL
NRBC BLD AUTO-RTO: 0 /100
P AXIS - MUSE: 34 DEGREES
PLATELET # BLD AUTO: 264 10E3/UL (ref 150–450)
POTASSIUM BLD-SCNC: 4.3 MMOL/L (ref 3.4–5.3)
PR INTERVAL - MUSE: 152 MS
PROT SERPL-MCNC: 7.2 G/DL (ref 6.8–8.8)
QRS DURATION - MUSE: 80 MS
QT - MUSE: 390 MS
QTC - MUSE: 453 MS
R AXIS - MUSE: 10 DEGREES
RBC # BLD AUTO: 4.75 10E6/UL (ref 3.8–5.2)
SODIUM SERPL-SCNC: 139 MMOL/L (ref 133–144)
SYSTOLIC BLOOD PRESSURE - MUSE: NORMAL MMHG
T AXIS - MUSE: 3 DEGREES
TROPONIN I SERPL HS-MCNC: 3 NG/L
VENTRICULAR RATE- MUSE: 81 BPM
WBC # BLD AUTO: 6.2 10E3/UL (ref 4–11)

## 2022-04-14 PROCEDURE — 93005 ELECTROCARDIOGRAM TRACING: CPT

## 2022-04-14 PROCEDURE — 70553 MRI BRAIN STEM W/O & W/DYE: CPT

## 2022-04-14 PROCEDURE — A9585 GADOBUTROL INJECTION: HCPCS | Performed by: EMERGENCY MEDICINE

## 2022-04-14 PROCEDURE — 70549 MR ANGIOGRAPH NECK W/O&W/DYE: CPT

## 2022-04-14 PROCEDURE — 84484 ASSAY OF TROPONIN QUANT: CPT | Performed by: EMERGENCY MEDICINE

## 2022-04-14 PROCEDURE — 80053 COMPREHEN METABOLIC PANEL: CPT | Performed by: EMERGENCY MEDICINE

## 2022-04-14 PROCEDURE — 83690 ASSAY OF LIPASE: CPT | Performed by: EMERGENCY MEDICINE

## 2022-04-14 PROCEDURE — 99285 EMERGENCY DEPT VISIT HI MDM: CPT | Mod: 25

## 2022-04-14 PROCEDURE — 250N000013 HC RX MED GY IP 250 OP 250 PS 637: Performed by: EMERGENCY MEDICINE

## 2022-04-14 PROCEDURE — 70544 MR ANGIOGRAPHY HEAD W/O DYE: CPT | Mod: XS

## 2022-04-14 PROCEDURE — 255N000002 HC RX 255 OP 636: Performed by: EMERGENCY MEDICINE

## 2022-04-14 PROCEDURE — 36415 COLL VENOUS BLD VENIPUNCTURE: CPT | Performed by: EMERGENCY MEDICINE

## 2022-04-14 PROCEDURE — 85025 COMPLETE CBC W/AUTO DIFF WBC: CPT | Performed by: EMERGENCY MEDICINE

## 2022-04-14 PROCEDURE — 71046 X-RAY EXAM CHEST 2 VIEWS: CPT

## 2022-04-14 RX ORDER — MECLIZINE HYDROCHLORIDE 25 MG/1
25 TABLET ORAL 3 TIMES DAILY PRN
Qty: 30 TABLET | Refills: 0 | Status: SHIPPED | OUTPATIENT
Start: 2022-04-14 | End: 2024-03-14

## 2022-04-14 RX ORDER — MAGNESIUM HYDROXIDE/ALUMINUM HYDROXICE/SIMETHICONE 120; 1200; 1200 MG/30ML; MG/30ML; MG/30ML
30 SUSPENSION ORAL ONCE
Status: COMPLETED | OUTPATIENT
Start: 2022-04-14 | End: 2022-04-14

## 2022-04-14 RX ORDER — SODIUM CHLORIDE 9 MG/ML
INJECTION, SOLUTION INTRAVENOUS CONTINUOUS PRN
Status: DISCONTINUED | OUTPATIENT
Start: 2022-04-14 | End: 2022-04-14 | Stop reason: HOSPADM

## 2022-04-14 RX ORDER — HYDRALAZINE HYDROCHLORIDE 20 MG/ML
10 INJECTION INTRAMUSCULAR; INTRAVENOUS EVERY 10 MIN PRN
Status: DISCONTINUED | OUTPATIENT
Start: 2022-04-14 | End: 2022-04-14 | Stop reason: HOSPADM

## 2022-04-14 RX ORDER — LABETALOL HYDROCHLORIDE 5 MG/ML
10 INJECTION, SOLUTION INTRAVENOUS EVERY 10 MIN PRN
Status: DISCONTINUED | OUTPATIENT
Start: 2022-04-14 | End: 2022-04-14 | Stop reason: HOSPADM

## 2022-04-14 RX ORDER — GADOBUTROL 604.72 MG/ML
10 INJECTION INTRAVENOUS ONCE
Status: COMPLETED | OUTPATIENT
Start: 2022-04-14 | End: 2022-04-14

## 2022-04-14 RX ADMIN — ALUMINUM HYDROXIDE, MAGNESIUM HYDROXIDE, AND SIMETHICONE 30 ML: 200; 200; 20 SUSPENSION ORAL at 12:08

## 2022-04-14 RX ADMIN — GADOBUTROL 10 ML: 604.72 INJECTION INTRAVENOUS at 15:09

## 2022-04-14 ASSESSMENT — ENCOUNTER SYMPTOMS
WEAKNESS: 0
FEVER: 0
ABDOMINAL PAIN: 0
HEADACHES: 0
LIGHT-HEADEDNESS: 1
SPEECH DIFFICULTY: 0
DIZZINESS: 1
NAUSEA: 0
VOMITING: 0

## 2022-04-14 NOTE — DISCHARGE INSTRUCTIONS
What do you do next:   Continue your home medications unless we have specifically changed them  Follow up as indicated below    When do you return: If you have uncontrollable chest pain, severe lightheadedness or fainting; focal numbness or weakness of your face, arms, or legs, difficulty speaking, difficulty walking, or any other symptoms that concern you, please return to the ED for reevaluation.    Thank you for allowing us to care for you today.

## 2022-04-14 NOTE — ED TRIAGE NOTES
Pt reports R neck pain, heartburn and burping x5 days. CMS intact x4. Currently no epigastric pain, SOB, HA. ABC intact.

## 2022-04-14 NOTE — ED PROVIDER NOTES
History     Chief Complaint:    Neck Pain and Heartburn      HPI   Amina Pineda is a 61 year old female who presents with chest discomfort as well as dizziness.    The patient states that about 5 days ago she began to perceive a lump or heaviness in the central lower chest.  She notes that she does take omeprazole for acid reflux and over the last couple of nights has woken up with reflux symptoms that have abated somewhat with Tums.  She has noted some burning across her chest as well.  She denies shortness of breath or nausea.  She states that this feels different than when she had her gallbladder issues.  She does note intermittent palpitations that seem to last less than a minute and have only happened a couple times over the last 5 days    Separately, the patient perceives some discomfort in her neck that is difficult to describe.  She states it does not feel like a pulled muscle but does note a sensation of slight unsteadiness.  The patient's  even states that the patient seemed uneasy and a little bit unsteady on her feet today.  She denies spinning dizziness.     She presents to the ED for evaluation of these issues.    Review of Systems   Constitutional: Negative for fever.   Cardiovascular: Positive for chest pain.   Gastrointestinal: Negative for abdominal pain, nausea and vomiting.   Neurological: Positive for dizziness (unsteadiness) and light-headedness (unsteadiness). Negative for syncope, speech difficulty, weakness and headaches.   All other systems reviewed and are negative.    Allergies:  No Known Drug Allergies    Medications:    meclizine (ANTIVERT) 25 MG tablet  omeprazole (PRILOSEC) 20 MG DR capsule  albuterol (PROAIR HFA/PROVENTIL HFA/VENTOLIN HFA) 108 (90 Base) MCG/ACT inhaler  buPROPion (WELLBUTRIN XL) 150 MG 24 hr tablet  cyclobenzaprine (FLEXERIL) 10 MG tablet  famciclovir (FAMVIR) 500 MG tablet  ferrous sulfate (FEROSUL) 325 (65 Fe) MG tablet  levothyroxine  (SYNTHROID/LEVOTHROID) 125 MCG tablet  metFORMIN (GLUCOPHAGE-XR) 500 MG 24 hr tablet  nystatin (MYCOSTATIN) 526826 UNIT/GM external powder  nystatin-triamcinolone (MYCOLOG) 825679-9.1 UNIT/GM-% external ointment  omeprazole (PRILOSEC) 20 MG DR capsule  tolterodine ER (DETROL LA) 4 MG 24 hr capsule  Urea 40 % CREA  Vitamins-Lipotropics (LIPOFLAVONOID) TABS         Past Medical History:   Past Surgical History:     Past Medical History:   Diagnosis Date     Decreased libido 11/6/2006     Depressive disorder, not elsewhere classified      Esophageal reflux      Generalized osteoarthrosis, unspecified site      Herpes simplex without mention of complication      Intramural leiomyoma of uterus      Unspecified hypothyroidism     Past Surgical History:   Procedure Laterality Date     CHOLECYSTECTOMY       COLONOSCOPY  4/24/2015     COLONOSCOPY WITH CO2 INSUFFLATION N/A 4/23/2015    Procedure: COLONOSCOPY WITH CO2 INSUFFLATION;  Surgeon: Bebeto Mortensen MD;  Location: [facility] OR     ELBOW SURGERY      Lt elbow repair.     HYSTERECTOMY, PAP NO LONGER INDICATED       LAPAROSCOPIC CHOLECYSTECTOMY N/A 4/24/2020    Procedure: CHOLECYSTECTOMY, LAPAROSCOPIC;  Surgeon: Janett Chan MD;  Location: [facility] OR     ORTHOPEDIC SURGERY       SOFT TISSUE SURGERY      cyst, both shoulders and left elbow     ZZC LIGATE FALLOPIAN TUBE       ZZC NONSPECIFIC PROCEDURE  5/02    rotator cuff surgery both shoulder     ZZC NONSPECIFIC PROCEDURE      wrist surgery for cyst x 2     ZZC VAGINAL HYSTERECTOMY  12/03    with BSO, 10-12 week size      Patient Active Problem List    Diagnosis Date Noted     Pelvic mass 03/05/2021     Priority: Medium     Ovarian remnant cyst? Right side, needs follow up from 6/20       Acquired hypothyroidism      Priority: Medium     Right ovarian cyst 05/13/2020     Priority: Medium     Herpes zoster without complication 05/13/2020     Priority: Medium     Acute calculous cholecystitis 04/23/2020     Priority:  Medium     Morbid obesity (H) 2018     Priority: Medium     Decreased libido 2018     Priority: Medium     Impaired fasting glucose 2018     Priority: Medium     Hyperlipidemia LDL goal <100 2018     Priority: Medium     Acute dacryocystitis, right 2017     Priority: Medium     ANDREW (obstructive sleep apnea) 2017     Priority: Medium     Home Sleep Apnea Testing - 3/8/17: 243 lbs 0 oz: AHI 6.7/hr. Supine AHI 19.4/hr. Oxygen Asael of 85%. Baseline 92.4%. Sp02 =< 88% for 3 minutes       Menopausal syndrome (hot flushes) 02/10/2017     Priority: Medium     Daytime somnolence 02/10/2017     Priority: Medium     Chronic low back pain, unspecified back pain laterality, with sciatica presence unspecified 02/10/2017     Priority: Medium     Mixed incontinence 02/10/2017     Priority: Medium     Ocular hypertension, right 2016     Priority: Medium     Female stress incontinence 04/10/2015     Priority: Medium     Restless leg syndrome 04/10/2015     Priority: Medium     CARDIOVASCULAR SCREENING; LDL GOAL LESS THAN 160 10/31/2010     Priority: Medium     Dysthymic disorder 2006     Priority: Medium     Started fluoxetine 10 mg daily 2013         Hypothyroidism 2004     Priority: Medium     Esophageal reflux 2004     Priority: Medium     Had an EGD in        Herpes simplex virus (HSV) infection 2004     Priority: Medium     Problem list name updated by automated process. Provider to review       Generalized osteoarthrosis, unspecified site 2004     Priority: Medium     R hip, on meds            Family History  Family History   Problem Relation Age of Onset     C.A.D. Maternal Grandfather      Coronary Artery Disease Maternal Grandfather         Heart Attack, ()     Coronary Artery Disease Father         High Cholesterol     Depression/Anxiety Father         Depression     Thyroid Disease Father         Hypo Thryoid     Retinal  detachment Father      Thyroid Disease Father         Hypo     Hypertension Mother         High Blood Pressure     Breast Cancer Mother         Has spot being watching.  Checked every 6 months     Glaucoma Mother         glc surgery     Breast Cancer Paternal Grandmother         Breast removed ()     Asthma Paternal Grandmother         Emphysema ()     Cerebrovascular Disease Paternal Grandfather         Strokes ()     Known Genetic Syndrome Daughter         Whitaker's Syndrome     Skin Cancer No family hx of         no family hx of skin cancer       Social History:  Presents to the ED with her     Physical Exam     Patient Vitals for the past 24 hrs:   BP Temp Temp src Pulse Resp SpO2 Weight   22 1700 (!) 141/78 -- -- 75 22 95 % --   22 1630 (!) 145/77 -- -- 78 11 97 % --   22 1625 (!) 145/77 -- -- 85 25 -- --   22 1500 (!) 141/73 -- -- 74 13 95 % --   22 1440 136/75 -- -- 75 13 95 % --   22 1420 (!) 140/63 -- -- 82 14 96 % --   22 1400 134/70 -- -- 77 12 95 % --   22 1340 (!) 140/71 -- -- 78 (!) 34 98 % --   22 1320 (!) 149/79 -- -- 87 22 96 % --   22 1300 137/82 -- -- 84 23 95 % --   22 1255 -- -- -- -- -- -- 112.6 kg (248 lb 3.8 oz)   22 1240 127/78 -- -- 81 30 96 % --   22 1230 132/84 -- -- 79 -- 99 % --   22 1208 -- -- -- -- -- 96 % --   22 1207 134/88 -- -- 77 -- -- --   22 1046 (!) 168/79 97.6  F (36.4  C) Temporal 88 18 98 % --       Physical Exam  Constitutional: Vital signs reviewed as above.   HENT:    Head: No external signs of trauma noted.   Eyes: Pupils are equal, round, and reactive to light.   Cardiovascular: Normal rate, regular rhythm, normal heart sounds and intact distal pulses.    Pulmonary/Chest: Effort normal and breath sounds normal. No respiratory distress. No wheezes noted.   Gastrointestinal: Soft. There is no tenderness. There is no rebound.    Musculoskeletal:   No deformities appreciated   No edema noted  Neurological:    Patient is alert and oriented to person, place, and time.    Speech is fluent, cognition is normal.   CN 2-12 intact (PERRL, EOMI, symmetric smile, equal eye squeeze and forehead raise, normal and equal sensation to bilateral forehead/cheek/chin, grossly equal hearing B/L, midline tongue protrusion with nl side-to-side movement, normal shoulder shrug).    RUE strength 5/5: , finger abd, wrist flex/ext, elbow flex/ext.    LUE strength 5/5: , finger abd, wrist flex/ext, elbow flex/ext.    RLE strength 5/5: ankle flex/ext, knee flex/ext, hip flex.    LLE strength 5/5: ankle flex/ext, knee flex/ext, hip flex.    Sensation equal in all 4 extremities.    No arm drift.     Cerebellar: Normal rapid alternating movements     ( finger-nose-finger, rapid pronation/supination, hand rolling)    Normal heel-to-shin   Skin: Skin is warm and dry.   Psychiatric: The patient appears calm      Emergency Department Course   ECG:  ECG taken at 1218, ECG read at 1218  NSR  Normal ECG     No significant change as compared to prior, dated 4/23/2020.  Rate 81 bpm. TX interval 152 ms. QRS duration 80 ms. QT/QTc 390/453 ms. P-R-T axes 34 10 3.     Imaging:  Chest XR,  PA & LAT   Final Result   IMPRESSION: Negative chest. Lungs clear.      BENTON ORTIZ MD            SYSTEM ID:  [id number]      MRA Neck (Carotids) wo & w Contrast   Final Result   IMPRESSION:     1. Widely patent arteries in the neck without evidence of dissection.   2. No significant atherosclerotic disease or stenosis of the carotid   arteries.   3. Anatomic variant of a retropharyngeal course of the right   subclavian artery.          LETTY CONTRERAS MD            SYSTEM ID:  [id number]      MRA Brain ([place]) wo Contrast   Final Result   IMPRESSION:  Normal MR angiogram of the head.           LETTY CONTRERAS MD            SYSTEM ID:  [id number]      MR Brain w/o & w Contrast   Final  Result   IMPRESSION:     1. No evidence of acute infarct, mass, hemorrhage, or herniation.   2. Mild nonspecific white matter changes likely due to chronic   microvascular ischemic disease.         LETTY CONTRERAS MD            SYSTEM ID:  [id number]          Laboratory:  Labs Ordered and Resulted from Time of ED Arrival to Time of ED Departure   COMPREHENSIVE METABOLIC PANEL - Normal       Result Value    Sodium 139      Potassium 4.3      Chloride 106      Carbon Dioxide (CO2) 29      Anion Gap 4      Urea Nitrogen 13      Creatinine 0.75      Calcium 9.2      Glucose 99      Alkaline Phosphatase 120      AST 15      ALT 24      Protein Total 7.2      Albumin 3.6      Bilirubin Total 0.7      GFR Estimate 89     LIPASE - Normal    Lipase 87     TROPONIN I - Normal    Troponin I High Sensitivity 3     GLUCOSE MONITOR NURSING POCT   CBC WITH PLATELETS AND DIFFERENTIAL    WBC Count 6.2      RBC Count 4.75      Hemoglobin 13.7      Hematocrit 42.8      MCV 90      MCH 28.8      MCHC 32.0      RDW 12.9      Platelet Count 264      % Neutrophils 64      % Lymphocytes 24      % Monocytes 8      % Eosinophils 3      % Basophils 1      % Immature Granulocytes 0      NRBCs per 100 WBC 0      Absolute Neutrophils 4.0      Absolute Lymphocytes 1.5      Absolute Monocytes 0.5      Absolute Eosinophils 0.2      Absolute Basophils 0.1      Absolute Immature Granulocytes 0.0      Absolute NRBCs 0.0         Procedures:      Emergency Department Course:           Reviewed:    I reviewed nursing notes, vitals and past history    Assessments/Consults:   ED Course as of 04/14/22 1803   Thu Apr 14, 2022   1208 Dr. Juárez' evaluation.   1638 Rechecked and updated.       Interventions:  Medications   sodium chloride 0.9% infusion (has no administration in time range)   labetalol (NORMODYNE/TRANDATE) injection 10 mg (has no administration in time range)     Or   hydrALAZINE (APRESOLINE) injection 10 mg (has no administration in time range)    niCARdipine 40 mg in 200 mL NS (CARDENE) infusion (has no administration in time range)   alum & mag hydroxide-simethicone (MAALOX) suspension 30 mL (30 mLs Oral Given 4/14/22 1208)   gadobutrol (GADAVIST) injection 10 mL (10 mLs Intravenous Given 4/14/22 1509)   sodium chloride (PF) 0.9% PF flush 60 mL (60 mLs Intravenous Given 4/14/22 1509)       Disposition:  The patient was discharged to home.    Impression & Plan      CMS Diagnoses:         Medical Decision Making:  This 61-year-old female patient presents to the ED due to chest burning as well as dizziness/unsteadiness.  Please see the HPI and exam for specifics.  The patient remained stable in the ED.  A broad differential was considered including ACS and even stroke.  Ultimately, imaging studies were ordered that, fortunately, do not demonstrate stroke or vascular occlusion.  I think it is unlikely the patient has ACS.  She describes more of a burning and notes this with an acid taste in her mouth that seems to wake her up at night.  She states she is already on omeprazole but I think it would be reasonable to increase the dose of this.    The patient also wondered if her dizziness type sensation could be due to vertigo and this is certainly possible so we will try some meclizine for home.  I think the patient should follow with her primary care clinic, increase her omeprazole dose to 40 mg daily, use meclizine as needed, and discuss changes in how she feels when she follows with primary care.    Otherwise, the patient can certainly return to the emergency department with any new or worsening symptoms.  Anticipatory guidance given to patient and  prior to discharge.      Critical Care time:  none    Covid-19  Amina Pineda was evaluated during a global COVID-19 pandemic, which necessitated consideration that the patient might be at risk for infection with the SARS-CoV-2 virus that causes COVID-19.  Applicable protocols for evaluation were  followed during the patient's care. COVID-19 was considered as part of the patient's evaluation.       Diagnosis:    ICD-10-CM    1. Burning chest pain  R07.89    2. Dizziness  R42        Discharge Medications:  Discharge Medication List as of 4/14/2022  4:55 PM      START taking these medications    Details   meclizine (ANTIVERT) 25 MG tablet Take 1 tablet (25 mg) by mouth 3 times daily as needed for dizziness, Disp-30 tablet, R-0, E-Prescribe      !! omeprazole (PRILOSEC) 20 MG DR capsule Take 2 capsules (40 mg) by mouth daily, Disp-60 capsule, R-0, E-Prescribe       !! - Potential duplicate medications found. Please discuss with provider.                 Johnny Juárez DO  04/14/22 [id number]

## 2022-05-16 ENCOUNTER — OFFICE VISIT (OUTPATIENT)
Dept: URGENT CARE | Facility: URGENT CARE | Age: 62
End: 2022-05-16
Payer: COMMERCIAL

## 2022-05-16 VITALS
DIASTOLIC BLOOD PRESSURE: 74 MMHG | SYSTOLIC BLOOD PRESSURE: 128 MMHG | BODY MASS INDEX: 37.03 KG/M2 | TEMPERATURE: 99.1 F | HEIGHT: 69 IN | WEIGHT: 250 LBS | OXYGEN SATURATION: 97 % | HEART RATE: 82 BPM | RESPIRATION RATE: 16 BRPM

## 2022-05-16 DIAGNOSIS — R30.0 DYSURIA: ICD-10-CM

## 2022-05-16 DIAGNOSIS — N30.01 ACUTE CYSTITIS WITH HEMATURIA: Primary | ICD-10-CM

## 2022-05-16 LAB
ALBUMIN UR-MCNC: 30 MG/DL
APPEARANCE UR: ABNORMAL
BACTERIA #/AREA URNS HPF: ABNORMAL /HPF
BILIRUB UR QL STRIP: NEGATIVE
COLOR UR AUTO: YELLOW
GLUCOSE UR STRIP-MCNC: NEGATIVE MG/DL
HGB UR QL STRIP: ABNORMAL
KETONES UR STRIP-MCNC: NEGATIVE MG/DL
LEUKOCYTE ESTERASE UR QL STRIP: ABNORMAL
NITRATE UR QL: NEGATIVE
PH UR STRIP: 5.5 [PH] (ref 5–7)
RBC #/AREA URNS AUTO: ABNORMAL /HPF
SP GR UR STRIP: 1.01 (ref 1–1.03)
SQUAMOUS #/AREA URNS AUTO: ABNORMAL /LPF
UROBILINOGEN UR STRIP-ACNC: 0.2 E.U./DL
WBC #/AREA URNS AUTO: >100 /HPF

## 2022-05-16 PROCEDURE — 99213 OFFICE O/P EST LOW 20 MIN: CPT | Performed by: NURSE PRACTITIONER

## 2022-05-16 PROCEDURE — 81001 URINALYSIS AUTO W/SCOPE: CPT | Performed by: NURSE PRACTITIONER

## 2022-05-16 PROCEDURE — 87086 URINE CULTURE/COLONY COUNT: CPT | Performed by: NURSE PRACTITIONER

## 2022-05-16 RX ORDER — SULFAMETHOXAZOLE/TRIMETHOPRIM 800-160 MG
1 TABLET ORAL 2 TIMES DAILY
Qty: 10 TABLET | Refills: 0 | Status: SHIPPED | OUTPATIENT
Start: 2022-05-16 | End: 2022-05-21

## 2022-05-16 NOTE — PROGRESS NOTES
Chief Complaint   Patient presents with     UTI     X 2-3 days, burning, blood, incontinence     SUBJECTIVE:  Amina Pineda is a 62 year old female who presents today for a possible UTI.   She has symptoms of dysuria, urgency, frequency, hematuria, incontinence and burning that have been going on for 3 days.  Symptom timing described as gradual onset and moderate in severity.    This patient does not have a history of urinary tract infections. She does have OAB and incontinence, was recently put on detrol.  There is no history of fever, chills, nausea or vomiting. She does have baseline low back pain.    Past Medical History:   Diagnosis Date     Decreased libido 11/6/2006     Depressive disorder, not elsewhere classified      Esophageal reflux      Generalized osteoarthrosis, unspecified site     R hip, on meds     Herpes simplex without mention of complication     oral     Intramural leiomyoma of uterus      Unspecified hypothyroidism      Current Outpatient Medications   Medication Sig Dispense Refill     sulfamethoxazole-trimethoprim (BACTRIM DS) 800-160 MG tablet Take 1 tablet by mouth 2 times daily for 5 days 10 tablet 0     albuterol (PROAIR HFA/PROVENTIL HFA/VENTOLIN HFA) 108 (90 Base) MCG/ACT inhaler Inhale 2 puffs into the lungs every 6 hours as needed for shortness of breath / dyspnea or wheezing 18 g 11     buPROPion (WELLBUTRIN XL) 150 MG 24 hr tablet Take 1 tablet (150 mg) by mouth every morning 90 tablet 3     cyclobenzaprine (FLEXERIL) 10 MG tablet Take 1 tablet (10 mg) by mouth 2 times daily as needed for muscle spasms 60 tablet 4     famciclovir (FAMVIR) 500 MG tablet Take 1 tablet (500 mg) by mouth 2 times daily as needed 60 tablet 11     ferrous sulfate (FEROSUL) 325 (65 Fe) MG tablet Take 325 mg by mouth daily (with breakfast)       levothyroxine (SYNTHROID/LEVOTHROID) 125 MCG tablet Take 1 tablet (125 mcg) by mouth daily 90 tablet 3     meclizine (ANTIVERT) 25 MG tablet Take 1 tablet (25  "mg) by mouth 3 times daily as needed for dizziness 30 tablet 0     metFORMIN (GLUCOPHAGE-XR) 500 MG 24 hr tablet Take 1 tablet (500 mg) by mouth daily (with dinner) 90 tablet 3     nystatin (MYCOSTATIN) 045812 UNIT/GM external powder Apply 30 g topically 3 times daily as needed 30 g 1     nystatin-triamcinolone (MYCOLOG) 979451-5.1 UNIT/GM-% external ointment Apply topically 2 times daily 30 g 0     omeprazole (PRILOSEC) 20 MG DR capsule Take 1 capsule (20 mg) by mouth daily 90 capsule 3     tolterodine ER (DETROL LA) 4 MG 24 hr capsule Take 1 capsule (4 mg) by mouth daily 90 capsule 3     Urea 40 % CREA Apply to the thick area on the toe nightly for 4 weeks. 28 g 3     Vitamins-Lipotropics (LIPOFLAVONOID) TABS Take 1 tablet by mouth 2 times daily       Social History     Tobacco Use     Smoking status: Never Smoker     Smokeless tobacco: Never Used   Substance Use Topics     Alcohol use: No     Allergies   Allergen Reactions     No Known Drug Allergies      Review of Systems  All systems negative except for those listed above in HPI.    OBJECTIVE:  /74 (BP Location: Right arm, Patient Position: Chair, Cuff Size: Adult Large)   Pulse 82   Temp 99.1  F (37.3  C) (Oral)   Resp 16   Ht 1.74 m (5' 8.5\")   Wt 113.4 kg (250 lb)   LMP  (LMP Unknown)   SpO2 97%   Breastfeeding No   BMI 37.46 kg/m       Physical Exam  Constitutional:       General: She is not in acute distress.     Appearance: She is well-developed. She is not diaphoretic.   Pulmonary:      Effort: Pulmonary effort is normal.   Abdominal:      General: Abdomen is flat. Bowel sounds are normal.      Palpations: Abdomen is soft.      Tenderness: There is no abdominal tenderness. There is no right CVA tenderness or left CVA tenderness.   Musculoskeletal:         General: Normal range of motion.   Skin:     General: Skin is warm and dry.      Capillary Refill: Capillary refill takes less than 2 seconds.      Coloration: Skin is not pale. "   Neurological:      Mental Status: She is alert and oriented to person, place, and time.   Psychiatric:         Mood and Affect: Mood normal.         Behavior: Behavior normal.       Results for orders placed or performed in visit on 05/16/22   UA macro with reflex to Microscopic and Culture - Clinc Collect     Status: Abnormal    Specimen: Urine, Clean Catch   Result Value Ref Range    Color Urine Yellow Colorless, Straw, Light Yellow, Yellow    Appearance Urine Slightly Cloudy (A) Clear    Glucose Urine Negative Negative mg/dL    Bilirubin Urine Negative Negative    Ketones Urine Negative Negative mg/dL    Specific Gravity Urine 1.015 1.003 - 1.035    Blood Urine Large (A) Negative    pH Urine 5.5 5.0 - 7.0    Protein Albumin Urine 30  (A) Negative mg/dL    Urobilinogen Urine 0.2 0.2, 1.0 E.U./dL    Nitrite Urine Negative Negative    Leukocyte Esterase Urine Large (A) Negative   Urine Microscopic Exam     Status: Abnormal   Result Value Ref Range    Bacteria Urine Few (A) None Seen /HPF    RBC Urine  (A) 0-2 /HPF /HPF    WBC Urine >100 (A) 0-5 /HPF /HPF    Squamous Epithelials Urine Few (A) None Seen /LPF     ASSESSMENT:    ICD-10-CM    1. Acute cystitis with hematuria  N30.01 sulfamethoxazole-trimethoprim (BACTRIM DS) 800-160 MG tablet   2. Dysuria  R30.0 UA macro with reflex to Microscopic and Culture - Clinc Collect     Urine Microscopic Exam     Urine Culture     PLAN:     Take full course of antibiotics.  Urine culture pending, we will call you only if culture shows resistance and change of antibiotic is required or if no growth to stop antibiotics and to follow-up with your primary care provider.  May use over the counter AZO pain relief or Uristat (phenazopyridine) for urinary burning but do not use for 24 hours before future urine tests.  Drink plenty of fluids. Limit caffeine and alcohol as these are bladder irritants.  May take tylenol or ibuprofen as needed for discomfort.   If you develop any  vomiting, high fevers or lower back pain, these can be signs of a kidney infection and you should be seen in urgent care or in the ER.  Prevention of future infections by drinking cranberry juice, urination after intercourse and wiping from front to back after using the toilet.  Please follow up with primary care provider if symptoms return, if you're not improving, worsening or new symptoms or for any adverse reactions to medications.     Follow up with primary care provider with any problems, questions or concerns or if symptoms worsen or fail to improve. Patient agreed to plan and verbalized understanding.    Regi Collazo, [facility]

## 2022-05-18 LAB — BACTERIA UR CULT: NORMAL

## 2022-06-03 ENCOUNTER — VIRTUAL VISIT (OUTPATIENT)
Dept: FAMILY MEDICINE | Facility: CLINIC | Age: 62
End: 2022-06-03
Payer: COMMERCIAL

## 2022-06-03 DIAGNOSIS — K21.9 GASTROESOPHAGEAL REFLUX DISEASE, UNSPECIFIED WHETHER ESOPHAGITIS PRESENT: Chronic | ICD-10-CM

## 2022-06-03 DIAGNOSIS — Z12.11 SCREEN FOR COLON CANCER: ICD-10-CM

## 2022-06-03 DIAGNOSIS — R05.9 COUGH: Primary | ICD-10-CM

## 2022-06-03 DIAGNOSIS — R73.01 IMPAIRED FASTING GLUCOSE: ICD-10-CM

## 2022-06-03 PROCEDURE — 99213 OFFICE O/P EST LOW 20 MIN: CPT | Mod: 95 | Performed by: FAMILY MEDICINE

## 2022-06-03 RX ORDER — OMEPRAZOLE 40 MG/1
40 CAPSULE, DELAYED RELEASE ORAL DAILY
Qty: 90 CAPSULE | Refills: 3 | Status: SHIPPED | OUTPATIENT
Start: 2022-06-03 | End: 2023-05-16

## 2022-06-03 RX ORDER — BENZONATATE 100 MG/1
100 CAPSULE ORAL 3 TIMES DAILY PRN
Qty: 90 CAPSULE | Refills: 0 | Status: SHIPPED | OUTPATIENT
Start: 2022-06-03 | End: 2023-05-16

## 2022-06-03 NOTE — PROGRESS NOTES
"Briseida is a 62 year old who is being evaluated via a billable telephone visit.      What phone number would you like to be contacted at? [phone]  How would you like to obtain your AVS? MyChart    Assessment & Plan     Gastroesophageal reflux disease, unspecified whether esophagitis present  Will refill, working well.  - omeprazole (PRILOSEC) 40 MG DR capsule; Take 1 capsule (40 mg) by mouth daily    Screen for colon cancer  Due , Discussed with the patient and all questioned fully answered. Diarrhea sx are on and off, recommended probiotics and daily fiber  - Adult Gastro Ref - Procedure Only; Future    Cough  Will give this a trial, if on going, let me know  - benzonatate (TESSALON) 100 MG capsule; Take 1 capsule (100 mg) by mouth 3 times daily as needed for cough    Impaired fasting glucose  Due for labs in feb, but could get this done sooner  - **A1C FUTURE 3mo; Future    Ordering of each unique test  Prescription drug management             No follow-ups on file.    Damari Baltazar MD  [facility] [place]    Rosanne Goins is a 62 year old who presents for the following health issues     History of Present Illness       Hypothyroidism:     Since last visit, patient describes the following symptoms::  Hair loss and Loose stools    Reason for visit:  Hospital follow up.    She eats 0-1 servings of fruits and vegetables daily.She consumes 1 sweetened beverage(s) daily.She exercises with enough effort to increase her heart rate 9 or less minutes per day.  She exercises with enough effort to increase her heart rate 3 or less days per week. She is missing 1 dose(s) of medications per week.  She is not taking prescribed medications regularly due to remembering to take.       ED/UC Followup:    Facility:  [facility] [place] [facility] ED  Date of visit: 4/14/22  Reason for visit: Burning chest pain, dizziness  Current Status: \"I think I got it figured out\",  Quit drinking carbonated novak, and " with the increase of Omeprazole. Has NOT had any waking at night with burning in throat.     Has had a cough for 3 weeks now, but feels like its an urge to cough. Has done Covid test at home, is negative.     If talking or take a deep breath, gets the urge to cough, so tried muscinex and did not help.  Had this once before when she inhaled water at , and ended up getting an infection ad Blainemaricarmen gave her something to help the cough urge to go away.    GERD-controlled, is on prilosec and doing well now. When she went to the ED, she thought she was having a heart attack. MRI was done and x-rays everything was good    Due for colonoscopy, has been 5 yrs, last one needed a follow up with barium enema.  Has a high fiber diet, but could try probiotics. Did have her gall bladder removed in the past.        Review of Systems   CONSTITUTIONAL: NEGATIVE for fever, chills, change in weight  ENT/MOUTH: NEGATIVE for ear, mouth and throat problems  RESP: NEGATIVE for significant cough or SOB  CV: NEGATIVE for chest pain, palpitations or peripheral edema      Objective           Vitals:  No vitals were obtained today due to virtual visit.    Physical Exam   healthy, alert and no distress  PSYCH: Alert and oriented times 3; coherent speech, normal   rate and volume, able to articulate logical thoughts, able   to abstract reason, no tangential thoughts, no hallucinations   or delusions  Her affect is normal  RESP: No cough, no audible wheezing, able to talk in full sentences  Remainder of exam unable to be completed due to telephone visits                Phone call duration: 20 minutes

## 2022-06-14 ENCOUNTER — ALLIED HEALTH/NURSE VISIT (OUTPATIENT)
Dept: FAMILY MEDICINE | Facility: CLINIC | Age: 62
End: 2022-06-14
Payer: COMMERCIAL

## 2022-06-14 DIAGNOSIS — Z23 HIGH PRIORITY FOR 2019-NCOV VACCINE: ICD-10-CM

## 2022-06-14 DIAGNOSIS — Z23 ENCOUNTER FOR IMMUNIZATION: Primary | ICD-10-CM

## 2022-06-14 PROCEDURE — 91306 COVID-19,PF,MODERNA (18+ YRS BOOSTER .25ML): CPT

## 2022-06-14 PROCEDURE — 90750 HZV VACC RECOMBINANT IM: CPT

## 2022-06-14 PROCEDURE — 99207 PR NO CHARGE NURSE ONLY: CPT

## 2022-06-14 PROCEDURE — 90471 IMMUNIZATION ADMIN: CPT

## 2022-06-14 PROCEDURE — 0064A COVID-19,PF,MODERNA (18+ YRS BOOSTER .25ML): CPT

## 2022-07-14 ENCOUNTER — HOSPITAL ENCOUNTER (OUTPATIENT)
Dept: CT IMAGING | Facility: CLINIC | Age: 62
Discharge: HOME | End: 2022-07-14
Attending: SURGERY | Admitting: SURGERY
Payer: COMMERCIAL

## 2022-07-14 ENCOUNTER — ANESTHESIA EVENT (OUTPATIENT)
Dept: SURGERY | Facility: CLINIC | Age: 62
End: 2022-07-14
Payer: COMMERCIAL

## 2022-07-14 ENCOUNTER — HOSPITAL ENCOUNTER (OUTPATIENT)
Facility: CLINIC | Age: 62
Discharge: HOME | End: 2022-07-14
Attending: SURGERY | Admitting: SURGERY
Payer: COMMERCIAL

## 2022-07-14 ENCOUNTER — ANESTHESIA (OUTPATIENT)
Dept: SURGERY | Facility: CLINIC | Age: 62
End: 2022-07-14
Payer: COMMERCIAL

## 2022-07-14 VITALS
TEMPERATURE: 97.6 F | DIASTOLIC BLOOD PRESSURE: 72 MMHG | BODY MASS INDEX: 35.44 KG/M2 | RESPIRATION RATE: 15 BRPM | WEIGHT: 239.3 LBS | HEIGHT: 69 IN | SYSTOLIC BLOOD PRESSURE: 113 MMHG | OXYGEN SATURATION: 98 % | HEART RATE: 68 BPM

## 2022-07-14 DIAGNOSIS — Z12.11 ENCOUNTER FOR SCREENING COLONOSCOPY: ICD-10-CM

## 2022-07-14 DIAGNOSIS — Z12.11 ENCOUNTER FOR SCREENING COLONOSCOPY: Primary | ICD-10-CM

## 2022-07-14 LAB — COLONOSCOPY: NORMAL

## 2022-07-14 PROCEDURE — G0121 COLON CA SCRN NOT HI RSK IND: HCPCS | Mod: 53 | Performed by: SURGERY

## 2022-07-14 PROCEDURE — 370N000017 HC ANESTHESIA TECHNICAL FEE, PER MIN: Performed by: SURGERY

## 2022-07-14 PROCEDURE — 258N000003 HC RX IP 258 OP 636: Performed by: ANESTHESIOLOGY

## 2022-07-14 PROCEDURE — 272N000001 HC OR GENERAL SUPPLY STERILE: Performed by: SURGERY

## 2022-07-14 PROCEDURE — 250N000011 HC RX IP 250 OP 636: Performed by: NURSE ANESTHETIST, CERTIFIED REGISTERED

## 2022-07-14 PROCEDURE — 74263 CT COLONOGRAPHY SCREENING: CPT

## 2022-07-14 PROCEDURE — 360N000075 HC SURGERY LEVEL 2, PER MIN: Performed by: SURGERY

## 2022-07-14 PROCEDURE — 999N000141 HC STATISTIC PRE-PROCEDURE NURSING ASSESSMENT: Performed by: SURGERY

## 2022-07-14 PROCEDURE — 710N000012 HC RECOVERY PHASE 2, PER MINUTE: Performed by: SURGERY

## 2022-07-14 RX ORDER — NALOXONE HYDROCHLORIDE 0.4 MG/ML
0.4 INJECTION, SOLUTION INTRAMUSCULAR; INTRAVENOUS; SUBCUTANEOUS
Status: CANCELLED | OUTPATIENT
Start: 2022-07-14

## 2022-07-14 RX ORDER — LIDOCAINE 40 MG/G
CREAM TOPICAL
Status: DISCONTINUED | OUTPATIENT
Start: 2022-07-14 | End: 2022-07-14 | Stop reason: HOSPADM

## 2022-07-14 RX ORDER — SODIUM CHLORIDE, SODIUM LACTATE, POTASSIUM CHLORIDE, CALCIUM CHLORIDE 600; 310; 30; 20 MG/100ML; MG/100ML; MG/100ML; MG/100ML
INJECTION, SOLUTION INTRAVENOUS CONTINUOUS
Status: DISCONTINUED | OUTPATIENT
Start: 2022-07-14 | End: 2022-07-14 | Stop reason: HOSPADM

## 2022-07-14 RX ORDER — FENTANYL CITRATE 50 UG/ML
25 INJECTION, SOLUTION INTRAMUSCULAR; INTRAVENOUS EVERY 5 MIN PRN
Status: DISCONTINUED | OUTPATIENT
Start: 2022-07-14 | End: 2022-07-14 | Stop reason: HOSPADM

## 2022-07-14 RX ORDER — ONDANSETRON 2 MG/ML
4 INJECTION INTRAMUSCULAR; INTRAVENOUS EVERY 30 MIN PRN
Status: DISCONTINUED | OUTPATIENT
Start: 2022-07-14 | End: 2022-07-14 | Stop reason: HOSPADM

## 2022-07-14 RX ORDER — NALOXONE HYDROCHLORIDE 0.4 MG/ML
0.2 INJECTION, SOLUTION INTRAMUSCULAR; INTRAVENOUS; SUBCUTANEOUS
Status: CANCELLED | OUTPATIENT
Start: 2022-07-14

## 2022-07-14 RX ORDER — ONDANSETRON 2 MG/ML
4 INJECTION INTRAMUSCULAR; INTRAVENOUS
Status: DISCONTINUED | OUTPATIENT
Start: 2022-07-14 | End: 2022-07-14 | Stop reason: HOSPADM

## 2022-07-14 RX ORDER — PROPOFOL 10 MG/ML
INJECTION, EMULSION INTRAVENOUS PRN
Status: DISCONTINUED | OUTPATIENT
Start: 2022-07-14 | End: 2022-07-14

## 2022-07-14 RX ORDER — ONDANSETRON 4 MG/1
4 TABLET, ORALLY DISINTEGRATING ORAL EVERY 6 HOURS PRN
Status: CANCELLED | OUTPATIENT
Start: 2022-07-14

## 2022-07-14 RX ORDER — PROPOFOL 10 MG/ML
INJECTION, EMULSION INTRAVENOUS CONTINUOUS PRN
Status: DISCONTINUED | OUTPATIENT
Start: 2022-07-14 | End: 2022-07-14

## 2022-07-14 RX ORDER — ONDANSETRON 2 MG/ML
4 INJECTION INTRAMUSCULAR; INTRAVENOUS EVERY 6 HOURS PRN
Status: CANCELLED | OUTPATIENT
Start: 2022-07-14

## 2022-07-14 RX ORDER — FLUMAZENIL 0.1 MG/ML
0.2 INJECTION, SOLUTION INTRAVENOUS
Status: CANCELLED | OUTPATIENT
Start: 2022-07-14 | End: 2022-07-14

## 2022-07-14 RX ORDER — ONDANSETRON 4 MG/1
4 TABLET, ORALLY DISINTEGRATING ORAL EVERY 30 MIN PRN
Status: DISCONTINUED | OUTPATIENT
Start: 2022-07-14 | End: 2022-07-14 | Stop reason: HOSPADM

## 2022-07-14 RX ORDER — LABETALOL HYDROCHLORIDE 5 MG/ML
10 INJECTION, SOLUTION INTRAVENOUS
Status: DISCONTINUED | OUTPATIENT
Start: 2022-07-14 | End: 2022-07-14 | Stop reason: HOSPADM

## 2022-07-14 RX ORDER — PROCHLORPERAZINE MALEATE 10 MG
10 TABLET ORAL EVERY 6 HOURS PRN
Status: CANCELLED | OUTPATIENT
Start: 2022-07-14

## 2022-07-14 RX ADMIN — PROPOFOL 150 MCG/KG/MIN: 10 INJECTION, EMULSION INTRAVENOUS at 10:13

## 2022-07-14 RX ADMIN — SODIUM CHLORIDE, POTASSIUM CHLORIDE, SODIUM LACTATE AND CALCIUM CHLORIDE: 600; 310; 30; 20 INJECTION, SOLUTION INTRAVENOUS at 10:08

## 2022-07-14 RX ADMIN — PROPOFOL 20 MG: 10 INJECTION, EMULSION INTRAVENOUS at 10:10

## 2022-07-14 RX ADMIN — PROPOFOL 30 MG: 10 INJECTION, EMULSION INTRAVENOUS at 10:13

## 2022-07-14 NOTE — DISCHARGE INSTRUCTIONS
[facility]ED[facility] ADULT DISCHARGE INSTRUCTIONS   SPECIAL PRECAUTIONS FOR 24 HOURS AFTER SURGERY    IT IS NOT UNUSUAL TO FEEL LIGHT-HEADED OR FAINT, UP TO 24 HOURS AFTER SURGERY OR WHILE TAKING PAIN MEDICATION.  IF YOU HAVE THESE SYMPTOMS; SIT FOR A FEW MINUTES BEFORE STANDING AND HAVE SOMEONE ASSIST YOU WHEN YOU GET UP TO WALK OR USE THE BATHROOM.    YOU SHOULD REST AND RELAX FOR THE NEXT 24 HOURS AND YOU MUST MAKE ARRANGEMENTS TO HAVE SOMEONE STAY WITH YOU FOR AT LEAST 24 HOURS AFTER YOUR DISCHARGE.  AVOID HAZARDOUS AND STRENUOUS ACTIVITIES.  DO NOT MAKE IMPORTANT DECISIONS FOR 24 HOURS.    DO NOT DRIVE ANY VEHICLE OR OPERATE MECHANICAL EQUIPMENT FOR 24 HOURS FOLLOWING THE END OF YOUR SURGERY.  EVEN THOUGH YOU MAY FEEL NORMAL, YOUR REACTIONS MAY BE AFFECTED BY THE MEDICATION YOU HAVE RECEIVED.    DO NOT DRINK ALCOHOLIC BEVERAGES FOR 24 HOURS FOLLOWING YOUR SURGERY.    DRINK CLEAR LIQUIDS (APPLE JUICE, GINGER ALE, 7-UP, BROTH, ETC.).  PROGRESS TO YOUR REGULAR DIET AS YOU FEEL ABLE.    YOU MAY HAVE A DRY MOUTH, A SORE THROAT, MUSCLES ACHES OR TROUBLE SLEEPING.  THESE SHOULD GO AWAY AFTER 24 HOURS.    CALL YOUR DOCTOR FOR ANY OF THE FOLLOWING:  SIGNS OF INFECTION (FEVER, GROWING TENDERNESS AT THE SURGERY SITE, A LARGE AMOUNT OF DRAINAGE OR BLEEDING, SEVERE PAIN, FOUL-SMELLING DRAINAGE, REDNESS OR SWELLING.    IT HAS BEEN OVER 8 TO 10 HOURS SINCE SURGERY AND YOU ARE STILL NOT ABLE TO URINATE (PASS WATER).

## 2022-07-14 NOTE — ANESTHESIA CARE TRANSFER NOTE
Patient: Amina Pineda    Procedure: Procedure(s):  COLONOSCOPY (fv)       Diagnosis: Screen for colon cancer [Z12.11]  Diagnosis Additional Information: No value filed.    Anesthesia Type:   MAC     Note:    Oropharynx: oropharynx clear of all foreign objects  Level of Consciousness: awake  Oxygen Supplementation: room air    Independent Airway: airway patency satisfactory and stable  Dentition: dentition unchanged  Vital Signs Stable: post-procedure vital signs reviewed and stable  Report to RN Given: handoff report given  Patient transferred to: Phase II    Handoff Report: Identifed the Patient, Identified the Reponsible Provider, Reviewed the pertinent medical history, Discussed the surgical course, Reviewed Intra-OP anesthesia mangement and issues during anesthesia, Set expectations for post-procedure period and Allowed opportunity for questions and acknowledgement of understanding      Vitals:  Vitals Value Taken Time   BP     Temp     Pulse     Resp     SpO2         Electronically Signed By: Dean Dennis Severson, APRN CRNA  July 14, 2022  11:15 AM

## 2022-07-14 NOTE — H&P
[facility] Anesthesia Pre-op History and Physical    Amina Pineda MRN# [id number]   Age: 62 year old YOB: 1960      Date of Surgery: 07/14/22   [facility]      Date of Exam 7/14/2022 Facility (Same day)       Primary care provider: Damari Baltazar         Chief Complaint and/or Reason for Procedure:   screening colonoscopy  Previous incomplete 5 years ago         Active problem list:     Patient Active Problem List    Diagnosis Date Noted     Pelvic mass 03/05/2021     Priority: Medium     Ovarian remnant cyst? Right side, needs follow up from 6/20       Acquired hypothyroidism      Priority: Medium     Right ovarian cyst 05/13/2020     Priority: Medium     Herpes zoster without complication 05/13/2020     Priority: Medium     Acute calculous cholecystitis 04/23/2020     Priority: Medium     Morbid obesity (H) 08/28/2018     Priority: Medium     Decreased libido 02/25/2018     Priority: Medium     Impaired fasting glucose 02/25/2018     Priority: Medium     Hyperlipidemia LDL goal <100 02/25/2018     Priority: Medium     Acute dacryocystitis, right 06/14/2017     Priority: Medium     ANDREW (obstructive sleep apnea) 03/09/2017     Priority: Medium     Home Sleep Apnea Testing - 3/8/17: 243 lbs 0 oz: AHI 6.7/hr. Supine AHI 19.4/hr. Oxygen Asael of 85%. Baseline 92.4%. Sp02 =< 88% for 3 minutes       Menopausal syndrome (hot flushes) 02/10/2017     Priority: Medium     Daytime somnolence 02/10/2017     Priority: Medium     Chronic low back pain, unspecified back pain laterality, with sciatica presence unspecified 02/10/2017     Priority: Medium     Mixed incontinence 02/10/2017     Priority: Medium     Ocular hypertension, right 09/28/2016     Priority: Medium     Female stress incontinence 04/10/2015     Priority: Medium     Restless leg syndrome 04/10/2015     Priority: Medium     CARDIOVASCULAR SCREENING; LDL GOAL LESS THAN 160 10/31/2010     Priority:  Medium     Dysthymic disorder 11/06/2006     Priority: Medium     Started fluoxetine 10 mg daily January 29, 2013         Hypothyroidism 11/05/2004     Priority: Medium     Esophageal reflux 11/05/2004     Priority: Medium     Had an EGD in 2005       Herpes simplex virus (HSV) infection 11/05/2004     Priority: Medium     Problem list name updated by automated process. Provider to review       Generalized osteoarthrosis, unspecified site 11/05/2004     Priority: Medium     R hip, on meds              Medications (include herbals and vitamins):     Current Outpatient Medications   Medication Instructions     albuterol (PROAIR HFA/PROVENTIL HFA/VENTOLIN HFA) 108 (90 Base) MCG/ACT inhaler 2 puffs, Inhalation, EVERY 6 HOURS PRN     benzonatate (TESSALON) 100 mg, Oral, 3 TIMES DAILY PRN     buPROPion (WELLBUTRIN XL) 150 mg, Oral, EVERY MORNING     cyclobenzaprine (FLEXERIL) 10 mg, Oral, 2 TIMES DAILY PRN     famciclovir (FAMVIR) 500 mg, Oral, 2 TIMES DAILY PRN     ferrous sulfate (FEROSUL) 325 mg, Oral, DAILY WITH BREAKFAST     levothyroxine (SYNTHROID/LEVOTHROID) 125 mcg, Oral, DAILY     meclizine (ANTIVERT) 25 mg, Oral, 3 TIMES DAILY PRN     metFORMIN (GLUCOPHAGE XR) 500 mg, Oral, DAILY WITH SUPPER     nystatin (MYCOSTATIN) 30 g, Topical, 3 TIMES DAILY PRN     nystatin-triamcinolone (MYCOLOG) 048795-0.1 UNIT/GM-% external ointment Topical, 2 TIMES DAILY     omeprazole (PRILOSEC) 40 mg, Oral, DAILY     tolterodine ER (DETROL LA) 4 mg, Oral, DAILY     Vitamins-Lipotropics (LIPOFLAVONOID) TABS 1 tablet, Oral, 2 TIMES DAILY     Surgical, social and family history reviewed and updated in epic    ROS negative x 10point          Allergies:      Allergies   Allergen Reactions     No Known Drug Allergies                Physical Exam:   All vitals have been reviewed  Patient Vitals for the past 8 hrs:   BP Temp Temp src Pulse Resp SpO2 Height Weight   07/14/22 0851 (!) 140/75 97.3  F (36.3  C) Temporal 82 16 96 % -- --  "  07/14/22 0843 -- -- -- -- -- -- 1.74 m (5' 8.5\") 108.5 kg (239 lb 4.8 oz)     Airway assessment:   Mallampatti classification: Class III (visualization of the soft palate and base of uvula)}     Lungs:   No increased work of breathing, good air exchange, clear to auscultation bilaterally     Cardiovascular:   regular rate and rhythm             Lab / Radiology Results:   COVID-19 negative        Anesthetic risk and/or ASA classification:   asa2  Ok to proceed without further testing    Brook Calle MD       "

## 2022-07-14 NOTE — LETTER
June 30, 2022      Amina Pineda  [address] [address] [address]        Dear Amina,     Please be aware that coverage of these services is subject to the terms and limitations of your health insurance plan.  Call member services at your health plan with any benefit or coverage questions.    Thank you for choosing [facility]. You are scheduled for the following service(s):    Date:  Thursday, July 14, 2022             Procedure:  COLONOSCOPY  Doctor:        Dr. Brook Calle  Arrival Time:  10:15 am Enter and check in at the Main Hospital Entrance  Procedure Time:  11:00 am      Location:   [facility]        Endoscopy Department, First Floor         [address] [place], [place] [address]      [phone] or [phone] (Formerly [facility], [facility]1983) to reschedule        MIRALAX -GATORADE  PREP  Colonoscopy is the most accurate test to detect colon polyps and colon cancer; and the only test where polyps can be removed. During this procedure, a doctor examines the lining of your large intestine and rectum through a flexible tube.   Transportation  You must arrange for a ride for the day of your procedure with a responsible adult. A taxi , Uber, etc, is not an option unless you are accompanied by a responsible adult. If you fail to arrange transportation with a responsible adult, your procedure will be cancelled and rescheduled.    Purchase the  following supplies at your local pharmacy:  - 2 (two) bisacodyl tablets: each tablet contains 5 mg.  (Dulcolax  laxative NOT Dulcolax  stool softener)   - 1 (one) 8.3 oz bottle of Polyethylene Glycol (PEG) 3350 Powder   (MiraLAX , Smooth LAX , ClearLAX  or equivalent)  - 64 oz Gatorade    Regular Gatorade, Gatorade G2 , Powerade , Powerade Zero  or Pedialyte  is acceptable. Red colored flavors are not allowed; all other colors (yellow, green, orange, purple and blue) are okay. It is also okay to buy two 2.12 oz  packets of powdered Gatorade that can be mixed with water to a total volume of 64 oz of liquid.  - 1 (one) 10 oz bottle of Magnesium Citrate (Red colored flavors are not allowed)  It is also okay for you to use a 0.5 oz package of powdered magnesium citrate (17 g) mixed with 10 oz of water.      PREPARATION FOR COLONOSCOPY    7 days before:    Discontinue fiber supplements and medications containing iron. This includes Metamucil  and Fibercon ; and multivitamins with iron.    3 days before:    Begin a low-fiber diet. A low-fiber diet helps making the cleanout more effective.     Examples of a low-fiber diet include (but are not limited to): white bread, white rice, pasta, crackers, fish, chicken, eggs, ground beef, creamy peanut butter, cooked/steamed/boiled vegetables, canned fruit, bananas, melons, milk, plain yogurt cheese, salad dressing and other condiments.     The following are not allowed on a low-fiber diet: seeds, nuts, popcorn, bran, whole wheat, corn, quinoa, raw fruits and vegetables, berries and dried fruit, beans and lentils.    For additional details on low-fiber diet, please refer to the table on the last page.    2 days before:    Continue the low-fiber diet.     Drink at least 8 glasses of water throughout the day.     Stop eating solid foods at 11:45 pm.    1 day before:    In the morning: begin a clear liquid diet (liquids you can see through).     Examples of a clear liquid diet include: water, clear broth or bouillon, Gatorade, Pedialyte or Powerade, carbonated and non-carbonated soft drinks (Sprite , 7-Up , ginger ale), strained fruit juices without pulp (apple, white grape, white cranberry), Jell-O  and popsicles.     The following are not allowed on a clear liquid diet: red liquids, alcoholic beverages, dairy products (milk, creamer, and yogurt), protein shakes, creamy broths, juice with pulp and chewing tobacco.    At noon: take 2 (two) bisacodyl tablets     At 4 (and no later than 6pm):  start drinking the Miralax-Gatorade preparation (8.3 oz of Miralax mixed with 64 oz of Gatorade in a large pitcher). Drink 1(one) 8 oz glass every 15 minutes thereafter, until the mixture is gone.  COLON CLEANSING TIPS: drink adequate amounts of fluids before and after your colon cleansing to prevent dehydration. Stay near a toilet because you will have diarrhea. Even if you are sitting on the toilet, continue to drink the cleansing solution every 15 minutes. If you feel nauseous or vomit, rinse your mouth with water, take a 15 to 30-minute-break and then continue drinking the solution. You will be uncomfortable until the stool has flushed from your colon (in about 2 to 4 hours). You may feel chilled.    Day of your procedure  You may take your morning medications including blood pressure medications, methadone, anti-seizure medications with sips of water 3 hours prior to your procedure or earlier. Do not take insulin, blood thinners (unless specifically told by your primary care provider) or vitamins prior to your procedure. Continue the clear liquid diet.     4 hours prior: drink 10 oz of magnesium citrate. It may be easier to drink it with a straw.    STOP consuming all liquids after that.     Do not take anything by mouth during this time.     Allow extra time to travel to your procedure as you may need to stop and use a restroom along the way.    You are ready for the procedure, if you followed all instructions and your stool is no longer formed, but clear or yellow liquid. If you are unsure whether your colon is clean, please call our office at [phone] before you leave for your appointment.    Bring the following to your procedure:  - Insurance Card/Photo ID.   - List of current medications including over-the-counter medications and supplements.   - Your rescue inhaler if you currently use one to control asthma.    Canceling or rescheduling your appointment:   If you must cancel or reschedule your  appointment, please call [phone] as soon as possible.  COLONOSCOPY PRE-PROCEDURE CHECKLIST    If you have diabetes, ask your regular doctor for diet and medication restrictions.  If you take an anticoagulant or anti-platelet medication (such as Coumadin , Lovenox , Pradaxa , Xarelto , Eliquis , etc.), please call your primary doctor for advice on holding this medication.  If you take aspirin you may continue to do so.  If you are or may be pregnant, please discuss the risks and benefits of this procedure with your doctor.    What happens during a colonoscopy?    Plan to spend up to two hours, starting at registration time, at the endoscopy center the day of your procedure. The colonoscopy takes an average of 15 to 30 minutes. Recovery time is about 30 minutes.      Before the exam:    You will change into a gown.    Your medical history and medication list will be reviewed with you, unless that has been done over the phone prior to the procedure.     A nurse will insert an intravenous (IV) line into your hand or arm.    The doctor will meet with you and will give you a consent form to sign.  During the exam:     Medicine will be given through the IV line to help you relax.     Your heart rate and oxygen levels will be monitored. If your blood pressure is low, you may be given fluids through the IV line.     The doctor will insert a flexible hollow tube, called a colonoscope, into your rectum. The scope will be advanced slowly through the large intestine (colon).    You may have a feeling of fullness or pressure.     If an abnormal tissue or a polyp is found, the doctor may remove it through the endoscope for closer examination, or biopsy. Tissue removal is painless    After the exam:           Any tissue samples removed during the exam will be sent to a lab for evaluation. It may take 5-7 working days for you to be notified of the results.     A nurse will provide you with complete discharge instructions before  you leave the endoscopy center. Be sure to ask the nurse for specific instructions if you take blood thinners such as Aspirin, Coumadin or Plavix.     The doctor will prepare a full report for you and for the physician who referred you for the procedure.     Your doctor will talk with you about the initial results of your exam.      Medication given during the exam will prohibit you from driving for the rest of the day.     Following the exam, you may resume your normal diet. Your first meal should be light, no greasy foods. Avoid alcohol until the next day.     You may resume your regular activities the day after the procedure.         LOW-FIBER DIET    Foods RECOMMENDED Foods to AVOID   Breads, Cereal, Rice and Pasta:   White bread, rolls, biscuits, croissant and ronan toast.   Waffles, Serbian toast and pancakes.   White rice, noodles, pasta, macaroni and peeled cooked potatoes.   Plain crackers and saltines.   Cooked cereals: farina, cream of rice.   Cold cereals: Puffed Rice , Rice Krispies , Corn Flakes  and Special K    Breads, Cereal, Rice and Pasta:   Breads or rolls with nuts, seeds or fruit.   Whole wheat, pumpernickel, rye breads and cornbread.   Potatoes with skin, brown or wild rice, and kasha (buckwheat).     Vegetables:   Tender cooked and canned vegetables without seeds: carrots, asparagus tips, green or wax beans, pumpkin, spinach, lima beans. Vegetables:   Raw or steamed vegetables.   Vegetables with seeds.   Sauerkraut.   Winter squash, peas, broccoli, Brussel sprouts, cabbage, onions, cauliflower, baked beans, peas and corn.   Fruits:   Strained fruit juice.   Canned fruit, except pineapple.   Ripe bananas and melon. Fruits:   Prunes and prune juice.   Raw fruits.   Dried fruits: figs, dates and raisins.   Milk/Dairy:   Milk: plain or flavored.   Yogurt, custard and ice cream.   Cheese and cottage cheese Milk/Dairy:     Meat and other proteins:   ground, well-cooked tender beef, lamb, ham, veal,  pork, fish, poultry and organ meats.   Eggs.   Peanut butter without nuts. Meat and other proteins:   Tough, fibrous meats with gristle.   Dry beans, peas and lentils.   Peanut butter with nuts.   Tofu.   Fats, Snack, Sweets, Condiments and Beverages:   Margarine, butter, oils, mayonnaise, sour cream and salad dressing, plain gravy.   Sugar, hard candy, clear jelly, honey and syrup.   Spices, cooked herbs, bouillon, broth and soups made with allowed vegetable, ketchup and mustard.   Coffee, tea and carbonated drinks.   Plain cakes, cookies and pretzels.   Gelatin, plain puddings, custard, ice cream, sherbet and popsicles. Fats, Snack, Sweets, Condiments and Beverages:   Nuts, seeds and coconut.   Jam, marmalade and preserves.   Pickles, olives, relish and horseradish.   All desserts containing nuts, seeds, dried fruit and coconut; or made from whole grains or bran.   Candy made with nuts or seeds.   Popcorn.     DIRECTIONS TO THE ENDOSCOPY DEPARTMENT    From the north ([facility])  Take [address] South, exit on [address] [address]. Get into the left hand anna, turn left (east), go one-half mile to [address] and turn left. Go north to the second stoplight, take a right on [address] and follow it to the Main Hospital entrance.    From the south ([facility] [place])  Take 35N to the [address] split and exit on [address] [address]. On [address] [address], turn left (west) to [address]. Turn right (north) on [address]. Go north to the second stoplight, take a right on [address] and follow it to the Main Hospital entrance.    From the east via 35E ([facility])  Take [address] south to [address] [address] exit. Turn right on [address] [address]. Go west to [address]. Turn right (north) on [address]. Go to the second stoplight, take a right on [address] to the Main Hospital entrance.    From the east via [address] ([facility])  Take [address] to [address].  Turn left (south) on [address] to [address], turn left (east) on [address] and follow it to the Main Hospital entrance.    From the west via [address] ([place], [place])  Take [address] east to [address]. Turn right (south) on [address] to [address], turn left (east) on [address] and follow it to the Main Hospital entrance.    Sincerely,         Endoscopy Department

## 2022-07-14 NOTE — ANESTHESIA POSTPROCEDURE EVALUATION
Patient: Amina Pineda    Procedure: Procedure(s):  COLONOSCOPY (fv)       Anesthesia Type:  MAC    Note:  Disposition: Outpatient   Postop Pain Control: Uneventful            Sign Out: Well controlled pain   PONV: No   Neuro/Psych: Uneventful            Sign Out: Acceptable/Baseline neuro status   Airway/Respiratory: Uneventful            Sign Out: Acceptable/Baseline resp. status   CV/Hemodynamics: Uneventful            Sign Out: Acceptable CV status   Other NRE: NONE   DID A NON-ROUTINE EVENT OCCUR? No           Last vitals:  Vitals Value Taken Time   /72 07/14/22 1200   Temp 97.6  F (36.4  C) 07/14/22 1200   Pulse 68 07/14/22 1200   Resp 15 07/14/22 1200   SpO2 98 % 07/14/22 1200       Electronically Signed By: Kaylen Wheeler MD  July 14, 2022  2:51 PM

## 2022-07-14 NOTE — LETTER
June 30, 2022      Amina WEBER Miriam  [id number] [facility] [id number]         Please be aware that coverage of these services is subject to the terms and limitations of your health insurance plan.  Call member services at your health plan with any benefit or coverage questions.    Thank you for choosing [facility]. You are scheduled for the following service(s):    Date:  Thursday July 14, 2022             Procedure:  COLONOSCOPY  Doctor:        Dr Calle   Arrival Time:  1015 Enter and check in at the Main Hospital Entrance  Procedure Time:  1100      Location:   [facility]        Endoscopy Department, First Floor         [address] [place], [place] [address]      [phone] or [phone] () to reschedule      MIRALAX -GATORADE  PREP  Colonoscopy is the most accurate test to detect colon polyps and colon cancer; and the only test where polyps can be removed. During this procedure, a doctor examines the lining of your large intestine and rectum through a flexible tube.   Transportation  You must arrange for a ride for the day of your procedure with a responsible adult. A taxi , Uber, etc, is not an option unless you are accompanied by a responsible adult. If you fail to arrange transportation with a responsible adult, your procedure will be cancelled and rescheduled.    Purchase the  following supplies at your local pharmacy:  - 2 (two) bisacodyl tablets: each tablet contains 5 mg.  (Dulcolax  laxative NOT Dulcolax  stool softener)   - 1 (one) 8.3 oz bottle of Polyethylene Glycol (PEG) 3350 Powder   (MiraLAX , Smooth LAX , ClearLAX  or equivalent)  - 64 oz Gatorade    Regular Gatorade, Gatorade G2 , Powerade , Powerade Zero  or Pedialyte  is acceptable. Red colored flavors are not allowed; all other colors (yellow, green, orange, purple and blue) are okay. It is also okay to buy two 2.12 oz packets of powdered Gatorade that can be  mixed with water to a total volume of 64 oz of liquid.  - 1 (one) 10 oz bottle of Magnesium Citrate (Red colored flavors are not allowed)  It is also okay for you to use a 0.5 oz package of powdered magnesium citrate (17 g) mixed with 10 oz of water.      PREPARATION FOR COLONOSCOPY    7 days before:    Discontinue fiber supplements and medications containing iron. This includes Metamucil  and Fibercon ; and multivitamins with iron.    3 days before:    Begin a low-fiber diet. A low-fiber diet helps making the cleanout more effective.     Examples of a low-fiber diet include (but are not limited to): white bread, white rice, pasta, crackers, fish, chicken, eggs, ground beef, creamy peanut butter, cooked/steamed/boiled vegetables, canned fruit, bananas, melons, milk, plain yogurt cheese, salad dressing and other condiments.     The following are not allowed on a low-fiber diet: seeds, nuts, popcorn, bran, whole wheat, corn, quinoa, raw fruits and vegetables, berries and dried fruit, beans and lentils.    For additional details on low-fiber diet, please refer to the table on the last page.    2 days before:    Continue the low-fiber diet.     Drink at least 8 glasses of water throughout the day.     Stop eating solid foods at 11:45 pm.    1 day before:    In the morning: begin a clear liquid diet (liquids you can see through).     Examples of a clear liquid diet include: water, clear broth or bouillon, Gatorade, Pedialyte or Powerade, carbonated and non-carbonated soft drinks (Sprite , 7-Up , ginger ale), strained fruit juices without pulp (apple, white grape, white cranberry), Jell-O  and popsicles.     The following are not allowed on a clear liquid diet: red liquids, alcoholic beverages, dairy products (milk, creamer, and yogurt), protein shakes, creamy broths, juice with pulp and chewing tobacco.    At noon: take 2 (two) bisacodyl tablets     At 4 (and no later than 6pm): start drinking the Miralax-Gatorade  preparation (8.3 oz of Miralax mixed with 64 oz of Gatorade in a large pitcher). Drink 1(one) 8 oz glass every 15 minutes thereafter, until the mixture is gone.    COLON CLEANSING TIPS: drink adequate amounts of fluids before and after your colon cleansing to prevent dehydration. Stay near a toilet because you will have diarrhea. Even if you are sitting on the toilet, continue to drink the cleansing solution every 15 minutes. If you feel nauseous or vomit, rinse your mouth with water, take a 15 to 30-minute-break and then continue drinking the solution. You will be uncomfortable until the stool has flushed from your colon (in about 2 to 4 hours). You may feel chilled.    Day of your procedure  You may take your morning medications including blood pressure medications, methadone, anti-seizure medications with sips of water 3 hours prior to your procedure or earlier. Do not take insulin, blood thinners (unless specifically told by your primary care provider) or vitamins prior to your procedure. Continue the clear liquid diet.       4 hours prior: drink 10 oz of magnesium citrate. It may be easier to drink it with a straw.    STOP consuming all liquids after that.     Do not take anything by mouth during this time.     Allow extra time to travel to your procedure as you may need to stop and use a restroom along the way.    You are ready for the procedure, if you followed all instructions and your stool is no longer formed, but clear or yellow liquid. If you are unsure whether your colon is clean, please call our office at [phone] before you leave for your appointment.    Bring the following to your procedure:  - Insurance Card/Photo ID.   - List of current medications including over-the-counter medications and supplements.   - Your rescue inhaler if you currently use one to control asthma.    Canceling or rescheduling your appointment:   If you must cancel or reschedule your appointment, please call [phone]  as soon as possible.      COLONOSCOPY PRE-PROCEDURE CHECKLIST    If you have diabetes, ask your regular doctor for diet and medication restrictions.  If you take an anticoagulant or anti-platelet medication (such as Coumadin , Lovenox , Pradaxa , Xarelto , Eliquis , etc.), please call your primary doctor for advice on holding this medication.  If you take aspirin you may continue to do so.  If you are or may be pregnant, please discuss the risks and benefits of this procedure with your doctor.        What happens during a colonoscopy?    Plan to spend up to two hours, starting at registration time, at the endoscopy center the day of your procedure. The colonoscopy takes an average of 15 to 30 minutes. Recovery time is about 30 minutes.      Before the exam:    You will change into a gown.    Your medical history and medication list will be reviewed with you, unless that has been done over the phone prior to the procedure.     A nurse will insert an intravenous (IV) line into your hand or arm.    The doctor will meet with you and will give you a consent form to sign.  During the exam:     Medicine will be given through the IV line to help you relax.     Your heart rate and oxygen levels will be monitored. If your blood pressure is low, you may be given fluids through the IV line.     The doctor will insert a flexible hollow tube, called a colonoscope, into your rectum. The scope will be advanced slowly through the large intestine (colon).    You may have a feeling of fullness or pressure.     If an abnormal tissue or a polyp is found, the doctor may remove it through the endoscope for closer examination, or biopsy. Tissue removal is painless    After the exam:           Any tissue samples removed during the exam will be sent to a lab for evaluation. It may take 5-7 working days for you to be notified of the results.     A nurse will provide you with complete discharge instructions before you leave the endoscopy  center. Be sure to ask the nurse for specific instructions if you take blood thinners such as Aspirin, Coumadin or Plavix.     The doctor will prepare a full report for you and for the physician who referred you for the procedure.     Your doctor will talk with you about the initial results of your exam.      Medication given during the exam will prohibit you from driving for the rest of the day.     Following the exam, you may resume your normal diet. Your first meal should be light, no greasy foods. Avoid alcohol until the next day.     You may resume your regular activities the day after the procedure.         LOW-FIBER DIET    Foods RECOMMENDED Foods to AVOID   Breads, Cereal, Rice and Pasta:   White bread, rolls, biscuits, croissant and ronan toast.   Waffles, Amharic toast and pancakes.   White rice, noodles, pasta, macaroni and peeled cooked potatoes.   Plain crackers and saltines.   Cooked cereals: farina, cream of rice.   Cold cereals: Puffed Rice , Rice Krispies , Corn Flakes  and Special K    Breads, Cereal, Rice and Pasta:   Breads or rolls with nuts, seeds or fruit.   Whole wheat, pumpernickel, rye breads and cornbread.   Potatoes with skin, brown or wild rice, and kasha (buckwheat).     Vegetables:   Tender cooked and canned vegetables without seeds: carrots, asparagus tips, green or wax beans, pumpkin, spinach, lima beans. Vegetables:   Raw or steamed vegetables.   Vegetables with seeds.   Sauerkraut.   Winter squash, peas, broccoli, Brussel sprouts, cabbage, onions, cauliflower, baked beans, peas and corn.   Fruits:   Strained fruit juice.   Canned fruit, except pineapple.   Ripe bananas and melon. Fruits:   Prunes and prune juice.   Raw fruits.   Dried fruits: figs, dates and raisins.   Milk/Dairy:   Milk: plain or flavored.   Yogurt, custard and ice cream.   Cheese and cottage cheese Milk/Dairy:     Meat and other proteins:   ground, well-cooked tender beef, lamb, ham, veal, pork, fish, poultry and  organ meats.   Eggs.   Peanut butter without nuts. Meat and other proteins:   Tough, fibrous meats with gristle.   Dry beans, peas and lentils.   Peanut butter with nuts.   Tofu.   Fats, Snack, Sweets, Condiments and Beverages:   Margarine, butter, oils, mayonnaise, sour cream and salad dressing, plain gravy.   Sugar, hard candy, clear jelly, honey and syrup.   Spices, cooked herbs, bouillon, broth and soups made with allowed vegetable, ketchup and mustard.   Coffee, tea and carbonated drinks.   Plain cakes, cookies and pretzels.   Gelatin, plain puddings, custard, ice cream, sherbet and popsicles. Fats, Snack, Sweets, Condiments and Beverages:   Nuts, seeds and coconut.   Jam, marmalade and preserves.   Pickles, olives, relish and horseradish.   All desserts containing nuts, seeds, dried fruit and coconut; or made from whole grains or bran.   Candy made with nuts or seeds.   Popcorn.         DIRECTIONS TO THE ENDOSCOPY DEPARTMENT    From the north ([facility])  Take [address] South, exit on [address] [address]. Get into the left hand anna, turn left (east), go one-half mile to [address] and turn left. Go north to the second stoplight, take a right on [address] and follow it to the Main Hospital entrance.    From the south ([facility])  Take 35N to the 35E split and exit on [address] [address]. On [address] [address], turn left (west) to [address]. Turn right (north) on [address]. Go north to the second stoplight, take a right on [address] and follow it to the Main Hospital entrance.    From the east via 35E ([facility])  Take 35E south to [address] [address] exit. Turn right on [address] [address]. Go west to [address]. Turn right (north) on [address]. Go to the second stoplight, take a right on [address] to the Main Hospital entrance.    From the east via [address] ([facility])  Take [address] to [address]. Turn left (south) on  [address] to [address], turn left (east) on [address] and follow it to the Main Hospital entrance.    From the west via [address] ([place], [place])  Take [address] east to [address]. Turn right (south) on [address] to [address], turn left (east) on [address] and follow it to the Main Hospital entrance.

## 2022-07-14 NOTE — PHARMACY-ADMISSION MEDICATION HISTORY
Medication reconciliation interview completed by pre-admitting nurse, reviewed by pharmacy. No further clarifications needed.     Prior to Admission medications    Medication Sig Last Dose Taking? Auth Provider Long Term End Date   albuterol (PROAIR HFA/PROVENTIL HFA/VENTOLIN HFA) 108 (90 Base) MCG/ACT inhaler Inhale 2 puffs into the lungs every 6 hours as needed for shortness of breath / dyspnea or wheezing  Yes Damari Baltazar MD Yes    buPROPion (WELLBUTRIN XL) 150 MG 24 hr tablet Take 1 tablet (150 mg) by mouth every morning  Yes Damari Baltazar MD Yes    cyclobenzaprine (FLEXERIL) 10 MG tablet Take 1 tablet (10 mg) by mouth 2 times daily as needed for muscle spasms  Yes Damari Baltazar MD No    famciclovir (FAMVIR) 500 MG tablet Take 1 tablet (500 mg) by mouth 2 times daily as needed  Yes Damari Baltazar MD Yes    ferrous sulfate (FEROSUL) 325 (65 Fe) MG tablet Take 325 mg by mouth daily (with breakfast)  Yes Unknown, Entered By History     levothyroxine (SYNTHROID/LEVOTHROID) 125 MCG tablet Take 1 tablet (125 mcg) by mouth daily  Yes Damari Baltazar MD Yes    meclizine (ANTIVERT) 25 MG tablet Take 1 tablet (25 mg) by mouth 3 times daily as needed for dizziness  Yes Johnny Juárez,      metFORMIN (GLUCOPHAGE-XR) 500 MG 24 hr tablet Take 1 tablet (500 mg) by mouth daily (with dinner)  Yes Damari Baltazar MD Yes    nystatin (MYCOSTATIN) 877538 UNIT/GM external powder Apply 30 g topically 3 times daily as needed  Yes Damari Baltazar MD     nystatin-triamcinolone (MYCOLOG) 424254-8.1 UNIT/GM-% external ointment Apply topically 2 times daily  Yes Damari Baltazar MD     omeprazole (PRILOSEC) 40 MG DR capsule Take 1 capsule (40 mg) by mouth daily  Yes Damari Baltazar MD     tolterodine ER (DETROL LA) 4 MG 24 hr capsule Take 1 capsule (4 mg) by mouth daily  Yes Damari Baltazar MD     Vitamins-Lipotropics (LIPOFLAVONOID) TABS Take 1  tablet by mouth 2 times daily  Yes Unknown, Entered By History     benzonatate (TESSALON) 100 MG capsule Take 1 capsule (100 mg) by mouth 3 times daily as needed for cough   Damari Baltazar MD

## 2022-07-14 NOTE — ANESTHESIA PREPROCEDURE EVALUATION
Anesthesia Pre-Procedure Evaluation    Patient: Amina Pineda   MRN: [id number] : 1960        Procedure : Procedure(s):  COLONOSCOPY (fv)          Past Medical History:   Diagnosis Date     Decreased libido 2006     Depressive disorder, not elsewhere classified      Esophageal reflux      Generalized osteoarthrosis, unspecified site     R hip, on meds     Herpes simplex without mention of complication     oral     Intramural leiomyoma of uterus      Unspecified hypothyroidism       Past Surgical History:   Procedure Laterality Date     CHOLECYSTECTOMY       COLONOSCOPY  2015     COLONOSCOPY WITH CO2 INSUFFLATION N/A 2015    Procedure: COLONOSCOPY WITH CO2 INSUFFLATION;  Surgeon: Bebeto Mortensen MD;  Location: [facility] OR     ELBOW SURGERY      Lt elbow repair.     HYSTERECTOMY, PAP NO LONGER INDICATED       LAPAROSCOPIC CHOLECYSTECTOMY N/A 2020    Procedure: CHOLECYSTECTOMY, LAPAROSCOPIC;  Surgeon: Janett Chan MD;  Location: [facility] OR     ORTHOPEDIC SURGERY       SOFT TISSUE SURGERY      cyst, both shoulders and left elbow     ZZC LIGATE FALLOPIAN TUBE       ZZC NONSPECIFIC PROCEDURE      rotator cuff surgery both shoulder     ZZC NONSPECIFIC PROCEDURE      wrist surgery for cyst x 2     ZZC VAGINAL HYSTERECTOMY      with BSO, 10-12 week size      Allergies   Allergen Reactions     No Known Drug Allergies       Social History     Tobacco Use     Smoking status: Never Smoker     Smokeless tobacco: Never Used   Substance Use Topics     Alcohol use: No      Wt Readings from Last 1 Encounters:   22 108.5 kg (239 lb 4.8 oz)        Anesthesia Evaluation   Pt has had prior anesthetic. Type: General.    No history of anesthetic complications       ROS/MED HX  ENT/Pulmonary:     (+) sleep apnea, recent URI, resolved, cold symptoms in May with mild residual non-productive cough, resolved 1 week ago:     Neurologic:    (-) no CVA   Cardiovascular:    (-) GARCIA and irregular  heartbeat/palpitations   METS/Exercise Tolerance:     Hematologic:       Musculoskeletal:       GI/Hepatic:     (+) GERD, Asymptomatic on medication,     Renal/Genitourinary:       Endo:     (+) type II DM, Not using insulin, thyroid problem, hypothyroidism, Obesity,     Psychiatric/Substance Use:       Infectious Disease:       Malignancy:       Other:            Physical Exam    Airway        Mallampati: II   TM distance: > 3 FB   Neck ROM: full   Mouth opening: > 3 cm    Respiratory Devices and Support         Dental       (+) partials      Cardiovascular   cardiovascular exam normal          Pulmonary   pulmonary exam normal                OUTSIDE LABS:  CBC:   Lab Results   Component Value Date    WBC 6.2 04/14/2022    WBC 5.9 02/05/2022    HGB 13.7 04/14/2022    HGB 13.9 02/05/2022    HCT 42.8 04/14/2022    HCT 44.1 02/05/2022     04/14/2022     02/05/2022     BMP:   Lab Results   Component Value Date     04/14/2022     02/05/2022    POTASSIUM 4.3 04/14/2022    POTASSIUM 4.7 02/05/2022    CHLORIDE 106 04/14/2022    CHLORIDE 106 02/05/2022    CO2 29 04/14/2022    CO2 31 02/05/2022    BUN 13 04/14/2022    BUN 14 02/05/2022    CR 0.75 04/14/2022    CR 0.99 02/05/2022    GLC 99 04/14/2022     (H) 02/05/2022     COAGS: No results found for: PTT, INR, FIBR  POC: No results found for: BGM, HCG, HCGS  HEPATIC:   Lab Results   Component Value Date    ALBUMIN 3.6 04/14/2022    PROTTOTAL 7.2 04/14/2022    ALT 24 04/14/2022    AST 15 04/14/2022    ALKPHOS 120 04/14/2022    BILITOTAL 0.7 04/14/2022     OTHER:   Lab Results   Component Value Date    A1C 5.8 (H) 02/05/2022    JOSE 9.2 04/14/2022    MAG 2.0 04/05/2019    LIPASE 87 04/14/2022    TSH 1.02 02/05/2022    T4 1.02 09/14/2020    SED 10 12/21/2011       Anesthesia Plan    ASA Status:  2   NPO Status:  NPO Appropriate    Anesthesia Type: MAC.     - Reason for MAC: straight local not clinically adequate               Consents    Anesthesia Plan(s) and associated risks, benefits, and realistic alternatives discussed. Questions answered and patient/representative(s) expressed understanding.    - Discussed:     - Discussed with:  Spouse, Patient      - Extended Intubation/Ventilatory Support Discussed: Yes.      - Patient is DNR/DNI Status: No    Use of blood products discussed: No .     Postoperative Care    Pain management: IV analgesics, Oral pain medications, Multi-modal analgesia.   PONV prophylaxis: Ondansetron (or other 5HT-3)     Comments:                Kaylen Wheeler MD

## 2022-10-16 ENCOUNTER — HEALTH MAINTENANCE LETTER (OUTPATIENT)
Age: 62
End: 2022-10-16

## 2022-12-15 NOTE — NURSING NOTE
Dermatology Rooming Note    Amina Pineda's goals for this visit include:   Chief Complaint   Patient presents with     RECHECK     Recheck onychomycosis  - patient states that her toenails are better       Is a scribe okay for this visit: YES    Are records needed for this visit(If yes, obtain release of information): NO     Vitals: There were no vitals taken for this visit.    Referring Provider:  No referring provider defined for this encounter.    Seema Sanchez, CMA      
yes

## 2023-01-31 DIAGNOSIS — E03.9 ACQUIRED HYPOTHYROIDISM: Chronic | ICD-10-CM

## 2023-01-31 DIAGNOSIS — N39.3 FEMALE STRESS INCONTINENCE: ICD-10-CM

## 2023-01-31 RX ORDER — TOLTERODINE 4 MG/1
4 CAPSULE, EXTENDED RELEASE ORAL DAILY
Qty: 90 CAPSULE | Refills: 0 | Status: SHIPPED | OUTPATIENT
Start: 2023-01-31 | End: 2023-05-16

## 2023-01-31 RX ORDER — LEVOTHYROXINE SODIUM 125 UG/1
TABLET ORAL
Qty: 90 TABLET | Refills: 0 | Status: SHIPPED | OUTPATIENT
Start: 2023-01-31 | End: 2023-05-08

## 2023-01-31 NOTE — TELEPHONE ENCOUNTER
Prescription approved per [facility] Refill Protocol.    Routing to MA/ to assist with updating needed screenings and/or scheduling appointment.   -Pt due for labs Feb 2023 and office visit June 2023    Candice SOLORIO RN  [facility]

## 2023-02-06 ENCOUNTER — DOCUMENTATION ONLY (OUTPATIENT)
Dept: OTHER | Facility: CLINIC | Age: 63
End: 2023-02-06
Payer: COMMERCIAL

## 2023-02-09 ENCOUNTER — DOCUMENTATION ONLY (OUTPATIENT)
Dept: LAB | Facility: CLINIC | Age: 63
End: 2023-02-09
Payer: COMMERCIAL

## 2023-02-10 NOTE — PROGRESS NOTES
I see a future lab for A1C in her chart?  Perhaps that is it.  Otherwise she is due for a physical and would have to see one of us first.    Jesus

## 2023-02-16 ENCOUNTER — TELEPHONE (OUTPATIENT)
Dept: FAMILY MEDICINE | Facility: CLINIC | Age: 63
End: 2023-02-16

## 2023-02-16 ENCOUNTER — LAB (OUTPATIENT)
Dept: LAB | Facility: CLINIC | Age: 63
End: 2023-02-16
Payer: COMMERCIAL

## 2023-02-16 DIAGNOSIS — R73.01 IMPAIRED FASTING GLUCOSE: ICD-10-CM

## 2023-02-16 DIAGNOSIS — E03.9 ACQUIRED HYPOTHYROIDISM: ICD-10-CM

## 2023-02-16 DIAGNOSIS — E03.9 ACQUIRED HYPOTHYROIDISM: Primary | Chronic | ICD-10-CM

## 2023-02-16 LAB
HBA1C MFR BLD: 5.9 % (ref 0–5.6)
HOLD SPECIMEN: NORMAL

## 2023-02-16 PROCEDURE — 84439 ASSAY OF FREE THYROXINE: CPT

## 2023-02-16 PROCEDURE — 36415 COLL VENOUS BLD VENIPUNCTURE: CPT

## 2023-02-16 PROCEDURE — 84443 ASSAY THYROID STIM HORMONE: CPT

## 2023-02-16 PROCEDURE — 83036 HEMOGLOBIN GLYCOSYLATED A1C: CPT

## 2023-02-16 NOTE — TELEPHONE ENCOUNTER
Patient due for TSH for further refills of levothyroxine. Is at [place] lab. No order for TSH.    Lab srinivasa blood for TSH. Please ok on order.    Routed to POD to approve lab order.     Cherelle Levine RN

## 2023-02-17 LAB
T4 FREE SERPL-MCNC: 1.79 NG/DL (ref 0.9–1.7)
TSH SERPL DL<=0.005 MIU/L-ACNC: 0.28 UIU/ML (ref 0.3–4.2)

## 2023-02-22 DIAGNOSIS — E03.9 ACQUIRED HYPOTHYROIDISM: Primary | ICD-10-CM

## 2023-03-15 ENCOUNTER — LAB (OUTPATIENT)
Dept: LAB | Facility: CLINIC | Age: 63
End: 2023-03-15
Payer: COMMERCIAL

## 2023-03-15 DIAGNOSIS — E03.9 ACQUIRED HYPOTHYROIDISM: ICD-10-CM

## 2023-03-15 LAB — TSH SERPL DL<=0.005 MIU/L-ACNC: 0.35 UIU/ML (ref 0.3–4.2)

## 2023-03-15 PROCEDURE — 36415 COLL VENOUS BLD VENIPUNCTURE: CPT

## 2023-03-15 PROCEDURE — 84443 ASSAY THYROID STIM HORMONE: CPT

## 2023-04-01 ENCOUNTER — HEALTH MAINTENANCE LETTER (OUTPATIENT)
Age: 63
End: 2023-04-01

## 2023-05-05 ENCOUNTER — MYC REFILL (OUTPATIENT)
Dept: FAMILY MEDICINE | Facility: CLINIC | Age: 63
End: 2023-05-05
Payer: COMMERCIAL

## 2023-05-05 DIAGNOSIS — B37.9 CANDIDA INFECTION: ICD-10-CM

## 2023-05-05 DIAGNOSIS — N39.3 FEMALE STRESS INCONTINENCE: ICD-10-CM

## 2023-05-05 DIAGNOSIS — E03.9 ACQUIRED HYPOTHYROIDISM: Chronic | ICD-10-CM

## 2023-05-05 RX ORDER — NYSTATIN AND TRIAMCINOLONE ACETONIDE 100000; 1 [USP'U]/G; MG/G
OINTMENT TOPICAL 2 TIMES DAILY
Qty: 30 G | Refills: 0 | Status: CANCELLED | OUTPATIENT
Start: 2023-05-05

## 2023-05-05 RX ORDER — TOLTERODINE 4 MG/1
4 CAPSULE, EXTENDED RELEASE ORAL DAILY
Qty: 90 CAPSULE | Refills: 0 | Status: CANCELLED | OUTPATIENT
Start: 2023-05-05

## 2023-05-05 RX ORDER — LEVOTHYROXINE SODIUM 125 UG/1
125 TABLET ORAL DAILY
Qty: 90 TABLET | Refills: 0 | Status: CANCELLED | OUTPATIENT
Start: 2023-05-05

## 2023-05-07 ENCOUNTER — MYC MEDICAL ADVICE (OUTPATIENT)
Dept: FAMILY MEDICINE | Facility: CLINIC | Age: 63
End: 2023-05-07
Payer: COMMERCIAL

## 2023-05-07 DIAGNOSIS — E66.01 MORBID OBESITY (H): ICD-10-CM

## 2023-05-07 DIAGNOSIS — E03.9 ACQUIRED HYPOTHYROIDISM: Chronic | ICD-10-CM

## 2023-05-07 DIAGNOSIS — R73.03 PREDIABETES: ICD-10-CM

## 2023-05-07 DIAGNOSIS — B37.9 CANDIDA INFECTION: ICD-10-CM

## 2023-05-07 DIAGNOSIS — F33.0 MAJOR DEPRESSIVE DISORDER, RECURRENT EPISODE, MILD (H): ICD-10-CM

## 2023-05-08 RX ORDER — NYSTATIN AND TRIAMCINOLONE ACETONIDE 100000; 1 [USP'U]/G; MG/G
OINTMENT TOPICAL 2 TIMES DAILY
Qty: 30 G | Refills: 0 | Status: CANCELLED | OUTPATIENT
Start: 2023-05-08

## 2023-05-08 RX ORDER — LEVOTHYROXINE SODIUM 125 UG/1
125 TABLET ORAL DAILY
Qty: 90 TABLET | Refills: 3 | Status: SHIPPED | OUTPATIENT
Start: 2023-05-08 | End: 2024-03-14

## 2023-05-08 RX ORDER — METFORMIN HCL 500 MG
500 TABLET, EXTENDED RELEASE 24 HR ORAL
Qty: 90 TABLET | Refills: 0 | Status: SHIPPED | OUTPATIENT
Start: 2023-05-08 | End: 2023-05-16

## 2023-05-08 RX ORDER — BUPROPION HYDROCHLORIDE 150 MG/1
150 TABLET ORAL EVERY MORNING
Qty: 90 TABLET | Refills: 0 | Status: SHIPPED | OUTPATIENT
Start: 2023-05-08 | End: 2023-05-16

## 2023-05-08 NOTE — TELEPHONE ENCOUNTER
See Paulino rodgers    Routing refill request to provider for review/approval because:  A break in medication-last used 2021  Pt overdue for annual visit, please advise    Last office visit: 2/9/2022 ; last virtual visit: 6/3/2022 with prescribing provider:  Cough, reflex   Future Office Visit:    Anastasiia Melchor RN, BSN  [facility]

## 2023-05-08 NOTE — TELEPHONE ENCOUNTER
Will forward to the refill pool - bupropion, metformin, levothyroxine and nystatin-triamcinolone high priority.      Pt due for an appt for a physical.  Was advised via [employer].

## 2023-05-08 NOTE — TELEPHONE ENCOUNTER
Duplicate, sent another response, see other encounter  Anastasiia Melchor, RN, BSN  [facility]

## 2023-05-08 NOTE — TELEPHONE ENCOUNTER
See mychart message, pt overdue for annual visit, refills extended  .Prescription approved per [facility] Refill Protocol.  Anastasiia Melchor RN, BSN  [facility]

## 2023-05-09 ENCOUNTER — E-VISIT (OUTPATIENT)
Dept: FAMILY MEDICINE | Facility: CLINIC | Age: 63
End: 2023-05-09
Payer: COMMERCIAL

## 2023-05-09 DIAGNOSIS — R39.81 FUNCTIONAL URINARY INCONTINENCE: Primary | ICD-10-CM

## 2023-05-09 PROCEDURE — 99421 OL DIG E/M SVC 5-10 MIN: CPT | Performed by: FAMILY MEDICINE

## 2023-05-09 RX ORDER — OXYBUTYNIN CHLORIDE 10 MG/1
10 TABLET, EXTENDED RELEASE ORAL DAILY
Qty: 90 TABLET | Refills: 3 | Status: SHIPPED | OUTPATIENT
Start: 2023-05-09 | End: 2024-01-10

## 2023-05-09 ASSESSMENT — ENCOUNTER SYMPTOMS
FEVER: 0
FREQUENCY: 0
SORE THROAT: 0
MYALGIAS: 1
SHORTNESS OF BREATH: 1
HEARTBURN: 0
JOINT SWELLING: 0
DYSURIA: 0
NERVOUS/ANXIOUS: 0
BREAST MASS: 0
CONSTIPATION: 0
HEMATOCHEZIA: 0
WEAKNESS: 0
HEADACHES: 0
NAUSEA: 0
ARTHRALGIAS: 0
EYE PAIN: 0
DIZZINESS: 0
ABDOMINAL PAIN: 0
CHILLS: 0
PARESTHESIAS: 0
COUGH: 0
PALPITATIONS: 0
HEMATURIA: 0
DIARRHEA: 1

## 2023-05-09 ASSESSMENT — ACTIVITIES OF DAILY LIVING (ADL): CURRENT_FUNCTION: NO ASSISTANCE NEEDED

## 2023-05-09 NOTE — PATIENT INSTRUCTIONS
Thank you for choosing us for your care. I have placed an order for a prescription so that you can start treatment. View your full visit summary for details by clicking on the link below. Your pharmacist will able to address any questions you may have about the medication.     If you're not feeling better within 5-7 days, please schedule an appointment.  You can schedule an appointment right here in [facility], or call [phone]  If the visit is for the same symptoms as your eVisit, we'll refund the cost of your eVisit if seen within seven days.

## 2023-05-10 RX ORDER — NYSTATIN AND TRIAMCINOLONE ACETONIDE 100000; 1 [USP'U]/G; MG/G
OINTMENT TOPICAL 2 TIMES DAILY
Qty: 30 G | Refills: 0 | Status: SHIPPED | OUTPATIENT
Start: 2023-05-10 | End: 2023-05-23

## 2023-05-16 ENCOUNTER — OFFICE VISIT (OUTPATIENT)
Dept: FAMILY MEDICINE | Facility: CLINIC | Age: 63
End: 2023-05-16
Payer: COMMERCIAL

## 2023-05-16 VITALS
SYSTOLIC BLOOD PRESSURE: 129 MMHG | WEIGHT: 240.8 LBS | BODY MASS INDEX: 35.66 KG/M2 | TEMPERATURE: 98 F | HEIGHT: 69 IN | HEART RATE: 66 BPM | OXYGEN SATURATION: 96 % | DIASTOLIC BLOOD PRESSURE: 79 MMHG

## 2023-05-16 DIAGNOSIS — G89.29 CHRONIC LOW BACK PAIN WITHOUT SCIATICA, UNSPECIFIED BACK PAIN LATERALITY: ICD-10-CM

## 2023-05-16 DIAGNOSIS — Z98.890 HISTORY OF COLONOSCOPY: ICD-10-CM

## 2023-05-16 DIAGNOSIS — K21.9 GASTROESOPHAGEAL REFLUX DISEASE, UNSPECIFIED WHETHER ESOPHAGITIS PRESENT: Chronic | ICD-10-CM

## 2023-05-16 DIAGNOSIS — E66.01 MORBID OBESITY (H): ICD-10-CM

## 2023-05-16 DIAGNOSIS — R73.03 PREDIABETES: ICD-10-CM

## 2023-05-16 DIAGNOSIS — E03.9 ACQUIRED HYPOTHYROIDISM: Chronic | ICD-10-CM

## 2023-05-16 DIAGNOSIS — Z00.00 ENCOUNTER FOR MEDICARE ANNUAL WELLNESS EXAM: Primary | ICD-10-CM

## 2023-05-16 DIAGNOSIS — Z12.31 ENCOUNTER FOR SCREENING MAMMOGRAM FOR BREAST CANCER: ICD-10-CM

## 2023-05-16 DIAGNOSIS — Z78.0 POST-MENOPAUSAL: ICD-10-CM

## 2023-05-16 DIAGNOSIS — M54.50 CHRONIC LOW BACK PAIN WITHOUT SCIATICA, UNSPECIFIED BACK PAIN LATERALITY: ICD-10-CM

## 2023-05-16 DIAGNOSIS — N39.3 FEMALE STRESS INCONTINENCE: ICD-10-CM

## 2023-05-16 DIAGNOSIS — F33.0 MAJOR DEPRESSIVE DISORDER, RECURRENT EPISODE, MILD (H): ICD-10-CM

## 2023-05-16 PROBLEM — H04.321: Status: RESOLVED | Noted: 2017-06-14 | Resolved: 2023-05-16

## 2023-05-16 PROBLEM — B02.9 HERPES ZOSTER WITHOUT COMPLICATION: Status: RESOLVED | Noted: 2020-05-13 | Resolved: 2023-05-16

## 2023-05-16 PROBLEM — R19.00 PELVIC MASS: Status: RESOLVED | Noted: 2021-03-05 | Resolved: 2023-05-16

## 2023-05-16 PROBLEM — N95.1 MENOPAUSAL SYNDROME (HOT FLUSHES): Status: RESOLVED | Noted: 2017-02-10 | Resolved: 2023-05-16

## 2023-05-16 PROBLEM — R73.01 IMPAIRED FASTING GLUCOSE: Status: RESOLVED | Noted: 2018-02-25 | Resolved: 2023-05-16

## 2023-05-16 PROBLEM — K80.00 ACUTE CALCULOUS CHOLECYSTITIS: Status: RESOLVED | Noted: 2020-04-23 | Resolved: 2023-05-16

## 2023-05-16 PROBLEM — N83.201 RIGHT OVARIAN CYST: Status: RESOLVED | Noted: 2020-05-13 | Resolved: 2023-05-16

## 2023-05-16 PROBLEM — N39.46 MIXED INCONTINENCE: Status: RESOLVED | Noted: 2017-02-10 | Resolved: 2023-05-16

## 2023-05-16 LAB
ANION GAP SERPL CALCULATED.3IONS-SCNC: 10 MMOL/L (ref 7–15)
BUN SERPL-MCNC: 9.2 MG/DL (ref 8–23)
CALCIUM SERPL-MCNC: 9.6 MG/DL (ref 8.8–10.2)
CHLORIDE SERPL-SCNC: 105 MMOL/L (ref 98–107)
CHOLEST SERPL-MCNC: 196 MG/DL
CREAT SERPL-MCNC: 0.85 MG/DL (ref 0.51–0.95)
DEPRECATED HCO3 PLAS-SCNC: 27 MMOL/L (ref 22–29)
GFR SERPL CREATININE-BSD FRML MDRD: 76 ML/MIN/1.73M2
GLUCOSE SERPL-MCNC: 107 MG/DL (ref 70–99)
HDLC SERPL-MCNC: 54 MG/DL
LDLC SERPL CALC-MCNC: 125 MG/DL
NONHDLC SERPL-MCNC: 142 MG/DL
POTASSIUM SERPL-SCNC: 4.7 MMOL/L (ref 3.4–5.3)
SODIUM SERPL-SCNC: 142 MMOL/L (ref 136–145)
TRIGL SERPL-MCNC: 87 MG/DL

## 2023-05-16 PROCEDURE — 90677 PCV20 VACCINE IM: CPT | Performed by: PHYSICIAN ASSISTANT

## 2023-05-16 PROCEDURE — 36415 COLL VENOUS BLD VENIPUNCTURE: CPT | Performed by: PHYSICIAN ASSISTANT

## 2023-05-16 PROCEDURE — 99214 OFFICE O/P EST MOD 30 MIN: CPT | Mod: 25 | Performed by: PHYSICIAN ASSISTANT

## 2023-05-16 PROCEDURE — 91313 COVID-19 BIVALENT 18+ (MODERNA): CPT | Performed by: PHYSICIAN ASSISTANT

## 2023-05-16 PROCEDURE — 80048 BASIC METABOLIC PNL TOTAL CA: CPT | Performed by: PHYSICIAN ASSISTANT

## 2023-05-16 PROCEDURE — G0009 ADMIN PNEUMOCOCCAL VACCINE: HCPCS | Mod: 59 | Performed by: PHYSICIAN ASSISTANT

## 2023-05-16 PROCEDURE — G0402 INITIAL PREVENTIVE EXAM: HCPCS | Performed by: PHYSICIAN ASSISTANT

## 2023-05-16 PROCEDURE — 0134A COVID-19 BIVALENT 18+ (MODERNA): CPT | Performed by: PHYSICIAN ASSISTANT

## 2023-05-16 PROCEDURE — 80061 LIPID PANEL: CPT | Performed by: PHYSICIAN ASSISTANT

## 2023-05-16 RX ORDER — BUPROPION HYDROCHLORIDE 150 MG/1
150 TABLET ORAL EVERY MORNING
Qty: 90 TABLET | Refills: 3 | Status: SHIPPED | OUTPATIENT
Start: 2023-05-16 | End: 2024-01-10

## 2023-05-16 RX ORDER — OMEPRAZOLE 40 MG/1
40 CAPSULE, DELAYED RELEASE ORAL DAILY
Qty: 90 CAPSULE | Refills: 3 | Status: SHIPPED | OUTPATIENT
Start: 2023-05-16 | End: 2024-01-10

## 2023-05-16 RX ORDER — METFORMIN HCL 500 MG
500 TABLET, EXTENDED RELEASE 24 HR ORAL
Qty: 90 TABLET | Refills: 1 | Status: SHIPPED | OUTPATIENT
Start: 2023-05-16 | End: 2024-01-10

## 2023-05-16 RX ORDER — CYCLOBENZAPRINE HCL 10 MG
10 TABLET ORAL 2 TIMES DAILY PRN
Qty: 60 TABLET | Refills: 4 | Status: SHIPPED | OUTPATIENT
Start: 2023-05-16 | End: 2024-03-14

## 2023-05-16 ASSESSMENT — ACTIVITIES OF DAILY LIVING (ADL): CURRENT_FUNCTION: NO ASSISTANCE NEEDED

## 2023-05-16 ASSESSMENT — ENCOUNTER SYMPTOMS
COUGH: 0
HEARTBURN: 0
HEMATOCHEZIA: 0
HEMATURIA: 0
PARESTHESIAS: 0
FREQUENCY: 0
ABDOMINAL PAIN: 0
CONSTIPATION: 0
NAUSEA: 0
FEVER: 0
HEADACHES: 0
PALPITATIONS: 0
DIARRHEA: 1
SHORTNESS OF BREATH: 0
ARTHRALGIAS: 0
SORE THROAT: 0
EYE PAIN: 0
WEAKNESS: 0
CHILLS: 0
BREAST MASS: 0
JOINT SWELLING: 0
NERVOUS/ANXIOUS: 0
DYSURIA: 0
DIZZINESS: 0
MYALGIAS: 1

## 2023-05-16 ASSESSMENT — PAIN SCALES - GENERAL: PAINLEVEL: NO PAIN (0)

## 2023-05-16 ASSESSMENT — PATIENT HEALTH QUESTIONNAIRE - PHQ9
SUM OF ALL RESPONSES TO PHQ QUESTIONS 1-9: 5
10. IF YOU CHECKED OFF ANY PROBLEMS, HOW DIFFICULT HAVE THESE PROBLEMS MADE IT FOR YOU TO DO YOUR WORK, TAKE CARE OF THINGS AT HOME, OR GET ALONG WITH OTHER PEOPLE: SOMEWHAT DIFFICULT
SUM OF ALL RESPONSES TO PHQ QUESTIONS 1-9: 5

## 2023-05-16 NOTE — PATIENT INSTRUCTIONS
"  Patient Education   Personalized Prevention Plan  You are due for the preventive services outlined below.  Your care team is available to assist you in scheduling these services.  If you have already completed any of these items, please share that information with your care team to update in your medical record.  Health Maintenance Due   Topic Date Due     Osteoporosis Screening  Never done     COVID-19 Vaccine (5 - Pfizer series) 08/09/2022     ANNUAL REVIEW OF HM ORDERS  02/09/2023     Annual Wellness Visit  04/21/2023     Pneumococcal Vaccine (1 - PCV) 04/21/2023       Depression and Suicide in Older Adults  Nearly 2 million older adults in the [place] have some type of depression. Some of them even take their own lives. Yet depression among older adults is often ignored. Learning about the warning signs of depression may help spare a loved one needless pain. You may also save a life.   What is depression?  Depression is a common and serious illness. It affects the way you think and feel. It is not a normal part of aging. It is not a sign of weakness, a character flaw, or something you can \"snap out of.\" Most people with depression need treatment to get better. The most common symptom is a feeling of deep sadness. People who are depressed also may seem tired and listless. And nothing seems to give them pleasure. It s normal to grieve or be sad sometimes. But sadness lessens or passes with time. Depression rarely goes away or improves on its own. Other symptoms of depression are:     Sleeping more or less than normal    Eating more or less than normal    Having headaches, stomachaches, or other pains that don t go away    Feeling nervous,  empty,  or worthless    Crying a lot    Thinking or talking about suicide or death    Loss of interest in activities previously enjoyed    Social isolation    Feeling confused or forgetful  What causes it?  The causes of depression aren t fully known. But it is thought to result " from a complex blend of these factors:     Biochemistry. Certain chemicals in the brain play a role.    Genes. Depression does run in families.    Life stress. Life stresses can also trigger depression in some people. Older adults often face many stressors. These may include isolation, the death of friends or a spouse, health problems, and financial concerns.    Chronic health conditions. This includes diabetes, heart disease, or cancer. These can cause symptoms of depression. Medicine side effects can cause changes in thoughts and behaviors.  Giving support    Depressed people often may not want to ask for help. When they do, they may be ignored. Or they may get the wrong treatment. You can help by showing parents and older friends love and support. If they seem depressed, don t lecture the person or ignore the symptoms. Don't discount the symptoms as a  normal  part of aging. They are not. Get involved, listen, and show interest and support.   Help them understand that depression is a treatable illness. Tell them you can help them find the right treatment. Offer to go to their healthcare provider's appointment with them for support when the symptoms are discussed. With their approval, contact a local mental health center, social service agency, or hospital about services.   Helping at healthcare visits  You can be an advocate for them at healthcare appointments. Many older adults have chronic illnesses. Many of these can cause symptoms of depression. Medicine side effects can change thoughts and behaviors.   You can help make sure that the healthcare provider looks at all of these factors. They should refer your family member or friend to a mental healthcare provider when needed. In some cases, untreated depression can lead to a misdiagnosis. A person may be diagnosed with a brain disorder such as dementia. If the healthcare provider does not take the issue of depression seriously, help your family member or  friend find another provider.   Asking about self-harm thoughts  If you think an older person you care about could be suicidal, ask,  Have you thought about suicide?  Most people will tell you the truth. If they say yes, they may already have a plan for how and when they will attempt it. Find out as much as you can. The more detailed the plan, and the easier it is to carry out, the more danger the person is in right now. Tell the person you are there for them and you do not want them to get harmed. Don't wait to get help for the person. Call the person's healthcare provider, local hospital, or emergency services.   Call 988 in a crisis   Never leave the person alone. A person who is actively suicidal needs crisis care right away. They need constant supervision. Never leave the person out of sight. Call 988 Tell the crisis counselor you need help for a person who is thinking about suicide. The counselor will help you get the right level of crisis help. You may be advised to take the person to the nearest emergency room.   The National Suicide Prevention Lifeline is available at 988, or 102-285-CNQE (293-418-4753), or www.suicidepreventionlifeline.org. When you call or text 988, you will be connected to trained counselors who are part of the National Suicide Prevention Lifeline network. An online chat option is also available. Lifeline is free and available 24/7.   To learn more    National Suicide Prevention Lifeline at www.suicidepreventionlifeline.org  or 459-852-GYHJ (692-828-0579)    National [place] on Mental Illness at www.dilshad.org  or 770-941-SYFQ (804-242-5053)    [facility] at www.nmha.org  or 669-413-9664    National [place] of Mental Health at www.nimh.nih.gov  or 132-416-0362    Jonatan last reviewed this educational content on 7/1/2022 2000-2022 [employer]. All rights reserved. This information is not intended as a substitute for professional medical care. Always follow  your healthcare professional's instructions.

## 2023-05-16 NOTE — PROGRESS NOTES
"SUBJECTIVE:   Briseida is a 63 year old who presents for Preventive Visit.      5/16/2023     8:16 AM   Additional Questions   Roomed by Teresa Guzman, ROGELIO   Patient has been advised of split billing requirements and indicates understanding: Yes     Are you in the first 12 months of your Medicare coverage?  Yes,  Visual Acuity:  Right Eye: 20/20   Left Eye: 20/20  Both Eyes: 20/16    Healthy Habits:     In general, how would you rate your overall health?  Good    Frequency of exercise:  1 day/week    Duration of exercise:  15-30 minutes    Do you usually eat at least 4 servings of fruit and vegetables a day, include whole grains    & fiber and avoid regularly eating high fat or \"junk\" foods?  Yes    Taking medications regularly:  Yes    Medication side effects:  None    Ability to successfully perform activities of daily living:  No assistance needed    Home Safety:  No safety concerns identified    Hearing Impairment:  Difficulty following a conversation in a noisy restaurant or crowded room, feel that people are mumbling or not speaking clearly, difficult to understand a speaker at a public meeting or Sh[employer] service and need to ask people to speak up or repeat themselves    In the past 6 months, have you been bothered by leaking of urine? Yes    In general, how would you rate your overall mental or emotional health?  Good      PHQ-2 Total Score: 2    Additional concerns today:  No      Colon cancer screening  Colonoscopy 7/2022:  Impression:               - The entire examined colon through the ascending                             colon is normal.                             - No specimens collected.   Recommendation:           - Perform a virtual colonoscopy (CT colonography)                             at appointment to be scheduled.                             - Consider further screenings with stool based                             studies +/- CT colonography every 5 years due to                           "   extreme difficulty with reaching the cecum.     CT colonography 7/14/22: Recommend colon screening going forward with stool studies or CT colography every 5 years, due to extremely difficult colonoscopy and inability to reach the cecum    Depression stable, controlled with wellbutrin. No medication side effects or concerns.      3/5/2021    12:13 PM 2/9/2022     9:27 AM 5/16/2023     8:04 AM   PHQ   PHQ-9 Total Score 0 5 5   Q9: Thoughts of better off dead/self-harm past 2 weeks Not at all Not at all Not at all     Flexeril PRN for back pain. Uses when more active, once a month or less.    Famciclovir - uses PRN when develops blisters on roof of mouth, HSV.    Hypothyroidism: feels euthyroid. Has noticed overall hair thinning for the past year or so but thyroid wnl. Iron checked 1 year ago, wnl. She does take iron supplement for restless leg.     Stress urinary incontinence. Was taking detrol but too expensive so recently switched to oxybutynin. She finished her rx for detrol and will be starting oxybutynin tomorrow. She would like referral to see urology.    GERD - taking omeprazole 40 mg daily. Symptoms were controlled for a while but has been having more heartburn symptoms at night. No recent diet changes. No chest pain. No abdominal pain, nausea, vomiting, blood in stool. No fever, fatigue, weight loss. No sore throat. No difficulty or pain with swallowing. No feelings of regurgitation. Eating and drinking normally, no feelings of early satiety.      Have you ever done Advance Care Planning? (For example, a Health Directive, POLST, or a discussion with a medical provider or your loved ones about your wishes): Yes, advance care planning is on file.      Fall risk  Fallen 2 or more times in the past year?: No  Any fall with injury in the past year?: No    Cognitive Screening   1) Repeat 3 items (Leader, Season, Table)    2) Clock draw: NORMAL  3) 3 item recall: Recalls 1 object   Results: NORMAL clock, 1-2 items  recalled: COGNITIVE IMPAIRMENT LESS LIKELY    Mini-CogTM Copyright KIRBY Meza. Licensed by the author for use in [facility]; reprinted with permission (yrn@[facility]). All rights reserved.      Do you have sleep apnea, excessive snoring or daytime drowsiness?: sleep apnea    Reviewed and updated as needed this visit by clinical staff   Tobacco  Allergies  Meds  Problems  Med Hx  Surg Hx  Fam Hx          Reviewed and updated as needed this visit by Provider   Tobacco  Allergies  Meds  Problems  Med Hx  Surg Hx  Fam Hx         Social History     Tobacco Use     Smoking status: Never     Smokeless tobacco: Never   Vaping Use     Vaping status: Never Used   Substance Use Topics     Alcohol use: No             5/9/2023     9:33 AM   Alcohol Use   Prescreen: >3 drinks/day or >7 drinks/week? No          View : No data to display.              Do you have a current opioid prescription? No  Do you use any other controlled substances or medications that are not prescribed by a provider? Cannabis - gummy       Current providers sharing in care for this patient include:   Patient Care Team:  Damari Baltazar MD as PCP - General (Family Medicine)  Tiff Dunn MD as MD (Dermatology)  Damari Baltazar MD as Assigned PCP    The following health maintenance items are reviewed in Epic and correct as of today:  Health Maintenance   Topic Date Due     DEXA  Never done     COVID-19 Vaccine (6 - Pfizer series) 09/16/2023     MAMMO SCREENING  09/28/2023     PHQ-9  11/16/2023     TSH W/FREE T4 REFLEX  03/15/2024     MEDICARE ANNUAL WELLNESS VISIT  05/16/2024     ANNUAL REVIEW OF HM ORDERS  05/16/2024     FALL RISK ASSESSMENT  05/16/2024     LIPID  02/05/2027     COLORECTAL CANCER SCREENING  07/14/2027     DTAP/TDAP/TD IMMUNIZATION (3 - Td or Tdap) 02/23/2028     ADVANCE CARE PLANNING  05/16/2028     HEPATITIS C SCREENING  Completed     DEPRESSION ACTION PLAN  Completed     INFLUENZA VACCINE   Completed     Pneumococcal Vaccine: 65+ Years  Completed     ZOSTER IMMUNIZATION  Completed     HIV SCREENING  Addressed     IPV IMMUNIZATION  Aged Out     MENINGITIS IMMUNIZATION  Aged Out     URINE DRUG SCREEN  Discontinued     PAP  Discontinued     Lab work is in process  Labs reviewed in EPIC  BP Readings from Last 3 Encounters:   05/16/23 129/79   07/14/22 113/72   05/16/22 128/74    Wt Readings from Last 3 Encounters:   05/16/23 109.2 kg (240 lb 12.8 oz)   07/14/22 108.5 kg (239 lb 4.8 oz)   05/16/22 113.4 kg (250 lb)                  Patient Active Problem List   Diagnosis     Esophageal reflux     Herpes simplex virus (HSV) infection     Generalized osteoarthrosis, unspecified site     Dysthymic disorder     CARDIOVASCULAR SCREENING; LDL GOAL LESS THAN 160     Female stress incontinence     Restless leg syndrome     Ocular hypertension, right     Daytime somnolence     Chronic low back pain, unspecified back pain laterality, with sciatica presence unspecified     ANDREW (obstructive sleep apnea)     Decreased libido     Hyperlipidemia LDL goal <100     Morbid obesity (H)     Acquired hypothyroidism     Major depressive disorder, recurrent episode, mild (H)     History of difficult colonoscopy     Prediabetes     Past Surgical History:   Procedure Laterality Date     CHOLECYSTECTOMY       COLONOSCOPY  4/24/2015     COLONOSCOPY N/A 7/14/2022    Procedure: COLONOSCOPY;  Surgeon: Brook Calle MD;  Location: [facility] OR     COLONOSCOPY WITH CO2 INSUFFLATION N/A 4/23/2015    Procedure: COLONOSCOPY WITH CO2 INSUFFLATION;  Surgeon: Bebeto Mortensen MD;  Location: [facility] OR     ELBOW SURGERY      Lt elbow repair.     HYSTERECTOMY, PAP NO LONGER INDICATED       LAPAROSCOPIC CHOLECYSTECTOMY N/A 4/24/2020    Procedure: CHOLECYSTECTOMY, LAPAROSCOPIC;  Surgeon: Janett Chan MD;  Location: [facility] OR     ORTHOPEDIC SURGERY       SOFT TISSUE SURGERY      cyst, both shoulders and left elbow     ZZC LIGATE FALLOPIAN TUBE        Z NONSPECIFIC PROCEDURE      rotator cuff surgery both shoulder     Z NONSPECIFIC PROCEDURE      wrist surgery for cyst x 2     Z VAGINAL HYSTERECTOMY      with BSO, 10-12 week size       Social History     Tobacco Use     Smoking status: Never     Smokeless tobacco: Never   Vaping Use     Vaping status: Never Used   Substance Use Topics     Alcohol use: No     Family History   Problem Relation Age of Onset     Hypertension Mother         High Blood Pressure     Fibrocystic breast disease Mother         Has spot being watching.  Checked every 6 months     Glaucoma Mother         glc surgery     Coronary Artery Disease Father         High Cholesterol     Depression/Anxiety Father         Depression     Thyroid Disease Father         Hypo Thryoid     Retinal detachment Father      Thyroid Disease Father         Hypo     C.A.D. Maternal Grandfather      Coronary Artery Disease Maternal Grandfather         Heart Attack, ()     Breast Cancer Paternal Grandmother         Breast removed ()     Asthma Paternal Grandmother         Emphysema ()     Cerebrovascular Disease Paternal Grandfather         Strokes ()     Known Genetic Syndrome Daughter         Whitaker's Syndrome     Skin Cancer No family hx of         no family hx of skin cancer     Colon Cancer No family hx of      Ovarian Cancer No family hx of          Current Outpatient Medications   Medication Sig Dispense Refill     albuterol (PROAIR HFA/PROVENTIL HFA/VENTOLIN HFA) 108 (90 Base) MCG/ACT inhaler Inhale 2 puffs into the lungs every 6 hours as needed for shortness of breath / dyspnea or wheezing 18 g 11     buPROPion (WELLBUTRIN XL) 150 MG 24 hr tablet Take 1 tablet (150 mg) by mouth every morning 90 tablet 3     cyclobenzaprine (FLEXERIL) 10 MG tablet Take 1 tablet (10 mg) by mouth 2 times daily as needed for muscle spasms 60 tablet 4     famciclovir (FAMVIR) 500 MG tablet Take 1 tablet (500 mg) by mouth 2 times  daily as needed 60 tablet 11     ferrous sulfate (FEROSUL) 325 (65 Fe) MG tablet Take 325 mg by mouth daily (with breakfast)       levothyroxine (SYNTHROID/LEVOTHROID) 125 MCG tablet Take 1 tablet (125 mcg) by mouth daily 90 tablet 3     meclizine (ANTIVERT) 25 MG tablet Take 1 tablet (25 mg) by mouth 3 times daily as needed for dizziness 30 tablet 0     metFORMIN (GLUCOPHAGE XR) 500 MG 24 hr tablet Take 1 tablet (500 mg) by mouth daily (with dinner) 90 tablet 1     nystatin (MYCOSTATIN) 029918 UNIT/GM external powder Apply 30 g topically 3 times daily as needed 30 g 1     nystatin-triamcinolone (MYCOLOG) 823803-8.1 UNIT/GM-% external ointment Apply topically 2 times daily 30 g 0     omeprazole (PRILOSEC) 40 MG DR capsule Take 1 capsule (40 mg) by mouth daily 90 capsule 3     oxybutynin ER (DITROPAN XL) 10 MG 24 hr tablet Take 1 tablet (10 mg) by mouth daily 90 tablet 3     Vitamins-Lipotropics (LIPOFLAVONOID) TABS Take 1 tablet by mouth 2 times daily       Allergies   Allergen Reactions     No Known Drug Allergy      Recent Labs   Lab Test 03/15/23  1411 02/16/23  1453 02/16/23  1448 04/14/22  1227 02/05/22  1006 06/24/20  1443 05/10/20  1055 05/06/20  0943 04/24/20  0526 04/23/20  1134 04/05/19  0821 10/30/18  1601 04/17/18  0819   A1C  --   --  5.9*  --  5.8*  --   --  5.7*  --   --  5.8*  --  5.7*   LDL  --   --   --   --  126*  --   --   --   --   --  124*  --  105*   HDL  --   --   --   --  50  --   --   --   --   --  54  --  59   TRIG  --   --   --   --  110  --   --   --   --   --  87  --  107   ALT  --   --   --  24 38  --  31  --  21   < > 34   < >  --    CR  --   --   --  0.75 0.99  --  0.80  --  0.88   < > 0.79   < >  --    GFRESTIMATED  --   --   --  89 64  --  79  --  71   < > 81   < >  --    GFRESTBLACK  --   --   --   --   --   --  >90  --  82   < > >90   < >  --    POTASSIUM  --   --   --  4.3 4.7  --  3.7  --  3.8   < > 4.4  --   --    TSH 0.35 0.28*  --   --  1.02   < >  --  0.21*  --   --  1.42   --   --     < > = values in this interval not displayed.      Mammogram Screening: Mammogram Screening: Recommended mammography every 1-2 years with patient discussion and risk factor consideration    FHS-7:       9/28/2021     3:17 PM 2/8/2022     3:26 PM 5/9/2023     9:34 AM   Breast CA Risk Assessment (FHS-7)   Did any of your first-degree relatives have breast or ovarian cancer? No No No   Did any of your relatives have bilateral breast cancer? No No Unknown   Did any man in your family have breast cancer? No No    Did any woman in your family have breast and ovarian cancer? No Yes Yes   Did any woman in your family have breast cancer before age 50 y? No No No   Do you have 2 or more relatives with breast and/or ovarian cancer? No No No   Do you have 2 or more relatives with breast and/or bowel cancer? No No No       Mammogram Screening: Recommended mammography every 1-2 years with patient discussion and risk factor consideration  Pertinent mammograms are reviewed under the imaging tab.    Review of Systems   Constitutional: Negative for chills and fever.   HENT: Positive for hearing loss. Negative for congestion, ear pain and sore throat.    Eyes: Negative for pain and visual disturbance.   Respiratory: Negative for cough and shortness of breath.    Cardiovascular: Negative for chest pain, palpitations and peripheral edema.   Gastrointestinal: Positive for diarrhea. Negative for abdominal pain, constipation, heartburn, hematochezia and nausea.   Breasts:  Negative for tenderness, breast mass and discharge.   Genitourinary: Positive for genital sores (sometimes feels irritated from urinary leakage). Negative for dysuria, frequency, hematuria, pelvic pain, urgency and vaginal discharge.   Musculoskeletal: Positive for myalgias. Negative for arthralgias and joint swelling.   Skin: Negative for rash.   Neurological: Negative for dizziness, weakness, headaches and paresthesias.   Psychiatric/Behavioral: Negative for  "mood changes. The patient is not nervous/anxious.        OBJECTIVE:   /79 (BP Location: Right arm, Patient Position: Sitting, Cuff Size: Adult Regular)   Pulse 66   Temp 98  F (36.7  C) (Oral)   Ht 1.74 m (5' 8.5\")   Wt 109.2 kg (240 lb 12.8 oz)   LMP  (LMP Unknown)   SpO2 96%   BMI 36.08 kg/m   Estimated body mass index is 36.08 kg/m  as calculated from the following:    Height as of this encounter: 1.74 m (5' 8.5\").    Weight as of this encounter: 109.2 kg (240 lb 12.8 oz).  Physical Exam  GENERAL: healthy, alert and no distress  EYES: Eyes grossly normal to inspection, PERRL and conjunctivae and sclerae normal  HENT: ear canals and TM's normal, nose and mouth without ulcers or lesions  NECK: no adenopathy, no asymmetry, masses, or scars and thyroid normal to palpation  RESP: lungs clear to auscultation - no rales, rhonchi or wheezes  CV: regular rate and rhythm, normal S1 S2, no S3 or S4, no murmur, click or rub, no peripheral edema and peripheral pulses strong  ABDOMEN: soft, nontender, no hepatosplenomegaly, no masses and bowel sounds normal  MS: no gross musculoskeletal defects noted, no edema  SKIN: no suspicious lesions or rashes  NEURO: Normal strength and tone, mentation intact and speech normal  PSYCH: mentation appears normal, affect normal/bright    Diagnostic Test Results:  Labs reviewed in Epic    ASSESSMENT / PLAN:   Encounter for Medicare annual wellness exam  Reviewed personal and family history. Reviewed age appropriate screenings. Reviewed healthy BP and BMI ranges. Counseled on lifestyle modifications for optimal mental and physical health.  Discussed age-appropriate health maintenance. Recommended any needed vaccinations. Continue to focus on well balanced diet and exercise.   - Lipid panel reflex to direct LDL Fasting; Future  - Basic metabolic panel  (Ca, Cl, CO2, Creat, Gluc, K, Na, BUN); Future  - Lipid panel reflex to direct LDL Fasting  - Basic metabolic panel  (Ca, Cl, CO2, " Creat, Gluc, K, Na, BUN)    Major depressive disorder, recurrent episode, mild (H)  Chronic, well controlled. Has been stable for years and would like to continue.  - buPROPion (WELLBUTRIN XL) 150 MG 24 hr tablet; Take 1 tablet (150 mg) by mouth every morning    Morbid obesity (H)  Weight loss is recommended - would help with GERD, reduce risk of developing other chronic health conditions like diabetes, etc.   - metFORMIN (GLUCOPHAGE XR) 500 MG 24 hr tablet; Take 1 tablet (500 mg) by mouth daily (with dinner)    Encounter for screening mammogram for breast cancer  - MA Screen Bilateral w/Yasir; Future    Post-menopausal  - DEXA HIP/PELVIS/SPINE - Future; Future    Chronic low back pain without sciatica, unspecified back pain laterality  Uses PRN, less than once per month.  - cyclobenzaprine (FLEXERIL) 10 MG tablet; Take 1 tablet (10 mg) by mouth 2 times daily as needed for muscle spasms    Acquired hypothyroidism  Clinically euthyroid, recent labs wnl 2 months ago. Does not need refills today.    Prediabetes  Chronic, stable.  - metFORMIN (GLUCOPHAGE XR) 500 MG 24 hr tablet; Take 1 tablet (500 mg) by mouth daily (with dinner)    Gastroesophageal reflux disease, unspecified whether esophagitis present  Symptoms were controlled for a while but now worsening despite omeprazole 40 mg daily. Will start with EGD and follow-up per results/symptoms. Discussed continued lifestyle modifications.   - omeprazole (PRILOSEC) 40 MG DR capsule; Take 1 capsule (40 mg) by mouth daily  - Adult GI  Referral - Procedure Only; Future    Female stress incontinence  Was taking detrol but too expensive so recently switched to oxybutynin. She finished her rx for detrol and will be starting oxybutynin tomorrow. She would like referral to see urology.  - Adult Urology  Referral; Future    History of difficult colonoscopy  Colonoscopy 7/2022:  Impression:               - The entire examined colon through the ascending            "                  colon is normal.                             - No specimens collected.   Recommendation:           - Perform a virtual colonoscopy (CT colonography)                             at appointment to be scheduled.                             - Consider further screenings with stool based                             studies +/- CT colonography every 5 years due to                             extreme difficulty with reaching the cecum.     CT colonography 7/14/22: Recommend colon screening going forward with stool studies or CT colography every 5 years, due to extremely difficult colonoscopy and inability to reach the cecum            COUNSELING:  Reviewed preventive health counseling, as reflected in patient instructions      BMI:   Estimated body mass index is 36.08 kg/m  as calculated from the following:    Height as of this encounter: 1.74 m (5' 8.5\").    Weight as of this encounter: 109.2 kg (240 lb 12.8 oz).   Weight management plan: Discussed healthy diet and exercise guidelines      She reports that she has never smoked. She has never used smokeless tobacco.      Appropriate preventive services were discussed with this patient, including applicable screening as appropriate for cardiovascular disease, diabetes, osteopenia/osteoporosis, and glaucoma.  As appropriate for age/gender, discussed screening for colorectal cancer, prostate cancer, breast cancer, and cervical cancer. Checklist reviewing preventive services available has been given to the patient.    Reviewed patients plan of care and provided an AVS. The Basic Care Plan (routine screening as documented in Health Maintenance) for Amina meets the Care Plan requirement. This Care Plan has been established and reviewed with the Patient.          Janett Cheng PA-C  [facility]    Identified Health Risks:    I have reviewed Opioid Use Disorder and Substance Use Disorder risk factors and made any needed referrals. "     Answers for HPI/ROS submitted by the patient on 5/16/2023  If you checked off any problems, how difficult have these problems made it for you to do your work, take care of things at home, or get along with other people?: Somewhat difficult  PHQ9 TOTAL SCORE: 5

## 2023-05-17 ENCOUNTER — TELEPHONE (OUTPATIENT)
Dept: FAMILY MEDICINE | Facility: CLINIC | Age: 63
End: 2023-05-17
Payer: COMMERCIAL

## 2023-05-17 DIAGNOSIS — B37.9 CANDIDA INFECTION: Primary | ICD-10-CM

## 2023-05-17 NOTE — TELEPHONE ENCOUNTER
Medicare Advantage- [facility] advising this medication is not on the pt's formulary and advising of alternatives. Form placed in provider bin.     Cordelia Carreon

## 2023-05-23 RX ORDER — TRIAMCINOLONE ACETONIDE 1 MG/G
CREAM TOPICAL 2 TIMES DAILY
Qty: 30 G | Refills: 0 | Status: SHIPPED | OUTPATIENT
Start: 2023-05-23

## 2023-05-23 NOTE — TELEPHONE ENCOUNTER
Ordered triamcinolone and nystatin will be OTC and can be used seperatly   Let me know if helping'  Thank you

## 2023-05-23 NOTE — TELEPHONE ENCOUNTER
Called pt and advised of below per Dr Baltazar.  Pt was able to get 2 tubes of the mycolog so she said it will be awhile before she needs to fill this.    Flores Mccormick RN, BSN  [facility]

## 2023-05-26 ENCOUNTER — MYC MEDICAL ADVICE (OUTPATIENT)
Dept: FAMILY MEDICINE | Facility: CLINIC | Age: 63
End: 2023-05-26
Payer: COMMERCIAL

## 2023-05-30 ENCOUNTER — TELEPHONE (OUTPATIENT)
Dept: GASTROENTEROLOGY | Facility: CLINIC | Age: 63
End: 2023-05-30
Payer: COMMERCIAL

## 2023-05-30 NOTE — TELEPHONE ENCOUNTER
Screening Questions  BLUE  KIND OF PREP RED  LOCATION [review exclusion criteria] GREEN  SEDATION TYPE        YES Are you active on mychart?       Janett Cheng PA-C Ordering/Referring Provider?        [facility] What type of coverage do you have?      N Have you had a positive covid test in the last 14 days?     36.5 1. BMI  [BMI 40+ - review exclusion criteria& smart-phrase document]    Y  2. Are you able to give consent for your medical care? [IF NO,RN REVIEW]          N  3. Are you taking any prescription pain medications on a routine schedule   (ex narcotics: oxycodone, roxicodone, oxycontin,  and percocet)? [RN Review]        NA  3a. EXTENDED PREP What kind of prescription?     N 4. Do you have any chemical dependencies such as alcohol, street drugs, or methadone?        **If yes 3- 5 , please schedule with MAC sedation.**          IF YES TO ANY 6 - 10 - HOSPITAL SETTING ONLY.     N 6.   Do you need assistance transferring?     N 7.   Have you had a heart or lung transplant?    N 8.   Are you currently on dialysis?   N 9.   Do you use daily home oxygen?   N 10. Do you take nitroglycerin?   10a. NA If yes, how often?      11. Are you currently pregnant?    11a.  If yes, how many weeks? [ Greater than 12 weeks, OR NEEDED]    N 12. Do you have Pulmonary Hypertension? *NEED PAC APPT AT [facility] w/ [facility]*     N 13. [review exclusion criteria]  Do you have any implantable devices in your body (pacemaker, defib, LVAD)?    N 14. In the past 6 months, have you had any heart related issues including cardiomyopathy or heart attack?     14a. NA If yes, did it require cardiac stenting if so when?     N 15. Have you had a stroke or Transient ischemic attack (TIA - aka  mini stroke ) within 6 months?      Y ANDREW MILD 16. Do you have mod to severe Obstructive Sleep Apnea?  [Hospital only]    N 17. Do you have SEVERE AND UNCONTROLLED asthma? *NEED PAC APPT AT [facility] w/MAC*     18.Do you take blood thinners?  No     19.  "Do you take any of the following medications?    NPhentermine    NOzempic    NWegovy (Semaglutide)      19a. If yes, \"Hold for 7 days before procedure.  Please consult your prescribing provider if you have questions about holding this medication.\"     N  20. Do you have chronic kidney disease?      Y PRE  21. Do you have a diagnosis of diabetes?     N  22. On a regular basis do you go 3-5 days between bowel movements?      23. Preferred [facility] for Pre Prescription           [id number] - [facility] [address] [facility]        - CLOSING REMINDERS -    You will receive a call from a Nurse to review instructions and health history.  This assessment must be completed prior to your procedure.  Failure to complete the Nurse assessment may result in the procedure being cancelled.      On the day of your procedure, please designatean adult(s) who can drive you home stay with you for the next 24 hours. The medicines used in the exam will make you sleepy. You will not be able to drive.      You cannot take public transportation, ride share services, or non-medical taxi service without a responsible caregiver.  Medical transport services are allowed with the requirement that a responsible caregiver will receive you at your destination.  We require that drivers and caregivers are confirmed prior to your procedure.      - SCHEDULING DETAILS -  YES & ANDREW Hospital Setting Required & If yes, what is the exclusion?   CHARLES  Surgeon    5/31/23  Date of Procedure  Upper Endoscopy [EGD]  Type of Procedure Scheduled  [facility] Location     MODERATE Sedation Type     N PAC / Pre-op Required                 "

## 2023-05-31 ENCOUNTER — HOSPITAL ENCOUNTER (OUTPATIENT)
Facility: CLINIC | Age: 63
Discharge: HOME | End: 2023-05-31
Attending: INTERNAL MEDICINE | Admitting: INTERNAL MEDICINE
Payer: COMMERCIAL

## 2023-05-31 VITALS
SYSTOLIC BLOOD PRESSURE: 115 MMHG | OXYGEN SATURATION: 95 % | RESPIRATION RATE: 18 BRPM | BODY MASS INDEX: 36.37 KG/M2 | DIASTOLIC BLOOD PRESSURE: 66 MMHG | TEMPERATURE: 97.7 F | WEIGHT: 240 LBS | HEART RATE: 74 BPM | HEIGHT: 68 IN

## 2023-05-31 LAB
GLUCOSE BLDC GLUCOMTR-MCNC: 110 MG/DL (ref 70–99)
UPPER GI ENDOSCOPY: NORMAL

## 2023-05-31 PROCEDURE — 250N000011 HC RX IP 250 OP 636: Performed by: INTERNAL MEDICINE

## 2023-05-31 PROCEDURE — 999N000099 HC STATISTIC MODERATE SEDATION < 10 MIN: Performed by: INTERNAL MEDICINE

## 2023-05-31 PROCEDURE — 250N000009 HC RX 250: Performed by: INTERNAL MEDICINE

## 2023-05-31 PROCEDURE — 43235 EGD DIAGNOSTIC BRUSH WASH: CPT | Performed by: INTERNAL MEDICINE

## 2023-05-31 PROCEDURE — 82962 GLUCOSE BLOOD TEST: CPT

## 2023-05-31 RX ORDER — PROCHLORPERAZINE MALEATE 5 MG
5 TABLET ORAL EVERY 6 HOURS PRN
Status: DISCONTINUED | OUTPATIENT
Start: 2023-05-31 | End: 2023-05-31 | Stop reason: HOSPADM

## 2023-05-31 RX ORDER — DIPHENHYDRAMINE HYDROCHLORIDE 50 MG/ML
25-50 INJECTION INTRAMUSCULAR; INTRAVENOUS
Status: DISCONTINUED | OUTPATIENT
Start: 2023-05-31 | End: 2023-05-31 | Stop reason: HOSPADM

## 2023-05-31 RX ORDER — ONDANSETRON 4 MG/1
4 TABLET, ORALLY DISINTEGRATING ORAL EVERY 6 HOURS PRN
Status: DISCONTINUED | OUTPATIENT
Start: 2023-05-31 | End: 2023-05-31 | Stop reason: HOSPADM

## 2023-05-31 RX ORDER — FLUMAZENIL 0.1 MG/ML
0.2 INJECTION, SOLUTION INTRAVENOUS
Status: DISCONTINUED | OUTPATIENT
Start: 2023-05-31 | End: 2023-05-31 | Stop reason: HOSPADM

## 2023-05-31 RX ORDER — NALOXONE HYDROCHLORIDE 0.4 MG/ML
0.4 INJECTION, SOLUTION INTRAMUSCULAR; INTRAVENOUS; SUBCUTANEOUS
Status: DISCONTINUED | OUTPATIENT
Start: 2023-05-31 | End: 2023-05-31 | Stop reason: HOSPADM

## 2023-05-31 RX ORDER — FENTANYL CITRATE 50 UG/ML
50-100 INJECTION, SOLUTION INTRAMUSCULAR; INTRAVENOUS EVERY 5 MIN PRN
Status: DISCONTINUED | OUTPATIENT
Start: 2023-05-31 | End: 2023-05-31 | Stop reason: HOSPADM

## 2023-05-31 RX ORDER — NALOXONE HYDROCHLORIDE 0.4 MG/ML
0.2 INJECTION, SOLUTION INTRAMUSCULAR; INTRAVENOUS; SUBCUTANEOUS
Status: DISCONTINUED | OUTPATIENT
Start: 2023-05-31 | End: 2023-05-31 | Stop reason: HOSPADM

## 2023-05-31 RX ORDER — VITAMIN B COMPLEX
1 TABLET ORAL DAILY
COMMUNITY

## 2023-05-31 RX ORDER — ATROPINE SULFATE 0.1 MG/ML
1 INJECTION INTRAVENOUS
Status: DISCONTINUED | OUTPATIENT
Start: 2023-05-31 | End: 2023-05-31 | Stop reason: HOSPADM

## 2023-05-31 RX ORDER — ONDANSETRON 2 MG/ML
4 INJECTION INTRAMUSCULAR; INTRAVENOUS EVERY 6 HOURS PRN
Status: DISCONTINUED | OUTPATIENT
Start: 2023-05-31 | End: 2023-05-31 | Stop reason: HOSPADM

## 2023-05-31 RX ORDER — SIMETHICONE 40MG/0.6ML
133 SUSPENSION, DROPS(FINAL DOSAGE FORM)(ML) ORAL
Status: DISCONTINUED | OUTPATIENT
Start: 2023-05-31 | End: 2023-05-31 | Stop reason: HOSPADM

## 2023-05-31 RX ORDER — EPINEPHRINE 1 MG/ML
0.1 INJECTION, SOLUTION INTRAMUSCULAR; SUBCUTANEOUS
Status: DISCONTINUED | OUTPATIENT
Start: 2023-05-31 | End: 2023-05-31 | Stop reason: HOSPADM

## 2023-05-31 RX ORDER — BIOTIN 1 MG
1000 TABLET ORAL DAILY
COMMUNITY
End: 2024-08-07

## 2023-05-31 RX ORDER — ONDANSETRON 2 MG/ML
4 INJECTION INTRAMUSCULAR; INTRAVENOUS
Status: DISCONTINUED | OUTPATIENT
Start: 2023-05-31 | End: 2023-05-31 | Stop reason: HOSPADM

## 2023-05-31 RX ORDER — LIDOCAINE 40 MG/G
CREAM TOPICAL
Status: DISCONTINUED | OUTPATIENT
Start: 2023-05-31 | End: 2023-05-31 | Stop reason: HOSPADM

## 2023-05-31 RX ADMIN — MIDAZOLAM 2 MG: 1 INJECTION INTRAMUSCULAR; INTRAVENOUS at 13:13

## 2023-05-31 RX ADMIN — FENTANYL CITRATE 100 MCG: 50 INJECTION, SOLUTION INTRAMUSCULAR; INTRAVENOUS at 13:13

## 2023-05-31 RX ADMIN — TOPICAL ANESTHETIC 0.5 ML: 200 SPRAY DENTAL; PERIODONTAL at 13:13

## 2023-05-31 ASSESSMENT — ACTIVITIES OF DAILY LIVING (ADL): ADLS_ACUITY_SCORE: 35

## 2023-05-31 NOTE — DISCHARGE INSTRUCTIONS
The patient has received a copy of the Provation report the doctor has written and discharge instructions have been discussed with the patient and responsible adult.  All questions were addressed and answered prior to patient discharge.     Examples of Bed Risers          GERD (Adult)    The esophagus is a tube that carries food from the mouth to the stomach. A valve (lower esophageal sphincter) prevents stomach acid from flowing upward. Sometimes this valve doesn't work correctly. Then stomach contents may flow (reflux) into the esophagus. When it happens again and again, it's called GERD (gastroesophageal reflux disease). GERD can irritate the esophagus. It can cause pain. It can also cause problems with swallowing or breathing. In severe cases, GERD can cause pneumonia that keeps coming back. This is from breathing in particles (aspiration).   Symptoms of reflux include burning, pressure, or sharp pain in the upper belly (abdomen). Symptoms may also be in the mid- to lower chest. The pain can spread to the neck, back, or shoulder. You may have:   Belching  Acid taste in the back of the throat  Chronic cough  Sore throat  Hoarseness  GERD symptoms often occur during the day after a big meal. They can also occur at night when lying down.    Home care  Lifestyle changes can help ease symptoms. Your healthcare provider may also prescribe medicines. Symptoms often get better with treatment. But if treatment is stopped, the symptoms often return after a few months. Most people with GERD will need to continue treatment. Or they may need treatment on and off.   Lifestyle changes  Limit or don't eat fatty, fried, or spicy foods. Also limit coffee, chocolate, mint, and foods with high acid content. These include tomatoes and citrus fruit and juices (orange, grapefruit, and lemon).  Don t eat large meals, especially at night. Frequent, smaller meals are best. Don't lie down right after eating. And don t eat anything 3 hours  before going to bed.  Don't drink alcohol or smoke. As much as possible, stay away from secondhand smoke.  If you are overweight, losing weight will reduce symptoms.   Don't wear tight clothing around your stomach area.  If your symptoms occur during sleep, use a foam wedge to raise your upper body not just your head. Or place 4-inch blocks under the head of your bed. Or use 2 bed risers under your bed frame.  Talk with your provider if you have trouble making the suggested lifestyle changes. They may be able to give you resources to help.  Medicines  Medicines can help ease the symptoms of GERD. They also help prevent damage to the esophagus. Discuss a medicine plan with your healthcare provider. This may include one or more of these medicines:   Antacids. These help neutralize the normal acids in your stomach.  Acid blockers (histamine or H2 blockers). These decrease how much acid your stomach makes.  Acid inhibitors (proton pump inhibitors PPIs). These also decrease how much acid your body makes, but in a different way from the blockers. They may work better. But they can take a little longer to do so.  Take an antacid 30 to 60 minutes after eating and at bedtime, but not at the same time as an acid blocker. Try not to take medicines such as ibuprofen and aspirin. If you take aspirin for your heart or other health reasons, talk with your healthcare provider about stopping it.   Follow-up care  Follow up with your healthcare provider as advised.   When to seek medical advice  Call your healthcare provider if any of the following occur:  Stomach pain gets worse or moves to the lower right belly (appendix area)  Chest pain appears or gets worse, or spreads to the back, neck, shoulder, or arm  An over-the-counter trial of medicine doesn't relieve your symptoms  Weight loss that can't be explained  Trouble or pain swallowing  Frequent vomiting (can t keep down liquids)  Blood in the stool or vomit (red or black in  color)  Feeling weak or dizzy  Fever of 100.4 F (38 C) or higher, or as directed by your healthcare provider  Symptoms get worse or you have new symptoms  Jonatan last reviewed this educational content on 11/1/2021 2000-2022 [employer]. All rights reserved. This information is not intended as a substitute for professional medical care. Always follow your healthcare professional's instructions.

## 2023-05-31 NOTE — H&P
Pre-Endoscopy History and Physical     Amina Pineda MRN# [id number]   YOB: 1960 Age: 63 year old     Date of Procedure: 5/31/2023  Primary care provider: Damari Baltazar  Type of Endoscopy: Gastroscopy with possible biopsy, possible dilation  Reason for Procedure: reflux  Type of Anesthesia Anticipated: Conscious Sedation    HPI:    Amina is a 63 year old female who will be undergoing the above procedure.      A history and physical has been performed. The patient's medications and allergies have been reviewed. The risks and benefits of the procedure and the sedation options and risks were discussed with the patient.  All questions were answered and informed consent was obtained.      She denies a personal or family history of anesthesia complications or bleeding disorders.     Patient Active Problem List   Diagnosis     Esophageal reflux     Herpes simplex virus (HSV) infection     Generalized osteoarthrosis, unspecified site     Dysthymic disorder     CARDIOVASCULAR SCREENING; LDL GOAL LESS THAN 160     Female stress incontinence     Restless leg syndrome     Ocular hypertension, right     Daytime somnolence     Chronic low back pain, unspecified back pain laterality, with sciatica presence unspecified     ANDREW (obstructive sleep apnea)     Decreased libido     Hyperlipidemia LDL goal <100     Morbid obesity (H)     Acquired hypothyroidism     Major depressive disorder, recurrent episode, mild (H)     History of difficult colonoscopy     Prediabetes        Past Medical History:   Diagnosis Date     Decreased libido 11/06/2006     Depressive disorder, not elsewhere classified      Esophageal reflux      Generalized osteoarthrosis, unspecified site     R hip, on meds     Herpes simplex without mention of complication     oral     Intramural leiomyoma of uterus      Menopausal syndrome (hot flushes) 2/10/2017     ANDREW (obstructive sleep apnea)      Pelvic mass 3/5/2021    Ovarian remnant  cyst? Right side, needs follow up from      Right ovarian cyst 2020     Unspecified hypothyroidism         Past Surgical History:   Procedure Laterality Date     CHOLECYSTECTOMY       COLONOSCOPY  2015     COLONOSCOPY N/A 2022    Procedure: COLONOSCOPY;  Surgeon: Brook Calle MD;  Location: [facility] OR     COLONOSCOPY WITH CO2 INSUFFLATION N/A 2015    Procedure: COLONOSCOPY WITH CO2 INSUFFLATION;  Surgeon: Bebeto Mortensen MD;  Location: [facility] OR     ELBOW SURGERY      Lt elbow repair.     HYSTERECTOMY, PAP NO LONGER INDICATED       LAPAROSCOPIC CHOLECYSTECTOMY N/A 2020    Procedure: CHOLECYSTECTOMY, LAPAROSCOPIC;  Surgeon: Janett Chan MD;  Location: [facility] OR     ORTHOPEDIC SURGERY       SOFT TISSUE SURGERY      cyst, both shoulders and left elbow     ZZC LIGATE FALLOPIAN TUBE       ZZC NONSPECIFIC PROCEDURE      rotator cuff surgery both shoulder     ZZC NONSPECIFIC PROCEDURE      wrist surgery for cyst x 2     ZZC VAGINAL HYSTERECTOMY      with BSO, 10-12 week size       Social History     Tobacco Use     Smoking status: Never     Smokeless tobacco: Never   Vaping Use     Vaping status: Never Used   Substance Use Topics     Alcohol use: No       Family History   Problem Relation Age of Onset     Hypertension Mother         High Blood Pressure     Fibrocystic breast disease Mother         Has spot being watching.  Checked every 6 months     Glaucoma Mother         glc surgery     Coronary Artery Disease Father         High Cholesterol     Depression/Anxiety Father         Depression     Thyroid Disease Father         Hypo Thryoid     Retinal detachment Father      Thyroid Disease Father         Hypo     C.A.D. Maternal Grandfather      Coronary Artery Disease Maternal Grandfather         Heart Attack, ()     Breast Cancer Paternal Grandmother         Breast removed ()     Asthma Paternal Grandmother         Emphysema ()     Cerebrovascular  Disease Paternal Grandfather         Strokes ()     Known Genetic Syndrome Daughter         Whitaker's Syndrome     Skin Cancer No family hx of         no family hx of skin cancer     Colon Cancer No family hx of      Ovarian Cancer No family hx of        Prior to Admission medications    Medication Sig Start Date End Date Taking? Authorizing Provider   albuterol (PROAIR HFA/PROVENTIL HFA/VENTOLIN HFA) 108 (90 Base) MCG/ACT inhaler Inhale 2 puffs into the lungs every 6 hours as needed for shortness of breath / dyspnea or wheezing 22  Yes Damari Baltazar MD   biotin 1000 MCG TABS tablet Take 1,000 mcg by mouth daily   Yes Reported, Patient   buPROPion (WELLBUTRIN XL) 150 MG 24 hr tablet Take 1 tablet (150 mg) by mouth every morning 23  Yes Janett Cheng PA-C   cyclobenzaprine (FLEXERIL) 10 MG tablet Take 1 tablet (10 mg) by mouth 2 times daily as needed for muscle spasms 23  Yes Janett Cheng PA-C   famciclovir (FAMVIR) 500 MG tablet Take 1 tablet (500 mg) by mouth 2 times daily as needed 22  Yes Damari Baltazar MD   ferrous sulfate (FEROSUL) 325 (65 Fe) MG tablet Take 325 mg by mouth daily (with breakfast)   Yes Unknown, Entered By History   levothyroxine (SYNTHROID/LEVOTHROID) 125 MCG tablet Take 1 tablet (125 mcg) by mouth daily 23  Yes Damari Baltazar MD   meclizine (ANTIVERT) 25 MG tablet Take 1 tablet (25 mg) by mouth 3 times daily as needed for dizziness 22  Yes Johnny Juárez DO   metFORMIN (GLUCOPHAGE XR) 500 MG 24 hr tablet Take 1 tablet (500 mg) by mouth daily (with dinner) 23  Yes Janett Cheng PA-C   nystatin (MYCOSTATIN) 112084 UNIT/GM external powder Apply 30 g topically 3 times daily as needed 21  Yes Damari Baltazar MD   omeprazole (PRILOSEC) 40 MG DR capsule Take 1 capsule (40 mg) by mouth daily 23  Yes Korpela, Janett E, PA-C   oxybutynin ER (DITROPAN XL) 10 MG 24 hr tablet Take 1 tablet (10 mg) by  "mouth daily 5/9/23  Yes Damari Baltazar MD   triamcinolone (KENALOG) 0.1 % external cream Apply topically 2 times daily Do not use longer than 2 weeks 5/23/23  Yes Damari Baltazar MD   Vitamin D3 (VITAMIN D, CHOLECALCIFEROL,) 25 mcg (1000 units) tablet Take 1 tablet by mouth daily   Yes Reported, Patient   Vitamins-Lipotropics (LIPOFLAVONOID) TABS Take 1 tablet by mouth 2 times daily   Yes Unknown, Entered By History       Allergies   Allergen Reactions     No Known Drug Allergy         REVIEW OF SYSTEMS:   5 point ROS negative except as noted above in HPI, including Gen., Resp., CV, GI &  system review.    PHYSICAL EXAM:   BP (!) 149/84   Pulse 80   Temp 97.7  F (36.5  C) (Temporal)   Resp 17   Ht 1.727 m (5' 8\")   Wt 108.9 kg (240 lb)   LMP  (LMP Unknown)   SpO2 95%   BMI 36.49 kg/m   Estimated body mass index is 36.49 kg/m  as calculated from the following:    Height as of this encounter: 1.727 m (5' 8\").    Weight as of this encounter: 108.9 kg (240 lb).   GENERAL APPEARANCE: alert, and oriented  MENTAL STATUS: alert  AIRWAY EXAM: Mallampatti Class I (visualization of the soft palate, fauces, uvula, anterior and posterior pillars)  RESP: lungs clear to auscultation - no rales, rhonchi or wheezes  CV: regular rates and rhythm  DIAGNOSTICS:    Not indicated    IMPRESSION   ASA Class 2 - Mild systemic disease    PLAN:   Plan for Gastroscopy with possible biopsy, possible dilation. We discussed the risks, benefits and alternatives and the patient wished to proceed.    The above has been forwarded to the consulting provider.      Signed Electronically by: Gurpreet Mata MD  May 31, 2023          "

## 2023-06-30 ENCOUNTER — ANCILLARY PROCEDURE (OUTPATIENT)
Dept: BONE DENSITY | Facility: CLINIC | Age: 63
End: 2023-06-30
Attending: PHYSICIAN ASSISTANT
Payer: COMMERCIAL

## 2023-06-30 ENCOUNTER — HOSPITAL ENCOUNTER (OUTPATIENT)
Dept: MAMMOGRAPHY | Facility: CLINIC | Age: 63
Discharge: HOME | End: 2023-06-30
Attending: PHYSICIAN ASSISTANT | Admitting: PHYSICIAN ASSISTANT
Payer: COMMERCIAL

## 2023-06-30 DIAGNOSIS — Z12.31 ENCOUNTER FOR SCREENING MAMMOGRAM FOR BREAST CANCER: ICD-10-CM

## 2023-06-30 DIAGNOSIS — Z78.0 POST-MENOPAUSAL: ICD-10-CM

## 2023-06-30 PROCEDURE — 77067 SCR MAMMO BI INCL CAD: CPT

## 2023-06-30 PROCEDURE — 77080 DXA BONE DENSITY AXIAL: CPT | Performed by: INTERNAL MEDICINE

## 2023-07-03 PROBLEM — M85.88 OSTEOPENIA OF LUMBAR SPINE: Status: ACTIVE | Noted: 2023-07-03

## 2023-07-11 ENCOUNTER — HOSPITAL ENCOUNTER (EMERGENCY)
Facility: CLINIC | Age: 63
Discharge: HOME | End: 2023-07-11
Attending: EMERGENCY MEDICINE | Admitting: EMERGENCY MEDICINE
Payer: COMMERCIAL

## 2023-07-11 VITALS
OXYGEN SATURATION: 91 % | WEIGHT: 240 LBS | SYSTOLIC BLOOD PRESSURE: 137 MMHG | RESPIRATION RATE: 13 BRPM | HEIGHT: 68 IN | DIASTOLIC BLOOD PRESSURE: 79 MMHG | TEMPERATURE: 97.2 F | HEART RATE: 71 BPM | BODY MASS INDEX: 36.37 KG/M2

## 2023-07-11 DIAGNOSIS — R00.2 PALPITATIONS: ICD-10-CM

## 2023-07-11 LAB
ANION GAP SERPL CALCULATED.3IONS-SCNC: 11 MMOL/L (ref 7–15)
BASOPHILS # BLD AUTO: 0.1 10E3/UL (ref 0–0.2)
BASOPHILS NFR BLD AUTO: 1 %
BUN SERPL-MCNC: 9 MG/DL (ref 8–23)
CALCIUM SERPL-MCNC: 10 MG/DL (ref 8.8–10.2)
CHLORIDE SERPL-SCNC: 103 MMOL/L (ref 98–107)
CREAT SERPL-MCNC: 0.8 MG/DL (ref 0.51–0.95)
DEPRECATED HCO3 PLAS-SCNC: 27 MMOL/L (ref 22–29)
EOSINOPHIL # BLD AUTO: 0.3 10E3/UL (ref 0–0.7)
EOSINOPHIL NFR BLD AUTO: 4 %
ERYTHROCYTE [DISTWIDTH] IN BLOOD BY AUTOMATED COUNT: 13 % (ref 10–15)
GFR SERPL CREATININE-BSD FRML MDRD: 81 ML/MIN/1.73M2
GLUCOSE SERPL-MCNC: 108 MG/DL (ref 70–99)
HCT VFR BLD AUTO: 45.2 % (ref 35–47)
HGB BLD-MCNC: 14.8 G/DL (ref 11.7–15.7)
IMM GRANULOCYTES # BLD: 0 10E3/UL
IMM GRANULOCYTES NFR BLD: 0 %
LYMPHOCYTES # BLD AUTO: 1.5 10E3/UL (ref 0.8–5.3)
LYMPHOCYTES NFR BLD AUTO: 18 %
MAGNESIUM SERPL-MCNC: 1.9 MG/DL (ref 1.7–2.3)
MCH RBC QN AUTO: 30.1 PG (ref 26.5–33)
MCHC RBC AUTO-ENTMCNC: 32.7 G/DL (ref 31.5–36.5)
MCV RBC AUTO: 92 FL (ref 78–100)
MONOCYTES # BLD AUTO: 0.5 10E3/UL (ref 0–1.3)
MONOCYTES NFR BLD AUTO: 6 %
NEUTROPHILS # BLD AUTO: 5.9 10E3/UL (ref 1.6–8.3)
NEUTROPHILS NFR BLD AUTO: 71 %
NRBC # BLD AUTO: 0 10E3/UL
NRBC BLD AUTO-RTO: 0 /100
PLATELET # BLD AUTO: 256 10E3/UL (ref 150–450)
POTASSIUM SERPL-SCNC: 4.8 MMOL/L (ref 3.4–5.3)
RBC # BLD AUTO: 4.92 10E6/UL (ref 3.8–5.2)
SODIUM SERPL-SCNC: 141 MMOL/L (ref 136–145)
TROPONIN T SERPL HS-MCNC: 7 NG/L
TROPONIN T SERPL HS-MCNC: 7 NG/L
TSH SERPL DL<=0.005 MIU/L-ACNC: 0.41 UIU/ML (ref 0.3–4.2)
WBC # BLD AUTO: 8.4 10E3/UL (ref 4–11)

## 2023-07-11 PROCEDURE — 36415 COLL VENOUS BLD VENIPUNCTURE: CPT | Performed by: EMERGENCY MEDICINE

## 2023-07-11 PROCEDURE — 99284 EMERGENCY DEPT VISIT MOD MDM: CPT

## 2023-07-11 PROCEDURE — 84484 ASSAY OF TROPONIN QUANT: CPT | Mod: 91 | Performed by: STUDENT IN AN ORGANIZED HEALTH CARE EDUCATION/TRAINING PROGRAM

## 2023-07-11 PROCEDURE — 93005 ELECTROCARDIOGRAM TRACING: CPT | Mod: 76

## 2023-07-11 PROCEDURE — 93005 ELECTROCARDIOGRAM TRACING: CPT

## 2023-07-11 PROCEDURE — 84484 ASSAY OF TROPONIN QUANT: CPT | Performed by: EMERGENCY MEDICINE

## 2023-07-11 PROCEDURE — 99285 EMERGENCY DEPT VISIT HI MDM: CPT | Mod: 25

## 2023-07-11 PROCEDURE — 36415 COLL VENOUS BLD VENIPUNCTURE: CPT | Performed by: STUDENT IN AN ORGANIZED HEALTH CARE EDUCATION/TRAINING PROGRAM

## 2023-07-11 PROCEDURE — 83735 ASSAY OF MAGNESIUM: CPT | Performed by: EMERGENCY MEDICINE

## 2023-07-11 PROCEDURE — 85025 COMPLETE CBC W/AUTO DIFF WBC: CPT | Performed by: EMERGENCY MEDICINE

## 2023-07-11 PROCEDURE — 82310 ASSAY OF CALCIUM: CPT | Performed by: EMERGENCY MEDICINE

## 2023-07-11 PROCEDURE — 84443 ASSAY THYROID STIM HORMONE: CPT | Performed by: EMERGENCY MEDICINE

## 2023-07-11 ASSESSMENT — ACTIVITIES OF DAILY LIVING (ADL)
ADLS_ACUITY_SCORE: 35
ADLS_ACUITY_SCORE: 35

## 2023-07-11 NOTE — ED NOTES
PIT/Triage Evaluation    Patient presented with palpitations since the end of June.  Patient is a 63-year-old female with a history of low thyroid.  Patient presents with irregular skips and feeling irregularities and pulse since June.  Patient's had no clear chest pain.  Not triggered by activity or exertion.  Does feel briefly short of breath when it happens and then goes away.  Patient presents emergency room as was in her back but now in her front.    Exam is notable for:  Brief irregular pulse that comes and goes no murmurs.    Appropriate interventions for symptom management were initiated if applicable.  Appropriate diagnostic tests were initiated if indicated.    Important information for subsequent clinician:  Likely symptomatic PVCs or APCs as patient felt symptoms while I was examining her and did have an irregular pulse but not caught on EKG.  Would recommend a Holter monitor and/or initiation of a beta-blocker.  Labs ordered.    I briefly evaluated the patient and developed an initial plan of care. I discussed this plan and explained that this brief interaction does not constitute a full evaluation. Patient/family understands that they should wait to be fully evaluated and discuss any test results with another clinician prior to leaving the hospital.       Enzo Pelletier MD  07/11/23 [id number]

## 2023-07-11 NOTE — ED TRIAGE NOTES
Pt presents to the ED with complaint of irregular heartbeat, palpitations, lightheadedness, and SOB. Pt states these symptoms started a week ago and she has them intermittently.      Triage Assessment     Row Name 07/11/23 [id number]       Triage Assessment (Adult)    Airway WDL WDL       Respiratory WDL    Respiratory WDL WDL       Skin Circulation/Temperature WDL    Skin Circulation/Temperature WDL WDL       Cardiac WDL    Cardiac WDL X;rhythm    Pulse Rate & Regularity other (see comments)  irregular       Peripheral/Neurovascular WDL    Peripheral Neurovascular WDL WDL       Cognitive/Neuro/Behavioral WDL    Cognitive/Neuro/Behavioral WDL WDL

## 2023-07-12 LAB
ATRIAL RATE - MUSE: 72 BPM
ATRIAL RATE - MUSE: 75 BPM
DIASTOLIC BLOOD PRESSURE - MUSE: NORMAL MMHG
DIASTOLIC BLOOD PRESSURE - MUSE: NORMAL MMHG
INTERPRETATION ECG - MUSE: NORMAL
INTERPRETATION ECG - MUSE: NORMAL
P AXIS - MUSE: 33 DEGREES
P AXIS - MUSE: 36 DEGREES
PR INTERVAL - MUSE: 144 MS
PR INTERVAL - MUSE: 152 MS
QRS DURATION - MUSE: 80 MS
QRS DURATION - MUSE: 84 MS
QT - MUSE: 410 MS
QT - MUSE: 420 MS
QTC - MUSE: 457 MS
QTC - MUSE: 459 MS
R AXIS - MUSE: 22 DEGREES
R AXIS - MUSE: 6 DEGREES
SYSTOLIC BLOOD PRESSURE - MUSE: NORMAL MMHG
SYSTOLIC BLOOD PRESSURE - MUSE: NORMAL MMHG
T AXIS - MUSE: -1 DEGREES
T AXIS - MUSE: -13 DEGREES
VENTRICULAR RATE- MUSE: 72 BPM
VENTRICULAR RATE- MUSE: 75 BPM

## 2023-07-12 NOTE — DISCHARGE INSTRUCTIONS
I have ordered a cardiac monitor to be worn for one week. You will be called to assist with coordinating scheduling of placement. I have also ordered cardiology referral for you to follow up with regarding the results of this monitor.    You may also follow up with your primary provider regarding your monitor results.    If you experience worsening symptoms or chest pain/worsening shortness of breath, please return to ER.    Discharge Instructions  Palpitations    Palpitations are an unusual awareness of your heartbeat. People often describe this as the heart skipping, fluttering, racing, irregular, or pounding. At this time, your provider has found no signs that your palpitations are due to a serious or life-threatening condition. However, sometimes there is a serious problem that does not show up right away.    Palpitations can be caused by caffeine, cigarettes, diet pills, energy drinks or supplements, other stimulants, and medications and street drugs. They can also be caused by anxiety, hormone conditions such as high thyroid, and other medical conditions. Sometimes they are a sign of abnormal rhythm in the heart. At this time, your provider did not find any dangerous cause of your symptoms.    Generally, every Emergency Department visit should have a follow-up clinic visit with either a primary or a specialty clinic/provider. Please follow-up as instructed by your emergency provider today.    Return to the Emergency Department if:  You get chest pain or tightness.  You are short of breath.  You get very weak or tired.  You pass out or faint.  Your heart rate is over 120 beats per minute for more than 10 minutes while you are resting.   You have anything else that worries you.    What can I do to help myself?  Fill any prescriptions the provider gave you and take them right away.   Follow your provider s instructions about the prescription medicines you are on. Sometimes the provider may tell you to stop taking  a medicine or change the dose.  If you smoke, this may be a good time to quit! The less you can smoke, the better.  Do not use energy drinks, diet pills, or stimulants. Limit your use of caffeine.  If you were given a prescription for medicine here today, be sure to read all of the information (including the package insert) that comes with your prescription.  This will include important information about the medicine, its side effects, and any warnings that you need to know about.  The pharmacist who fills the prescription can provide more information and answer questions you may have about the medicine.  If you have questions or concerns that the pharmacist cannot address, please call or return to the Emergency Department.     Remember that you can always come back to the Emergency Department if you are not able to see your regular provider in the amount of time listed above, if you get any new symptoms, or if there is anything that worries you.

## 2023-07-12 NOTE — ED PROVIDER NOTES
"  History     Chief Complaint:  Irregular Heart Beat       HPI   Amina Pineda is a 63 year old female who presents with palpitations. Patient states that for the last week, she has been experiencing intermittent \"fluttering\" almost daily that lasts for a couple of hours. When these occur, she generally feels numerous flutters immediately, and then every so often for about 2 hours. Denies history of arrhythmias or cardiac disease. Reports starting Nexium one month ago, however no other medication changes. No significant caffeine use or stimulants. Symptoms do not wake patient from sleep. She denies any chest pain but endorses somewhat of shortness of breath at times, describes as \"gasping\" when she feels numerous flutters, but no lasting shortness of breath. Does report that several days ago she experienced some right neck pain during these flutters and felt right arm weakness. States this was short lived and has since resolved entirely. Denies anxiety or increased stressors. No recent travel or surgeries.       Independent Historian:   None - Patient Only    Review of External Notes:   None       Medications:    albuterol (PROAIR HFA/PROVENTIL HFA/VENTOLIN HFA) 108 (90 Base) MCG/ACT inhaler  biotin 1000 MCG TABS tablet  buPROPion (WELLBUTRIN XL) 150 MG 24 hr tablet  cyclobenzaprine (FLEXERIL) 10 MG tablet  famciclovir (FAMVIR) 500 MG tablet  ferrous sulfate (FEROSUL) 325 (65 Fe) MG tablet  levothyroxine (SYNTHROID/LEVOTHROID) 125 MCG tablet  meclizine (ANTIVERT) 25 MG tablet  metFORMIN (GLUCOPHAGE XR) 500 MG 24 hr tablet  nystatin (MYCOSTATIN) 543310 UNIT/GM external powder  omeprazole (PRILOSEC) 40 MG DR capsule  oxybutynin ER (DITROPAN XL) 10 MG 24 hr tablet  triamcinolone (KENALOG) 0.1 % external cream  Vitamin D3 (VITAMIN D, CHOLECALCIFEROL,) 25 mcg (1000 units) tablet  Vitamins-Lipotropics (LIPOFLAVONOID) TABS        Past Medical History:    Past Medical History:   Diagnosis Date     Decreased libido " "11/06/2006     Depressive disorder, not elsewhere classified      Esophageal reflux      Generalized osteoarthrosis, unspecified site      Herpes simplex without mention of complication      Intramural leiomyoma of uterus      Menopausal syndrome (hot flushes) 2/10/2017     ANDREW (obstructive sleep apnea)      Pelvic mass 3/5/2021     Right ovarian cyst 5/13/2020     Unspecified hypothyroidism        Past Surgical History:    Past Surgical History:   Procedure Laterality Date     CHOLECYSTECTOMY       COLONOSCOPY  4/24/2015     COLONOSCOPY N/A 7/14/2022    Procedure: COLONOSCOPY;  Surgeon: rBook Calle MD;  Location: [facility] OR     COLONOSCOPY WITH CO2 INSUFFLATION N/A 4/23/2015    Procedure: COLONOSCOPY WITH CO2 INSUFFLATION;  Surgeon: Bebeto Mortensen MD;  Location: [facility]     ELBOW SURGERY      Lt elbow repair.     ESOPHAGOSCOPY, GASTROSCOPY, DUODENOSCOPY (EGD), COMBINED N/A 5/31/2023    Procedure: Esophagoscopy, gastroscopy, duodenoscopy (EGD), combined;  Surgeon: Gurpreet Mata MD;  Location: [facility]     HYSTERECTOMY, PAP NO LONGER INDICATED       LAPAROSCOPIC CHOLECYSTECTOMY N/A 4/24/2020    Procedure: CHOLECYSTECTOMY, LAPAROSCOPIC;  Surgeon: Janett Chan MD;  Location: [facility] OR     ORTHOPEDIC SURGERY       SOFT TISSUE SURGERY      cyst, both shoulders and left elbow     ZZC LIGATE FALLOPIAN TUBE       ZZC NONSPECIFIC PROCEDURE  5/02    rotator cuff surgery both shoulder     ZZC NONSPECIFIC PROCEDURE      wrist surgery for cyst x 2     ZZC VAGINAL HYSTERECTOMY  12/03    with BSO, 10-12 week size        Physical Exam     Patient Vitals for the past 24 hrs:   BP Temp Temp src Pulse Resp SpO2 Height Weight   07/11/23 2057 -- -- -- -- -- -- 1.727 m (5' 8\") 108.9 kg (240 lb)   07/11/23 2056 139/89 -- Temporal 76 18 99 % -- --   07/11/23 1828 -- 97.2  F (36.2  C) Temporal -- -- -- -- --   07/11/23 1821 (!) 180/75 -- -- (!) 48 20 99 % -- --        Physical Exam  General: Alert and cooperative with exam. " Patient in no apparent distress. Normal mentation.  Head:  Scalp is NC/AT  Eyes:  No scleral icterus, PERRL  ENT:  The external nose and ears are normal.   Neck:  Normal range of motion without rigidity.  CV:  Regular rate and rhythm    No pathologic murmur   Resp:  Breath sounds are clear bilaterally    Non-labored, no retractions or accessory muscle use  GI:  Abdomen is soft, no distension, no tenderness. No peritoneal signs  MS:  No lower extremity edema   Skin:  Warm and dry, No rash or lesions noted.  Neuro:  Oriented x 3. No gross motor deficits.      Emergency Department Course     ECG results from 07/11/23   EKG 12 lead     Value    Systolic Blood Pressure     Diastolic Blood Pressure     Ventricular Rate 75    Atrial Rate 75    MS Interval 144    QRS Duration 84        QTc 457    P Axis 36    R AXIS 22    T Axis -13    Interpretation ECG      Sinus rhythm with sinus arrhythmia  Normal ECG  When compared with ECG of 14-APR-2022 12:18,  No significant change was found     EKG 12 lead     Value    Systolic Blood Pressure     Diastolic Blood Pressure     Ventricular Rate 72    Atrial Rate 72    MS Interval 152    QRS Duration 80        QTc 459    P Axis 33    R AXIS 6    T Axis -1    Interpretation ECG      Sinus rhythm  Normal ECG  When compared with ECG of 11-JUL-2023 18:24, (unconfirmed)  No significant change was found       Laboratory:  Labs Ordered and Resulted from Time of ED Arrival to Time of ED Departure   BASIC METABOLIC PANEL - Abnormal       Result Value    Sodium 141      Potassium 4.8      Chloride 103      Carbon Dioxide (CO2) 27      Anion Gap 11      Urea Nitrogen 9.0      Creatinine 0.80      Calcium 10.0      Glucose 108 (*)     GFR Estimate 81     TROPONIN T, HIGH SENSITIVITY - Normal    Troponin T, High Sensitivity 7     MAGNESIUM - Normal    Magnesium 1.9     TSH WITH FREE T4 REFLEX - Normal    TSH 0.41     TROPONIN T, HIGH SENSITIVITY - Normal    Troponin T, High Sensitivity  7     CBC WITH PLATELETS AND DIFFERENTIAL    WBC Count 8.4      RBC Count 4.92      Hemoglobin 14.8      Hematocrit 45.2      MCV 92      MCH 30.1      MCHC 32.7      RDW 13.0      Platelet Count 256      % Neutrophils 71      % Lymphocytes 18      % Monocytes 6      % Eosinophils 4      % Basophils 1      % Immature Granulocytes 0      NRBCs per 100 WBC 0      Absolute Neutrophils 5.9      Absolute Lymphocytes 1.5      Absolute Monocytes 0.5      Absolute Eosinophils 0.3      Absolute Basophils 0.1      Absolute Immature Granulocytes 0.0      Absolute NRBCs 0.0          Procedures   None    Emergency Department Course & Assessments:    Interventions:  Medications - No data to display     Independent Interpretation (X-rays, CTs, rhythm strip):  None    Consultations/Discussion of Management or Tests:  None        Social Determinants of Health affecting care:   None    Disposition:  The patient was discharged to home.     Impression & Plan      Medical Decision Making:  Amina Pineda is a 63 year old female who presents with palpitations. On initial and repeat EKG here, in normal sinus rhythm however noted PVCs on cardiac monitor and upon exam noted to have irregular rhythm intermittently. Labs today are reassuring, no signs of anemia or electrolyte derangements. Her TSH is normal. Low suspicion for ACS given no chest pain, however due to slightly elevated initial trop and possible concerning symptoms of neck pain and arm involvement several days ago, delta trop completed and thankfully normal today. As irregular rhythm was difficult to capture here, patient would benefit from cardiac monitor. She is uncertain if she experiences these symptoms daily so week-long monitor was determined to be most likely to capture irregularities. Ordered cardiology EP referral for follow up of cardiac monitor results. Also recommend she follow up with her PCP for reassessment. Discussed reasons to return to ER.     Diagnosis:     ICD-10-CM    1. Palpitations  R00.2 Follow-Up with Cardiology EP     Cardiac Event Monitor Adult Pediatric           Discharge Medications:  New Prescriptions    No medications on file        7/11/2023   HONEY Harrell Lauren R, PA-C  07/11/23 1817

## 2023-07-12 NOTE — ED PROVIDER NOTES
"ED ATTENDING PHYSICIAN NOTE:   I evaluated this patient in conjunction with Heydi Tamayo PA-C  I have participated in the care of the patient and personally performed key elements of the history, exam, and medical decision making.      HPI:   Amina Pineda is a 63 year old female who presents emergency department concern for palpitations over the last week without similar history in the past.  She denies any new medications, significant caffeine use, stimulant use, weight loss medications.  No associated chest pain but has occasional shortness of breath when palpitations seem numerous.  She denies any new stressors.  No leg swelling or pain.  No recent travel or surgery.  No history of DVT or pulmonary embolism.  She is on thyroid medications.    Independent Historian:   None - Patient Only    Review of External Notes: Outpatient clinic visits reviewed.     EXAM:   /79   Pulse 71   Temp 97.2  F (36.2  C) (Temporal)   Resp 13   Ht 1.727 m (5' 8\")   Wt 108.9 kg (240 lb)   LMP  (LMP Unknown)   SpO2 91%   BMI 36.49 kg/m    General: Adult female sitting upright  Eyes: PERRL, Conjunctive within normal limits  ENT: Moist mucous membranes, oropharynx clear.   Neck: No appreciable thyromegaly.  CV: Normal S1S2, no murmur, rub or gallop. Regular rate and rhythm  Resp: Clear to auscultation bilaterally, no wheezes, rales or rhonchi. Normal respiratory effort.  MSK: No edema. Normal active range of motion.  Skin: Warm and dry.   Neuro: Alert and oriented. Responds appropriately to all questions and commands.   Psych: Normal mood and affect. Pleasant.    Independent Interpretation (X-rays, CTs, rhythm strip):  I reviewed the patient's ECG.  Sinus rhythm with sinus arrhythmia noted.    Consultations/Discussion of Management or Tests:  None     Social Determinants of Health affecting care:   None     MEDICAL DECISION MAKING/ASSESSMENT AND PLAN:   Amina Pineda is a 63 year old female who presents for " evaluation of palpitations.  Initial ECG shows sinus rhythm with sinus arrhythmia.  A broad differential diagnosis was considered including SVT, Atrial fibrillation, ventricular arrhythmia, thyroid disease, acute electrolyte abnormality,  drugs/medications, caffeine intake or other stimulants, medication side effect, anemia, heart disease, PE, etc.  The workup and exam here in ED is reassuring and would indicate that supportive outpatient management is indicated.  I doubt PE as patient no chest pain or significant shortness of breath, hypoxia or tachycardia.  Doubt acute coronary syndrome given symptoms and exam.  There is thyroid disease but her TSH is reassuring here.  I have not ordered for the patient.  Follow-up with cardiology recommended should symptoms persist but could start with her primary care provider within the next 3 days.  All questions were answered prior to discharge.  Return precautions were discussed.     DIAGNOSIS:     ICD-10-CM    1. Palpitations  R00.2 Follow-Up with Cardiology EP     Cardiac Event Monitor Adult Pediatric               DISPOSITION:    Discharged to home in stable condition     Kaylee Contreras MD  7/11/2023  [facility] EMERGENCY DEPT     Kaylee Contreras MD  07/13/23 5008

## 2023-07-19 ENCOUNTER — HOSPITAL ENCOUNTER (OUTPATIENT)
Dept: CARDIOLOGY | Facility: CLINIC | Age: 63
Discharge: HOME | End: 2023-07-19
Attending: STUDENT IN AN ORGANIZED HEALTH CARE EDUCATION/TRAINING PROGRAM | Admitting: STUDENT IN AN ORGANIZED HEALTH CARE EDUCATION/TRAINING PROGRAM
Payer: COMMERCIAL

## 2023-07-19 DIAGNOSIS — R00.2 PALPITATIONS: ICD-10-CM

## 2023-07-19 PROCEDURE — 93272 ECG/REVIEW INTERPRET ONLY: CPT | Performed by: INTERNAL MEDICINE

## 2023-07-19 PROCEDURE — 93270 REMOTE 30 DAY ECG REV/REPORT: CPT

## 2023-07-26 ENCOUNTER — TELEPHONE (OUTPATIENT)
Dept: CARDIOLOGY | Facility: CLINIC | Age: 63
End: 2023-07-26
Payer: COMMERCIAL

## 2023-07-26 NOTE — TELEPHONE ENCOUNTER
Hello,  This patient has not yet established care with cardiology, initial consult is scheduled for 8/30. Please review and follow up with patient if EP visit is needed.  Thank you  Ann CHANDLER

## 2023-07-26 NOTE — TELEPHONE ENCOUNTER
Health Call Center    Phone Message    May a detailed message be left on voicemail: yes     Reason for Call: Other:   Patient states someone was checking in to see if she needs to see EP. Patient is just looking for an update on this information. Please call the patient back to discuss.     Action Taken: Other: cardiology    Travel Screening: Not Applicable  Thank you!  [facility]

## 2023-07-28 NOTE — TELEPHONE ENCOUNTER
Reached out to pt to help get pt scheduled. Pt is wondering what this appointment would be addressing and if it is needed because she was trying to figure out if she was to waiting to hear if she should wait for the cardiologist appt on 8/30 or see the Electrophysiologist which wouldn't be till November.     Pt call back: [phone] Detailed VM OK    Cordelia Carreon

## 2023-07-28 NOTE — TELEPHONE ENCOUNTER
If she has a medical question, then she needs an appt before 8/30, recommend Anamika Hood.   If she does not want to come in, then I recommend keeping the cardiology appt on 8/30 and discuss with them the needed cardiology work up, which is their field of expertise Thank you

## 2023-07-28 NOTE — TELEPHONE ENCOUNTER
Called the pt to advise of below.      The pt wanted to know if Dr. Baltazar got a report for the cardiac event monitor.  Looks like it is still in process.      Offered to schedule an appt.  She is going to see what happens in the next week.      Advised to keep Cardiology appt.

## 2023-07-31 ENCOUNTER — VIRTUAL VISIT (OUTPATIENT)
Dept: UROLOGY | Facility: CLINIC | Age: 63
End: 2023-07-31
Payer: COMMERCIAL

## 2023-07-31 VITALS — WEIGHT: 239 LBS | BODY MASS INDEX: 35.4 KG/M2 | HEIGHT: 69 IN

## 2023-07-31 DIAGNOSIS — N39.3 FEMALE STRESS INCONTINENCE: ICD-10-CM

## 2023-07-31 DIAGNOSIS — R39.14 FEELING OF INCOMPLETE BLADDER EMPTYING: ICD-10-CM

## 2023-07-31 DIAGNOSIS — R39.15 URINARY URGENCY: Primary | ICD-10-CM

## 2023-07-31 PROCEDURE — 99203 OFFICE O/P NEW LOW 30 MIN: CPT | Mod: VID | Performed by: PHYSICIAN ASSISTANT

## 2023-07-31 RX ORDER — TOLTERODINE 2 MG/1
2 CAPSULE, EXTENDED RELEASE ORAL DAILY
Qty: 90 CAPSULE | Refills: 3 | Status: SHIPPED | OUTPATIENT
Start: 2023-07-31 | End: 2024-07-30

## 2023-07-31 ASSESSMENT — PAIN SCALES - GENERAL: PAINLEVEL: NO PAIN (0)

## 2023-07-31 ASSESSMENT — ENCOUNTER SYMPTOMS
CHILLS: 0
NAUSEA: 0
FEVER: 0
CONSTIPATION: 0
PALPITATIONS: 1
VOMITING: 0
DYSURIA: 0
HEMATURIA: 0
SHORTNESS OF BREATH: 0
FREQUENCY: 1

## 2023-07-31 NOTE — LETTER
2023       RE: Amina Pineda  [id number] [facility] [id number]     Dear Colleague,    Thank you for referring your patient, Amina Pineda, to the [facility]OLOGY [facility] [place] [facility]. Please see a copy of my visit note below.    Pt will meet you in [facility]    Briseida is a 63 year old who is being evaluated via a billable video visit.      How would you like to obtain your AVS? [facility]  If the video visit is dropped, the invitation should be resent by: Text to cell phone: [phone]  Will anyone else be joining your video visit? No      Video-Visit Details    Type of service:  Video Visit   Video Start Time:  140  Video End Time: 1420    Originating Location (pt. Location): Home    Distant Location (provider location):  On-site  Platform used for Video Visit: Ilda Lay     REQUESTING PROVIDER   Janett Cheng     REASON FOR CONSULT   Urinary incontinence    HISTORY OF PRESENT ILLNESS   Ms. Pineda is very pleasant 63 year old year old, , female, who presents today for further evaluation recommendations regarding urinary incontinence.  Patient notes that her urinary incontinence has been going on for approximately 4 to 5 years and is getting worse.  She was trialed on Detrol 2 mg daily.  She did feel that this helped her urinary incontinence, but when she became eligible for Medicare, this was too expensive and she was switched to oxybutynin 10 mg extended release.  She does not feel like this is helping her symptoms as well as the Detrol did.  Patient did endorse having more dry mouth with the Detrol.  Prior to starting medication, she was using pads.  She is no longer utilizing pads daily.    Patient denies hematuria and dysuria.  She does not always feel like she empties her bladder completely and finds himself double voiding.  She does endorse some urgency and frequency of urination.  Patient endorses mild  urge incontinence, but quite a bit of stress incontinence.  She did feel like her stress incontinence actually improved on medication.  Has nocturia 1-2 times, but does not restrict prior to bedtime.    Back in 2015, patient went to pelvic floor physical therapy, and it did not seem to help her symptoms particularly well.  She denies any constipation.  Patient has undergone a hysterectomy.    Fluid intake includes water, decaf sweet tea, 1 decaf coffee, vitamin water, and occasional soda.    She had a UTI last year.    The following portions of the patient's history were reviewed and updated as appropriate: allergies, current medications, past family history, past medical history, past social history, past surgical history, and problem list.     REVIEW OF SYSTEMS   Review of Systems   Constitutional:  Negative for chills and fever.   Respiratory:  Negative for shortness of breath.    Cardiovascular:  Positive for palpitations. Negative for chest pain.   Gastrointestinal:  Negative for constipation, nausea and vomiting.   Genitourinary:  Positive for frequency and urgency. Negative for dysuria and hematuria.      Per HPI.     Patient Active Problem List   Diagnosis    Esophageal reflux    Herpes simplex virus (HSV) infection    Generalized osteoarthrosis, unspecified site    Dysthymic disorder    CARDIOVASCULAR SCREENING; LDL GOAL LESS THAN 160    Female stress incontinence    Restless leg syndrome    Ocular hypertension, right    Daytime somnolence    Chronic low back pain, unspecified back pain laterality, with sciatica presence unspecified    ANDREW (obstructive sleep apnea)    Decreased libido    Hyperlipidemia LDL goal <100    Morbid obesity (H)    Acquired hypothyroidism    Major depressive disorder, recurrent episode, mild (H)    History of difficult colonoscopy    Prediabetes    Osteopenia of lumbar spine      Past Medical History:   Diagnosis Date    Decreased libido 11/06/2006    Depressive disorder, not  elsewhere classified     Esophageal reflux     Generalized osteoarthrosis, unspecified site     R hip, on meds    Herpes simplex without mention of complication     oral    Intramural leiomyoma of uterus     Menopausal syndrome (hot flushes) 02/10/2017    Mumps     ANDREW (obstructive sleep apnea)     Palpitations     Pelvic mass 03/05/2021    Ovarian remnant cyst? Right side, needs follow up from 6/20    Right ovarian cyst 05/13/2020    Spider veins     Unspecified hypothyroidism       Past Surgical History:   Procedure Laterality Date    CHOLECYSTECTOMY      COLONOSCOPY  04/24/2015    COLONOSCOPY N/A 07/14/2022    Procedure: COLONOSCOPY;  Surgeon: Brook Calle MD;  Location: [facility] OR    COLONOSCOPY WITH CO2 INSUFFLATION N/A 04/23/2015    Procedure: COLONOSCOPY WITH CO2 INSUFFLATION;  Surgeon: Bebeto Mortensen MD;  Location: [facility] OR    ELBOW SURGERY      Lt elbow repair.    ESOPHAGOSCOPY, GASTROSCOPY, DUODENOSCOPY (EGD), COMBINED N/A 05/31/2023    Procedure: Esophagoscopy, gastroscopy, duodenoscopy (EGD), combined;  Surgeon: Gurpreet Mata MD;  Location: [facility] GI    HYSTERECTOMY, PAP NO LONGER INDICATED      LAPAROSCOPIC CHOLECYSTECTOMY N/A 04/24/2020    Procedure: CHOLECYSTECTOMY, LAPAROSCOPIC;  Surgeon: Janett Chan MD;  Location: [facility] OR    ORTHOPEDIC SURGERY      SOFT TISSUE SURGERY      cyst, both shoulders and left elbow    ZZC LIGATE FALLOPIAN TUBE      ZZC NONSPECIFIC PROCEDURE  05/2002    rotator cuff surgery both shoulder    ZZC NONSPECIFIC PROCEDURE      wrist surgery for cyst x 2    ZZC VAGINAL HYSTERECTOMY  12/2003    with BSO, 10-12 week size      Social History:   Never smoker.    Objective     PHYSICAL EXAM   GENERAL: Healthy, alert and no distress  EYES: Eyes grossly normal to inspection.  No discharge or erythema, or obvious scleral/conjunctival abnormalities.  HENT: Normal cephalic/atraumatic.  External ears, nose and mouth without ulcers or lesions.  No nasal drainage visible.  NECK:  No asymmetry, visible masses or scars  RESP: No audible wheeze, cough, or visible cyanosis.  No visible retractions or increased work of breathing.    MS: No gross musculoskeletal defects noted.  Normal range of motion.  No visible edema.  SKIN: Visible skin clear. No significant rash, abnormal pigmentation or lesions.  NEURO: Cranial nerves grossly intact.  Mentation and speech appropriate for age.  PSYCH: Mentation appears normal, affect normal/bright, judgement and insight intact, normal speech and appearance well-groomed.     LABORATORY   Lab Results   Component Value Date    CR 0.80 07/11/2023      Assessment & Plan   1. Urinary urgency    2. Female stress incontinence    3. Feeling of incomplete bladder emptying      I had the pleasure today of meeting with Ms. Pineda to discuss her urinary incontinence.  Patient has some urinary urgency, rare urge incontinence, feeling of incomplete emptying, and a more prominent stress incontinence.  She has trialed an overactive bladder medication in the form of Detrol, which significantly improved her symptoms.  She has been switched to oxybutynin, but she does not feel like this helps her symptoms as well.    Typical treatments for urgency and urge incontinence include avoiding bladder irritants, biofeedback bladder retraining, overactive bladder medications like anticholinergics or beta 3 agonist, posterior tibial nerve stimulation (PTNS), Botox (which would require learning to perform clean intermittent catheterization and place it works too well), implantable device like Axonics or InterStim, or possible urinary diversion.  We typically go through this in a stepwise fashion.     We also discussed stress incontinence treatment options including Kegel exercises/biofeedback bladder retraining, pessary, Poise Impressa, Revive, Estim/Urostym, sling, and bulking agents.     We discussed ways to get the Detrol cheaper such as using GoodRx.  It does look like she would be  able to get this at a relatively reasonable price utilizing this.    We will plan on the following:    -Restart Detrol 2 mg extended release once daily.    -We will plan on having you stop oxybutynin 10 mg extended release.    -Nurse visit for urinalysis and postvoid residual to ensure no UTI and adequately emptying.    -Would consider strongly revisiting pelvic floor physical therapy in the future for evaluation with Dr. Lundberg or Yany.    Signed by:     Christie Velázquez PA-C 7/31/2023 2:02 PM

## 2023-07-31 NOTE — PATIENT INSTRUCTIONS
-Restart Detrol 2 mg extended release once daily.  Use GoodRx to make cheaper.    You have been prescribed medication to help relax your bladder.    This medication may help control (or improve) urinary frequency, urgency and urge incontinence.    Common side effects include:  Dry mouth (Biotene mouthwash can help with this.)  Constipation  Drowsiness/Mental Fogginess  Blurred vision/Dry Eyes  Difficulty with sweating    Rare side effect:  Urinary retention     -We will plan on having you stop oxybutynin 10 mg extended release.    -Nurse visit for urinalysis and postvoid residual to ensure no UTI and adequately emptying.    Contact us in the interim with questions, concerns, or changes in symptomatology.  [phone]

## 2023-07-31 NOTE — PROGRESS NOTES
Pt will meet you in sally    Briseida is a 63 year old who is being evaluated via a billable video visit.      How would you like to obtain your AVS? Sally  If the video visit is dropped, the invitation should be resent by: Text to cell phone: [phone]  Will anyone else be joining your video visit? No      Video-Visit Details    Type of service:  Video Visit   Video Start Time:  1402  Video End Time: 1420    Originating Location (pt. Location): Home    Distant Location (provider location):  On-site  Platform used for Video Visit: Ilda Lay      REQUESTING PROVIDER   Janett Cheng     REASON FOR CONSULT   Urinary incontinence    HISTORY OF PRESENT ILLNESS   Ms. Pineda is very pleasant 63 year old year old, , female, who presents today for further evaluation recommendations regarding urinary incontinence.  Patient notes that her urinary incontinence has been going on for approximately 4 to 5 years and is getting worse.  She was trialed on Detrol 2 mg daily.  She did feel that this helped her urinary incontinence, but when she became eligible for Medicare, this was too expensive and she was switched to oxybutynin 10 mg extended release.  She does not feel like this is helping her symptoms as well as the Detrol did.  Patient did endorse having more dry mouth with the Detrol.  Prior to starting medication, she was using pads.  She is no longer utilizing pads daily.    Patient denies hematuria and dysuria.  She does not always feel like she empties her bladder completely and finds himself double voiding.  She does endorse some urgency and frequency of urination.  Patient endorses mild urge incontinence, but quite a bit of stress incontinence.  She did feel like her stress incontinence actually improved on medication.  Has nocturia 1-2 times, but does not restrict prior to bedtime.    Back in , patient went to pelvic floor physical therapy, and it did not seem to help her symptoms particularly  well.  She denies any constipation.  Patient has undergone a hysterectomy.    Fluid intake includes water, decaf sweet tea, 1 decaf coffee, vitamin water, and occasional soda.    She had a UTI last year.    The following portions of the patient's history were reviewed and updated as appropriate: allergies, current medications, past family history, past medical history, past social history, past surgical history, and problem list.     REVIEW OF SYSTEMS   Review of Systems   Constitutional:  Negative for chills and fever.   Respiratory:  Negative for shortness of breath.    Cardiovascular:  Positive for palpitations. Negative for chest pain.   Gastrointestinal:  Negative for constipation, nausea and vomiting.   Genitourinary:  Positive for frequency and urgency. Negative for dysuria and hematuria.      Per HPI.     Patient Active Problem List   Diagnosis    Esophageal reflux    Herpes simplex virus (HSV) infection    Generalized osteoarthrosis, unspecified site    Dysthymic disorder    CARDIOVASCULAR SCREENING; LDL GOAL LESS THAN 160    Female stress incontinence    Restless leg syndrome    Ocular hypertension, right    Daytime somnolence    Chronic low back pain, unspecified back pain laterality, with sciatica presence unspecified    ANDREW (obstructive sleep apnea)    Decreased libido    Hyperlipidemia LDL goal <100    Morbid obesity (H)    Acquired hypothyroidism    Major depressive disorder, recurrent episode, mild (H)    History of difficult colonoscopy    Prediabetes    Osteopenia of lumbar spine      Past Medical History:   Diagnosis Date    Decreased libido 11/06/2006    Depressive disorder, not elsewhere classified     Esophageal reflux     Generalized osteoarthrosis, unspecified site     R hip, on meds    Herpes simplex without mention of complication     oral    Intramural leiomyoma of uterus     Menopausal syndrome (hot flushes) 02/10/2017    Mumps     ANDREW (obstructive sleep apnea)     Palpitations     Pelvic  mass 03/05/2021    Ovarian remnant cyst? Right side, needs follow up from 6/20    Right ovarian cyst 05/13/2020    Spider veins     Unspecified hypothyroidism       Past Surgical History:   Procedure Laterality Date    CHOLECYSTECTOMY      COLONOSCOPY  04/24/2015    COLONOSCOPY N/A 07/14/2022    Procedure: COLONOSCOPY;  Surgeon: Brook Calle MD;  Location: [facility] OR    COLONOSCOPY WITH CO2 INSUFFLATION N/A 04/23/2015    Procedure: COLONOSCOPY WITH CO2 INSUFFLATION;  Surgeon: Bebeto Mortensen MD;  Location: [facility] OR    ELBOW SURGERY      Lt elbow repair.    ESOPHAGOSCOPY, GASTROSCOPY, DUODENOSCOPY (EGD), COMBINED N/A 05/31/2023    Procedure: Esophagoscopy, gastroscopy, duodenoscopy (EGD), combined;  Surgeon: Gurpreet Mata MD;  Location: [facility] GI    HYSTERECTOMY, PAP NO LONGER INDICATED      LAPAROSCOPIC CHOLECYSTECTOMY N/A 04/24/2020    Procedure: CHOLECYSTECTOMY, LAPAROSCOPIC;  Surgeon: Janett Chan MD;  Location: [facility] OR    ORTHOPEDIC SURGERY      SOFT TISSUE SURGERY      cyst, both shoulders and left elbow    ZZC LIGATE FALLOPIAN TUBE      ZZC NONSPECIFIC PROCEDURE  05/2002    rotator cuff surgery both shoulder    ZZC NONSPECIFIC PROCEDURE      wrist surgery for cyst x 2    ZZC VAGINAL HYSTERECTOMY  12/2003    with BSO, 10-12 week size      Social History:   Never smoker.    Objective      PHYSICAL EXAM   GENERAL: Healthy, alert and no distress  EYES: Eyes grossly normal to inspection.  No discharge or erythema, or obvious scleral/conjunctival abnormalities.  HENT: Normal cephalic/atraumatic.  External ears, nose and mouth without ulcers or lesions.  No nasal drainage visible.  NECK: No asymmetry, visible masses or scars  RESP: No audible wheeze, cough, or visible cyanosis.  No visible retractions or increased work of breathing.    MS: No gross musculoskeletal defects noted.  Normal range of motion.  No visible edema.  SKIN: Visible skin clear. No significant rash, abnormal pigmentation or  lesions.  NEURO: Cranial nerves grossly intact.  Mentation and speech appropriate for age.  PSYCH: Mentation appears normal, affect normal/bright, judgement and insight intact, normal speech and appearance well-groomed.     LABORATORY   Lab Results   Component Value Date    CR 0.80 07/11/2023      Assessment & Plan    1. Urinary urgency    2. Female stress incontinence    3. Feeling of incomplete bladder emptying      I had the pleasure today of meeting with Ms. Pineda to discuss her urinary incontinence.  Patient has some urinary urgency, rare urge incontinence, feeling of incomplete emptying, and a more prominent stress incontinence.  She has trialed an overactive bladder medication in the form of Detrol, which significantly improved her symptoms.  She has been switched to oxybutynin, but she does not feel like this helps her symptoms as well.    Typical treatments for urgency and urge incontinence include avoiding bladder irritants, biofeedback bladder retraining, overactive bladder medications like anticholinergics or beta 3 agonist, posterior tibial nerve stimulation (PTNS), Botox (which would require learning to perform clean intermittent catheterization and place it works too well), implantable device like Axonics or InterStim, or possible urinary diversion.  We typically go through this in a stepwise fashion.     We also discussed stress incontinence treatment options including Kegel exercises/biofeedback bladder retraining, pessary, Poise Impressa, Revive, Estim/Urostym, sling, and bulking agents.     We discussed ways to get the Detrol cheaper such as using GoodRx.  It does look like she would be able to get this at a relatively reasonable price utilizing this.    We will plan on the following:    -Restart Detrol 2 mg extended release once daily.    -We will plan on having you stop oxybutynin 10 mg extended release.    -Nurse visit for urinalysis and postvoid residual to ensure no UTI and adequately  emptying.    -Would consider strongly revisiting pelvic floor physical therapy in the future for evaluation with Dr. Lundberg or Yany.    Signed by:     Christie Velázquez PA-C 7/31/2023 2:02 PM

## 2023-08-08 ENCOUNTER — ALLIED HEALTH/NURSE VISIT (OUTPATIENT)
Dept: UROLOGY | Facility: CLINIC | Age: 63
End: 2023-08-08
Payer: COMMERCIAL

## 2023-08-08 DIAGNOSIS — N39.3 FEMALE STRESS INCONTINENCE: Primary | ICD-10-CM

## 2023-08-08 LAB
ALBUMIN UR-MCNC: NEGATIVE MG/DL
APPEARANCE UR: CLEAR
BILIRUB UR QL STRIP: NEGATIVE
COLOR UR AUTO: YELLOW
GLUCOSE UR STRIP-MCNC: NEGATIVE MG/DL
HGB UR QL STRIP: NEGATIVE
KETONES UR STRIP-MCNC: NEGATIVE MG/DL
LEUKOCYTE ESTERASE UR QL STRIP: NEGATIVE
NITRATE UR QL: NEGATIVE
PH UR STRIP: 5.5 [PH] (ref 5–7)
RESIDUAL VOLUME (RV) (EXTERNAL): 36
SP GR UR STRIP: <=1.005 (ref 1–1.03)
UROBILINOGEN UR STRIP-ACNC: 0.2 E.U./DL

## 2023-08-08 PROCEDURE — 51798 US URINE CAPACITY MEASURE: CPT

## 2023-08-08 PROCEDURE — 81003 URINALYSIS AUTO W/O SCOPE: CPT | Mod: QW

## 2023-08-08 NOTE — PROGRESS NOTES
Amina Pineda comes into clinic today at the request of Christie Vleázquez PA-C Ordering Provider for UA/ PVR.    Pt's post void residual was 36 mL today by bladder scan.      This service provided today was under the supervising provider of the emily Salazar CNP, who was available if needed.    Marylou Guerra, CMA

## 2023-08-30 ENCOUNTER — OFFICE VISIT (OUTPATIENT)
Dept: CARDIOLOGY | Facility: CLINIC | Age: 63
End: 2023-08-30
Attending: STUDENT IN AN ORGANIZED HEALTH CARE EDUCATION/TRAINING PROGRAM
Payer: COMMERCIAL

## 2023-08-30 VITALS
OXYGEN SATURATION: 97 % | SYSTOLIC BLOOD PRESSURE: 145 MMHG | HEIGHT: 69 IN | HEART RATE: 69 BPM | BODY MASS INDEX: 34.8 KG/M2 | WEIGHT: 235 LBS | DIASTOLIC BLOOD PRESSURE: 69 MMHG

## 2023-08-30 DIAGNOSIS — R00.2 PALPITATIONS: Primary | ICD-10-CM

## 2023-08-30 DIAGNOSIS — E66.09 CLASS 2 OBESITY DUE TO EXCESS CALORIES WITHOUT SERIOUS COMORBIDITY WITH BODY MASS INDEX (BMI) OF 35.0 TO 35.9 IN ADULT: ICD-10-CM

## 2023-08-30 DIAGNOSIS — R73.03 PREDIABETES: ICD-10-CM

## 2023-08-30 DIAGNOSIS — E03.9 ACQUIRED HYPOTHYROIDISM: ICD-10-CM

## 2023-08-30 DIAGNOSIS — E78.5 HYPERLIPIDEMIA LDL GOAL <100: ICD-10-CM

## 2023-08-30 DIAGNOSIS — E66.812 CLASS 2 OBESITY DUE TO EXCESS CALORIES WITHOUT SERIOUS COMORBIDITY WITH BODY MASS INDEX (BMI) OF 35.0 TO 35.9 IN ADULT: ICD-10-CM

## 2023-08-30 PROCEDURE — 99204 OFFICE O/P NEW MOD 45 MIN: CPT | Performed by: INTERNAL MEDICINE

## 2023-08-30 RX ORDER — ESOMEPRAZOLE MAGNESIUM 40 MG/1
40 CAPSULE, DELAYED RELEASE ORAL
COMMUNITY
End: 2024-03-14

## 2023-08-30 RX ORDER — CALCIUM CARBONATE/VITAMIN D3 500MG-5MCG
TABLET ORAL
COMMUNITY

## 2023-08-30 NOTE — PROGRESS NOTES
HPI and Plan:   Is a very nice 63-year-old woman who I am seeing because of a recent ER visit because of palpitations.  She also had an ER visit last spring for neck discomfort.    She has a history of acquired hypothyroidism, impaired fasting glucose.  Both mom and dad are alive and well and in their mid 80s.  Her mother does have elevated glucose she is a lifelong non-smoker has impaired fasting glucose does not have hypertension rarely drinks alcohol.  Does not have daytime somnolence and does not snore at nighttime.    She describes flip-flop skips and jumps in her heart that occurred during quiet times.  She does not notice them at all with exercise.  They have no other accompanying features.  Last spring she did have an episode of a knot in her chest and tightness in her chest.  She has no exertional chest arm neck jaw or shoulder discomfort.  No dyspnea on exertion orthopnea or PND no palpitations lightheadedness dizziness syncope or near syncope.    Review of the chart shows normal electrolytes, kidney function, CBC, thyroid function tests with a hemoglobin A1c of 5.9 and a fasting glucose of 108.  Most recent fasting lipid profile shows a total cholesterol 196 with an HDL of 54 and LDL of 125 and triglycerides of 87.  EKG is normal sinus rhythm with a normal EKG.  A 7-day event monitor showed frequent isolated PVCs.  And no higher grade ectopy.    Assessment and plan.  Briseida has no symptoms at this time to suggest ischemia heart failure or significant arrhythmia.    It appears she just has isolated PVCs.  At this time I do not think we need to do an echocardiogram but I may reconsider.    She appears to be very low risk for coronary artery disease but we did discuss the calcium score and we will set this up.  If I am surprised with a very high calcium score I may [place] around and do a stress echocardiogram.  If the calcium score is low I do not think she needs any further cardiac follow-up.    Discussed the  nature of isolated PVCs.  I told her in the setting of no underlying coronary disease and no structural heart disease they are benign and do not need specific treatment.  They will wax and wane depending on stimulation including alcohol, caffeine, stress, disordered sleep at nighttime.  They can be quieted down with regular exercise a diet high in fresh fruits and vegetables and weight loss.    He does not appear to have sleep apnea.    We talked about the importance of a low carbohydrate diet regular exercise and weight loss given her impaired fasting glucose.    Further evaluation treatment depend upon the above results.  If her calcium score is reassuring I do not think she needs long-term cardiac follow-up.  We will be glad to see her back at any time.  I told her we could put her on medications for PVCs but is not necessary.    Thank you for allow me to participate in this patient's care.  Sincerely,                               Giovanny Roach MD [facility]          Today's clinic visit entailed:  Review of external notes as documented elsewhere in note  Review of the result(s) of each unique test - hospital records, event monitor, lab work  Ordering of each unique test  Prescription drug management  46 minutes spent by me on the date of the encounter doing chart review, history and exam, documentation and further activities per the note  Provider  Link to MDM Help Grid     The level of medical decision making during this visit was of moderate complexity.      No orders of the defined types were placed in this encounter.      Orders Placed This Encounter   Medications    esomeprazole (NEXIUM) 40 MG DR capsule     Sig: Take 40 mg by mouth every morning (before breakfast) Take 30-60 minutes before eating.    Calcium Carb-Cholecalciferol (CALCIUM 500 + D) 500-5 MG-MCG TABS       There are no discontinued medications.      Encounter Diagnoses   Name Primary?    Palpitations Yes    Prediabetes     Class 2 obesity due  to excess calories without serious comorbidity with body mass index (BMI) of 35.0 to 35.9 in adult     Acquired hypothyroidism     Hyperlipidemia LDL goal <100        CURRENT MEDICATIONS:  Current Outpatient Medications   Medication Sig Dispense Refill    albuterol (PROAIR HFA/PROVENTIL HFA/VENTOLIN HFA) 108 (90 Base) MCG/ACT inhaler Inhale 2 puffs into the lungs every 6 hours as needed for shortness of breath / dyspnea or wheezing 18 g 11    biotin 1000 MCG TABS tablet Take 1,000 mcg by mouth daily      buPROPion (WELLBUTRIN XL) 150 MG 24 hr tablet Take 1 tablet (150 mg) by mouth every morning 90 tablet 3    Calcium Carb-Cholecalciferol (CALCIUM 500 + D) 500-5 MG-MCG TABS       cyclobenzaprine (FLEXERIL) 10 MG tablet Take 1 tablet (10 mg) by mouth 2 times daily as needed for muscle spasms 60 tablet 4    esomeprazole (NEXIUM) 40 MG DR capsule Take 40 mg by mouth every morning (before breakfast) Take 30-60 minutes before eating.      famciclovir (FAMVIR) 500 MG tablet Take 1 tablet (500 mg) by mouth 2 times daily as needed 60 tablet 11    ferrous sulfate (FEROSUL) 325 (65 Fe) MG tablet Take 325 mg by mouth daily (with breakfast)      levothyroxine (SYNTHROID/LEVOTHROID) 125 MCG tablet Take 1 tablet (125 mcg) by mouth daily 90 tablet 3    meclizine (ANTIVERT) 25 MG tablet Take 1 tablet (25 mg) by mouth 3 times daily as needed for dizziness 30 tablet 0    metFORMIN (GLUCOPHAGE XR) 500 MG 24 hr tablet Take 1 tablet (500 mg) by mouth daily (with dinner) 90 tablet 1    nystatin (MYCOSTATIN) 487341 UNIT/GM external powder Apply 30 g topically 3 times daily as needed 30 g 1    tolterodine ER (DETROL LA) 2 MG 24 hr capsule Take 1 capsule (2 mg) by mouth daily 90 capsule 3    triamcinolone (KENALOG) 0.1 % external cream Apply topically 2 times daily Do not use longer than 2 weeks 30 g 0    Vitamins-Lipotropics (LIPOFLAVONOID) TABS Take 1 tablet by mouth 2 times daily      omeprazole (PRILOSEC) 40 MG DR capsule Take 1 capsule  (40 mg) by mouth daily (Patient not taking: Reported on 8/30/2023) 90 capsule 3    oxybutynin ER (DITROPAN XL) 10 MG 24 hr tablet Take 1 tablet (10 mg) by mouth daily (Patient not taking: Reported on 8/30/2023) 90 tablet 3    Vitamin D3 (VITAMIN D, CHOLECALCIFEROL,) 25 mcg (1000 units) tablet Take 1 tablet by mouth daily (Patient not taking: Reported on 8/30/2023)         ALLERGIES     Allergies   Allergen Reactions    No Known Drug Allergy        PAST MEDICAL HISTORY:  Past Medical History:   Diagnosis Date    Decreased libido 11/06/2006    Depressive disorder, not elsewhere classified     Esophageal reflux     Generalized osteoarthrosis, unspecified site     R hip, on meds    Herpes simplex without mention of complication     oral    Intramural leiomyoma of uterus     Menopausal syndrome (hot flushes) 02/10/2017    Mumps     ANDREW (obstructive sleep apnea)     Palpitations     Pelvic mass 03/05/2021    Ovarian remnant cyst? Right side, needs follow up from 6/20    Right ovarian cyst 05/13/2020    Spider veins     Unspecified hypothyroidism        PAST SURGICAL HISTORY:  Past Surgical History:   Procedure Laterality Date    CHOLECYSTECTOMY      COLONOSCOPY  04/24/2015    COLONOSCOPY N/A 07/14/2022    Procedure: COLONOSCOPY;  Surgeon: Brook Calle MD;  Location: [facility] OR    COLONOSCOPY WITH CO2 INSUFFLATION N/A 04/23/2015    Procedure: COLONOSCOPY WITH CO2 INSUFFLATION;  Surgeon: Bebeto Mortensen MD;  Location:  OR    ELBOW SURGERY      Lt elbow repair.    ESOPHAGOSCOPY, GASTROSCOPY, DUODENOSCOPY (EGD), COMBINED N/A 05/31/2023    Procedure: Esophagoscopy, gastroscopy, duodenoscopy (EGD), combined;  Surgeon: Gurpreet Mata MD;  Location:  GI    HYSTERECTOMY, PAP NO LONGER INDICATED      LAPAROSCOPIC CHOLECYSTECTOMY N/A 04/24/2020    Procedure: CHOLECYSTECTOMY, LAPAROSCOPIC;  Surgeon: Janett Chan MD;  Location: [facility] OR    ORTHOPEDIC SURGERY      SOFT TISSUE SURGERY      cyst, both shoulders and  left elbow    ZZC LIGATE FALLOPIAN TUBE      ZZC NONSPECIFIC PROCEDURE  2002    rotator cuff surgery both shoulder    ZZC NONSPECIFIC PROCEDURE      wrist surgery for cyst x 2    ZZC VAGINAL HYSTERECTOMY  2003    with BSO, 10-12 week size       FAMILY HISTORY:  Family History   Problem Relation Age of Onset    Hypertension Mother         High Blood Pressure    Fibrocystic breast disease Mother         Has spot being watching.  Checked every 6 months    Glaucoma Mother         glc surgery    Coronary Artery Disease Father         High Cholesterol    Depression/Anxiety Father         Depression    Thyroid Disease Father         Hypo Thryoid    Retinal detachment Father     Thyroid Disease Father         Hypo    C.A.D. Maternal Grandfather     Coronary Artery Disease Maternal Grandfather         Heart Attack, ()    Breast Cancer Paternal Grandmother         Breast removed ()    Asthma Paternal Grandmother         Emphysema ()    Cerebrovascular Disease Paternal Grandfather         Strokes ()    Known Genetic Syndrome Daughter         Whitaker's Syndrome    Skin Cancer No family hx of         no family hx of skin cancer    Colon Cancer No family hx of     Ovarian Cancer No family hx of        SOCIAL HISTORY:  Social History     Socioeconomic History    Marital status:      Spouse name: None    Number of children: None    Years of education: None    Highest education level: None   Tobacco Use    Smoking status: Never    Smokeless tobacco: Never   Vaping Use    Vaping Use: Never used   Substance and Sexual Activity    Alcohol use: No    Drug use: Yes     Comment: cannabis gummies    Sexual activity: Not Currently     Partners: Male   Other Topics Concern    Parent/sibling w/ CABG, MI or angioplasty before 65F 55M? No   Social History Narrative    Dairy/d 3-4 servings/d.     Caffeine 2 servings/d    Exercise 0 x week    Sunscreen used - Yes    Seatbelts used - Yes    Working  "smoke/CO detectors in the home - Yes    Guns stored in the home - Yes    Self Breast Exams - No    Self Testicular Exam - NA    Eye Exam up to date - Yes 2007    Dental Exam up to date - Yes 2007    Pap Smear up to date - Yes  Dr. Whitaker    Mammogram up to date - Yes     PSA up to date - NA    Dexa Scan up to date - NA    Flex Sig / Colonoscopy up to date - NA    Immunizations up to date - Yes Today    Abuse: Current or Past(Physical, Sexual or Emotional)- No    Do you feel safe in your environment - Yes    2008       Review of Systems:  Skin:  Negative       Eyes:  Positive for glasses some days blurry vision  ENT:  Positive for tinnitus    Respiratory:  Positive for dyspnea on exertion inclines and activity   Cardiovascular:    Positive for;palpitations occas  Gastroenterology: Positive for reflux;diarrhea treated  Genitourinary:  Negative      Musculoskeletal:  Positive for back pain lower back.sees chiropracter  Neurologic:  Negative      Psychiatric:  Negative      Heme/Lymph/Imm:  Negative      Endocrine:  Positive for   Graves disease    Physical Exam:  Vitals: BP (!) 145/69   Pulse 69   Ht 1.74 m (5' 8.5\")   Wt 106.6 kg (235 lb)   LMP  (LMP Unknown)   SpO2 97%   BMI 35.21 kg/m      Constitutional:  cooperative, alert and oriented, well developed, well nourished, in no acute distress obese      Skin:  warm and dry to the touch, no apparent skin lesions or masses noted          Head:  normocephalic, no masses or lesions        Eyes:  pupils equal and round, conjunctivae and lids unremarkable, sclera white, no xanthalasma, EOMS intact, no nystagmus        Lymph:      ENT:  no pallor or cyanosis, dentition good        Neck:  no carotid bruit        Respiratory:  normal breath sounds, clear to auscultation, normal A-P diameter, normal symmetry, normal respiratory excursion, no use of accessory muscles         Cardiac: regular rhythm;no murmurs, gallops or rubs detected frequent premature beats "              pulses full and equal                                        GI:           Extremities and Muscular Skeletal:  no edema;no spinal abnormalities noted;normal muscle strength and tone              Neurological:  no gross motor deficits        Psych:  affect appropriate, oriented to time, person and place        CC  JOSE ANGEL Cobb-C  [facility]  [id number]   [place] [address]

## 2023-08-30 NOTE — LETTER
8/30/2023    Damari Baltazar MD  [id number] [address] [facility] [address]    RE: Amina Pineda       Dear Colleague,     I had the pleasure of seeing Amina Pineda in the [facility].  HPI and Plan:   Is a very nice 63-year-old woman who I am seeing because of a recent ER visit because of palpitations.  She also had an ER visit last spring for neck discomfort.    She has a history of acquired hypothyroidism, impaired fasting glucose.  Both mom and dad are alive and well and in their mid 80s.  Her mother does have elevated glucose she is a lifelong non-smoker has impaired fasting glucose does not have hypertension rarely drinks alcohol.  Does not have daytime somnolence and does not snore at nighttime.    She describes flip-flop skips and jumps in her heart that occurred during quiet times.  She does not notice them at all with exercise.  They have no other accompanying features.  Last spring she did have an episode of a knot in her chest and tightness in her chest.  She has no exertional chest arm neck jaw or shoulder discomfort.  No dyspnea on exertion orthopnea or PND no palpitations lightheadedness dizziness syncope or near syncope.    Review of the chart shows normal electrolytes, kidney function, CBC, thyroid function tests with a hemoglobin A1c of 5.9 and a fasting glucose of 108.  Most recent fasting lipid profile shows a total cholesterol 196 with an HDL of 54 and LDL of 125 and triglycerides of 87.  EKG is normal sinus rhythm with a normal EKG.  A 7-day event monitor showed frequent isolated PVCs.  And no higher grade ectopy.    Assessment and plan.  Briseida has no symptoms at this time to suggest ischemia heart failure or significant arrhythmia.    It appears she just has isolated PVCs.  At this time I do not think we need to do an echocardiogram but I may reconsider.    She appears to be very low risk for coronary artery disease but we did discuss the calcium score and we will  set this up.  If I am surprised with a very high calcium score I may [place] around and do a stress echocardiogram.  If the calcium score is low I do not think she needs any further cardiac follow-up.    Discussed the nature of isolated PVCs.  I told her in the setting of no underlying coronary disease and no structural heart disease they are benign and do not need specific treatment.  They will wax and wane depending on stimulation including alcohol, caffeine, stress, disordered sleep at nighttime.  They can be quieted down with regular exercise a diet high in fresh fruits and vegetables and weight loss.    He does not appear to have sleep apnea.    We talked about the importance of a low carbohydrate diet regular exercise and weight loss given her impaired fasting glucose.    Further evaluation treatment depend upon the above results.  If her calcium score is reassuring I do not think she needs long-term cardiac follow-up.  We will be glad to see her back at any time.  I told her we could put her on medications for PVCs but is not necessary.    Thank you for allow me to participate in this patient's care.  Sincerely,                               Giovanny Roach MD [facility]          Today's clinic visit entailed:  Review of external notes as documented elsewhere in note  Review of the result(s) of each unique test - hospital records, event monitor, lab work  Ordering of each unique test  Prescription drug management  46 minutes spent by me on the date of the encounter doing chart review, history and exam, documentation and further activities per the note  Provider  Link to [facility] Help Grid     The level of medical decision making during this visit was of moderate complexity.      No orders of the defined types were placed in this encounter.      Orders Placed This Encounter   Medications    esomeprazole (NEXIUM) 40 MG DR capsule     Sig: Take 40 mg by mouth every morning (before breakfast) Take 30-60 minutes before  eating.    Calcium Carb-Cholecalciferol (CALCIUM 500 + D) 500-5 MG-MCG TABS       There are no discontinued medications.      Encounter Diagnoses   Name Primary?    Palpitations Yes    Prediabetes     Class 2 obesity due to excess calories without serious comorbidity with body mass index (BMI) of 35.0 to 35.9 in adult     Acquired hypothyroidism     Hyperlipidemia LDL goal <100        CURRENT MEDICATIONS:  Current Outpatient Medications   Medication Sig Dispense Refill    albuterol (PROAIR HFA/PROVENTIL HFA/VENTOLIN HFA) 108 (90 Base) MCG/ACT inhaler Inhale 2 puffs into the lungs every 6 hours as needed for shortness of breath / dyspnea or wheezing 18 g 11    biotin 1000 MCG TABS tablet Take 1,000 mcg by mouth daily      buPROPion (WELLBUTRIN XL) 150 MG 24 hr tablet Take 1 tablet (150 mg) by mouth every morning 90 tablet 3    Calcium Carb-Cholecalciferol (CALCIUM 500 + D) 500-5 MG-MCG TABS       cyclobenzaprine (FLEXERIL) 10 MG tablet Take 1 tablet (10 mg) by mouth 2 times daily as needed for muscle spasms 60 tablet 4    esomeprazole (NEXIUM) 40 MG DR capsule Take 40 mg by mouth every morning (before breakfast) Take 30-60 minutes before eating.      famciclovir (FAMVIR) 500 MG tablet Take 1 tablet (500 mg) by mouth 2 times daily as needed 60 tablet 11    ferrous sulfate (FEROSUL) 325 (65 Fe) MG tablet Take 325 mg by mouth daily (with breakfast)      levothyroxine (SYNTHROID/LEVOTHROID) 125 MCG tablet Take 1 tablet (125 mcg) by mouth daily 90 tablet 3    meclizine (ANTIVERT) 25 MG tablet Take 1 tablet (25 mg) by mouth 3 times daily as needed for dizziness 30 tablet 0    metFORMIN (GLUCOPHAGE XR) 500 MG 24 hr tablet Take 1 tablet (500 mg) by mouth daily (with dinner) 90 tablet 1    nystatin (MYCOSTATIN) 474218 UNIT/GM external powder Apply 30 g topically 3 times daily as needed 30 g 1    tolterodine ER (DETROL LA) 2 MG 24 hr capsule Take 1 capsule (2 mg) by mouth daily 90 capsule 3    triamcinolone (KENALOG) 0.1 %  external cream Apply topically 2 times daily Do not use longer than 2 weeks 30 g 0    Vitamins-Lipotropics (LIPOFLAVONOID) TABS Take 1 tablet by mouth 2 times daily      omeprazole (PRILOSEC) 40 MG DR capsule Take 1 capsule (40 mg) by mouth daily (Patient not taking: Reported on 8/30/2023) 90 capsule 3    oxybutynin ER (DITROPAN XL) 10 MG 24 hr tablet Take 1 tablet (10 mg) by mouth daily (Patient not taking: Reported on 8/30/2023) 90 tablet 3    Vitamin D3 (VITAMIN D, CHOLECALCIFEROL,) 25 mcg (1000 units) tablet Take 1 tablet by mouth daily (Patient not taking: Reported on 8/30/2023)         ALLERGIES     Allergies   Allergen Reactions    No Known Drug Allergy        PAST MEDICAL HISTORY:  Past Medical History:   Diagnosis Date    Decreased libido 11/06/2006    Depressive disorder, not elsewhere classified     Esophageal reflux     Generalized osteoarthrosis, unspecified site     R hip, on meds    Herpes simplex without mention of complication     oral    Intramural leiomyoma of uterus     Menopausal syndrome (hot flushes) 02/10/2017    Mumps     ANDREW (obstructive sleep apnea)     Palpitations     Pelvic mass 03/05/2021    Ovarian remnant cyst? Right side, needs follow up from 6/20    Right ovarian cyst 05/13/2020    Spider veins     Unspecified hypothyroidism        PAST SURGICAL HISTORY:  Past Surgical History:   Procedure Laterality Date    CHOLECYSTECTOMY      COLONOSCOPY  04/24/2015    COLONOSCOPY N/A 07/14/2022    Procedure: COLONOSCOPY;  Surgeon: Brook Calle MD;  Location:  OR    COLONOSCOPY WITH CO2 INSUFFLATION N/A 04/23/2015    Procedure: COLONOSCOPY WITH CO2 INSUFFLATION;  Surgeon: Bebeto Mortensen MD;  Location:  OR    ELBOW SURGERY      Lt elbow repair.    ESOPHAGOSCOPY, GASTROSCOPY, DUODENOSCOPY (EGD), COMBINED N/A 05/31/2023    Procedure: Esophagoscopy, gastroscopy, duodenoscopy (EGD), combined;  Surgeon: Gurpreet Mata MD;  Location:  GI    HYSTERECTOMY, PAP NO LONGER INDICATED       LAPAROSCOPIC CHOLECYSTECTOMY N/A 2020    Procedure: CHOLECYSTECTOMY, LAPAROSCOPIC;  Surgeon: Janett Chan MD;  Location: [facility] OR    ORTHOPEDIC SURGERY      SOFT TISSUE SURGERY      cyst, both shoulders and left elbow    ZZC LIGATE FALLOPIAN TUBE      ZZC NONSPECIFIC PROCEDURE  2002    rotator cuff surgery both shoulder    ZZC NONSPECIFIC PROCEDURE      wrist surgery for cyst x 2    ZZC VAGINAL HYSTERECTOMY  2003    with BSO, 10-12 week size       FAMILY HISTORY:  Family History   Problem Relation Age of Onset    Hypertension Mother         High Blood Pressure    Fibrocystic breast disease Mother         Has spot being watching.  Checked every 6 months    Glaucoma Mother         glc surgery    Coronary Artery Disease Father         High Cholesterol    Depression/Anxiety Father         Depression    Thyroid Disease Father         Hypo Thryoid    Retinal detachment Father     Thyroid Disease Father         Hypo    C.A.D. Maternal Grandfather     Coronary Artery Disease Maternal Grandfather         Heart Attack, ()    Breast Cancer Paternal Grandmother         Breast removed ()    Asthma Paternal Grandmother         Emphysema ()    Cerebrovascular Disease Paternal Grandfather         Strokes ()    Known Genetic Syndrome Daughter         Whitaker's Syndrome    Skin Cancer No family hx of         no family hx of skin cancer    Colon Cancer No family hx of     Ovarian Cancer No family hx of        SOCIAL HISTORY:  Social History     Socioeconomic History    Marital status:      Spouse name: None    Number of children: None    Years of education: None    Highest education level: None   Tobacco Use    Smoking status: Never    Smokeless tobacco: Never   Vaping Use    Vaping Use: Never used   Substance and Sexual Activity    Alcohol use: No    Drug use: Yes     Comment: cannabis gummies    Sexual activity: Not Currently     Partners: Male   Other Topics Concern     "Parent/sibling w/ CABG, MI or angioplasty before 65F 55M? No   Social History Narrative    Dairy/d 3-4 servings/d.     Caffeine 2 servings/d    Exercise 0 x week    Sunscreen used - Yes    Seatbelts used - Yes    Working smoke/CO detectors in the home - Yes    Guns stored in the home - Yes    Self Breast Exams - No    Self Testicular Exam - NA    Eye Exam up to date - Yes 2007    Dental Exam up to date - Yes 2007    Pap Smear up to date - Yes  Dr. Whitaker    Mammogram up to date - Yes     PSA up to date - NA    Dexa Scan up to date - NA    Flex Sig / Colonoscopy up to date - NA    Immunizations up to date - Yes Today    Abuse: Current or Past(Physical, Sexual or Emotional)- No    Do you feel safe in your environment - Yes    2008       Review of Systems:  Skin:  Negative       Eyes:  Positive for glasses some days blurry vision  ENT:  Positive for tinnitus    Respiratory:  Positive for dyspnea on exertion inclines and activity   Cardiovascular:    Positive for;palpitations occas  Gastroenterology: Positive for reflux;diarrhea treated  Genitourinary:  Negative      Musculoskeletal:  Positive for back pain lower back.sees chiropracter  Neurologic:  Negative      Psychiatric:  Negative      Heme/Lymph/Imm:  Negative      Endocrine:  Positive for   Graves disease    Physical Exam:  Vitals: BP (!) 145/69   Pulse 69   Ht 1.74 m (5' 8.5\")   Wt 106.6 kg (235 lb)   LMP  (LMP Unknown)   SpO2 97%   BMI 35.21 kg/m      Constitutional:  cooperative, alert and oriented, well developed, well nourished, in no acute distress obese      Skin:  warm and dry to the touch, no apparent skin lesions or masses noted          Head:  normocephalic, no masses or lesions        Eyes:  pupils equal and round, conjunctivae and lids unremarkable, sclera white, no xanthalasma, EOMS intact, no nystagmus        Lymph:      ENT:  no pallor or cyanosis, dentition good        Neck:  no carotid bruit        Respiratory:  normal breath " sounds, clear to auscultation, normal A-P diameter, normal symmetry, normal respiratory excursion, no use of accessory muscles         Cardiac: regular rhythm;no murmurs, gallops or rubs detected frequent premature beats              pulses full and equal                                        GI:           Extremities and Muscular Skeletal:  no edema;no spinal abnormalities noted;normal muscle strength and tone              Neurological:  no gross motor deficits        Psych:  affect appropriate, oriented to time, person and place        CC  Heydi Tamayo PA-C  [facility]  [address] [place] [address]                  Thank you for allowing me to participate in the care of your patient.      Sincerely,     Giovanny Roach MD     [facility]

## 2023-10-19 ENCOUNTER — TELEPHONE (OUTPATIENT)
Dept: CARDIOLOGY | Facility: CLINIC | Age: 63
End: 2023-10-19

## 2023-10-19 ENCOUNTER — HOSPITAL ENCOUNTER (OUTPATIENT)
Dept: CT IMAGING | Facility: CLINIC | Age: 63
Discharge: HOME | End: 2023-10-19
Attending: INTERNAL MEDICINE | Admitting: INTERNAL MEDICINE
Payer: COMMERCIAL

## 2023-10-19 DIAGNOSIS — R00.2 PALPITATIONS: ICD-10-CM

## 2023-10-19 PROCEDURE — 75571 CT HRT W/O DYE W/CA TEST: CPT | Mod: 26 | Performed by: INTERNAL MEDICINE

## 2023-10-19 PROCEDURE — 75571 CT HRT W/O DYE W/CA TEST: CPT

## 2023-10-19 NOTE — TELEPHONE ENCOUNTER
CT calcium scan routed to Dr Roach to review. Ordered at visit 8/30/23 to assess for coronary disease. Not mentions patient does not require long-term cardiology follow up if CT calcium is reassuring.     Calcium score = 0     Soft tissue report = No acute noncardiovascular abnormality in the visualized portion of the chest.

## 2023-10-20 NOTE — TELEPHONE ENCOUNTER
Giovanny Roach MD  You12 hours ago (7:57 PM)     Discussed with patient.  Follow-up as necessary.

## 2023-11-20 NOTE — PROGRESS NOTES
Patient came in for flu vaccine, high dose administered to the left arm with no adverse reaction.  NDC: 72898-152-40  LOT: [id number]  EXP: 06/30/2024    Patient came in for covid vaccine, administered to the right arm with no adverse reaction.   NDC: 03776-713-03  LOT: [id number]  EXP: 05/09/2024    VIS and consent form given     Patient has an upcoming lab only appt and currently has no future orders in their chart. Please place future orders as needed. If this appointment is not needed please have your team contact patient to cancel.Thanks!

## 2024-01-09 ASSESSMENT — ANXIETY QUESTIONNAIRES
4. TROUBLE RELAXING: NOT AT ALL
3. WORRYING TOO MUCH ABOUT DIFFERENT THINGS: SEVERAL DAYS
6. BECOMING EASILY ANNOYED OR IRRITABLE: SEVERAL DAYS
1. FEELING NERVOUS, ANXIOUS, OR ON EDGE: SEVERAL DAYS
GAD7 TOTAL SCORE: 3
IF YOU CHECKED OFF ANY PROBLEMS ON THIS QUESTIONNAIRE, HOW DIFFICULT HAVE THESE PROBLEMS MADE IT FOR YOU TO DO YOUR WORK, TAKE CARE OF THINGS AT HOME, OR GET ALONG WITH OTHER PEOPLE: NOT DIFFICULT AT ALL
8. IF YOU CHECKED OFF ANY PROBLEMS, HOW DIFFICULT HAVE THESE MADE IT FOR YOU TO DO YOUR WORK, TAKE CARE OF THINGS AT HOME, OR GET ALONG WITH OTHER PEOPLE?: NOT DIFFICULT AT ALL
7. FEELING AFRAID AS IF SOMETHING AWFUL MIGHT HAPPEN: NOT AT ALL
5. BEING SO RESTLESS THAT IT IS HARD TO SIT STILL: NOT AT ALL
7. FEELING AFRAID AS IF SOMETHING AWFUL MIGHT HAPPEN: NOT AT ALL
2. NOT BEING ABLE TO STOP OR CONTROL WORRYING: NOT AT ALL

## 2024-01-09 ASSESSMENT — PATIENT HEALTH QUESTIONNAIRE - PHQ9
SUM OF ALL RESPONSES TO PHQ QUESTIONS 1-9: 5
10. IF YOU CHECKED OFF ANY PROBLEMS, HOW DIFFICULT HAVE THESE PROBLEMS MADE IT FOR YOU TO DO YOUR WORK, TAKE CARE OF THINGS AT HOME, OR GET ALONG WITH OTHER PEOPLE: NOT DIFFICULT AT ALL
SUM OF ALL RESPONSES TO PHQ QUESTIONS 1-9: 5

## 2024-01-10 ENCOUNTER — OFFICE VISIT (OUTPATIENT)
Dept: FAMILY MEDICINE | Facility: CLINIC | Age: 64
End: 2024-01-10
Payer: COMMERCIAL

## 2024-01-10 VITALS
HEART RATE: 66 BPM | RESPIRATION RATE: 14 BRPM | OXYGEN SATURATION: 95 % | BODY MASS INDEX: 35.44 KG/M2 | TEMPERATURE: 98.4 F | HEIGHT: 69 IN | DIASTOLIC BLOOD PRESSURE: 75 MMHG | SYSTOLIC BLOOD PRESSURE: 125 MMHG | WEIGHT: 239.3 LBS

## 2024-01-10 DIAGNOSIS — E66.01 MORBID OBESITY (H): ICD-10-CM

## 2024-01-10 DIAGNOSIS — E66.812 CLASS 2 SEVERE OBESITY DUE TO EXCESS CALORIES WITH SERIOUS COMORBIDITY AND BODY MASS INDEX (BMI) OF 35.0 TO 35.9 IN ADULT (H): Primary | ICD-10-CM

## 2024-01-10 DIAGNOSIS — E03.9 HYPOTHYROIDISM, UNSPECIFIED TYPE: ICD-10-CM

## 2024-01-10 DIAGNOSIS — B00.9 HERPES SIMPLEX VIRUS (HSV) INFECTION: ICD-10-CM

## 2024-01-10 DIAGNOSIS — R73.03 PREDIABETES: ICD-10-CM

## 2024-01-10 DIAGNOSIS — J45.909 MILD ASTHMA WITHOUT COMPLICATION, UNSPECIFIED WHETHER PERSISTENT: ICD-10-CM

## 2024-01-10 DIAGNOSIS — E66.01 CLASS 2 SEVERE OBESITY DUE TO EXCESS CALORIES WITH SERIOUS COMORBIDITY AND BODY MASS INDEX (BMI) OF 35.0 TO 35.9 IN ADULT (H): Primary | ICD-10-CM

## 2024-01-10 DIAGNOSIS — F33.0 MAJOR DEPRESSIVE DISORDER, RECURRENT EPISODE, MILD (H): ICD-10-CM

## 2024-01-10 LAB — HBA1C MFR BLD: 5.8 % (ref 0–5.6)

## 2024-01-10 PROCEDURE — 84481 FREE ASSAY (FT-3): CPT | Performed by: FAMILY MEDICINE

## 2024-01-10 PROCEDURE — 84443 ASSAY THYROID STIM HORMONE: CPT | Performed by: FAMILY MEDICINE

## 2024-01-10 PROCEDURE — 84439 ASSAY OF FREE THYROXINE: CPT | Performed by: FAMILY MEDICINE

## 2024-01-10 PROCEDURE — 36415 COLL VENOUS BLD VENIPUNCTURE: CPT | Performed by: FAMILY MEDICINE

## 2024-01-10 PROCEDURE — 80053 COMPREHEN METABOLIC PANEL: CPT | Performed by: FAMILY MEDICINE

## 2024-01-10 PROCEDURE — 99214 OFFICE O/P EST MOD 30 MIN: CPT | Performed by: FAMILY MEDICINE

## 2024-01-10 PROCEDURE — 83036 HEMOGLOBIN GLYCOSYLATED A1C: CPT | Performed by: FAMILY MEDICINE

## 2024-01-10 RX ORDER — BUPROPION HYDROCHLORIDE 150 MG/1
150 TABLET ORAL EVERY MORNING
Qty: 90 TABLET | Refills: 1 | Status: SHIPPED | OUTPATIENT
Start: 2024-01-10 | End: 2024-08-07

## 2024-01-10 RX ORDER — FAMCICLOVIR 500 MG/1
500 TABLET ORAL 2 TIMES DAILY PRN
Qty: 60 TABLET | Refills: 11 | Status: SHIPPED | OUTPATIENT
Start: 2024-01-10

## 2024-01-10 RX ORDER — TOPIRAMATE 25 MG/1
25 TABLET, FILM COATED ORAL 2 TIMES DAILY
Qty: 60 TABLET | Refills: 3 | Status: SHIPPED | OUTPATIENT
Start: 2024-01-10 | End: 2024-03-14

## 2024-01-10 RX ORDER — METFORMIN HCL 500 MG
500 TABLET, EXTENDED RELEASE 24 HR ORAL
Qty: 90 TABLET | Refills: 1 | Status: SHIPPED | OUTPATIENT
Start: 2024-01-10 | End: 2024-03-14

## 2024-01-10 RX ORDER — ALBUTEROL SULFATE 90 UG/1
2 AEROSOL, METERED RESPIRATORY (INHALATION) EVERY 6 HOURS PRN
Qty: 18 G | Refills: 11 | Status: SHIPPED | OUTPATIENT
Start: 2024-01-10

## 2024-01-10 NOTE — PROGRESS NOTES
Assessment & Plan     Class 2 severe obesity due to excess calories with serious comorbidity and body mass index (BMI) of 35.0 to 35.9 in adult (H)  Discussed all options, see below    Major depressive disorder, recurrent episode, mild (H24)  Cont same, working well  - buPROPion (WELLBUTRIN XL) 150 MG 24 hr tablet; Take 1 tablet (150 mg) by mouth every morning    Herpes simplex virus (HSV) infection  Cold sores, use med prn, working well  - famciclovir (FAMVIR) 500 MG tablet; Take 1 tablet (500 mg) by mouth 2 times daily as needed    Morbid obesity (H)  Cont low dose metformin and start topamax to help with weight and prevent diabetes  - metFORMIN (GLUCOPHAGE XR) 500 MG 24 hr tablet; Take 1 tablet (500 mg) by mouth daily (with dinner)  - topiramate (TOPAMAX) 25 MG tablet; Take 1 tablet (25 mg) by mouth 2 times daily  - Comprehensive metabolic panel (BMP + Alb, Alk Phos, ALT, AST, Total. Bili, TP); Future  - Comprehensive metabolic panel (BMP + Alb, Alk Phos, ALT, AST, Total. Bili, TP)    Prediabetes  Cont same, due for labs  - metFORMIN (GLUCOPHAGE XR) 500 MG 24 hr tablet; Take 1 tablet (500 mg) by mouth daily (with dinner)  - Hemoglobin A1c; Future  - Comprehensive metabolic panel (BMP + Alb, Alk Phos, ALT, AST, Total. Bili, TP); Future  - Hemoglobin A1c  - Comprehensive metabolic panel (BMP + Alb, Alk Phos, ALT, AST, Total. Bili, TP)    Hypothyroidism, unspecified type  Due for labs, pt wanting t4 and t3, will check, her tsh has been on the low side, discussed this with pt  - TSH with free T4 reflex; Future  - T4, free; Future  - T3, Free; Future  - T4, free  - T3, Free  - TSH    Mild asthma without complication, unspecified whether persistent  Controlled, comes with exercise and colds, fairly new  - albuterol (PROAIR HFA/PROVENTIL HFA/VENTOLIN HFA) 108 (90 Base) MCG/ACT inhaler; Inhale 2 puffs into the lungs every 6 hours as needed for shortness of breath or wheezing    Ordering of each unique  "test  Prescription drug management         BMI:   Estimated body mass index is 35.86 kg/m  as calculated from the following:    Height as of this encounter: 1.74 m (5' 8.5\").    Weight as of this encounter: 108.5 kg (239 lb 4.8 oz).   Weight management plan: Discussed healthy diet and exercise guidelines    Work on weight loss  Regular exercise    Damari Baltazar MD  [facility] JOAQUÍN Goins is a 63 year old, presenting for the following health issues:  Diabetes (Why is she on metformin?), Palpitations (Follow up from wearing monitor), and Recheck Medication (Also has some thyroid questions)        1/10/2024     9:07 AM   Additional Questions   Roomed by Mildred Garcia       History of Present Illness       Reason for visit:  Palpitations- follow up from wearing monitor    She eats 2-3 servings of fruits and vegetables daily.She consumes 1 sweetened beverage(s) daily.She exercises with enough effort to increase her heart rate 10 to 19 minutes per day.  She exercises with enough effort to increase her heart rate 3 or less days per week.   She is taking medications regularly.     Last summer on monitor, cardiology dx palpatations, but everything ok  Caffeine made it worse, and now she is drinking decaf    Egd/colonoscoy-tortuouse, needs to do cologuard    Nexium after dinner and not longer waking up with heartburn and uses pillow wedge, if eats late.     Cold sores, worse after eating at buffet, or dental work, meds help    Inhaler used during colds or if getting SOB, and coughing alot    Morbid obesity bmi 35 and has prediabetes, wanting to loose weight to prevent diabetes. Goal is 30 min    Depression/anxiety and on wellbutrin, used in the past prozac,  with back problems and surgery and then a PE, its a lot of stress  Takes in am and working well      Amina is seen today for follow-up of her hypothyroidism,   She has been doing well, noting no tremor, insomnia, hair " "loss or changes in skin texture. She continues to take her meds as directed, without adverse reactions or side effects.  Tired, gil and hair loss, sleeping fine and gaining weight, wondering if her thyroid is controlled?  TSH   Date Value Ref Range Status   07/11/2023 0.41 0.30 - 4.20 uIU/mL Final   02/05/2022 1.02 0.40 - 4.00 mU/L Final   11/13/2020 2.83 0.40 - 4.00 mU/L Final     Night sweats, no hot flashes during the day. Trouble concentrating.       preDiabetes Follow-up    How often are you checking your blood sugar? Not at all  What concerns do you have today about your diabetes? None   Do you have any of these symptoms? (Select all that apply)  No numbness or tingling in feet.  No redness, sores or blisters on feet.  No complaints of excessive thirst.  No reports of blurry vision.  No significant changes to weight.  Lab Results   Component Value Date    A1C 5.9 02/16/2023    A1C 5.8 02/05/2022    A1C 5.7 05/06/2020    A1C 5.8 04/05/2019    A1C 5.7 04/17/2018         BP Readings from Last 2 Encounters:   01/10/24 125/75   08/30/23 (!) 145/69     Hemoglobin A1C (%)   Date Value   02/16/2023 5.9 (H)   02/05/2022 5.8 (H)   05/06/2020 5.7 (H)   04/05/2019 5.8 (H)     LDL Cholesterol Calculated (mg/dL)   Date Value   05/16/2023 125 (H)   02/05/2022 126 (H)   04/05/2019 124 (H)   04/17/2018 105 (H)               Review of Systems   Constitutional, HEENT, cardiovascular, pulmonary, gi and gu systems are negative, except as otherwise noted.      Objective    /75 (BP Location: Right arm, Patient Position: Sitting, Cuff Size: Adult Large)   Pulse 66   Temp 98.4  F (36.9  C) (Oral)   Resp 14   Ht 1.74 m (5' 8.5\")   Wt 108.5 kg (239 lb 4.8 oz)   LMP  (LMP Unknown)   SpO2 95%   BMI 35.86 kg/m    Body mass index is 35.86 kg/m .  Physical Exam   GENERAL: healthy, alert and no distress  EYES: Eyes grossly normal to inspection,  and conjunctivae and sclerae normal  HENT: ear canals and TM's normal, nose and " mouth without ulcers or lesions  NECK: no adenopathy, no asymmetry, masses, or scars and thyroid normal to palpation  RESP: lungs clear to auscultation - no rales, rhonchi or wheezes  CV: regular rate and rhythm, normal S1 S2, no S3 or S4, no murmur, click or rub, no peripheral edema and peripheral pulses strong  ABDOMEN: soft, nontender, no hepatosplenomegaly, no masses and bowel sounds normal  MS: no gross musculoskeletal defects noted, no edema  SKIN: no suspicious lesions or rashes  NEURO: Normal strength and tone, mentation intact and speech normal  PSYCH: mentation appears normal, affect normal/bright

## 2024-01-11 LAB
ALBUMIN SERPL BCG-MCNC: 4.3 G/DL (ref 3.5–5.2)
ALP SERPL-CCNC: 109 U/L (ref 40–150)
ALT SERPL W P-5'-P-CCNC: 20 U/L (ref 0–50)
ANION GAP SERPL CALCULATED.3IONS-SCNC: 15 MMOL/L (ref 7–15)
AST SERPL W P-5'-P-CCNC: 21 U/L (ref 0–45)
BILIRUB SERPL-MCNC: 0.8 MG/DL
BUN SERPL-MCNC: 19.8 MG/DL (ref 8–23)
CALCIUM SERPL-MCNC: 10.1 MG/DL (ref 8.8–10.2)
CHLORIDE SERPL-SCNC: 102 MMOL/L (ref 98–107)
CREAT SERPL-MCNC: 1.04 MG/DL (ref 0.51–0.95)
DEPRECATED HCO3 PLAS-SCNC: 26 MMOL/L (ref 22–29)
EGFRCR SERPLBLD CKD-EPI 2021: 59 ML/MIN/1.73M2
GLUCOSE SERPL-MCNC: 105 MG/DL (ref 70–99)
POTASSIUM SERPL-SCNC: 4.5 MMOL/L (ref 3.4–5.3)
PROT SERPL-MCNC: 7.3 G/DL (ref 6.4–8.3)
SODIUM SERPL-SCNC: 143 MMOL/L (ref 135–145)
T3FREE SERPL-MCNC: 2.6 PG/ML (ref 2–4.4)
T4 FREE SERPL-MCNC: 1.54 NG/DL (ref 0.9–1.7)
TSH SERPL DL<=0.005 MIU/L-ACNC: 2.95 UIU/ML (ref 0.3–4.2)

## 2024-03-05 ENCOUNTER — MYC MEDICAL ADVICE (OUTPATIENT)
Dept: FAMILY MEDICINE | Facility: CLINIC | Age: 64
End: 2024-03-05
Payer: COMMERCIAL

## 2024-03-05 DIAGNOSIS — N18.2 CKD (CHRONIC KIDNEY DISEASE) STAGE 2, GFR 60-89 ML/MIN: Primary | ICD-10-CM

## 2024-03-05 NOTE — TELEPHONE ENCOUNTER
Informed patient lab orders were placed and assisted in scheduling lab only appointment at the [facility] per pt's request. No further questions.     Cathy Skaggs RN on 3/5/2024 at 3:28 PM

## 2024-03-08 ASSESSMENT — ASTHMA QUESTIONNAIRES
QUESTION_4 LAST FOUR WEEKS HOW OFTEN HAVE YOU USED YOUR RESCUE INHALER OR NEBULIZER MEDICATION (SUCH AS ALBUTEROL): NOT AT ALL
QUESTION_5 LAST FOUR WEEKS HOW WOULD YOU RATE YOUR ASTHMA CONTROL: COMPLETELY CONTROLLED
ACT_TOTALSCORE: 24
QUESTION_2 LAST FOUR WEEKS HOW OFTEN HAVE YOU HAD SHORTNESS OF BREATH: ONCE OR TWICE A WEEK
QUESTION_3 LAST FOUR WEEKS HOW OFTEN DID YOUR ASTHMA SYMPTOMS (WHEEZING, COUGHING, SHORTNESS OF BREATH, CHEST TIGHTNESS OR PAIN) WAKE YOU UP AT NIGHT OR EARLIER THAN USUAL IN THE MORNING: NOT AT ALL
ACT_TOTALSCORE: 24
QUESTION_1 LAST FOUR WEEKS HOW MUCH OF THE TIME DID YOUR ASTHMA KEEP YOU FROM GETTING AS MUCH DONE AT WORK, SCHOOL OR AT HOME: NONE OF THE TIME

## 2024-03-11 ENCOUNTER — LAB (OUTPATIENT)
Dept: LAB | Facility: CLINIC | Age: 64
End: 2024-03-11
Payer: COMMERCIAL

## 2024-03-11 DIAGNOSIS — N18.2 CKD (CHRONIC KIDNEY DISEASE) STAGE 2, GFR 60-89 ML/MIN: ICD-10-CM

## 2024-03-11 LAB
ANION GAP SERPL CALCULATED.3IONS-SCNC: 9 MMOL/L (ref 7–15)
BUN SERPL-MCNC: 14.3 MG/DL (ref 8–23)
CALCIUM SERPL-MCNC: 9.7 MG/DL (ref 8.8–10.2)
CHLORIDE SERPL-SCNC: 101 MMOL/L (ref 98–107)
CREAT SERPL-MCNC: 0.88 MG/DL (ref 0.51–0.95)
DEPRECATED HCO3 PLAS-SCNC: 28 MMOL/L (ref 22–29)
EGFRCR SERPLBLD CKD-EPI 2021: 73 ML/MIN/1.73M2
GLUCOSE SERPL-MCNC: 102 MG/DL (ref 70–99)
POTASSIUM SERPL-SCNC: 4.1 MMOL/L (ref 3.4–5.3)
SODIUM SERPL-SCNC: 138 MMOL/L (ref 135–145)

## 2024-03-11 PROCEDURE — 36415 COLL VENOUS BLD VENIPUNCTURE: CPT

## 2024-03-11 PROCEDURE — 80048 BASIC METABOLIC PNL TOTAL CA: CPT

## 2024-03-14 ENCOUNTER — VIRTUAL VISIT (OUTPATIENT)
Dept: FAMILY MEDICINE | Facility: CLINIC | Age: 64
End: 2024-03-14
Payer: COMMERCIAL

## 2024-03-14 DIAGNOSIS — K21.9 GASTROESOPHAGEAL REFLUX DISEASE, UNSPECIFIED WHETHER ESOPHAGITIS PRESENT: Chronic | ICD-10-CM

## 2024-03-14 DIAGNOSIS — K59.1 FUNCTIONAL DIARRHEA: Primary | ICD-10-CM

## 2024-03-14 DIAGNOSIS — E66.01 MORBID OBESITY (H): ICD-10-CM

## 2024-03-14 DIAGNOSIS — G89.29 CHRONIC LOW BACK PAIN WITHOUT SCIATICA, UNSPECIFIED BACK PAIN LATERALITY: ICD-10-CM

## 2024-03-14 DIAGNOSIS — M54.50 CHRONIC LOW BACK PAIN WITHOUT SCIATICA, UNSPECIFIED BACK PAIN LATERALITY: ICD-10-CM

## 2024-03-14 DIAGNOSIS — R73.03 PREDIABETES: ICD-10-CM

## 2024-03-14 DIAGNOSIS — E03.9 ACQUIRED HYPOTHYROIDISM: Chronic | ICD-10-CM

## 2024-03-14 DIAGNOSIS — N90.89 VULVAR IRRITATION: ICD-10-CM

## 2024-03-14 PROCEDURE — 99214 OFFICE O/P EST MOD 30 MIN: CPT | Mod: 95 | Performed by: FAMILY MEDICINE

## 2024-03-14 RX ORDER — CYCLOBENZAPRINE HCL 5 MG
5 TABLET ORAL 2 TIMES DAILY PRN
Qty: 60 TABLET | Refills: 0 | Status: SHIPPED | OUTPATIENT
Start: 2024-03-14 | End: 2024-08-07

## 2024-03-14 RX ORDER — TOPIRAMATE 50 MG/1
50 TABLET, FILM COATED ORAL 2 TIMES DAILY
Qty: 180 TABLET | Refills: 1 | Status: SHIPPED | OUTPATIENT
Start: 2024-03-14 | End: 2024-08-07

## 2024-03-14 RX ORDER — ESOMEPRAZOLE MAGNESIUM 40 MG/1
40 CAPSULE, DELAYED RELEASE ORAL
Qty: 90 CAPSULE | Refills: 3 | Status: SHIPPED | OUTPATIENT
Start: 2024-03-14

## 2024-03-14 RX ORDER — LEVOTHYROXINE SODIUM 125 UG/1
125 TABLET ORAL DAILY
Qty: 90 TABLET | Refills: 3 | Status: SHIPPED | OUTPATIENT
Start: 2024-03-14

## 2024-03-14 NOTE — PROGRESS NOTES
Briseida is a 63 year old who is being evaluated via a billable video visit.    How would you like to obtain your AVS? MyChart  If the video visit is dropped, the invitation should be resent by: Text to cell phone: [phone]  Will anyone else be joining your video visit? No      Assessment & Plan     Morbid obesity (H)  Going up on dose  - topiramate (TOPAMAX) 50 MG tablet; Take 1 tablet (50 mg) by mouth 2 times daily    Chronic low back pain without sciatica, unspecified back pain laterality  Refilled lowered to 5mg prn  - cyclobenzaprine (FLEXERIL) 5 MG tablet; Take 1 tablet (5 mg) by mouth 2 times daily as needed for muscle spasms    Acquired hypothyroidism  Refills, reviewed labs and normal  - levothyroxine (SYNTHROID/LEVOTHROID) 125 MCG tablet; Take 1 tablet (125 mcg) by mouth daily    Prediabetes  Stop metformin, doing well    Functional diarrhea  Referral placed, stop diary was suggested, pt dose not eat a lot of diary  - Adult GI  Referral - Consult Only; Future    Gastroesophageal reflux disease, unspecified whether esophagitis present  refilled  - esomeprazole (NEXIUM) 40 MG DR capsule; Take 1 capsule (40 mg) by mouth every morning (before breakfast) Take 30-60 minutes before eating.    Vulvar irritation  Stop all soap, will check exam on follow up appt in June  - conjugated estrogens (PREMARIN) 0.625 MG/GM vaginal cream; Place 0.5 g vaginally twice a week    Ordering of each unique test  Prescription drug management  30 minutes spent by me on the date of the encounter doing chart review, history and exam, documentation and further activities per the note        Work on weight loss  Regular exercise  See Patient Instructions    Subjective   Briseida is a 63 year old, presenting for the following health issues:  Recheck Medication (topiramate)    Retired now, her  retired in 2020.  Was able to work from home for the past few yrs    On the topamax, and has lost weight, thinks she is ready to go up  on the dose, as she yoyos.  No side effects    Gerd-nexium, rx was given by GI doc, after her EGD, stopped prilosec, must have this    Kidney function abnormal last time, she stopped the naprosyn, for her back, not taking only taking tylenol    She thinks she may have IBS, going out to eat or leaving the house is a concern due to diarrhea, very loose or even watery, can skip a day or two or even has explosive. Not really dependant on what she eats, she does not eat a lot of diary.    Lab Results   Component Value Date    A1C 5.8 01/10/2024    A1C 5.9 02/16/2023    A1C 5.8 02/05/2022    A1C 5.7 05/06/2020    A1C 5.8 04/05/2019    A1C 5.7 04/17/2018         Burning pain , for years, on detrol for leakage, but feels like it is her skin.itchy and burning pain, does not look red.   Cream for yeast infection did not help.          3/14/2024    10:09 AM   Additional Questions   Roomed by Brook ENAMORADO CMA     Video Start Time:  10:35    History of Present Illness       Reason for visit:  Weight control and follow-up labs - need to talk about possible IBS    She eats 2-3 servings of fruits and vegetables daily.She consumes 1 sweetened beverage(s) daily.She exercises with enough effort to increase her heart rate 9 or less minutes per day.  She exercises with enough effort to increase her heart rate 3 or less days per week.   She is taking medications regularly.     Medication Followup of topiramate   Taking Medication as prescribed: yes  Side Effects:  None  Medication Helping Symptoms:  Yes and no, thinks she may need a stronger dose.           Review of Systems  CONSTITUTIONAL: NEGATIVE for fever, chills, change in weight  ENT/MOUTH: NEGATIVE for ear, mouth and throat problems  RESP: NEGATIVE for significant cough or SOB  CV: NEGATIVE for chest pain, palpitations or peripheral edema      Objective    Vitals - Patient Reported  Systolic (Patient Reported): 135  Diastolic (Patient Reported): 82  Weight (Patient Reported): 105.2  "kg (232 lb)  Height (Patient Reported): 174 cm (5' 8.5\")  SpO2 (Patient Reported): 98  Pulse (Patient Reported): 79  Pain Score: No Pain (0)    Physical Exam   GENERAL: alert and no distress  EYES: Eyes grossly normal to inspection.  No discharge or erythema, or obvious scleral/conjunctival abnormalities.  RESP: No audible wheeze, cough, or visible cyanosis.    SKIN: Visible skin clear. No significant rash, abnormal pigmentation or lesions.  NEURO: Cranial nerves grossly intact.  Mentation and speech appropriate for age.  PSYCH: Appropriate affect, tone, and pace of words          Video-Visit Details    Type of service:  Video Visit   Video End Time:11:01 AM  Originating Location (pt. Location): Home    Distant Location (provider location):  On-site  Platform used for Video Visit: Ilda  Signed Electronically by: Damari Baltazar MD    "

## 2024-04-01 ENCOUNTER — MYC MEDICAL ADVICE (OUTPATIENT)
Dept: FAMILY MEDICINE | Facility: CLINIC | Age: 64
End: 2024-04-01
Payer: COMMERCIAL

## 2024-04-01 DIAGNOSIS — N90.89 VULVAR IRRITATION: Primary | ICD-10-CM

## 2024-04-03 RX ORDER — ESTRADIOL 10 UG/1
10 INSERT VAGINAL
Qty: 24 TABLET | Refills: 3 | Status: SHIPPED | OUTPATIENT
Start: 2024-04-04 | End: 2024-08-07

## 2024-04-03 NOTE — TELEPHONE ENCOUNTER
Sent Amanda Huff [employer] response informing  Anastasiia Melchor, RN, BSN  [facility]

## 2024-07-30 ENCOUNTER — TELEPHONE (OUTPATIENT)
Dept: UROLOGY | Facility: CLINIC | Age: 64
End: 2024-07-30
Payer: COMMERCIAL

## 2024-07-30 DIAGNOSIS — R39.15 URINARY URGENCY: ICD-10-CM

## 2024-07-30 DIAGNOSIS — N39.3 FEMALE STRESS INCONTINENCE: ICD-10-CM

## 2024-07-30 RX ORDER — TOLTERODINE 2 MG/1
2 CAPSULE, EXTENDED RELEASE ORAL DAILY
Qty: 90 CAPSULE | Refills: 0 | Status: SHIPPED | OUTPATIENT
Start: 2024-07-30 | End: 2024-08-07

## 2024-08-03 SDOH — HEALTH STABILITY: PHYSICAL HEALTH: ON AVERAGE, HOW MANY MINUTES DO YOU ENGAGE IN EXERCISE AT THIS LEVEL?: 20 MIN

## 2024-08-03 SDOH — HEALTH STABILITY: PHYSICAL HEALTH: ON AVERAGE, HOW MANY DAYS PER WEEK DO YOU ENGAGE IN MODERATE TO STRENUOUS EXERCISE (LIKE A BRISK WALK)?: 1 DAY

## 2024-08-03 ASSESSMENT — SOCIAL DETERMINANTS OF HEALTH (SDOH): HOW OFTEN DO YOU GET TOGETHER WITH FRIENDS OR RELATIVES?: ONCE A WEEK

## 2024-08-06 ASSESSMENT — PATIENT HEALTH QUESTIONNAIRE - PHQ9
SUM OF ALL RESPONSES TO PHQ QUESTIONS 1-9: 0
SUM OF ALL RESPONSES TO PHQ QUESTIONS 1-9: 0

## 2024-08-06 ASSESSMENT — ANXIETY QUESTIONNAIRES
4. TROUBLE RELAXING: NOT AT ALL
2. NOT BEING ABLE TO STOP OR CONTROL WORRYING: SEVERAL DAYS
6. BECOMING EASILY ANNOYED OR IRRITABLE: NOT AT ALL
GAD7 TOTAL SCORE: 4
GAD7 TOTAL SCORE: 4
IF YOU CHECKED OFF ANY PROBLEMS ON THIS QUESTIONNAIRE, HOW DIFFICULT HAVE THESE PROBLEMS MADE IT FOR YOU TO DO YOUR WORK, TAKE CARE OF THINGS AT HOME, OR GET ALONG WITH OTHER PEOPLE: NOT DIFFICULT AT ALL
1. FEELING NERVOUS, ANXIOUS, OR ON EDGE: SEVERAL DAYS
5. BEING SO RESTLESS THAT IT IS HARD TO SIT STILL: NOT AT ALL
7. FEELING AFRAID AS IF SOMETHING AWFUL MIGHT HAPPEN: SEVERAL DAYS
GAD7 TOTAL SCORE: 4
3. WORRYING TOO MUCH ABOUT DIFFERENT THINGS: SEVERAL DAYS
7. FEELING AFRAID AS IF SOMETHING AWFUL MIGHT HAPPEN: SEVERAL DAYS
8. IF YOU CHECKED OFF ANY PROBLEMS, HOW DIFFICULT HAVE THESE MADE IT FOR YOU TO DO YOUR WORK, TAKE CARE OF THINGS AT HOME, OR GET ALONG WITH OTHER PEOPLE?: NOT DIFFICULT AT ALL

## 2024-08-07 ENCOUNTER — OFFICE VISIT (OUTPATIENT)
Dept: FAMILY MEDICINE | Facility: CLINIC | Age: 64
End: 2024-08-07
Payer: COMMERCIAL

## 2024-08-07 VITALS
OXYGEN SATURATION: 97 % | WEIGHT: 226 LBS | HEIGHT: 69 IN | TEMPERATURE: 98.2 F | SYSTOLIC BLOOD PRESSURE: 120 MMHG | RESPIRATION RATE: 20 BRPM | BODY MASS INDEX: 33.47 KG/M2 | HEART RATE: 61 BPM | DIASTOLIC BLOOD PRESSURE: 68 MMHG

## 2024-08-07 DIAGNOSIS — G89.29 CHRONIC LOW BACK PAIN WITHOUT SCIATICA, UNSPECIFIED BACK PAIN LATERALITY: ICD-10-CM

## 2024-08-07 DIAGNOSIS — F33.0 MAJOR DEPRESSIVE DISORDER, RECURRENT EPISODE, MILD (H): ICD-10-CM

## 2024-08-07 DIAGNOSIS — E78.5 HYPERLIPIDEMIA LDL GOAL <100: ICD-10-CM

## 2024-08-07 DIAGNOSIS — Z13.220 SCREENING FOR HYPERLIPIDEMIA: ICD-10-CM

## 2024-08-07 DIAGNOSIS — R94.4 DECREASED GFR: ICD-10-CM

## 2024-08-07 DIAGNOSIS — R39.15 URINARY URGENCY: ICD-10-CM

## 2024-08-07 DIAGNOSIS — B37.89 CANDIDIASIS OF BREAST: ICD-10-CM

## 2024-08-07 DIAGNOSIS — E66.01 MORBID OBESITY (H): ICD-10-CM

## 2024-08-07 DIAGNOSIS — Z00.00 ENCOUNTER FOR MEDICARE ANNUAL WELLNESS EXAM: Primary | ICD-10-CM

## 2024-08-07 DIAGNOSIS — M54.50 CHRONIC LOW BACK PAIN WITHOUT SCIATICA, UNSPECIFIED BACK PAIN LATERALITY: ICD-10-CM

## 2024-08-07 DIAGNOSIS — N39.3 FEMALE STRESS INCONTINENCE: ICD-10-CM

## 2024-08-07 PROCEDURE — G0439 PPPS, SUBSEQ VISIT: HCPCS

## 2024-08-07 PROCEDURE — 99214 OFFICE O/P EST MOD 30 MIN: CPT | Mod: 25

## 2024-08-07 RX ORDER — BUPROPION HYDROCHLORIDE 150 MG/1
150 TABLET ORAL EVERY MORNING
Qty: 90 TABLET | Refills: 3 | Status: SHIPPED | OUTPATIENT
Start: 2024-08-07

## 2024-08-07 RX ORDER — TOLTERODINE 2 MG/1
2 CAPSULE, EXTENDED RELEASE ORAL DAILY
Qty: 90 CAPSULE | Refills: 3 | Status: SHIPPED | OUTPATIENT
Start: 2024-10-21 | End: 2024-10-07

## 2024-08-07 RX ORDER — TOPIRAMATE 50 MG/1
50 TABLET, FILM COATED ORAL 2 TIMES DAILY
Qty: 180 TABLET | Refills: 1 | Status: SHIPPED | OUTPATIENT
Start: 2024-09-02

## 2024-08-07 RX ORDER — NYSTATIN 100000 [USP'U]/G
POWDER TOPICAL PRN
Qty: 30 G | Refills: 1 | Status: SHIPPED | OUTPATIENT
Start: 2024-08-07

## 2024-08-07 RX ORDER — CYCLOBENZAPRINE HCL 5 MG
5 TABLET ORAL 2 TIMES DAILY PRN
Qty: 60 TABLET | Refills: 0 | Status: SHIPPED | OUTPATIENT
Start: 2024-08-07

## 2024-08-07 NOTE — PROGRESS NOTES
Preventive Care Visit  [facility]  Bhumi Choe PA-C, Family Medicine  Aug 7, 2024    Assessment & Plan     (Z00.00) Encounter for Medicare annual wellness exam  (primary encounter diagnosis)  Stable exam. Routine screening labs, she is going to come back for fasting labs. Follow up in 1 year for annual wellness; sooner as needed for acute concerns.  Plan: Lipid panel reflex to direct LDL Fasting,         Comprehensive metabolic panel (BMP + Alb, Alk         Phos, ALT, AST, Total. Bili, TP)          (E78.5) Hyperlipidemia LDL goal <100  Due for screening lipid panel. Will get fasting lipid panel at a future visit.  Plan: Lipid panel reflex to direct LDL Non-fasting          (N39.3) Female stress incontinence  (R39.15) Urinary urgency  Well controlled with use of Detrol. No new side effects of the medication. Refill provided.  Plan: tolterodine ER (DETROL LA) 2 MG 24 hr capsule          (M54.50,  G89.29) Chronic low back pain without sciatica, unspecified back pain laterality  Uses Flexeril PRN for flares of her low back pain. Typically not using more than a few times a month but will depend on activity level, etc. Refilled today with visit. No acute concerns at this time or changes in her intermittent pains.  Plan: cyclobenzaprine (FLEXERIL) 5 MG tablet          (E66.01) Morbid obesity (H)  Doing well on topiramate. She continues to notice slow weight loss on the medication and is tolerating without side effects. Refilled medication today.   Plan: topiramate (TOPAMAX) 50 MG tablet          (F33.0) Major depressive disorder, recurrent episode, mild (H24)  Well controlled with use of Wellbutrin daily. No new side effects of the medication. Refill provided.  Plan: buPROPion (WELLBUTRIN XL) 150 MG 24 hr tablet          (B37.89) Candidiasis of breast  Uses nystatin powder in the summer months for candida due to sweating and heat. Filled today during visit.   Plan: nystatin (MYCOSTATIN) 489310  "UNIT/GM external         powder          (Z13.220) Screening for hyperlipidemia  Plan: Lipid panel reflex to direct LDL Fasting          (R94.4) Decreased GFR  Most recent kidney function testing had improved but had a sudden decrease in kidney function in January 2024-felt to be related to frequent naproxen use. Will recheck again today and if stable will continue to follow yearly. Encouraged her to continue to drink plenty of water and avoid frequent/daily NSAID use.   Plan: Comprehensive metabolic panel (BMP + Alb, Alk         Phos, ALT, AST, Total. Bili, TP)          Patient has been advised of split billing requirements and indicates understanding: Yes    BMI  Estimated body mass index is 33.86 kg/m  as calculated from the following:    Height as of this encounter: 1.74 m (5' 8.5\").    Weight as of this encounter: 102.5 kg (226 lb).     Counseling  Appropriate preventive services were addressed with this patient via screening, questionnaire, or discussion as appropriate for fall prevention, nutrition, physical activity, Tobacco-use cessation, weight loss and cognition.  Checklist reviewing preventive services available has been given to the patient.  Reviewed patient's diet, addressing concerns and/or questions.   She is at risk for lack of exercise and has been provided with information to increase physical activity for the benefit of her well-being.   The patient was provided with written information regarding signs of hearing loss.       Follow up in 1 year for physical; sooner with any acute concerns.    Rosanne Goins is a 64 year old, presenting for the following:  Physical    Health Care Directive  Patient has a Health Care Directive on file  Advance care planning document is on file and is current.    HPI    Diarrhea: Continues to have intermittent diarrhea. Somewhat better with probiotic use. Has appointment scheduled with GI for November.    -Uses Flexeril PRN for low back and hip pain. Will use if " having a flare. Sometimes not using for a week or two at a time, other times needing to use more.         8/3/2024   General Health   How would you rate your overall physical health? Good   Feel stress (tense, anxious, or unable to sleep) Not at all          8/3/2024   Nutrition   Diet: Regular (no restrictions)          8/3/2024   Exercise   Days per week of moderate/strenous exercise 1 day   Average minutes spent exercising at this level 20 min      (!) EXERCISE CONCERN      8/3/2024   Social Factors   Frequency of gathering with friends or relatives Once a week   Worry food won't last until get money to buy more No   Food not last or not have enough money for food? No   Do you have housing? (Housing is defined as stable permanent housing and does not include staying ouside in a car, in a tent, in an abandoned building, in an overnight shelter, or couch-surfing.) Yes   Are you worried about losing your housing? No   Lack of transportation? No   Unable to get utilities (heat,electricity)? No          8/7/2024   Fall Risk   Gait Speed Test (Document in seconds) 4.32   Gait Speed Test Interpretation Less than or equal to 5.00 seconds - PASS               8/3/2024   Activities of Daily Living- Home Safety   Needs help with the following daily activites None of the above   Safety concerns in the home None of the above          8/3/2024   Dental   Dentist two times every year? Yes          8/3/2024   Hearing Screening   Hearing concerns? (!) I FEEL THAT PEOPLE ARE MUMBLING OR NOT SPEAKING CLEARLY.    (!) I NEED TO ASK PEOPLE TO SPEAK UP OR REPEAT THEMSELVES.    (!) IT'S HARD TO FOLLOW A CONVERSATION IN A NOISY RESTAURANT OR CROWDED ROOM.    (!) TROUBLE UNDERSTANDING SOFT OR WHISPERED SPEECH.       Multiple values from one day are sorted in reverse-chronological order         8/3/2024   Driving Risk Screening   Patient/family members have concerns about driving No          8/3/2024   General Alertness/Fatigue Screening    Have you been more tired than usual lately? No          8/3/2024   Urinary Incontinence Screening   Bothered by leaking urine in past 6 months No          8/3/2024   TB Screening   Were you born outside of the [place]? No      Today's PHQ-9 Score:       8/6/2024     3:55 PM   PHQ-9 SCORE   PHQ-9 Total Score MyChart 0   PHQ-9 Total Score 0         8/3/2024   Substance Use   Alcohol more than 3/day or more than 7/wk No   Do you have a current opioid prescription? No   How severe/bad is pain from 1 to 10? 2/10   Do you use any other substances recreationally? (!) CANNABIS PRODUCTS      Social History     Tobacco Use    Smoking status: Never     Passive exposure: Never    Smokeless tobacco: Never   Vaping Use    Vaping status: Never Used   Substance Use Topics    Alcohol use: No    Drug use: Yes     Comment: cannabis gummies         6/30/2023   LAST FHS-7 RESULTS   1st degree relative breast or ovarian cancer No   Any relative bilateral breast cancer No   Any male have breast cancer No   Any ONE woman have BOTH breast AND ovarian cancer No   Any woman with breast cancer before 50yrs No   2 or more relatives with breast AND/OR ovarian cancer No   2 or more relatives with breast AND/OR bowel cancer No      Mammogram Screening - Mammogram every 1-2 years updated in Health Maintenance based on mutual decision making    History of abnormal Pap smear: No - age 65 or older with adequate negative prior screening test results (3 consecutive negative cytology results, 2 consecutive negative cotesting results, or 2 consecutive negative HrHPV test results within 10 years, with the most recent test occurring within the recommended screening interval for the test used)       ASCVD Risk   The 10-year ASCVD risk score (Cornelio KWOK, et al., 2019) is: 5.5%    Values used to calculate the score:      Age: 64 years      Sex: Female      Is Non- : No      Diabetic: No      Tobacco smoker: No      Systolic Blood  Pressure: 120 mmHg      Is BP treated: No      HDL Cholesterol: 54 mg/dL      Total Cholesterol: 196 mg/dL    Reviewed and updated as needed this visit by Provider   Tobacco  Allergies  Meds  Problems  Med Hx  Surg Hx  Fam Hx            Past Medical History:   Diagnosis Date    Decreased libido 11/06/2006    Depressive disorder, not elsewhere classified     Esophageal reflux     Generalized osteoarthrosis, unspecified site     R hip, on meds    Herpes simplex without mention of complication     oral    Intramural leiomyoma of uterus     Menopausal syndrome (hot flushes) 02/10/2017    Mumps     ANDREW (obstructive sleep apnea)     Palpitations     Pelvic mass 03/05/2021    Ovarian remnant cyst? Right side, needs follow up from 6/20    Right ovarian cyst 05/13/2020    Spider veins     Unspecified hypothyroidism      Past Surgical History:   Procedure Laterality Date    CHOLECYSTECTOMY      COLONOSCOPY  04/24/2015    COLONOSCOPY N/A 07/14/2022    Procedure: COLONOSCOPY;  Surgeon: Brook Calle MD;  Location: [facility] OR    COLONOSCOPY WITH CO2 INSUFFLATION N/A 04/23/2015    Procedure: COLONOSCOPY WITH CO2 INSUFFLATION;  Surgeon: Bebeto Mortensen MD;  Location:  OR    ELBOW SURGERY      Lt elbow repair.    ESOPHAGOSCOPY, GASTROSCOPY, DUODENOSCOPY (EGD), COMBINED N/A 05/31/2023    Procedure: Esophagoscopy, gastroscopy, duodenoscopy (EGD), combined;  Surgeon: Gurpreet Mata MD;  Location: [facility] GI    HYSTERECTOMY, PAP NO LONGER INDICATED      LAPAROSCOPIC CHOLECYSTECTOMY N/A 04/24/2020    Procedure: CHOLECYSTECTOMY, LAPAROSCOPIC;  Surgeon: Janett Chan MD;  Location: [facility] OR    ORTHOPEDIC SURGERY      SOFT TISSUE SURGERY      cyst, both shoulders and left elbow    ZZC LIGATE FALLOPIAN TUBE      ZZC NONSPECIFIC PROCEDURE  05/2002    rotator cuff surgery both shoulder    ZZC NONSPECIFIC PROCEDURE      wrist surgery for cyst x 2    ZZC VAGINAL HYSTERECTOMY  12/2003    with BSO, 10-12 week size     BP  Readings from Last 3 Encounters:   08/07/24 120/68   01/10/24 125/75   08/30/23 (!) 145/69    Wt Readings from Last 3 Encounters:   08/07/24 102.5 kg (226 lb)   01/10/24 108.5 kg (239 lb 4.8 oz)   08/30/23 106.6 kg (235 lb)         Patient Active Problem List   Diagnosis    Esophageal reflux    Herpes simplex virus (HSV) infection    Generalized osteoarthrosis, unspecified site    Dysthymic disorder    CARDIOVASCULAR SCREENING; LDL GOAL LESS THAN 160    Female stress incontinence    Restless leg syndrome    Ocular hypertension, right    Daytime somnolence    Chronic low back pain, unspecified back pain laterality, with sciatica presence unspecified    ANDREW (obstructive sleep apnea)    Decreased libido    Hyperlipidemia LDL goal <100    Class 2 obesity due to excess calories without serious comorbidity with body mass index (BMI) of 35.0 to 35.9 in adult    Acquired hypothyroidism    Major depressive disorder, recurrent episode, mild (H24)    History of difficult colonoscopy    Prediabetes    Osteopenia of lumbar spine    Palpitations    Class 2 severe obesity due to excess calories with serious comorbidity in adult (H)     Past Surgical History:   Procedure Laterality Date    CHOLECYSTECTOMY      COLONOSCOPY  04/24/2015    COLONOSCOPY N/A 07/14/2022    Procedure: COLONOSCOPY;  Surgeon: Brook Calle MD;  Location:  OR    COLONOSCOPY WITH CO2 INSUFFLATION N/A 04/23/2015    Procedure: COLONOSCOPY WITH CO2 INSUFFLATION;  Surgeon: Bebeto Mortensen MD;  Location:  OR    ELBOW SURGERY      Lt elbow repair.    ESOPHAGOSCOPY, GASTROSCOPY, DUODENOSCOPY (EGD), COMBINED N/A 05/31/2023    Procedure: Esophagoscopy, gastroscopy, duodenoscopy (EGD), combined;  Surgeon: Gurpreet Mata MD;  Location:  GI    HYSTERECTOMY, PAP NO LONGER INDICATED      LAPAROSCOPIC CHOLECYSTECTOMY N/A 04/24/2020    Procedure: CHOLECYSTECTOMY, LAPAROSCOPIC;  Surgeon: Janett Chan MD;  Location:  OR    ORTHOPEDIC SURGERY       SOFT TISSUE SURGERY      cyst, both shoulders and left elbow    ZZC LIGATE FALLOPIAN TUBE      ZZC NONSPECIFIC PROCEDURE  2002    rotator cuff surgery both shoulder    ZZC NONSPECIFIC PROCEDURE      wrist surgery for cyst x 2    ZZC VAGINAL HYSTERECTOMY  2003    with BSO, 10-12 week size       Social History     Tobacco Use    Smoking status: Never     Passive exposure: Never    Smokeless tobacco: Never   Substance Use Topics    Alcohol use: No     Family History   Problem Relation Age of Onset    Hypertension Mother         High Blood Pressure    Fibrocystic breast disease Mother         Has spot being watching.  Checked every 6 months    Glaucoma Mother         glc surgery    Coronary Artery Disease Father         High Cholesterol    Depression/Anxiety Father         Depression    Thyroid Disease Father         Hypo Thryoid    Retinal detachment Father     Thyroid Disease Father         Hypo    C.A.D. Maternal Grandfather     Coronary Artery Disease Maternal Grandfather         Heart Attack, ()    Breast Cancer Paternal Grandmother         Breast removed ()    Asthma Paternal Grandmother         Emphysema ()    Cerebrovascular Disease Paternal Grandfather         Strokes ()    Known Genetic Syndrome Daughter         Whitaker's Syndrome    Skin Cancer No family hx of         no family hx of skin cancer    Colon Cancer No family hx of     Ovarian Cancer No family hx of          Current Outpatient Medications   Medication Sig Dispense Refill    albuterol (PROAIR HFA/PROVENTIL HFA/VENTOLIN HFA) 108 (90 Base) MCG/ACT inhaler Inhale 2 puffs into the lungs every 6 hours as needed for shortness of breath or wheezing 18 g 11    buPROPion (WELLBUTRIN XL) 150 MG 24 hr tablet Take 1 tablet (150 mg) by mouth every morning 90 tablet 1    Calcium Carb-Cholecalciferol (CALCIUM 500 + D) 500-5 MG-MCG TABS       cyclobenzaprine (FLEXERIL) 5 MG tablet Take 1 tablet (5 mg) by mouth 2 times daily as  needed for muscle spasms 60 tablet 0    esomeprazole (NEXIUM) 40 MG DR capsule Take 1 capsule (40 mg) by mouth every morning (before breakfast) Take 30-60 minutes before eating. 90 capsule 3    famciclovir (FAMVIR) 500 MG tablet Take 1 tablet (500 mg) by mouth 2 times daily as needed 60 tablet 11    ferrous sulfate (FEROSUL) 325 (65 Fe) MG tablet Take 325 mg by mouth daily (with breakfast)      levothyroxine (SYNTHROID/LEVOTHROID) 125 MCG tablet Take 1 tablet (125 mcg) by mouth daily 90 tablet 3    tolterodine ER (DETROL LA) 2 MG 24 hr capsule Take 1 capsule (2 mg) by mouth daily 90 capsule 0    topiramate (TOPAMAX) 50 MG tablet Take 1 tablet (50 mg) by mouth 2 times daily 180 tablet 1    Vitamin D3 (VITAMIN D, CHOLECALCIFEROL,) 25 mcg (1000 units) tablet Take 1 tablet by mouth daily      Vitamins-Lipotropics (LIPOFLAVONOID) TABS Take 1 tablet by mouth 2 times daily      biotin 1000 MCG TABS tablet Take 1,000 mcg by mouth daily      conjugated estrogens (PREMARIN) 0.625 MG/GM vaginal cream Place 0.5 g vaginally twice a week 30 g 1    estradiol (VAGIFEM) 10 MCG TABS vaginal tablet Place 1 tablet (10 mcg) vaginally twice a week 24 tablet 3    triamcinolone (KENALOG) 0.1 % external cream Apply topically 2 times daily Do not use longer than 2 weeks (Patient not taking: Reported on 3/14/2024) 30 g 0     Allergies   Allergen Reactions    No Known Drug Allergy        Current providers sharing in care for this patient include:  Patient Care Team:  Bhumi Choe PA-C as PCP - General (Family Medicine)  Tiff Dunn MD as MD (Dermatology)  Christie Velázquez PA-C as Physician Assistant (Urology)  Christie Velázquez PA-C as Assigned Surgical Provider  Giovanny Roach MD as Assigned Heart and Vascular Provider  Damari Baltazar MD as Assigned PCP  Janett Zaldivar PA-C as Physician Assistant (Gastroenterology)  Sara Velasquez PA-C as Physician Assistant (Gastroenterology)    The following health maintenance  "items are reviewed in Epic and correct as of today:  Health Maintenance   Topic Date Due    ASTHMA ACTION PLAN  Never done    RSV VACCINE (Pregnancy & 60+) (1 - 1-dose 60+ series) Never done    COVID-19 Vaccine (7 - 2023-24 season) 03/10/2024    MEDICARE ANNUAL WELLNESS VISIT  05/16/2024    LIPID  05/16/2024    ANNUAL REVIEW OF HM ORDERS  05/16/2024    INFLUENZA VACCINE (1) 09/01/2024    ASTHMA CONTROL TEST  09/14/2024    TSH W/FREE T4 REFLEX  01/10/2025    PHQ-9  02/07/2025    MAMMO SCREENING  06/30/2025    FALL RISK ASSESSMENT  08/07/2025    GLUCOSE  03/11/2027    COLORECTAL CANCER SCREENING  07/14/2027    DTAP/TDAP/TD IMMUNIZATION (3 - Td or Tdap) 02/23/2028    ADVANCE CARE PLANNING  05/16/2028    DEXA  06/30/2028    HEPATITIS C SCREENING  Completed    DEPRESSION ACTION PLAN  Completed    Pneumococcal Vaccine: 65+ Years  Completed    ZOSTER IMMUNIZATION  Completed    IPV IMMUNIZATION  Aged Out    HPV IMMUNIZATION  Aged Out    MENINGITIS IMMUNIZATION  Aged Out    RSV MONOCLONAL ANTIBODY  Aged Out    URINE DRUG SCREEN  Discontinued    PAP  Discontinued       Review of Systems  Constitutional, HEENT, cardiovascular, pulmonary, GI, , musculoskeletal, neuro, skin, endocrine and psych systems are negative, except as otherwise noted.       Objective    Exam  /68 (BP Location: Right arm, Patient Position: Sitting, Cuff Size: Adult Large)   Pulse 61   Temp 98.2  F (36.8  C) (Oral)   Resp 20   Ht 1.74 m (5' 8.5\")   Wt 102.5 kg (226 lb)   LMP  (LMP Unknown)   SpO2 97%   BMI 33.86 kg/m     Estimated body mass index is 33.86 kg/m  as calculated from the following:    Height as of this encounter: 1.74 m (5' 8.5\").    Weight as of this encounter: 102.5 kg (226 lb).    Physical Exam  GENERAL: alert and no distress  EYES: Eyes grossly normal to inspection, PERRL and conjunctivae and sclerae normal  HENT: ear canals and TM's normal, nose and mouth without ulcers or lesions  NECK: no adenopathy, no asymmetry, " masses, or scars  RESP: lungs clear to auscultation - no rales, rhonchi or wheezes  CV: regular rate and rhythm, normal S1 S2, no S3 or S4, no murmur, click or rub  MS: no gross musculoskeletal defects noted, no edema             8/7/2024   Mini Cog   Clock Draw Score 2 Normal   3 Item Recall 3 objects recalled   Mini Cog Total Score 5        Vision Screen  Reason Vision Screen Not Completed: Patient had exam in last 12 months    Signed Electronically by: Bhumi Choe PA-C    Answers submitted by the patient for this visit:  Patient Health Questionnaire (Submitted on 8/6/2024)  PHQ9 TOTAL SCORE: 0  VERONICA-7 (Submitted on 8/6/2024)  VERONICA 7 TOTAL SCORE: 4

## 2024-08-07 NOTE — PATIENT INSTRUCTIONS
Patient Education   Preventive Care Advice   This is general advice given by our system to help you stay healthy. However, your care team may have specific advice just for you. Please talk to your care team about your preventive care needs.  Nutrition  Eat 5 or more servings of fruits and vegetables each day.  Try wheat bread, brown rice and whole grain pasta (instead of white bread, rice, and pasta).  Get enough calcium and vitamin D. Check the label on foods and aim for 100% of the RDA (recommended daily allowance).  Lifestyle  Exercise at least 150 minutes each week  (30 minutes a day, 5 days a week).  Do muscle strengthening activities 2 days a week. These help control your weight and prevent disease.  No smoking.  Wear sunscreen to prevent skin cancer.  Have a dental exam and cleaning every 6 months.  Yearly exams  See your health care team every year to talk about:  Any changes in your health.  Any medicines your care team has prescribed.  Preventive care, family planning, and ways to prevent chronic diseases.  Shots (vaccines)   HPV shots (up to age 26), if you've never had them before.  Hepatitis B shots (up to age 59), if you've never had them before.  COVID-19 shot: Get this shot when it's due.  Flu shot: Get a flu shot every year.  Tetanus shot: Get a tetanus shot every 10 years.  Pneumococcal, hepatitis A, and RSV shots: Ask your care team if you need these based on your risk.  Shingles shot (for age 50 and up)  General health tests  Diabetes screening:  Starting at age 35, Get screened for diabetes at least every 3 years.  If you are younger than age 35, ask your care team if you should be screened for diabetes.  Cholesterol test: At age 39, start having a cholesterol test every 5 years, or more often if advised.  Bone density scan (DEXA): At age 50, ask your care team if you should have this scan for osteoporosis (brittle bones).  Hepatitis C: Get tested at least once in your life.  STIs (sexually  transmitted infections)  Before age 24: Ask your care team if you should be screened for STIs.  After age 24: Get screened for STIs if you're at risk. You are at risk for STIs (including HIV) if:  You are sexually active with more than one person.  You don't use condoms every time.  You or a partner was diagnosed with a sexually transmitted infection.  If you are at risk for HIV, ask about PrEP medicine to prevent HIV.  Get tested for HIV at least once in your life, whether you are at risk for HIV or not.  Cancer screening tests  Cervical cancer screening: If you have a cervix, begin getting regular cervical cancer screening tests starting at age 21.  Breast cancer scan (mammogram): If you've ever had breasts, begin having regular mammograms starting at age 40. This is a scan to check for breast cancer.  Colon cancer screening: It is important to start screening for colon cancer at age 45.  Have a colonoscopy test every 10 years (or more often if you're at risk) Or, ask your provider about stool tests like a FIT test every year or Cologuard test every 3 years.  To learn more about your testing options, visit:   .  For help making a decision, visit:   https://bit.ly/is85897.  Prostate cancer screening test: If you have a prostate, ask your care team if a prostate cancer screening test (PSA) at age 55 is right for you.  Lung cancer screening: If you are a current or former smoker ages 50 to 80, ask your care team if ongoing lung cancer screenings are right for you.  For informational purposes only. Not to replace the advice of your health care provider. Copyright   2023 [facility]. All rights reserved. Clinically reviewed by the [facility] Transitions Program. [employer] 589846 - REV 01/24.  Preventing Falls: Care Instructions  Injuries and health problems such as trouble walking or poor eyesight can increase your risk of falling. So can some medicines. But there are things you can do to help  "prevent falls. You can exercise to get stronger. You can also arrange your home to make it safer.    Talk to your doctor about the medicines you take. Ask if any of them increase the risk of falls and whether they can be changed or stopped.   Try to exercise regularly. It can help improve your strength and balance. This can help lower your risk of falling.     Practice fall safety and prevention.    Wear low-heeled shoes that fit well and give your feet good support. Talk to your doctor if you have foot problems that make this hard.  Carry a cellphone or wear a medical alert device that you can use to call for help.  Use stepladders instead of chairs to reach high objects. Don't climb if you're at risk for falls. Ask for help, if needed.  Wear the correct eyeglasses, if you need them.    Make your home safer.    Remove rugs, cords, clutter, and furniture from walkways.  Keep your house well lit. Use night-lights in hallways and bathrooms.  Install and use sturdy handrails on stairways.  Wear nonskid footwear, even inside. Don't walk barefoot or in socks without shoes.    Be safe outside.    Use handrails, curb cuts, and ramps whenever possible.  Keep your hands free by using a shoulder bag or backpack.  Try to walk in well-lit areas. Watch out for uneven ground, changes in pavement, and debris.  Be careful in the winter. Walk on the grass or gravel when sidewalks are slippery. Use de-icer on steps and walkways. Add non-slip devices to shoes.    Put grab bars and nonskid mats in your shower or tub and near the toilet. Try to use a shower chair or bath bench when bathing.   Get into a tub or shower by putting in your weaker leg first. Get out with your strong side first. Have a phone or medical alert device in the bathroom with you.   Where can you learn more?  Go to https://www.[employer].net/patiented  Enter G117 in the search box to learn more about \"Preventing Falls: Care Instructions.\"  Current as of: July 17, " 2023               Content Version: 14.0    5865-1808 [employer].   Care instructions adapted under license by your healthcare professional. If you have questions about a medical condition or this instruction, always ask your healthcare professional. [employer] disclaims any warranty or liability for your use of this information.      Hearing Loss: Care Instructions  Overview     Hearing loss is a sudden or slow decrease in how well you hear. It can range from slight to profound. Permanent hearing loss can occur with aging. It also can happen when you are exposed long-term to loud noise. Examples include listening to loud music, riding motorcycles, or being around other loud machines.  Hearing loss can affect your work and home life. It can make you feel lonely or depressed. You may feel that you have lost your independence. But hearing aids and other devices can help you hear better and feel connected to others.  Follow-up care is a key part of your treatment and safety. Be sure to make and go to all appointments, and call your doctor if you are having problems. It's also a good idea to know your test results and keep a list of the medicines you take.  How can you care for yourself at home?  Avoid loud noises whenever possible. This helps keep your hearing from getting worse.  Always wear hearing protection around loud noises.  Wear a hearing aid as directed.  A professional can help you pick a hearing aid that will work best for you.  You can also get hearing aids over the counter for mild to moderate hearing loss.  Have hearing tests as your doctor suggests. They can show whether your hearing has changed. Your hearing aid may need to be adjusted.  Use other devices as needed. These may include:  Telephone amplifiers and hearing aids that can connect to a television, stereo, radio, or microphone.  Devices that use lights or vibrations. These alert you to the doorbell, a ringing  "telephone, or a baby monitor.  Television closed-captioning. This shows the words at the bottom of the screen. Most new TVs can do this.  TTY (text telephone). This lets you type messages back and forth on the telephone instead of talking or listening. These devices are also called TDD. When messages are typed on the keyboard, they are sent over the phone line to a receiving TTY. The message is shown on a monitor.  Use text messaging, social media, and email if it is hard for you to communicate by telephone.  Try to learn a listening technique called speechreading. It is not lipreading. You pay attention to people's gestures, expressions, posture, and tone of voice. These clues can help you understand what a person is saying. Face the person you are talking to, and have them face you. Make sure the lighting is good. You need to see the other person's face clearly.  Think about counseling if you need help to adjust to your hearing loss.  When should you call for help?  Watch closely for changes in your health, and be sure to contact your doctor if:    You think your hearing is getting worse.     You have new symptoms, such as dizziness or nausea.   Where can you learn more?  Go to https://www.[employer].net/patiented  Enter R798 in the search box to learn more about \"Hearing Loss: Care Instructions.\"  Current as of: September 27, 2023               Content Version: 14.0    8705-5747 [employer].   Care instructions adapted under license by your healthcare professional. If you have questions about a medical condition or this instruction, always ask your healthcare professional. [employer] disclaims any warranty or liability for your use of this information.  "

## 2024-08-08 ENCOUNTER — LAB (OUTPATIENT)
Dept: LAB | Facility: CLINIC | Age: 64
End: 2024-08-08
Payer: COMMERCIAL

## 2024-08-08 DIAGNOSIS — R94.4 DECREASED GFR: ICD-10-CM

## 2024-08-08 DIAGNOSIS — Z00.00 ENCOUNTER FOR MEDICARE ANNUAL WELLNESS EXAM: ICD-10-CM

## 2024-08-08 DIAGNOSIS — Z13.220 SCREENING FOR HYPERLIPIDEMIA: ICD-10-CM

## 2024-08-08 LAB
ALBUMIN SERPL BCG-MCNC: 4.1 G/DL (ref 3.5–5.2)
ALP SERPL-CCNC: 97 U/L (ref 40–150)
ALT SERPL W P-5'-P-CCNC: 27 U/L (ref 0–50)
ANION GAP SERPL CALCULATED.3IONS-SCNC: 8 MMOL/L (ref 7–15)
AST SERPL W P-5'-P-CCNC: 23 U/L (ref 0–45)
BILIRUB SERPL-MCNC: 0.5 MG/DL
BUN SERPL-MCNC: 15.3 MG/DL (ref 8–23)
CALCIUM SERPL-MCNC: 9.5 MG/DL (ref 8.8–10.4)
CHLORIDE SERPL-SCNC: 106 MMOL/L (ref 98–107)
CHOLEST SERPL-MCNC: 194 MG/DL
CREAT SERPL-MCNC: 0.89 MG/DL (ref 0.51–0.95)
EGFRCR SERPLBLD CKD-EPI 2021: 71 ML/MIN/1.73M2
FASTING STATUS PATIENT QL REPORTED: YES
FASTING STATUS PATIENT QL REPORTED: YES
GLUCOSE SERPL-MCNC: 115 MG/DL (ref 70–99)
HCO3 SERPL-SCNC: 27 MMOL/L (ref 22–29)
HDLC SERPL-MCNC: 51 MG/DL
LDLC SERPL CALC-MCNC: 126 MG/DL
NONHDLC SERPL-MCNC: 143 MG/DL
POTASSIUM SERPL-SCNC: 4.4 MMOL/L (ref 3.4–5.3)
PROT SERPL-MCNC: 6.6 G/DL (ref 6.4–8.3)
SODIUM SERPL-SCNC: 141 MMOL/L (ref 135–145)
TRIGL SERPL-MCNC: 84 MG/DL

## 2024-08-08 PROCEDURE — 80061 LIPID PANEL: CPT

## 2024-08-08 PROCEDURE — 36415 COLL VENOUS BLD VENIPUNCTURE: CPT

## 2024-08-08 PROCEDURE — 80053 COMPREHEN METABOLIC PANEL: CPT

## 2024-09-26 NOTE — TELEPHONE ENCOUNTER
REFERRAL INFORMATION:  Referring Provider:  Damari Baltazar MD   Referring Clinic:  [facility]   Reason for Visit/Diagnosis: K59.1 (ICD-10-CM) - Functional diarrhea      FUTURE VISIT INFORMATION:  Appointment Date: 11/4/24  Appointment Time:      NOTES STATUS DETAILS   OFFICE NOTE from Referring Provider Internal 3/14/24   OFFICE NOTE from Other Specialist N/A    HOSPITAL DISCHARGE SUMMARY/  ED VISITS Internal Admit 4/23/20-4/24/20-Dr. Dela Cruz   OPERATIVE REPORT Internal 4/24/20-CHOLECYSTECTOMY, LAPAROSCOPIC -Dr. Chan   MEDICATION LIST Internal         ENDOSCOPY  Internal 5/31/23   COLONOSCOPY Internal 7/14/22  4/23/15   ERCP N/A    EUS N/A    STOOL TESTING N/A    PERTINENT LABS Internal    PATHOLOGY REPORTS (RELATED) N/A    IMAGING (CT, MRI, EGD, MRCP, Small Bowel Follow Through/SBT, MR/CT Enterography) Internal 7/14/22-CT colonography    5/10/20-CT abd pel

## 2024-10-07 ENCOUNTER — VIRTUAL VISIT (OUTPATIENT)
Dept: UROLOGY | Facility: CLINIC | Age: 64
End: 2024-10-07
Payer: COMMERCIAL

## 2024-10-07 VITALS — WEIGHT: 223 LBS | BODY MASS INDEX: 33.41 KG/M2

## 2024-10-07 DIAGNOSIS — N39.3 FEMALE STRESS INCONTINENCE: ICD-10-CM

## 2024-10-07 DIAGNOSIS — R39.15 URINARY URGENCY: Primary | ICD-10-CM

## 2024-10-07 PROCEDURE — 99213 OFFICE O/P EST LOW 20 MIN: CPT | Mod: 95 | Performed by: PHYSICIAN ASSISTANT

## 2024-10-07 RX ORDER — TOLTERODINE 2 MG/1
2 CAPSULE, EXTENDED RELEASE ORAL DAILY
Qty: 90 CAPSULE | Refills: 3 | Status: SHIPPED | OUTPATIENT
Start: 2024-10-21

## 2024-10-07 RX ORDER — TOLTERODINE 2 MG/1
2 CAPSULE, EXTENDED RELEASE ORAL DAILY
Qty: 90 CAPSULE | Refills: 3 | Status: SHIPPED | OUTPATIENT
Start: 2024-10-07

## 2024-10-07 ASSESSMENT — ENCOUNTER SYMPTOMS
HEMATURIA: 0
CONSTITUTIONAL NEGATIVE: 1
FREQUENCY: 0
DYSURIA: 0

## 2024-10-07 NOTE — LETTER
10/7/2024       RE: Amina Pineda  [address] [address] [address]     Dear Colleague,    Thank you for referring your patient, Amina Pineda, to the [facility] [place] [facility]. Please see a copy of my visit note below.    Virtual Visit Details    Type of service:  Video Visit   Video Start Time:  1259  Video End Time: 1303    Originating Location (pt. Location): Home    Distant Location (provider location):  On-site  Platform used for Video Visit: Ilda    CHIEF COMPLAINT/REASON FOR VISIT   Follow up on urinary incontinence-med check    HISTORY OF PRESENT ILLNESS   Ms. Pineda is very pleasant 64 year old year old female, who presents today for follow-up on urinary incontinence.  I initially saw her on 07/31/2024.  She had been on Detrol 2 mg daily previously, but had to be switched to oxybutynin 10 mg extended release.  It did not help her urinary symptoms as well as the Detrol.  At her last visit, we recommended restarting Detrol 2 mg and utilizing Good [facility].    Patient has continued on Detrol 2 mg once daily.  She notes some mild dry mouth with it, otherwise no side effects.  She is doing well on this and is not wearing any pads.  Her nurse visit last year showed she was emptying her bladder well with a postvoid residual of 36 mL.    She denies any current UTI symptoms.  She feels like she is doing well on her current medication regimen.    The following portions of the patient's history were reviewed and updated as appropriate: allergies, current medications, past family history, past medical history, past social history, past surgical history, and problem list.     REVIEW OF SYSTEMS   Review of Systems   Constitutional: Negative.    Genitourinary:  Negative for dysuria, frequency, hematuria and urgency.      Per HPI.     Patient Active Problem List   Diagnosis     Esophageal reflux     Herpes simplex virus (HSV)  infection     Generalized osteoarthrosis, unspecified site     Dysthymic disorder     CARDIOVASCULAR SCREENING; LDL GOAL LESS THAN 160     Female stress incontinence     Restless leg syndrome     Ocular hypertension, right     Daytime somnolence     Chronic low back pain, unspecified back pain laterality, with sciatica presence unspecified     ANDREW (obstructive sleep apnea)     Decreased libido     Hyperlipidemia LDL goal <100     Class 2 obesity due to excess calories without serious comorbidity with body mass index (BMI) of 35.0 to 35.9 in adult     Acquired hypothyroidism     Major depressive disorder, recurrent episode, mild (H)     History of difficult colonoscopy     Prediabetes     Osteopenia of lumbar spine     Palpitations     Class 2 severe obesity due to excess calories with serious comorbidity in adult (H)      Past Medical History:   Diagnosis Date     Decreased libido 11/06/2006     Depressive disorder, not elsewhere classified      Esophageal reflux      Generalized osteoarthrosis, unspecified site     R hip, on meds     Herpes simplex without mention of complication     oral     Intramural leiomyoma of uterus      Menopausal syndrome (hot flushes) 02/10/2017     Mumps      ANDREW (obstructive sleep apnea)      Palpitations      Pelvic mass 03/05/2021    Ovarian remnant cyst? Right side, needs follow up from 6/20     Right ovarian cyst 05/13/2020     Spider veins      Unspecified hypothyroidism         Objective     PHYSICAL EXAM   GENERAL: alert and no distress  EYES: Eyes grossly normal to inspection.  No discharge or erythema, or obvious scleral/conjunctival abnormalities.  HENT: Normal cephalic/atraumatic.  External ears, nose and mouth without ulcers or lesions.  No nasal drainage visible.  NECK: No asymmetry, visible masses or scars  RESP: No audible wheeze, cough, or visible cyanosis.    MS: No gross musculoskeletal defects noted.  Normal range of motion.  No visible edema.  SKIN: Visible skin  clear. No significant rash, abnormal pigmentation or lesions.  NEURO: Cranial nerves grossly intact.  Mentation and speech appropriate for age.  PSYCH: Appropriate affect, tone, and pace of words       Assessment & Plan   1. Urinary urgency    2. Female stress incontinence        I had the pleasure today of meeting with Ms. Pineda to discuss her urinary incontinence.  She has been taking Detrol 2 mg daily.  She has minimal side effects with this and feels like it is controlling her symptoms well at this time.  Her last nurse visit showed that she empties her bladder well.  Denies any current UTI symptoms or recent UTIs.    Will plan on the following:    -Continue with Detrol 2 mg extended release once daily.  Refills provided for 1 year.    -Plan on follow-up in 1 year with med check.  Contact us in the interim if symptomatology worsens, could consider increasing Detrol to 4 mg extended release or alternative therapies.    Signed by:       Christie Velázquez PA-C 10/7/2024 12:59 PM         Again, thank you for allowing me to participate in the care of your patient.      Sincerely,    Christie Velázquez PA-C

## 2024-10-07 NOTE — PATIENT INSTRUCTIONS
-Continue with Detrol 2 mg extended release once daily.  Refills provided for 1 year.    -Plan on follow-up in 1 year with med check.      -Contact us in the interim if symptomatology worsens, could consider increasing Detrol to 4 mg extended release or alternative therapies such as a different anticholinergic, beta 3 agonist, pelvic floor physical therapy, pessary, or posterior tibial nerve stimulation.    Contact us in the interim with questions, concerns, or changes in symptomatology.  [phone]

## 2024-10-07 NOTE — NURSING NOTE
Current patient location: [address] [place] [address]    Is the patient currently in the state of [place]? YES    Visit mode:VIDEO    If the visit is dropped, the patient can be reconnected by: VIDEO VISIT: Text to cell phone:   Telephone Information:   Mobile [phone]       Will anyone else be joining the visit? NO  (If patient encounters technical issues they should call [phone]    Are changes needed to the allergy or medication list? No    Are refills needed on medications prescribed by this physician? NO    Rooming Documentation:  Questionnaire(s) completed    Reason for visit: Video Visit and RECHECK    Dyana COOPER

## 2024-10-09 NOTE — PROGRESS NOTES
Visit #:  7 in 2019    Subjective:  Amina Pineda is a 56 year old female who has been seen for:        Segmental dysfunction of pelvic region  Lumbago  Segmental dysfunction of lumbar region  Spasm of muscle  Segmental dysfunction of thoracic region  Cervicalgia  Cervical segment dysfunction.     Since last visit on 9/9/2019,  Amina Pineda reports the following changes: Pain immediately after last treatment: 1/10 and their pain level today 4-5/10.  Amina reports that she was feeling better, then her low back and neck started stiffening up a week or so ago.  This past week she was crawling around trying to capture a mouse which seemed to flare up her back.    Area of chief complaint:  Lumbar :  Symptoms are graded at  4-5/10. The quality is described as stiff, achey.  Motion has increased, but is still not normal. Patient feels that they are improved due to a reduction in symptoms.        Objective:  The following was observed:    P: palpatory tenderness, piriformis bilaterally    A: static palpation demonstrates intersegmental asymmetry , pelvis, lumbar thoracic    R: motion palpation notes restricted motion, : C6, C7, T1, T2,  T10, T11, T12, L4, L5, RPSIS    T: hypertonicity at: Lumbar erector spine, Piriformis  R>>L      Assessment:    Segmental spinal dysfunction/restrictions found at:  C6, C7, T1, T2, T10, T11, T12, L4, L5, R PSIS    Diagnoses:      1. Segmental dysfunction of pelvic region    2. Lumbago    3. Segmental dysfunction of lumbar region    4. Spasm of muscle    5. Segmental dysfunction of thoracic region    6. Cervicalgia   7. Segmental dysfunction of cervical       Patient's condition:  Patient had restrictions pre-manipulation    Treatment effectiveness:  Post manipulation there is better intersegmental movement and Patient claims to feel looser post manipulation      Procedures:  CMT:  16887 Chiropractic manipulative treatment 3-4 regions performed   Cervical: Activator,  C6, C7  prone  Thoracic  Activator, T1, T2, T10, T11, T12, , Prone  Lumbar: Activator, L4, L5,Prone  Pelvis: Drop Table, PSIS Right , Prone    Modalities:  35304: MSTM:  To Piriformis  for 5 min    Therapeutic procedures:  None      Prognosis: Good    Progress towards Goals: Patient is making progress towards the goal     Response to Treatment:   Reduction in symptoms as reported by patient      Recommendations:    Instructions:ice 20 minutes every other hour as needed    Follow-up:  Return to care in one week               VOLTAREN  Take 1 tablet by mouth 2 times daily for 10 days            ASK your doctor about these medications      HYDROcodone-acetaminophen 5-325 MG per tablet  Commonly known as: NORCO     letrozole 2.5 MG tablet  Commonly known as: FEMARA     lisinopril 20 MG tablet  Commonly known as: PRINIVIL;ZESTRIL     promethazine 12.5 MG tablet  Commonly known as: PHENERGAN               Where to Get Your Medications        These medications were sent to [facility] [id number] - [facility] [address] [place] [address] - P [phone] - F [phone]  [address] [place] [address]      Phone: [phone]   diclofenac 75 MG EC tablet           DISCONTINUED MEDICATIONS:  Discharge Medication List as of 10/8/2024 11:51 PM          I am the Primary Clinician of Record.   Carlos Eduardo Young MD (electronically signed)    (Please note that parts of this dictation were completed with voice recognition software. Quite often unanticipated grammatical, syntax, homophones, and other interpretive errors are inadvertently transcribed by the computer software. Please disregards these errors. Please excuse any errors that have escaped final proofreading.)         Carlos Eduardo Young MD  10/09/24 0256

## 2024-11-04 ENCOUNTER — TELEPHONE (OUTPATIENT)
Dept: GASTROENTEROLOGY | Facility: CLINIC | Age: 64
End: 2024-11-04

## 2024-11-04 ENCOUNTER — PRE VISIT (OUTPATIENT)
Dept: GASTROENTEROLOGY | Facility: CLINIC | Age: 64
End: 2024-11-04

## 2024-11-04 ENCOUNTER — OFFICE VISIT (OUTPATIENT)
Dept: GASTROENTEROLOGY | Facility: CLINIC | Age: 64
End: 2024-11-04
Attending: FAMILY MEDICINE
Payer: COMMERCIAL

## 2024-11-04 VITALS
OXYGEN SATURATION: 96 % | HEART RATE: 68 BPM | SYSTOLIC BLOOD PRESSURE: 137 MMHG | BODY MASS INDEX: 33.28 KG/M2 | WEIGHT: 224.7 LBS | HEIGHT: 69 IN | DIASTOLIC BLOOD PRESSURE: 87 MMHG

## 2024-11-04 DIAGNOSIS — K21.9 GASTROESOPHAGEAL REFLUX DISEASE, UNSPECIFIED WHETHER ESOPHAGITIS PRESENT: Chronic | ICD-10-CM

## 2024-11-04 DIAGNOSIS — R19.7 DIARRHEA, UNSPECIFIED TYPE: Primary | ICD-10-CM

## 2024-11-04 DIAGNOSIS — K59.1 FUNCTIONAL DIARRHEA: ICD-10-CM

## 2024-11-04 PROCEDURE — 99205 OFFICE O/P NEW HI 60 MIN: CPT | Performed by: DIETITIAN, REGISTERED

## 2024-11-04 RX ORDER — MONTELUKAST SODIUM 4 MG/1
2 TABLET, CHEWABLE ORAL DAILY
Qty: 90 TABLET | Refills: 4 | Status: SHIPPED | OUTPATIENT
Start: 2024-11-04

## 2024-11-04 RX ORDER — MONTELUKAST SODIUM 4 MG/1
1 TABLET, CHEWABLE ORAL 2 TIMES DAILY
Qty: 90 TABLET | Refills: 4 | Status: SHIPPED | OUTPATIENT
Start: 2024-11-04 | End: 2024-11-04

## 2024-11-04 ASSESSMENT — PAIN SCALES - GENERAL: PAINLEVEL_OUTOF10: NO PAIN (0)

## 2024-11-04 NOTE — PROGRESS NOTES
GI CLINIC VISIT - NEW PATIENT    CC/REFERRING MD:  Damari Baltazar  REASON FOR CONSULTATION: salomón    ASSESSMENT/PLAN:      #GERD  Patient with longstanding GERD with acid regurgitation, nighttime symptoms.  EGD done in 2023 without findings noted, no biopsies taken.  Per patient told signs of reflux and scarring related to reflux so recommended lifelong use of Nexium, this is noted in recommendations.  Nexium is controlling her symptoms, can continue this daily, tolerating well.    #Chronic Intermitent Diarrhea  Patient with 5+ years of intermittent loose urgent stools, with significant worsening after cholecystectomy.  No inciting factors for loose stools at time of onset prior to cholecystectomy.  Has hypothyroidism, on levothyroxine, TSH wnl 1/2024. BMP wnl 8/2024. No other labs or stool studies.  Given tortuous colon and going multiple days without bowel movements then having loose liquid urgent explosive stools, possibly constipation with overflow/emptying, likely component of bile acid diarrhea triggering bowel movements.  Other considerations include functional diarrhea, chronic infection, celiac, IBD, SIBO, lactose intolerance/other carbohydrate intolerance, pancreatic insufficiency, other.  We will start with labs including celiac serologies and CBC and stool test for infection (enteric panel, ova and parasite) and fecal calprotectin.  Clinical picture not consistent with C. difficile infection.  To help regulate her bowel pattern will start soluble fiber supplementation with soluble fiber powder, she prefers to mix indoor coffee so suggested Benefiber.  Given likely component of bile acid diarrhea we will also try colestipol which she can take as needed before she eats out to try to prevent episodes of diarrhea.  Start with 1 to 2 pills prior to meal, discussed medication interactions and timing with medications. Discussed this could cause constipation and monitor bowel pattern. Pending response,  can consider daily scheduled use.    Discussed differential, outlined options and reviewed medications with patient.  Mutually agreed upon plan outlined below.  PLAN:  -- Fecal calprotectin  -- Infectious stool studies including Enteric Panel, Ova & Parasite  -- Celiac serologies  -- CBC  -- Start Soluble fiber supplementation on a daily basis  with Benefiber (vs Citrucel/Metamucil) start 1/2 tablespoon per day increase up to 2 tablespoons per day. Y  -- Try colestipol pill before you eat out. Take 1-2 pills.   ------- Consider colonoscopy if fecal calprotectin elevated and biopsies for microscopic coltiis  ------- Future considerations include fecal elastase, fecal fat, hydrogen breath testing,        Colorectal cancer screening: colonoscopy (unable to get to cecum) and subsequent CT colonography normal 7/14/2022, repeat CT colonography in 5 years. Patient with tortuous colon and colonoscopies have been very difficult, she has been recommended CT colonography vs stool based testing for colon cancer screening. No FH of CRC.      RTC 3-4 months    Thank you for this consultation.  It was a pleasure to participate in the care of this patient; please contact us with any further questions.     60Minutes was spent on the date of the encounter during chart review, history and exam, documentation, and further activities as noted       Sara Velasquez PA-C  Division of Hepatology, Gastroenterology & Nutrition  [facility]      HPI  Ms. Pineda is a 64 year old year old female with history of obesity, hyperlipidemia, ANDREW, prediabetes osteopenia, depression, hypothyroidism (on levothyroxine), female stress incontinence, GERD status post hysterectomy (2003), cholecystectomy (2020) who presents for evaluation of chronic diarrhea. They are new to the [facility] GI clinic and this is my first encounter with the patient.     Patient reports diarrhea for 5+ years, no inciting events, travel, medication changes however notes  "worsening following cholecystectomy in 2020.  She has tried probiotics which she feels has helped some, recently started fiber gummy 1 per day, may be slightly bulking stools.  Also notes that iron supplement for restless legs make stool dark and slows down GI tract.     Currently going 2 to 3 days without a bowel movement then will have liquid high-volume, urgent, \"explosive \"diarrhea multiple times in a row over the course of a couple hours.  After this feels worn out and empty.  No straining.  No blood or black stools (aside from dark when taking iron).  Stools typically are following meal, often eating out.  No specific food triggers but tends to avoids higher fat foods as she feels this would not be very well-tolerated.  No associated abdominal pain.  No fecal leakage between bowel movements.  Rare incontinence.  No nausea or vomiting.    She does have history of GERD.  EGD report states normal esophagus however recommendation for indefinite use of Nexium.  Per patient endoscopist noted \"scarring \"related to reflux and stated she should continue PPI long-term.  With Nexium 40 mg daily no GERD symptoms, no dysphagia or dyne aphasia.      ROS:  Complete 10 System ROS performed. All are negative except as documented below, in the HPI, or in patient questionnaire from today's visit.    PROBLEM LIST  Patient Active Problem List    Diagnosis Date Noted    Class 2 severe obesity due to excess calories with serious comorbidity in adult (H) 01/10/2024     Priority: Medium    Palpitations 08/30/2023     Priority: Medium    Osteopenia of lumbar spine 07/03/2023     Priority: Medium     Dexa 6/2023 - osteopenia lumbar spine, repeat 5 years      Major depressive disorder, recurrent episode, mild (H) 05/16/2023     Priority: Medium    History of difficult colonoscopy 05/16/2023     Priority: Medium     Colonoscopy 7/2022:  Impression:               - The entire examined colon through the ascending                             " colon is normal.                             - No specimens collected.   Recommendation:           - Perform a virtual colonoscopy (CT colonography)                             at appointment to be scheduled.                             - Consider further screenings with stool based                             studies +/- CT colonography every 5 years due to                             extreme difficulty with reaching the cecum.     CT colonography 7/14/22: Recommend colon screening going forward with stool studies or CT colography every 5 years, due to extremely difficult colonoscopy and inability to reach the cecum      Prediabetes 05/16/2023     Priority: Medium    Acquired hypothyroidism      Priority: Medium    Class 2 obesity due to excess calories without serious comorbidity with body mass index (BMI) of 35.0 to 35.9 in adult 08/28/2018     Priority: Medium    Decreased libido 02/25/2018     Priority: Medium    Hyperlipidemia LDL goal <100 02/25/2018     Priority: Medium    ANDREW (obstructive sleep apnea) 03/09/2017     Priority: Medium     Home Sleep Apnea Testing - 3/8/17: 243 lbs 0 oz: AHI 6.7/hr. Supine AHI 19.4/hr. Oxygen Asael of 85%. Baseline 92.4%. Sp02 =< 88% for 3 minutes      Daytime somnolence 02/10/2017     Priority: Medium    Chronic low back pain, unspecified back pain laterality, with sciatica presence unspecified 02/10/2017     Priority: Medium    Ocular hypertension, right 09/28/2016     Priority: Medium    Female stress incontinence 04/10/2015     Priority: Medium    Restless leg syndrome 04/10/2015     Priority: Medium    CARDIOVASCULAR SCREENING; LDL GOAL LESS THAN 160 10/31/2010     Priority: Medium    Dysthymic disorder 11/06/2006     Priority: Medium     Started fluoxetine 10 mg daily January 29, 2013        Esophageal reflux 11/05/2004     Priority: Medium     Had an EGD in 2005      Herpes simplex virus (HSV) infection 11/05/2004     Priority: Medium     Problem list name updated by  automated process. Provider to review      Generalized osteoarthrosis, unspecified site 11/05/2004     Priority: Medium     R hip, on meds         PERTINENT PAST MEDICAL HISTORY:  Past Medical History:   Diagnosis Date    Decreased libido 11/06/2006    Depressive disorder, not elsewhere classified     Esophageal reflux     Generalized osteoarthrosis, unspecified site     R hip, on meds    Herpes simplex without mention of complication     oral    Intramural leiomyoma of uterus     Menopausal syndrome (hot flushes) 02/10/2017    Mumps     ANDREW (obstructive sleep apnea)     Palpitations     Pelvic mass 03/05/2021    Ovarian remnant cyst? Right side, needs follow up from 6/20    Right ovarian cyst 05/13/2020    Spider veins     Unspecified hypothyroidism        PREVIOUS SURGERIES:  Past Surgical History:   Procedure Laterality Date    CHOLECYSTECTOMY      COLONOSCOPY  04/24/2015    COLONOSCOPY N/A 07/14/2022    Procedure: COLONOSCOPY;  Surgeon: Brook Calle MD;  Location: [facility] OR    COLONOSCOPY WITH CO2 INSUFFLATION N/A 04/23/2015    Procedure: COLONOSCOPY WITH CO2 INSUFFLATION;  Surgeon: Bebeto Mortensen MD;  Location: [facility] OR    ELBOW SURGERY      Lt elbow repair.    ESOPHAGOSCOPY, GASTROSCOPY, DUODENOSCOPY (EGD), COMBINED N/A 05/31/2023    Procedure: Esophagoscopy, gastroscopy, duodenoscopy (EGD), combined;  Surgeon: Gurpreet Mata MD;  Location: [facility] GI    HYSTERECTOMY, PAP NO LONGER INDICATED      LAPAROSCOPIC CHOLECYSTECTOMY N/A 04/24/2020    Procedure: CHOLECYSTECTOMY, LAPAROSCOPIC;  Surgeon: Janett Chan MD;  Location: [facility] OR    ORTHOPEDIC SURGERY      SOFT TISSUE SURGERY      cyst, both shoulders and left elbow    ZZC LIGATE FALLOPIAN TUBE      ZZC NONSPECIFIC PROCEDURE  05/2002    rotator cuff surgery both shoulder    ZZC NONSPECIFIC PROCEDURE      wrist surgery for cyst x 2    ZZC VAGINAL HYSTERECTOMY  12/2003    with BSO, 10-12 week size       ALLERGIES:     Allergies   Allergen Reactions     No Known Drug Allergy        PERTINENT MEDICATIONS:    Current Outpatient Medications:     albuterol (PROAIR HFA/PROVENTIL HFA/VENTOLIN HFA) 108 (90 Base) MCG/ACT inhaler, Inhale 2 puffs into the lungs every 6 hours as needed for shortness of breath or wheezing, Disp: 18 g, Rfl: 11    buPROPion (WELLBUTRIN XL) 150 MG 24 hr tablet, Take 1 tablet (150 mg) by mouth every morning, Disp: 90 tablet, Rfl: 3    Calcium Carb-Cholecalciferol (CALCIUM 500 + D) 500-5 MG-MCG TABS, , Disp: , Rfl:     cyclobenzaprine (FLEXERIL) 5 MG tablet, Take 1 tablet (5 mg) by mouth 2 times daily as needed for muscle spasms, Disp: 60 tablet, Rfl: 0    esomeprazole (NEXIUM) 40 MG DR capsule, Take 1 capsule (40 mg) by mouth every morning (before breakfast) Take 30-60 minutes before eating., Disp: 90 capsule, Rfl: 3    famciclovir (FAMVIR) 500 MG tablet, Take 1 tablet (500 mg) by mouth 2 times daily as needed, Disp: 60 tablet, Rfl: 11    ferrous sulfate (FEROSUL) 325 (65 Fe) MG tablet, Take 325 mg by mouth daily (with breakfast), Disp: , Rfl:     levothyroxine (SYNTHROID/LEVOTHROID) 125 MCG tablet, Take 1 tablet (125 mcg) by mouth daily, Disp: 90 tablet, Rfl: 3    nystatin (MYCOSTATIN) 382639 UNIT/GM external powder, Apply topically as needed for other (skin inflammation), Disp: 30 g, Rfl: 1    tolterodine ER (DETROL LA) 2 MG 24 hr capsule, Take 1 capsule (2 mg) by mouth daily., Disp: 90 capsule, Rfl: 3    tolterodine ER (DETROL LA) 2 MG 24 hr capsule, Take 1 capsule (2 mg) by mouth daily., Disp: 90 capsule, Rfl: 3    topiramate (TOPAMAX) 50 MG tablet, Take 1 tablet (50 mg) by mouth 2 times daily, Disp: 180 tablet, Rfl: 1    triamcinolone (KENALOG) 0.1 % external cream, Apply topically 2 times daily Do not use longer than 2 weeks, Disp: 30 g, Rfl: 0    Vitamin D3 (VITAMIN D, CHOLECALCIFEROL,) 25 mcg (1000 units) tablet, Take 1 tablet by mouth daily, Disp: , Rfl:     Vitamins-Lipotropics (LIPOFLAVONOID) TABS, Take 1 tablet by mouth 2 times daily,  Disp: , Rfl:    No other NSAID/anticoagulation reported by patient.   No other OTC/herbal/supplements reported by patient.    SOCIAL HISTORY:  Social History     Socioeconomic History    Marital status:      Spouse name: Not on file    Number of children: Not on file    Years of education: Not on file    Highest education level: Not on file   Occupational History    Not on file   Tobacco Use    Smoking status: Never     Passive exposure: Never    Smokeless tobacco: Never   Vaping Use    Vaping status: Never Used   Substance and Sexual Activity    Alcohol use: No    Drug use: Yes     Comment: cannabis gummies    Sexual activity: Not Currently     Partners: Male   Other Topics Concern    Parent/sibling w/ CABG, MI or angioplasty before 65F 55M? No   Social History Narrative    Dairy/d 3-4 servings/d.     Caffeine 2 servings/d    Exercise 0 x week    Sunscreen used - Yes    Seatbelts used - Yes    Working smoke/CO detectors in the home - Yes    Guns stored in the home - Yes    Self Breast Exams - No    Self Testicular Exam - NA    Eye Exam up to date - Yes 2007    Dental Exam up to date - Yes 2007    Pap Smear up to date - Yes  Dr. Whitaker    Mammogram up to date - Yes     PSA up to date - NA    Dexa Scan up to date - NA    Flex Sig / Colonoscopy up to date - NA    Immunizations up to date - Yes Today    Abuse: Current or Past(Physical, Sexual or Emotional)- No    Do you feel safe in your environment - Yes    2008     Social Drivers of Health     Financial Resource Strain: Low Risk  (8/3/2024)    Financial Resource Strain     Within the past 12 months, have you or your family members you live with been unable to get utilities (heat, electricity) when it was really needed?: No   Food Insecurity: Low Risk  (8/3/2024)    Food Insecurity     Within the past 12 months, did you worry that your food would run out before you got money to buy more?: No     Within the past 12 months, did the food you bought  just not last and you didn t have money to get more?: No   Transportation Needs: Low Risk  (8/3/2024)    Transportation Needs     Within the past 12 months, has lack of transportation kept you from medical appointments, getting your medicines, non-medical meetings or appointments, work, or from getting things that you need?: No   Physical Activity: Insufficiently Active (8/3/2024)    Exercise Vital Sign     Days of Exercise per Week: 1 day     Minutes of Exercise per Session: 20 min   Stress: No Stress Concern Present (8/3/2024)    [employer] [place] [employer] - Occupational Stress Questionnaire     Feeling of Stress : Not at all   Social Connections: Unknown (8/3/2024)    Social Connection and Isolation Panel [NHANES]     Frequency of Communication with Friends and Family: Not on file     Frequency of Social Gatherings with Friends and Family: Once a week     Attends Orthodox Services: Not on file     Active Member of Clubs or Organizations: Not on file     Attends Club or Organization Meetings: Not on file     Marital Status: Not on file   Interpersonal Safety: Low Risk  (8/7/2024)    Interpersonal Safety     Do you feel physically and emotionally safe where you currently live?: Yes     Within the past 12 months, have you been hit, slapped, kicked or otherwise physically hurt by someone?: No     Within the past 12 months, have you been humiliated or emotionally abused in other ways by your partner or ex-partner?: No   Housing Stability: Low Risk  (8/3/2024)    Housing Stability     Do you have housing? : Yes     Are you worried about losing your housing?: No       Tobacco: no  Alcohol: no  Cannabis: occasional, no association with GI symptoms  Drugs: no    FAMILY HISTORY:  No colon/panc/esophageal/other GI CA. No other [place] or other HPS-related [place]. No IBD or celiac disease. No other Autoimmune, Liver, or Thyroid disease.  Family History   Problem Relation Age of Onset    Hypertension Mother          "High Blood Pressure    Fibrocystic breast disease Mother         Has spot being watching.  Checked every 6 months    Glaucoma Mother         glc surgery    Coronary Artery Disease Father         High Cholesterol    Depression/Anxiety Father         Depression    Thyroid Disease Father         Hypo Thryoid    Retinal detachment Father     Thyroid Disease Father         Hypo    C.A.D. Maternal Grandfather     Coronary Artery Disease Maternal Grandfather         Heart Attack, ()    Breast Cancer Paternal Grandmother         Breast removed ()    Asthma Paternal Grandmother         Emphysema ()    Cerebrovascular Disease Paternal Grandfather         Strokes ()    Known Genetic Syndrome Daughter         Whitaker's Syndrome    Skin Cancer No family hx of         no family hx of skin cancer    Colon Cancer No family hx of     Ovarian Cancer No family hx of        Past/family/social history reviewed and no changes    PHYSICAL EXAMINATION:  Constitutional: AAOx3, cooperative, pleasant, not dyspneic/diaphoretic, no acute distress  Vitals reviewed: /87   Pulse 68   Ht 1.753 m (5' 9\")   Wt 101.9 kg (224 lb 11.2 oz)   LMP  (LMP Unknown)   SpO2 96%   BMI 33.18 kg/m    Wt:   Wt Readings from Last 2 Encounters:   24 101.9 kg (224 lb 11.2 oz)   10/07/24 101.2 kg (223 lb)      Eyes: Sclera anicteric/injected  Ears/nose/mouth/throat: Normal oropharynx without ulcers or exudate, mucus membranes moist, hearing intact  Neck: supple, thyroid normal size  CV: No edema  Respiratory: Unlabored breathing  Lymph: No axillary, submandibular, supraclavicular or inguinal lymphadenopathy  Abd:  Nondistended, +bs, no hepatosplenomegaly, nontender, no peritoneal signs  Skin: warm, perfused, no jaundice  Psych: Normal affect  MSK: Normal gait    PREVIOUS ENDOSCOPY:  EGD 23:  Impression:                 - Normal esophagus.   - Multiple gastric polyps. No specimens collected.   - The examination was " otherwise normal.    - Normal examined duodenum.   Recommendation:             - Follow an antireflux regimen for the rest of the patient's life.    - Use Nexium (esomeprazole) 40 mg PO daily indefinitely.     CT Colonography 07/14/2022:  CONCLUSION:  1.  No colonic mass or polyp.  2.  No clinically significant extracolonic findings.    Colonoscopy 7/14/22:  Impression:                 - The entire examined colon through the ascending colon is normal.    - No specimens collected.   Recommendation:             - Perform a virtual colonoscopy (CT colonography) at appointment to be scheduled.   - Consider further screenings with stool based studies +/- CT colonography every 5 years due to extreme difficulty with reaching the cecum.     Colonoscopy 4/23/15:  Impression:                 - The area at 30 cm proximal to the anus is normal.   Recommendation:             - No polyps or diverticulosis seen. Your colonoscopy was normal, at least the short area that I saw. I was not able to make it around a sharp turn.     PERTINENT STUDIES:  Labs  TSH wnl 1/2024  Hgb A1C 5.8 1/2024  LFTs wnl 8/2024  BMP wnl 8/2024    Imaging  --

## 2024-11-04 NOTE — TELEPHONE ENCOUNTER
Patient confirmed scheduled appointment:  Date: 3/31/25  Time: 1:45 pm  Visit type: RET GI  Provider: Sara Velasquez  Location: [facility]  Testing/imaging: n/a  Additional notes: n/a

## 2024-11-04 NOTE — PATIENT INSTRUCTIONS
It was a pleasure meeting with you today and discussing your healthcare plan. Below is a summary of what we covered:    -- Get labs stool tests done. Lab orders have been placed,which can be performed at any [place] lab at your convenience. To schedule lab appointment here, use my chart or call [phone].   -- Soluble fiber supplementation on a daily basis can help regulate the consistency of your stools. Metamucil, citrucel or benefiber are all examples. You can start with 1/2 tablespoon per day and if tolerated, may increase up to 2 tablespoons per day. You may experience some bloating with initiation of fiber supplementation that will improve over the first month.  A good fiber trial to evaluate the effect is 3-6 months.  -- Try colestipol pill before you eat out. Take 1-2 pills. This is used to control diarrhea. Administer other drugs including vitamins or mineral supplements at least 1 hour before or at least 4 to 6 hours after colestipol.      If you have any questions please do not hesitate to reach out via [facility] or call my nurse coordinator, Teresa Soler, at [phone].    Please see below for any additional questions and scheduling guidelines.    Sign up for [facility]: [facility] patient portal serves as a secure platform for accessing your medical records from the [facility]. Additionally, [facility] facilitates easy, timely, and secure messaging with your care team. If you have not signed up, you may do so by using the provided code or calling [phone].    Coordinating your care after your visit:  There are multiple options for scheduling your follow-up care based on your provider's recommendation.    How do I schedule a follow-up clinic appointment:   After your appointment, you may receive scheduling assistance with the Clinic Coordinators by having a seat in the waiting room and a Clinic Coordinator will call you up to schedule.  Virtual visits or after you leave the clinic:  Your  provider has placed a follow-up order in the [facility] portal for scheduling your return appointment. A member of the scheduling team will contact you to schedule.  [facility] Scheduling: Timely scheduling through [facility] is advised to ensure appointment availability.   Call to schedule: You may schedule your follow-up appointment(s) by calling [phone], option 1.    How do I schedule my endoscopy or colonoscopy procedure:  If a procedure, such as a colonoscopy or upper endoscopy was ordered by your provider, the scheduling team will contact you to schedule this procedure. Or you may choose to call to schedule at   [phone], option 2.  Please allow 20-30 minutes when scheduling a procedure.    How do I get my blood work done? To get your blood work done, you need to schedule a lab appointment at an [facility]. There are multiple ways to schedule:   At the clinic: The Clinic Coordinator you meet after your visit can help you schedule a lab appointment.   [facility] scheduling: [facility] offers online lab scheduling at all [facility] laboratory locations.   Call to schedule: You can call [phone] to schedule your lab appointment.    How do I schedule my imaging study: To schedule imaging studies, such as CT scans, ultrasounds, MRIs, or X-rays, contact Imaging Services at [phone].    How do I schedule a referral to another doctor: If your provider recommended a referral to another specialist(s), the referral order was placed by your provider. You will receive a phone call to schedule this referral, or you may choose to call the number attached to the referral to self-schedule.    For Post-Visit Question(s):  For any inquiries following today's visit:  Please utilize [facility] messaging and allow 48 hours for reply or contact the Call Center during normal business hours at [phone], option 3.  For Emergent After-hours questions, contact the On-Call GI Fellow through the [facility]  at [phone].  In addition, you may contact your Nurse directly using the provided contact information.    Test Results:  Test results will be accessible via [facility] in compliance with the 21st Century Cures Act. This means that your results will be available to you at the same time as your provider. Often you may see your results before your provider does. Results are reviewed by staff within two weeks with communication follow-up. Results may be released in the patient portal prior to your care team review.    Prescription Refill(s):  Medication prescribed by your provider will be addressed during your visit. For future refills, please coordinate with your pharmacy. If you have not had a recent clinic visit or routine labs, for your safety, your provider may not be able to refill your prescription.

## 2024-11-04 NOTE — TELEPHONE ENCOUNTER
Patient confirmed scheduled appointment:  Date: 11/7/24  Time: 10:15 am  Visit type: labs  Provider: Sara Velasquez  Location: [place] lab  Testing/imaging: n/a  Additional notes: n/a

## 2024-11-04 NOTE — NURSING NOTE
"Chief Complaint   Patient presents with    New Patient       Vitals:    11/04/24 0751   BP: (!) 145/78   Pulse: 68   SpO2: 96%   Weight: 101.9 kg (224 lb 11.2 oz)   Height: 1.753 m (5' 9\")       Body mass index is 33.18 kg/m .    Gurpreet Rivera MA      "

## 2024-11-04 NOTE — LETTER
11/4/2024      Amina Pineda  [address] [facility] [address]      Dear Colleague,    Thank you for referring your patient, Amina Pineda, to the [facility] [place]. Please see a copy of my visit note below.    GI CLINIC VISIT - NEW PATIENT    CC/REFERRING MD:  Damari Baltazar  REASON FOR CONSULTATION: dairrhea    ASSESSMENT/PLAN:      #GERD  Patient with longstanding GERD with acid regurgitation, nighttime symptoms.  EGD done in 2023 without findings noted, no biopsies taken.  Per patient told signs of reflux and scarring related to reflux so recommended lifelong use of Nexium, this is noted in recommendations.  Nexium is controlling her symptoms, can continue this daily, tolerating well.    #Chronic Intermitent Diarrhea  Patient with 5+ years of intermittent loose urgent stools, with significant worsening after cholecystectomy.  No inciting factors for loose stools at time of onset prior to cholecystectomy.  Has hypothyroidism, on levothyroxine, TSH wnl 1/2024. BMP wnl 8/2024. No other labs or stool studies.  Given tortuous colon and going multiple days without bowel movements then having loose liquid urgent explosive stools, possibly constipation with overflow/emptying, likely component of bile acid diarrhea triggering bowel movements.  Other considerations include functional diarrhea, chronic infection, celiac, IBD, SIBO, lactose intolerance/other carbohydrate intolerance, pancreatic insufficiency, other.  We will start with labs including celiac serologies and CBC and stool test for infection (enteric panel, ova and parasite) and fecal calprotectin.  Clinical picture not consistent with C. difficile infection.  To help regulate her bowel pattern will start soluble fiber supplementation with soluble fiber powder, she prefers to mix indoor coffee so suggested Benefiber.  Given likely component of bile acid diarrhea we will also try colestipol which  she can take as needed before she eats out to try to prevent episodes of diarrhea.  Start with 1 to 2 pills prior to meal, discussed medication interactions and timing with medications. Discussed this could cause constipation and monitor bowel pattern. Pending response, can consider daily scheduled use.    Discussed differential, outlined options and reviewed medications with patient.  Mutually agreed upon plan outlined below.  PLAN:  -- Fecal calprotectin  -- Infectious stool studies including Enteric Panel, Ova & Parasite  -- Celiac serologies  -- CBC  -- Start Soluble fiber supplementation on a daily basis  with Benefiber (vs Citrucel/Metamucil) start 1/2 tablespoon per day increase up to 2 tablespoons per day. Y  -- Try colestipol pill before you eat out. Take 1-2 pills.   ------- Consider colonoscopy if fecal calprotectin elevated and biopsies for microscopic coltiis  ------- Future considerations include fecal elastase, fecal fat, hydrogen breath testing,        Colorectal cancer screening: colonoscopy (unable to get to cecum) and subsequent CT colonography normal 7/14/2022, repeat CT colonography in 5 years. Patient with tortuous colon and colonoscopies have been very difficult, she has been recommended CT colonography vs stool based testing for colon cancer screening. No FH of CRC.      RTC 3-4 months    Thank you for this consultation.  It was a pleasure to participate in the care of this patient; please contact us with any further questions.     60Minutes was spent on the date of the encounter during chart review, history and exam, documentation, and further activities as noted       Sara Velasquez PA-C  Division of Hepatology, Gastroenterology & Nutrition  [facility]      HPI  Ms. Pineda is a 64 year old year old female with history of obesity, hyperlipidemia, ANDREW, prediabetes osteopenia, depression, hypothyroidism (on levothyroxine), female stress incontinence, GERD status post hysterectomy  "(2003), cholecystectomy (2020) who presents for evaluation of chronic diarrhea. They are new to the [facility] GI clinic and this is my first encounter with the patient.     Patient reports diarrhea for 5+ years, no inciting events, travel, medication changes however notes worsening following cholecystectomy in 2020.  She has tried probiotics which she feels has helped some, recently started fiber gummy 1 per day, may be slightly bulking stools.  Also notes that iron supplement for restless legs make stool dark and slows down GI tract.     Currently going 2 to 3 days without a bowel movement then will have liquid high-volume, urgent, \"explosive \"diarrhea multiple times in a row over the course of a couple hours.  After this feels worn out and empty.  No straining.  No blood or black stools (aside from dark when taking iron).  Stools typically are following meal, often eating out.  No specific food triggers but tends to avoids higher fat foods as she feels this would not be very well-tolerated.  No associated abdominal pain.  No fecal leakage between bowel movements.  Rare incontinence.  No nausea or vomiting.    She does have history of GERD.  EGD report states normal esophagus however recommendation for indefinite use of Nexium.  Per patient endoscopist noted \"scarring \"related to reflux and stated she should continue PPI long-term.  With Nexium 40 mg daily no GERD symptoms, no dysphagia or dyne aphasia.      ROS:  Complete 10 System ROS performed. All are negative except as documented below, in the HPI, or in patient questionnaire from today's visit.    PROBLEM LIST  Patient Active Problem List    Diagnosis Date Noted     Class 2 severe obesity due to excess calories with serious comorbidity in adult (H) 01/10/2024     Priority: Medium     Palpitations 08/30/2023     Priority: Medium     Osteopenia of lumbar spine 07/03/2023     Priority: Medium     Dexa 6/2023 - osteopenia lumbar spine, repeat 5 years       Major " depressive disorder, recurrent episode, mild (H) 05/16/2023     Priority: Medium     History of difficult colonoscopy 05/16/2023     Priority: Medium     Colonoscopy 7/2022:  Impression:               - The entire examined colon through the ascending                             colon is normal.                             - No specimens collected.   Recommendation:           - Perform a virtual colonoscopy (CT colonography)                             at appointment to be scheduled.                             - Consider further screenings with stool based                             studies +/- CT colonography every 5 years due to                             extreme difficulty with reaching the cecum.     CT colonography 7/14/22: Recommend colon screening going forward with stool studies or CT colography every 5 years, due to extremely difficult colonoscopy and inability to reach the cecum       Prediabetes 05/16/2023     Priority: Medium     Acquired hypothyroidism      Priority: Medium     Class 2 obesity due to excess calories without serious comorbidity with body mass index (BMI) of 35.0 to 35.9 in adult 08/28/2018     Priority: Medium     Decreased libido 02/25/2018     Priority: Medium     Hyperlipidemia LDL goal <100 02/25/2018     Priority: Medium     ANDREW (obstructive sleep apnea) 03/09/2017     Priority: Medium     Home Sleep Apnea Testing - 3/8/17: 243 lbs 0 oz: AHI 6.7/hr. Supine AHI 19.4/hr. Oxygen Asael of 85%. Baseline 92.4%. Sp02 =< 88% for 3 minutes       Daytime somnolence 02/10/2017     Priority: Medium     Chronic low back pain, unspecified back pain laterality, with sciatica presence unspecified 02/10/2017     Priority: Medium     Ocular hypertension, right 09/28/2016     Priority: Medium     Female stress incontinence 04/10/2015     Priority: Medium     Restless leg syndrome 04/10/2015     Priority: Medium     CARDIOVASCULAR SCREENING; LDL GOAL LESS THAN 160 10/31/2010     Priority: Medium      Dysthymic disorder 11/06/2006     Priority: Medium     Started fluoxetine 10 mg daily January 29, 2013         Esophageal reflux 11/05/2004     Priority: Medium     Had an EGD in 2005       Herpes simplex virus (HSV) infection 11/05/2004     Priority: Medium     Problem list name updated by automated process. Provider to review       Generalized osteoarthrosis, unspecified site 11/05/2004     Priority: Medium     R hip, on meds         PERTINENT PAST MEDICAL HISTORY:  Past Medical History:   Diagnosis Date     Decreased libido 11/06/2006     Depressive disorder, not elsewhere classified      Esophageal reflux      Generalized osteoarthrosis, unspecified site     R hip, on meds     Herpes simplex without mention of complication     oral     Intramural leiomyoma of uterus      Menopausal syndrome (hot flushes) 02/10/2017     Mumps      ANDREW (obstructive sleep apnea)      Palpitations      Pelvic mass 03/05/2021    Ovarian remnant cyst? Right side, needs follow up from 6/20     Right ovarian cyst 05/13/2020     Spider veins      Unspecified hypothyroidism        PREVIOUS SURGERIES:  Past Surgical History:   Procedure Laterality Date     CHOLECYSTECTOMY       COLONOSCOPY  04/24/2015     COLONOSCOPY N/A 07/14/2022    Procedure: COLONOSCOPY;  Surgeon: Brook Calle MD;  Location:  OR     COLONOSCOPY WITH CO2 INSUFFLATION N/A 04/23/2015    Procedure: COLONOSCOPY WITH CO2 INSUFFLATION;  Surgeon: Bebeto Mortensen MD;  Location:  OR     ELBOW SURGERY      Lt elbow repair.     ESOPHAGOSCOPY, GASTROSCOPY, DUODENOSCOPY (EGD), COMBINED N/A 05/31/2023    Procedure: Esophagoscopy, gastroscopy, duodenoscopy (EGD), combined;  Surgeon: Gurpreet Mata MD;  Location:  GI     HYSTERECTOMY, PAP NO LONGER INDICATED       LAPAROSCOPIC CHOLECYSTECTOMY N/A 04/24/2020    Procedure: CHOLECYSTECTOMY, LAPAROSCOPIC;  Surgeon: Janett Chan MD;  Location: [facility] OR     ORTHOPEDIC SURGERY       SOFT TISSUE SURGERY      cyst,  both shoulders and left elbow     ZZC LIGATE FALLOPIAN TUBE       ZZC NONSPECIFIC PROCEDURE  05/2002    rotator cuff surgery both shoulder     ZZC NONSPECIFIC PROCEDURE      wrist surgery for cyst x 2     ZZC VAGINAL HYSTERECTOMY  12/2003    with BSO, 10-12 week size       ALLERGIES:     Allergies   Allergen Reactions     No Known Drug Allergy        PERTINENT MEDICATIONS:    Current Outpatient Medications:      albuterol (PROAIR HFA/PROVENTIL HFA/VENTOLIN HFA) 108 (90 Base) MCG/ACT inhaler, Inhale 2 puffs into the lungs every 6 hours as needed for shortness of breath or wheezing, Disp: 18 g, Rfl: 11     buPROPion (WELLBUTRIN XL) 150 MG 24 hr tablet, Take 1 tablet (150 mg) by mouth every morning, Disp: 90 tablet, Rfl: 3     Calcium Carb-Cholecalciferol (CALCIUM 500 + D) 500-5 MG-MCG TABS, , Disp: , Rfl:      cyclobenzaprine (FLEXERIL) 5 MG tablet, Take 1 tablet (5 mg) by mouth 2 times daily as needed for muscle spasms, Disp: 60 tablet, Rfl: 0     esomeprazole (NEXIUM) 40 MG DR capsule, Take 1 capsule (40 mg) by mouth every morning (before breakfast) Take 30-60 minutes before eating., Disp: 90 capsule, Rfl: 3     famciclovir (FAMVIR) 500 MG tablet, Take 1 tablet (500 mg) by mouth 2 times daily as needed, Disp: 60 tablet, Rfl: 11     ferrous sulfate (FEROSUL) 325 (65 Fe) MG tablet, Take 325 mg by mouth daily (with breakfast), Disp: , Rfl:      levothyroxine (SYNTHROID/LEVOTHROID) 125 MCG tablet, Take 1 tablet (125 mcg) by mouth daily, Disp: 90 tablet, Rfl: 3     nystatin (MYCOSTATIN) 285339 UNIT/GM external powder, Apply topically as needed for other (skin inflammation), Disp: 30 g, Rfl: 1     tolterodine ER (DETROL LA) 2 MG 24 hr capsule, Take 1 capsule (2 mg) by mouth daily., Disp: 90 capsule, Rfl: 3     tolterodine ER (DETROL LA) 2 MG 24 hr capsule, Take 1 capsule (2 mg) by mouth daily., Disp: 90 capsule, Rfl: 3     topiramate (TOPAMAX) 50 MG tablet, Take 1 tablet (50 mg) by mouth 2 times daily, Disp: 180 tablet,  Rfl: 1     triamcinolone (KENALOG) 0.1 % external cream, Apply topically 2 times daily Do not use longer than 2 weeks, Disp: 30 g, Rfl: 0     Vitamin D3 (VITAMIN D, CHOLECALCIFEROL,) 25 mcg (1000 units) tablet, Take 1 tablet by mouth daily, Disp: , Rfl:      Vitamins-Lipotropics (LIPOFLAVONOID) TABS, Take 1 tablet by mouth 2 times daily, Disp: , Rfl:    No other NSAID/anticoagulation reported by patient.   No other OTC/herbal/supplements reported by patient.    SOCIAL HISTORY:  Social History     Socioeconomic History     Marital status:      Spouse name: Not on file     Number of children: Not on file     Years of education: Not on file     Highest education level: Not on file   Occupational History     Not on file   Tobacco Use     Smoking status: Never     Passive exposure: Never     Smokeless tobacco: Never   Vaping Use     Vaping status: Never Used   Substance and Sexual Activity     Alcohol use: No     Drug use: Yes     Comment: cannabis gummies     Sexual activity: Not Currently     Partners: Male   Other Topics Concern     Parent/sibling w/ CABG, MI or angioplasty before 65F 55M? No   Social History Narrative    Dairy/d 3-4 servings/d.     Caffeine 2 servings/d    Exercise 0 x week    Sunscreen used - Yes    Seatbelts used - Yes    Working smoke/CO detectors in the home - Yes    Guns stored in the home - Yes    Self Breast Exams - No    Self Testicular Exam - NA    Eye Exam up to date - Yes 2007    Dental Exam up to date - Yes 2007    Pap Smear up to date - Yes  Dr. Whitaker    Mammogram up to date - Yes     PSA up to date - NA    Dexa Scan up to date - NA    Flex Sig / Colonoscopy up to date - NA    Immunizations up to date - Yes Today    Abuse: Current or Past(Physical, Sexual or Emotional)- No    Do you feel safe in your environment - Yes    2008     Social Drivers of Health     Financial Resource Strain: Low Risk  (8/3/2024)    Financial Resource Strain      Within the past 12 months,  have you or your family members you live with been unable to get utilities (heat, electricity) when it was really needed?: No   Food Insecurity: Low Risk  (8/3/2024)    Food Insecurity      Within the past 12 months, did you worry that your food would run out before you got money to buy more?: No      Within the past 12 months, did the food you bought just not last and you didn t have money to get more?: No   Transportation Needs: Low Risk  (8/3/2024)    Transportation Needs      Within the past 12 months, has lack of transportation kept you from medical appointments, getting your medicines, non-medical meetings or appointments, work, or from getting things that you need?: No   Physical Activity: Insufficiently Active (8/3/2024)    Exercise Vital Sign      Days of Exercise per Week: 1 day      Minutes of Exercise per Session: 20 min   Stress: No Stress Concern Present (8/3/2024)    [employer] [place] [employer] - Occupational Stress Questionnaire      Feeling of Stress : Not at all   Social Connections: Unknown (8/3/2024)    Social Connection and Isolation Panel [NHANES]      Frequency of Communication with Friends and Family: Not on file      Frequency of Social Gatherings with Friends and Family: Once a week      Attends [employer] Services: Not on file      Active Member of Clubs or Organizations: Not on file      Attends Club or Organization Meetings: Not on file      Marital Status: Not on file   Interpersonal Safety: Low Risk  (8/7/2024)    Interpersonal Safety      Do you feel physically and emotionally safe where you currently live?: Yes      Within the past 12 months, have you been hit, slapped, kicked or otherwise physically hurt by someone?: No      Within the past 12 months, have you been humiliated or emotionally abused in other ways by your partner or ex-partner?: No   Housing Stability: Low Risk  (8/3/2024)    Housing Stability      Do you have housing? : Yes      Are you worried about  "losing your housing?: No       Tobacco: no  Alcohol: no  Cannabis: occasional, no association with GI symptoms  Drugs: no    FAMILY HISTORY:  No colon/panc/esophageal/other GI CA. No other [place] or other HPS-related [place]. No IBD or celiac disease. No other Autoimmune, Liver, or Thyroid disease.  Family History   Problem Relation Age of Onset     Hypertension Mother         High Blood Pressure     Fibrocystic breast disease Mother         Has spot being watching.  Checked every 6 months     Glaucoma Mother         glc surgery     Coronary Artery Disease Father         High Cholesterol     Depression/Anxiety Father         Depression     Thyroid Disease Father         Hypo Thryoid     Retinal detachment Father      Thyroid Disease Father         Hypo     C.A.D. Maternal Grandfather      Coronary Artery Disease Maternal Grandfather         Heart Attack, ()     Breast Cancer Paternal Grandmother         Breast removed ()     Asthma Paternal Grandmother         Emphysema ()     Cerebrovascular Disease Paternal Grandfather         Strokes ()     Known Genetic Syndrome Daughter         Whitaker's Syndrome     Skin Cancer No family hx of         no family hx of skin cancer     Colon Cancer No family hx of      Ovarian Cancer No family hx of        Past/family/social history reviewed and no changes    PHYSICAL EXAMINATION:  Constitutional: AAOx3, cooperative, pleasant, not dyspneic/diaphoretic, no acute distress  Vitals reviewed: /87   Pulse 68   Ht 1.753 m (5' 9\")   Wt 101.9 kg (224 lb 11.2 oz)   LMP  (LMP Unknown)   SpO2 96%   BMI 33.18 kg/m    Wt:   Wt Readings from Last 2 Encounters:   24 101.9 kg (224 lb 11.2 oz)   10/07/24 101.2 kg (223 lb)      Eyes: Sclera anicteric/injected  Ears/nose/mouth/throat: Normal oropharynx without ulcers or exudate, mucus membranes moist, hearing intact  Neck: supple, thyroid normal size  CV: No edema  Respiratory: Unlabored breathing  Lymph: " No axillary, submandibular, supraclavicular or inguinal lymphadenopathy  Abd:  Nondistended, +bs, no hepatosplenomegaly, nontender, no peritoneal signs  Skin: warm, perfused, no jaundice  Psych: Normal affect  MSK: Normal gait    PREVIOUS ENDOSCOPY:  EGD 5/31/23:  Impression:                 - Normal esophagus.   - Multiple gastric polyps. No specimens collected.   - The examination was otherwise normal.    - Normal examined duodenum.   Recommendation:             - Follow an antireflux regimen for the rest of the patient's life.    - Use Nexium (esomeprazole) 40 mg PO daily indefinitely.     CT Colonography 07/14/2022:  CONCLUSION:  1.  No colonic mass or polyp.  2.  No clinically significant extracolonic findings.    Colonoscopy 7/14/22:  Impression:                 - The entire examined colon through the ascending colon is normal.    - No specimens collected.   Recommendation:             - Perform a virtual colonoscopy (CT colonography) at appointment to be scheduled.   - Consider further screenings with stool based studies +/- CT colonography every 5 years due to extreme difficulty with reaching the cecum.     Colonoscopy 4/23/15:  Impression:                 - The area at 30 cm proximal to the anus is normal.   Recommendation:             - No polyps or diverticulosis seen. Your colonoscopy was normal, at least the short area that I saw. I was not able to make it around a sharp turn.     PERTINENT STUDIES:  Labs  TSH wnl 1/2024  Hgb A1C 5.8 1/2024  LFTs wnl 8/2024  BMP wnl 8/2024    Imaging  --        Again, thank you for allowing me to participate in the care of your patient.        Sincerely,        Sara Velasquez PA-C

## 2024-11-07 ENCOUNTER — LAB (OUTPATIENT)
Dept: LAB | Facility: CLINIC | Age: 64
End: 2024-11-07
Payer: COMMERCIAL

## 2024-11-07 DIAGNOSIS — R19.7 DIARRHEA, UNSPECIFIED TYPE: ICD-10-CM

## 2024-11-07 LAB
BASOPHILS # BLD AUTO: 0 10E3/UL (ref 0–0.2)
BASOPHILS NFR BLD AUTO: 1 %
EOSINOPHIL # BLD AUTO: 0.2 10E3/UL (ref 0–0.7)
EOSINOPHIL NFR BLD AUTO: 3 %
ERYTHROCYTE [DISTWIDTH] IN BLOOD BY AUTOMATED COUNT: 12.4 % (ref 10–15)
HCT VFR BLD AUTO: 43.2 % (ref 35–47)
HGB BLD-MCNC: 13.8 G/DL (ref 11.7–15.7)
IMM GRANULOCYTES # BLD: 0 10E3/UL
IMM GRANULOCYTES NFR BLD: 0 %
LYMPHOCYTES # BLD AUTO: 1.5 10E3/UL (ref 0.8–5.3)
LYMPHOCYTES NFR BLD AUTO: 27 %
MCH RBC QN AUTO: 30.9 PG (ref 26.5–33)
MCHC RBC AUTO-ENTMCNC: 31.9 G/DL (ref 31.5–36.5)
MCV RBC AUTO: 97 FL (ref 78–100)
MONOCYTES # BLD AUTO: 0.3 10E3/UL (ref 0–1.3)
MONOCYTES NFR BLD AUTO: 6 %
NEUTROPHILS # BLD AUTO: 3.4 10E3/UL (ref 1.6–8.3)
NEUTROPHILS NFR BLD AUTO: 63 %
PLATELET # BLD AUTO: 237 10E3/UL (ref 150–450)
RBC # BLD AUTO: 4.46 10E6/UL (ref 3.8–5.2)
WBC # BLD AUTO: 5.4 10E3/UL (ref 4–11)

## 2024-11-07 PROCEDURE — 85025 COMPLETE CBC W/AUTO DIFF WBC: CPT

## 2024-11-07 PROCEDURE — 87507 IADNA-DNA/RNA PROBE TQ 12-25: CPT | Mod: GZ

## 2024-11-07 PROCEDURE — 83993 ASSAY FOR CALPROTECTIN FECAL: CPT | Mod: 59

## 2024-11-07 PROCEDURE — 87209 SMEAR COMPLEX STAIN: CPT

## 2024-11-07 PROCEDURE — 82784 ASSAY IGA/IGD/IGG/IGM EACH: CPT

## 2024-11-07 PROCEDURE — 36415 COLL VENOUS BLD VENIPUNCTURE: CPT

## 2024-11-07 PROCEDURE — 87177 OVA AND PARASITES SMEARS: CPT

## 2024-11-07 PROCEDURE — 86258 DGP ANTIBODY EACH IG CLASS: CPT

## 2024-11-07 PROCEDURE — 86364 TISS TRNSGLTMNASE EA IG CLAS: CPT

## 2024-11-08 LAB
ADV 40+41 DNA STL QL NAA+NON-PROBE: NEGATIVE
ASTRO TYP 1-8 RNA STL QL NAA+NON-PROBE: NEGATIVE
C CAYETANENSIS DNA STL QL NAA+NON-PROBE: NEGATIVE
CALPROTECTIN STL-MCNT: 120 MG/KG (ref 0–49.9)
CAMPYLOBACTER DNA SPEC NAA+PROBE: NEGATIVE
CRYPTOSP DNA STL QL NAA+NON-PROBE: NEGATIVE
E COLI O157 DNA STL QL NAA+NON-PROBE: NORMAL
E HISTOLYT DNA STL QL NAA+NON-PROBE: NEGATIVE
EAEC ASTA GENE ISLT QL NAA+PROBE: NEGATIVE
EC STX1+STX2 GENES STL QL NAA+NON-PROBE: NEGATIVE
EPEC EAE GENE STL QL NAA+NON-PROBE: NEGATIVE
ETEC LTA+ST1A+ST1B TOX ST NAA+NON-PROBE: NEGATIVE
G LAMBLIA DNA STL QL NAA+NON-PROBE: NEGATIVE
IGA SERPL-MCNC: 200 MG/DL (ref 84–499)
NOROVIRUS GI+II RNA STL QL NAA+NON-PROBE: NEGATIVE
O+P STL MICRO: NEGATIVE
P SHIGELLOIDES DNA STL QL NAA+NON-PROBE: NEGATIVE
RVA RNA STL QL NAA+NON-PROBE: NEGATIVE
SALMONELLA SP RPOD STL QL NAA+PROBE: NEGATIVE
SAPO I+II+IV+V RNA STL QL NAA+NON-PROBE: NEGATIVE
SHIGELLA SP+EIEC IPAH ST NAA+NON-PROBE: NEGATIVE
SPECIMEN TYPE: NORMAL
V CHOLERAE DNA SPEC QL NAA+PROBE: NEGATIVE
VIBRIO DNA SPEC NAA+PROBE: NEGATIVE
Y ENTEROCOL DNA STL QL NAA+PROBE: NEGATIVE

## 2024-11-11 DIAGNOSIS — R19.5 ELEVATED FECAL CALPROTECTIN: ICD-10-CM

## 2024-11-11 DIAGNOSIS — R19.7 DIARRHEA, UNSPECIFIED TYPE: Primary | ICD-10-CM

## 2024-11-11 LAB
GLIADIN IGA SER-ACNC: 1.6 U/ML
GLIADIN IGG SER-ACNC: 0.8 U/ML
TTG IGA SER-ACNC: 0.7 U/ML
TTG IGG SER-ACNC: <0.6 U/ML

## 2024-12-18 ENCOUNTER — LAB (OUTPATIENT)
Dept: LAB | Facility: CLINIC | Age: 64
End: 2024-12-18
Payer: COMMERCIAL

## 2024-12-18 DIAGNOSIS — R19.7 DIARRHEA, UNSPECIFIED TYPE: ICD-10-CM

## 2024-12-18 DIAGNOSIS — R19.5 ELEVATED FECAL CALPROTECTIN: ICD-10-CM

## 2024-12-21 ENCOUNTER — APPOINTMENT (OUTPATIENT)
Dept: LAB | Facility: CLINIC | Age: 64
End: 2024-12-21
Payer: COMMERCIAL

## 2024-12-23 LAB — CALPROTECTIN STL-MCNT: 227 MG/KG (ref 0–49.9)

## 2024-12-26 DIAGNOSIS — R19.7 DIARRHEA, UNSPECIFIED TYPE: Primary | ICD-10-CM

## 2024-12-26 DIAGNOSIS — R19.5 ELEVATED FECAL CALPROTECTIN: ICD-10-CM

## 2025-01-15 ENCOUNTER — TELEPHONE (OUTPATIENT)
Dept: GASTROENTEROLOGY | Facility: CLINIC | Age: 65
End: 2025-01-15
Payer: COMMERCIAL

## 2025-01-15 NOTE — TELEPHONE ENCOUNTER
"Endoscopy Scheduling Screen    Have you had any respiratory illness or flu-like symptoms in the last 10 days?  No    What is your communication preference for Instructions and/or Bowel Prep?   MyChart    What insurance is in the chart?  Other:  [facility]    Ordering/Referring Provider: Sara Velasquez   (If ordering provider performs procedure, schedule with ordering provider unless otherwise instructed. )    BMI: Estimated body mass index is 33.18 kg/m  as calculated from the following:    Height as of 11/4/24: 1.753 m (5' 9\").    Weight as of 11/4/24: 101.9 kg (224 lb 11.2 oz).     Sedation Ordered  MAC/deep sedation.   BMI<= 45 45 < BMI <= 48 48 < BMI < = 50  BMI > 50   No Restrictions No [facility]  No [facility]  [facility] with exceptions Hospital Only OR Only       Do you have a history of malignant hyperthermia?  No    (Females) Are you currently pregnant?   No     Have you been diagnosed or told you have pulmonary hypertension?   No    Do you have an LVAD?  No    Have you been told you have moderate to severe sleep apnea?  No.    Have you been told you have COPD, asthma, or any other lung disease?  No    Do you have any heart conditions?  No     Have you ever had or are you waiting for an organ transplant?  No. Continue scheduling, no site restrictions.    Have you had a stroke or transient ischemic attack (TIA aka \"mini stroke\" in the last 6 months?   No    Have you been diagnosed with or been told you have cirrhosis of the liver?   No.    Are you currently on dialysis?   No    Do you need assistance transferring?   No    BMI: Estimated body mass index is 33.18 kg/m  as calculated from the following:    Height as of 11/4/24: 1.753 m (5' 9\").    Weight as of 11/4/24: 101.9 kg (224 lb 11.2 oz).     Is patients BMI > 40 and scheduling location [facility]?  No    Do you take an injectable or oral medication for weight loss or diabetes (excluding insulin)?  Yes, hold time can be up to 7 days. Please consult with you prescribing " provider to discuss endoscopy recommendations. (Please schedule at least 7 days out.)    Do you take the medication Naltrexone?  No    Do you take blood thinners?  No       Prep   Are you currently on dialysis or do you have chronic kidney disease?  No    Do you have a diagnosis of diabetes?  No    Do you have a diagnosis of cystic fibrosis (CF)?  No    On a regular basis do you go 3 -5 days between bowel movements?  Yes (Extended Prep)    BMI > 40?  No    Preferred Pharmacy:        [facility] [address] [place] [address] [id number]  [id number] [facility] [address]  Phone: [phone] Fax: [phone]      Final Scheduling Details     Procedure scheduled  Colonoscopy    Surgeon:  Krista     Date of procedure:  4/1/25     Pre-OP / PAC:   No - Not required for this site.    Location  [facility] - [facility] - Patient preference.    Sedation   MAC/Deep Sedation - Per order.      Patient Reminders:   You will receive a call from a Nurse to review instructions and health history.  This assessment must be completed prior to your procedure.  Failure to complete the Nurse assessment may result in the procedure being cancelled.      On the day of your procedure, please designate an adult(s) who can drive you home stay with you for the next 24 hours. The medicines used in the exam will make you sleepy. You will not be able to drive.      You cannot take public transportation, ride share services, or non-medical taxi service without a responsible caregiver.  Medical transport services are allowed with the requirement that a responsible caregiver will receive you at your destination.  We require that drivers and caregivers are confirmed prior to your procedure.

## 2025-01-17 ENCOUNTER — MYC MEDICAL ADVICE (OUTPATIENT)
Dept: GASTROENTEROLOGY | Facility: CLINIC | Age: 65
End: 2025-01-17
Payer: COMMERCIAL

## 2025-01-17 DIAGNOSIS — L65.9 HAIR LOSS: Primary | ICD-10-CM

## 2025-03-13 ENCOUNTER — TELEPHONE (OUTPATIENT)
Dept: GASTROENTEROLOGY | Facility: CLINIC | Age: 65
End: 2025-03-13
Payer: COMMERCIAL

## 2025-03-13 DIAGNOSIS — R19.5 ELEVATED FECAL CALPROTECTIN: ICD-10-CM

## 2025-03-13 DIAGNOSIS — R19.7 DIARRHEA: Primary | ICD-10-CM

## 2025-03-13 RX ORDER — BISACODYL 5 MG/1
TABLET, DELAYED RELEASE ORAL
Qty: 4 TABLET | Refills: 0 | Status: SHIPPED | OUTPATIENT
Start: 2025-03-13

## 2025-03-13 NOTE — TELEPHONE ENCOUNTER
Attempted to contact patient in order to complete pre assessment questions.     No answer. Left message to return call to [phone] option 3    Callback communication sent via [employer].      Cinthia Huerta LPN  Endoscopy Procedure Pre Assessment

## 2025-03-13 NOTE — TELEPHONE ENCOUNTER
Pre visit planning completed.    ANDREW -- mild.       Procedure details:    Patient scheduled for Colonoscopy on 4/1/25.     Arrival time: 1310. Procedure time 1410    Facility location: [facility]; [address] [place] [address], 5th Floor, [place] [address]. Check in location: 5th Floor.    Sedation type: MAC -- tortuous colon noted in 2022.     Pre op exam needed? No.    Indication for procedure:   Diarrhea, unspecified type [R19.7]  - Primary      Elevated fecal calprotectin [R19.5]            Chart review:     Electronic implanted devices? No    Recent diagnosis of diverticulitis within the last 6 weeks? No      Medication review:    Diabetic? Prediabetic. Not on diabetic medications.    Anticoagulants? No    Weight loss medication/injectable? No GLP-1 medication per patient's medication list. Nursing to verify with pre-assessment call.    YES was noted on scheduling TE regarding weight loss medications. Verify.     Other medication HOLDING recommendations:  Ferrous sulfate (iron supplements): HOLD 7 days before procedure.      Prep for procedure:     Bowel prep recommendation: Extended Golytely. Bowel prep sent to      [facility] [address] [place], [place] - [address]    Due to: constipation noted or reported    Procedure information and instructions sent via  Please send Siterrat once you verify if Pt is taking weight loss medications.           Marnie Hurtado RN  Endoscopy Procedure Pre Assessment   [phone] option 3

## 2025-03-17 ENCOUNTER — TELEPHONE (OUTPATIENT)
Dept: GASTROENTEROLOGY | Facility: CLINIC | Age: 65
End: 2025-03-17
Payer: COMMERCIAL

## 2025-03-17 NOTE — TELEPHONE ENCOUNTER
Called pt to r/s 7/7 appt, pt stated she already have a video appt with provider 4/8. Ok'ed to cancel 7/7 appt.    Manuela LINTON

## 2025-03-31 ENCOUNTER — ANESTHESIA EVENT (OUTPATIENT)
Dept: SURGERY | Facility: AMBULATORY SURGERY CENTER | Age: 65
End: 2025-03-31
Payer: COMMERCIAL

## 2025-04-01 ENCOUNTER — HOSPITAL ENCOUNTER (OUTPATIENT)
Facility: AMBULATORY SURGERY CENTER | Age: 65
Discharge: HOME | End: 2025-04-01
Attending: INTERNAL MEDICINE
Payer: COMMERCIAL

## 2025-04-01 ENCOUNTER — ANESTHESIA (OUTPATIENT)
Dept: SURGERY | Facility: AMBULATORY SURGERY CENTER | Age: 65
End: 2025-04-01
Payer: COMMERCIAL

## 2025-04-01 VITALS
BODY MASS INDEX: 33.03 KG/M2 | SYSTOLIC BLOOD PRESSURE: 109 MMHG | HEIGHT: 69 IN | WEIGHT: 223 LBS | DIASTOLIC BLOOD PRESSURE: 54 MMHG | HEART RATE: 63 BPM | TEMPERATURE: 97.6 F | RESPIRATION RATE: 16 BRPM | OXYGEN SATURATION: 97 %

## 2025-04-01 VITALS — HEART RATE: 70 BPM

## 2025-04-01 LAB — COLONOSCOPY: NORMAL

## 2025-04-01 PROCEDURE — 88305 TISSUE EXAM BY PATHOLOGIST: CPT | Mod: 26 | Performed by: PATHOLOGY

## 2025-04-01 PROCEDURE — 45380 COLONOSCOPY AND BIOPSY: CPT | Performed by: INTERNAL MEDICINE

## 2025-04-01 PROCEDURE — 88305 TISSUE EXAM BY PATHOLOGIST: CPT | Mod: TC | Performed by: INTERNAL MEDICINE

## 2025-04-01 RX ORDER — ONDANSETRON 4 MG/1
4 TABLET, ORALLY DISINTEGRATING ORAL EVERY 6 HOURS PRN
Status: DISCONTINUED | OUTPATIENT
Start: 2025-04-01 | End: 2025-04-02 | Stop reason: HOSPADM

## 2025-04-01 RX ORDER — NALOXONE HYDROCHLORIDE 0.4 MG/ML
0.4 INJECTION, SOLUTION INTRAMUSCULAR; INTRAVENOUS; SUBCUTANEOUS
Status: DISCONTINUED | OUTPATIENT
Start: 2025-04-01 | End: 2025-04-02 | Stop reason: HOSPADM

## 2025-04-01 RX ORDER — ONDANSETRON 2 MG/ML
4 INJECTION INTRAMUSCULAR; INTRAVENOUS EVERY 6 HOURS PRN
Status: DISCONTINUED | OUTPATIENT
Start: 2025-04-01 | End: 2025-04-02 | Stop reason: HOSPADM

## 2025-04-01 RX ORDER — PROCHLORPERAZINE MALEATE 5 MG/1
5 TABLET ORAL EVERY 6 HOURS PRN
Status: DISCONTINUED | OUTPATIENT
Start: 2025-04-01 | End: 2025-04-02 | Stop reason: HOSPADM

## 2025-04-01 RX ORDER — LIDOCAINE HYDROCHLORIDE 20 MG/ML
INJECTION, SOLUTION INFILTRATION; PERINEURAL PRN
Status: DISCONTINUED | OUTPATIENT
Start: 2025-04-01 | End: 2025-04-01

## 2025-04-01 RX ORDER — LIDOCAINE 40 MG/G
CREAM TOPICAL
Status: DISCONTINUED | OUTPATIENT
Start: 2025-04-01 | End: 2025-04-01 | Stop reason: HOSPADM

## 2025-04-01 RX ORDER — NALOXONE HYDROCHLORIDE 0.4 MG/ML
0.2 INJECTION, SOLUTION INTRAMUSCULAR; INTRAVENOUS; SUBCUTANEOUS
Status: DISCONTINUED | OUTPATIENT
Start: 2025-04-01 | End: 2025-04-02 | Stop reason: HOSPADM

## 2025-04-01 RX ORDER — PROPOFOL 10 MG/ML
INJECTION, EMULSION INTRAVENOUS PRN
Status: DISCONTINUED | OUTPATIENT
Start: 2025-04-01 | End: 2025-04-01

## 2025-04-01 RX ORDER — FLUMAZENIL 0.1 MG/ML
0.2 INJECTION, SOLUTION INTRAVENOUS
Status: DISCONTINUED | OUTPATIENT
Start: 2025-04-01 | End: 2025-04-02 | Stop reason: HOSPADM

## 2025-04-01 RX ORDER — ONDANSETRON 2 MG/ML
4 INJECTION INTRAMUSCULAR; INTRAVENOUS
Status: DISCONTINUED | OUTPATIENT
Start: 2025-04-01 | End: 2025-04-01 | Stop reason: HOSPADM

## 2025-04-01 RX ORDER — SODIUM CHLORIDE, SODIUM LACTATE, POTASSIUM CHLORIDE, CALCIUM CHLORIDE 600; 310; 30; 20 MG/100ML; MG/100ML; MG/100ML; MG/100ML
INJECTION, SOLUTION INTRAVENOUS CONTINUOUS
Status: DISCONTINUED | OUTPATIENT
Start: 2025-04-01 | End: 2025-04-01 | Stop reason: HOSPADM

## 2025-04-01 RX ORDER — PROPOFOL 10 MG/ML
INJECTION, EMULSION INTRAVENOUS CONTINUOUS PRN
Status: DISCONTINUED | OUTPATIENT
Start: 2025-04-01 | End: 2025-04-01

## 2025-04-01 RX ADMIN — PROPOFOL 40 MG: 10 INJECTION, EMULSION INTRAVENOUS at 13:58

## 2025-04-01 RX ADMIN — LIDOCAINE HYDROCHLORIDE 100 MG: 20 INJECTION, SOLUTION INFILTRATION; PERINEURAL at 13:57

## 2025-04-01 RX ADMIN — SODIUM CHLORIDE, SODIUM LACTATE, POTASSIUM CHLORIDE, CALCIUM CHLORIDE: 600; 310; 30; 20 INJECTION, SOLUTION INTRAVENOUS at 13:23

## 2025-04-01 RX ADMIN — PROPOFOL 40 MG: 10 INJECTION, EMULSION INTRAVENOUS at 14:02

## 2025-04-01 RX ADMIN — PROPOFOL 200 MCG/KG/MIN: 10 INJECTION, EMULSION INTRAVENOUS at 13:57

## 2025-04-01 NOTE — ANESTHESIA POSTPROCEDURE EVALUATION
Patient: Amina Pineda    Procedure: Procedure(s):  COLONOSCOPY, WITH BIOPSY       Anesthesia Type:  MAC    Note:  Disposition: Outpatient   Postop Pain Control: Uneventful            Sign Out: Well controlled pain   PONV: No   Neuro/Psych: Uneventful            Sign Out: Acceptable/Baseline neuro status   Airway/Respiratory: Uneventful            Sign Out: Acceptable/Baseline resp. status   CV/Hemodynamics: Uneventful            Sign Out: Acceptable CV status; No obvious hypovolemia; No obvious fluid overload   Other NRE: NONE   DID A NON-ROUTINE EVENT OCCUR? No           Last vitals:  Vitals Value Taken Time   /54 04/01/25 1445   Temp 36.4  C (97.6  F) 04/01/25 1445   Pulse 63 04/01/25 1445   Resp 16 04/01/25 1445   SpO2 98 % 04/01/25 1449   Vitals shown include unfiled device data.    Electronically Signed By: Adela Oshea MD  April 1, 2025  3:22 PM

## 2025-04-01 NOTE — H&P
ENDOSCOPY PRE-SEDATION H&P FOR OUTPATIENT PROCEDURES    Amina Pineda  [id number]  1960    Procedure: colonoscopy    Pre-procedure diagnosis: diarrhea, hx incomplete colonoscopy    Past medical history:   Past Medical History:   Diagnosis Date    Decreased libido 11/06/2006    Depressive disorder, not elsewhere classified     Esophageal reflux     Generalized osteoarthrosis, unspecified site     R hip, on meds    Herpes simplex without mention of complication     oral    Intramural leiomyoma of uterus     Menopausal syndrome (hot flushes) 02/10/2017    Mumps     ANDREW (obstructive sleep apnea)     Palpitations     Pelvic mass 03/05/2021    Ovarian remnant cyst? Right side, needs follow up from 6/20    Right ovarian cyst 05/13/2020    Spider veins     Unspecified hypothyroidism        Past surgical history:   Past Surgical History:   Procedure Laterality Date    CHOLECYSTECTOMY      COLONOSCOPY  04/24/2015    COLONOSCOPY N/A 07/14/2022    Procedure: COLONOSCOPY;  Surgeon: Brook Calle MD;  Location: [facility] OR    COLONOSCOPY WITH CO2 INSUFFLATION N/A 04/23/2015    Procedure: COLONOSCOPY WITH CO2 INSUFFLATION;  Surgeon: Bebeto Mortensen MD;  Location: [facility] OR    ELBOW SURGERY      Lt elbow repair.    ESOPHAGOSCOPY, GASTROSCOPY, DUODENOSCOPY (EGD), COMBINED N/A 05/31/2023    Procedure: Esophagoscopy, gastroscopy, duodenoscopy (EGD), combined;  Surgeon: Gurpreet Mata MD;  Location: [facility] GI    HYSTERECTOMY, PAP NO LONGER INDICATED      LAPAROSCOPIC CHOLECYSTECTOMY N/A 04/24/2020    Procedure: CHOLECYSTECTOMY, LAPAROSCOPIC;  Surgeon: Janett Chan MD;  Location: [facility] OR    ORTHOPEDIC SURGERY      SOFT TISSUE SURGERY      cyst, both shoulders and left elbow    ZZC LIGATE FALLOPIAN TUBE      ZZC NONSPECIFIC PROCEDURE  05/2002    rotator cuff surgery both shoulder    ZZC NONSPECIFIC PROCEDURE      wrist surgery for cyst x 2    ZZC VAGINAL HYSTERECTOMY  12/2003    with BSO, 10-12 week size       Current  Outpatient Medications   Medication Sig Dispense Refill    albuterol (PROAIR HFA/PROVENTIL HFA/VENTOLIN HFA) 108 (90 Base) MCG/ACT inhaler Inhale 2 puffs into the lungs every 6 hours as needed for shortness of breath or wheezing 18 g 11    buPROPion (WELLBUTRIN XL) 150 MG 24 hr tablet Take 1 tablet (150 mg) by mouth every morning 90 tablet 3    Calcium Carb-Cholecalciferol (CALCIUM 500 + D) 500-5 MG-MCG TABS       colestipol (COLESTID) 1 g tablet Take 2 tablets (2 g) by mouth daily. 1-2 pills daily as needed.  Administer other drugs including vitamins or mineral supplements at least 1 hour before or at least 4 hours after cholestyramine. 90 tablet 4    cyclobenzaprine (FLEXERIL) 5 MG tablet Take 1 tablet (5 mg) by mouth 2 times daily as needed for muscle spasms 60 tablet 0    esomeprazole (NEXIUM) 40 MG DR capsule Take 1 capsule (40 mg) by mouth every morning (before breakfast) Take 30-60 minutes before eating. 90 capsule 3    famciclovir (FAMVIR) 500 MG tablet Take 1 tablet (500 mg) by mouth 2 times daily as needed 60 tablet 11    ferrous sulfate (FEROSUL) 325 (65 Fe) MG tablet Take 325 mg by mouth daily (with breakfast)      levothyroxine (SYNTHROID/LEVOTHROID) 125 MCG tablet Take 1 tablet (125 mcg) by mouth daily 90 tablet 3    tolterodine ER (DETROL LA) 2 MG 24 hr capsule Take 1 capsule (2 mg) by mouth daily. 90 capsule 3    tolterodine ER (DETROL LA) 2 MG 24 hr capsule Take 1 capsule (2 mg) by mouth daily. 90 capsule 3    topiramate (TOPAMAX) 50 MG tablet Take 1 tablet (50 mg) by mouth 2 times daily 180 tablet 1    Vitamin D3 (VITAMIN D, CHOLECALCIFEROL,) 25 mcg (1000 units) tablet Take 1 tablet by mouth daily      Vitamins-Lipotropics (LIPOFLAVONOID) TABS Take 1 tablet by mouth 2 times daily      bisacodyl (DULCOLAX) 5 MG EC tablet Two days prior to exam take two (2) tablets at 4pm. One day prior to exam take two (2) tablets at 4pm 4 tablet 0    nystatin (MYCOSTATIN) 635592 UNIT/GM external powder Apply  topically as needed for other (skin inflammation) 30 g 1    polyethylene glycol (GOLYTELY) 236 g suspension Two days before procedure at 5PM fill first container with water. Mix and drink an 8 oz glass every 15 minutes until HALF of the container is gone. Place the remainder in the refrigerator. One day before procedure at 5PM drink second half of bowel prep. Drink an 8 oz glass every 15 minutes until it is gone. Day of procedure 6 hours before arrival time fill the 2nd container with water. Mix and drink an 8 oz glass every 15 minutes until HALF of the container is gone. Discard the remaining solution. 8000 mL 0    triamcinolone (KENALOG) 0.1 % external cream Apply topically 2 times daily Do not use longer than 2 weeks 30 g 0     Current Facility-Administered Medications   Medication Dose Route Frequency Provider Last Rate Last Admin    lactated ringers infusion   Intravenous Continuous Adela Oshea MD 10 mL/hr at 04/01/25 1323 New Bag at 04/01/25 1323    lidocaine (LMX4) kit   Topical Q1H PRN Barry Velasco MD        lidocaine (LMX4) kit   Topical Q1H PRN Adela Oshea MD        lidocaine 1 % 0.1-1 mL  0.1-1 mL Other Q1H PRN Barry Velasco MD        lidocaine 1 % 0.1-1 mL  0.1-1 mL Other Q1H PRN Adela Oshea MD        ondansetron (ZOFRAN) injection 4 mg  4 mg Intravenous Once PRN Barry Velasco MD        sodium chloride (PF) 0.9% PF flush 3 mL  3 mL Intracatheter Q8H Barry Valle MD        sodium chloride (PF) 0.9% PF flush 3 mL  3 mL Intracatheter q1 min prn Barry Velasco MD        sodium chloride (PF) 0.9% PF flush 3 mL  3 mL Intracatheter Q8H Adela Finn MD        sodium chloride (PF) 0.9% PF flush 3 mL  3 mL Intracatheter q1 min prAdela Ornelas MD           Allergies   Allergen Reactions    No Known Drug Allergy        History of Anesthesia/Sedation Problems: no    PHYSICAL  "EXAMINATION:  Constitutional: aaox3, cooperative, pleasant  Vitals reviewed: /72 (BP Location: Right arm)   Pulse 90   Temp 98.5  F (36.9  C) (Temporal)   Resp 18   Ht 1.753 m (5' 9\")   Wt 101.2 kg (223 lb)   LMP  (LMP Unknown)   SpO2 98%   BMI 32.93 kg/m    Wt:   Wt Readings from Last 2 Encounters:   04/01/25 101.2 kg (223 lb)   11/04/24 101.9 kg (224 lb 11.2 oz)      Eyes: Sclera anicteric/injected  Ears/nose/mouth/throat: Normal oropharynx without ulcers or exudate, mucus membranes moist, hearing intact  Neck: supple, thyroid normal size  CV: No edema  Respiratory: Unlabored breathing  Lymph: No submandibular, supraclavicular or inguinal lymphadenopathy  Abd: Nondistended, no masses, nontender  Skin: warm, perfused, no jaundice  Psych: Normal affect  MSK: normal movement on limited exam.    ASA Score: See Provation note    Assessment/Plan:     The patient is an appropriate candidate to receive sedation.    Informed consent was discussed with the patient/family, including the risks, benefits, potential complications and any alternative options associated with sedation.    Patient assessment completed just prior to sedation and while under constant observation by the provider. Condition determined to be adequate for proceeding with sedation.    The specific risks for the procedure were discussed with the patient at the time of informed consent and include but are not limited to perforation which could require surgery, missing significant neoplasm or lesion, hemorrhage and adverse sedative complication.      Barry Velasco MD         "

## 2025-04-01 NOTE — ANESTHESIA CARE TRANSFER NOTE
Patient: Amina Pineda    Procedure: Procedure(s):  COLONOSCOPY, WITH BIOPSY       Diagnosis: Diarrhea, unspecified type [R19.7]  Elevated fecal calprotectin [R19.5]  Diagnosis Additional Information: No value filed.    Anesthesia Type:   MAC     Note:    Oropharynx: oropharynx clear of all foreign objects and spontaneously breathing  Level of Consciousness: awake  Oxygen Supplementation: room air    Independent Airway: airway patency satisfactory and stable  Dentition: dentition unchanged  Vital Signs Stable: post-procedure vital signs reviewed and stable  Report to RN Given: handoff report given  Patient transferred to: Phase II    Handoff Report: Identifed the Patient, Identified the Reponsible Provider, Reviewed the pertinent medical history, Discussed the surgical course, Reviewed Intra-OP anesthesia mangement and issues during anesthesia, Set expectations for post-procedure period and Allowed opportunity for questions and acknowledgement of understanding      Vitals:  Vitals Value Taken Time   /77 04/01/25 1427   Temp 36.3  C (97.3  F) 04/01/25 1427   Pulse 68 04/01/25 1427   Resp 19 04/01/25 1427   SpO2 95 % 04/01/25 1428   Vitals shown include unfiled device data.    Electronically Signed By: GAEL Musa CRNA  April 1, 2025  2:29 PM

## 2025-04-01 NOTE — ANESTHESIA PREPROCEDURE EVALUATION
Anesthesia Pre-Procedure Evaluation    Patient: Amina Pineda   MRN: [id number] : 1960        Procedure : Procedure(s):  Colonoscopy          Past Medical History:   Diagnosis Date     Decreased libido 2006     Depressive disorder, not elsewhere classified      Esophageal reflux      Generalized osteoarthrosis, unspecified site     R hip, on meds     Herpes simplex without mention of complication     oral     Intramural leiomyoma of uterus      Menopausal syndrome (hot flushes) 02/10/2017     Mumps      ANDREW (obstructive sleep apnea)      Palpitations      Pelvic mass 2021    Ovarian remnant cyst? Right side, needs follow up from      Right ovarian cyst 2020     Spider veins      Unspecified hypothyroidism       Past Surgical History:   Procedure Laterality Date     CHOLECYSTECTOMY       COLONOSCOPY  2015     COLONOSCOPY N/A 2022    Procedure: COLONOSCOPY;  Surgeon: Brook Calle MD;  Location: [facility]     COLONOSCOPY WITH CO2 INSUFFLATION N/A 2015    Procedure: COLONOSCOPY WITH CO2 INSUFFLATION;  Surgeon: Bebeto Mortensen MD;  Location: [facility]     ELBOW SURGERY      Lt elbow repair.     ESOPHAGOSCOPY, GASTROSCOPY, DUODENOSCOPY (EGD), COMBINED N/A 2023    Procedure: Esophagoscopy, gastroscopy, duodenoscopy (EGD), combined;  Surgeon: Gurpreet Mata MD;  Location: [facility]     HYSTERECTOMY, PAP NO LONGER INDICATED       LAPAROSCOPIC CHOLECYSTECTOMY N/A 2020    Procedure: CHOLECYSTECTOMY, LAPAROSCOPIC;  Surgeon: Janett Chan MD;  Location: [facility]     ORTHOPEDIC SURGERY       SOFT TISSUE SURGERY      cyst, both shoulders and left elbow     ZZC LIGATE FALLOPIAN TUBE       ZZC NONSPECIFIC PROCEDURE  2002    rotator cuff surgery both shoulder     ZZC NONSPECIFIC PROCEDURE      wrist surgery for cyst x 2     ZZC VAGINAL HYSTERECTOMY  2003    with BSO, 10-12 week size      Allergies   Allergen Reactions     No Known Drug Allergy       Social  History     Tobacco Use     Smoking status: Never     Passive exposure: Never     Smokeless tobacco: Never   Substance Use Topics     Alcohol use: No      Wt Readings from Last 1 Encounters:   11/04/24 101.9 kg (224 lb 11.2 oz)        Anesthesia Evaluation   Pt has had prior anesthetic.         ROS/MED HX  ENT/Pulmonary:     (+) sleep apnea, doesn't use CPAP,                                      Neurologic:       Cardiovascular:     (+) Dyslipidemia - -   -  - -                                      METS/Exercise Tolerance: 4 - Raking leaves, gardening    Hematologic:       Musculoskeletal: Comment: CBP  (+)  arthritis,             GI/Hepatic:     (+) GERD, Asymptomatic on medication,                  Renal/Genitourinary: Comment: UI      Endo:     (+)          thyroid problem, hypothyroidism,    Obesity,       Psychiatric/Substance Use:     (+) psychiatric history depression       Infectious Disease:       Malignancy:       Other:          Physical Exam    Airway        Mallampati: II   TM distance: > 3 FB   Neck ROM: full   Mouth opening: > 3 cm    Respiratory Devices and Support         Dental       (+) Removable bridges or other hardware    B=Bridge, C=Chipped, L=Loose, M=Missing    Cardiovascular   cardiovascular exam normal       Rhythm and rate: regular and normal     Pulmonary   pulmonary exam normal        breath sounds clear to auscultation       OUTSIDE LABS:  CBC:   Lab Results   Component Value Date    WBC 5.4 11/07/2024    WBC 8.4 07/11/2023    HGB 13.8 11/07/2024    HGB 14.8 07/11/2023    HCT 43.2 11/07/2024    HCT 45.2 07/11/2023     11/07/2024     07/11/2023     BMP:   Lab Results   Component Value Date     08/08/2024     03/11/2024    POTASSIUM 4.4 08/08/2024    POTASSIUM 4.1 03/11/2024    CHLORIDE 106 08/08/2024    CHLORIDE 101 03/11/2024    CO2 27 08/08/2024    CO2 28 03/11/2024    BUN 15.3 08/08/2024    BUN 14.3 03/11/2024    CR 0.89 08/08/2024    CR 0.88 03/11/2024     " (H) 08/08/2024     (H) 03/11/2024     COAGS: No results found for: \"PTT\", \"INR\", \"FIBR\"  POC: No results found for: \"BGM\", \"HCG\", \"HCGS\"  HEPATIC:   Lab Results   Component Value Date    ALBUMIN 4.1 08/08/2024    PROTTOTAL 6.6 08/08/2024    ALT 27 08/08/2024    AST 23 08/08/2024    ALKPHOS 97 08/08/2024    BILITOTAL 0.5 08/08/2024     OTHER:   Lab Results   Component Value Date    A1C 5.8 (H) 01/10/2024    JOSE 9.5 08/08/2024    MAG 1.9 07/11/2023    LIPASE 87 04/14/2022    TSH 2.95 01/10/2024    T4 1.54 01/10/2024    SED 10 12/21/2011       Anesthesia Plan    ASA Status:  3    NPO Status:  NPO Appropriate    Anesthesia Type: MAC.     - Reason for MAC: immobility needed, straight local not clinically adequate              Consents    Anesthesia Plan(s) and associated risks, benefits, and realistic alternatives discussed. Questions answered and patient/representative(s) expressed understanding.     - Discussed:     - Discussed with:  Patient            Postoperative Care    Pain management: Multi-modal analgesia.   PONV prophylaxis: Ondansetron (or other 5HT-3), Background Propofol Infusion     Comments:             Adela Oshea MD    Clinically Significant Risk Factors Present on Admission                                          " 6

## 2025-04-02 LAB
PATH REPORT.COMMENTS IMP SPEC: NORMAL
PATH REPORT.COMMENTS IMP SPEC: NORMAL
PATH REPORT.FINAL DX SPEC: NORMAL
PATH REPORT.GROSS SPEC: NORMAL
PATH REPORT.MICROSCOPIC SPEC OTHER STN: NORMAL
PATH REPORT.RELEVANT HX SPEC: NORMAL
PHOTO IMAGE: NORMAL

## 2025-04-08 ENCOUNTER — VIRTUAL VISIT (OUTPATIENT)
Dept: GASTROENTEROLOGY | Facility: CLINIC | Age: 65
End: 2025-04-08
Payer: COMMERCIAL

## 2025-04-08 VITALS — BODY MASS INDEX: 33.03 KG/M2 | HEIGHT: 69 IN | WEIGHT: 223 LBS

## 2025-04-08 DIAGNOSIS — K21.9 GASTROESOPHAGEAL REFLUX DISEASE, UNSPECIFIED WHETHER ESOPHAGITIS PRESENT: ICD-10-CM

## 2025-04-08 DIAGNOSIS — R19.7 DIARRHEA, UNSPECIFIED TYPE: ICD-10-CM

## 2025-04-08 DIAGNOSIS — R19.5 ELEVATED FECAL CALPROTECTIN: Primary | ICD-10-CM

## 2025-04-08 PROCEDURE — 98007 SYNCH AUDIO-VIDEO EST HI 40: CPT | Performed by: DIETITIAN, REGISTERED

## 2025-04-08 PROCEDURE — 1126F AMNT PAIN NOTED NONE PRSNT: CPT | Mod: 95 | Performed by: DIETITIAN, REGISTERED

## 2025-04-08 RX ORDER — COLESTIPOL HYDROCHLORIDE 1 G/1
2 TABLET ORAL DAILY
Qty: 90 TABLET | Refills: 4 | Status: SHIPPED | OUTPATIENT
Start: 2025-04-08

## 2025-04-08 ASSESSMENT — PAIN SCALES - GENERAL: PAINLEVEL_OUTOF10: NO PAIN (0)

## 2025-04-08 NOTE — NURSING NOTE
Current patient location: Patient declined to provide     Is the patient currently in the state of [place]? YES    Visit mode: VIDEO    If the visit is dropped, the patient can be reconnected by:VIDEO VISIT: Text to cell phone:   Telephone Information:   Mobile [phone]       Will anyone else be joining the visit? NO  (If patient encounters technical issues they should call [phone])    Are changes needed to the allergy or medication list? No    Are refills needed on medications prescribed by this physician? NO    Rooming Documentation:  Questionnaire(s) completed    Reason for visit: RECHECK (F/U)    Dianne COOPER

## 2025-04-08 NOTE — PATIENT INSTRUCTIONS
It was a pleasure meeting with you today and discussing your healthcare plan. Below is a summary of what we covered:    -- Continue 3 Benefiber gummies per day  -- Colestipol, 1-2 tabs with high fat meals/eating out  -- If symptoms worsen, let us know  --------- Consider MRE in the future given elevated fecal calprotectin      Please see below for any additional questions and scheduling guidelines.    Sign up for Testt: Testt patient portal serves as a secure platform for accessing your medical records from the [facility]. Additionally, Testt facilitates easy, timely, and secure messaging with your care team. If you have not signed up, you may do so by using the provided code or calling [phone].    Coordinating your care after your visit:  There are multiple options for scheduling your follow-up care based on your provider's recommendation.    How do I schedule a follow-up clinic appointment:   After your appointment, you may receive scheduling assistance with the Clinic Coordinators by having a seat in the waiting room and a Clinic Coordinator will call you up to schedule.  Virtual visits or after you leave the clinic:  Your provider has placed a follow-up order in the Testt portal for scheduling your return appointment. A member of the scheduling team will contact you to schedule.  Testt Scheduling: Timely scheduling through Testt is advised to ensure appointment availability.   Call to schedule: You may schedule your follow-up appointment(s) by calling [phone], option 1.    How do I schedule my endoscopy or colonoscopy procedure:  If a procedure, such as a colonoscopy or upper endoscopy was ordered by your provider, the scheduling team will contact you to schedule this procedure. Or you may choose to call to schedule at   [phone], option 2.  Please allow 20-30 minutes when scheduling a procedure.    How do I get my blood work done? To get your blood work done, you need to  schedule a lab appointment at an [facility]. There are multiple ways to schedule:   At the clinic: The Clinic Coordinator you meet after your visit can help you schedule a lab appointment.   [facility] scheduling: [employer] offers online lab scheduling at all [facility] laboratory locations.   Call to schedule: You can call [phone] to schedule your lab appointment.    How do I schedule my imaging study: To schedule imaging studies, such as CT scans, ultrasounds, MRIs, or X-rays, contact Imaging Services at [phone].    How do I schedule a referral to another doctor: If your provider recommended a referral to another specialist(s), the referral order was placed by your provider. You will receive a phone call to schedule this referral, or you may choose to call the number attached to the referral to self-schedule.    For Post-Visit Question(s):  For any inquiries following today's visit:  Please utilize [employer] messaging and allow 48 hours for reply or contact the Call Center during normal business hours at [phone], option 3.  For Emergent After-hours questions, contact the On-Call GI Fellow through the [facility]  at [phone].  In addition, you may contact your Nurse directly using the provided contact information.    Test Results:  Test results will be accessible via [employer] in compliance with the 21st Century Cures Act. This means that your results will be available to you at the same time as your provider. Often you may see your results before your provider does. Results are reviewed by staff within two weeks with communication follow-up. Results may be released in the patient portal prior to your care team review.    Prescription Refill(s):  Medication prescribed by your provider will be addressed during your visit. For future refills, please coordinate with your pharmacy. If you have not had a recent clinic visit or routine labs, for your safety, your  provider may not be able to refill your prescription.

## 2025-04-08 NOTE — PROGRESS NOTES
Virtual Visit Details    Type of service:  Video Visit   Video Start Time:  11:19 AM  Video End Time: 11:45 AM    Originating Location (pt. Location): Home    Distant Location (provider location):  On-site  Platform used for Video Visit: [facility]      GI CLINIC VISIT - NEW PATIENT    CC/REFERRING MD:  Damari Baltazar  REASON FOR CONSULTATION: dairrhea    ASSESSMENT/PLAN:      #GERD  Patient with longstanding GERD with acid regurgitation, nighttime symptoms.  EGD done in 2023 without findings noted, no biopsies taken.  Per patient told signs of reflux and scarring related to reflux so recommended lifelong use of Nexium, this is noted in recommendations.  Nexium is controlling her symptoms, can continue this daily, tolerating well.    #Chronic Intermitent Diarrhea  Patient with 5+ years of intermittent loose urgent stools, with significant worsening after cholecystectomy.  No inciting factors for loose stools at time of onset prior to cholecystectomy.  Has hypothyroidism, on levothyroxine, TSH wnl 1/2024. BMP wnl 8/2024. f Labs 11/2024 with normal CBC, negative celiac serolgoies (TTG IgA, TTG IgG, DGP IgA, DGP IgG). Enteric panel negative. O&P negative. Fecal calprotectin elevated to 120 11/7/25, 227 on recheck 12/21/24. Had colonoscopy with 4/1/25 with normal ileum, diverticulosis in the entire colon, decreased vascular mucosa in entire colon, otherwise normal. Biopsies unremarkable, no evidence of inflammation or microscopic colitis.  Symptoms now well managed with daily fiber - using 3 gummy fiber per day and colestipol with high fat meals/eating out. Suspect that underlying constipation with overflow/emptying and and bile acid diarrhea trigger bowel movements.  Possible IBS/functional diarrhea component, lactose intolerance, other carbohydrate intolerance . Unclear  of elevated fecal calprotectin, discussed that we could do MRE to fully rule out any small bowel inflammation given colonoscopy and biopsies  were normal and infectious stool studies negative. Possible medication effect. Given patient symptomatically doing well, she would like to hold off for now, this is reasonable if loose stools return would recommend proceeding with MRE. Other considerations in the future pending symptoms are work up for pancreatic insuffiencey, SIBO.     Discussed differential, outlined options and reviewed medications with patient.  Mutually agreed upon plan outlined below.  PLAN:  -- Continue 3 Benefiber gummies per day  -- Colestipol, 1-2 tabs with high fat meals/eating out  -- If symptoms worsen, let us know  -------- Consider MRE in the future given elevated fecal calprotectin       Colorectal cancer screening: colonoscopy (unable to get to cecum) and subsequent CT colonography normal 7/14/2022, repeat CT colonography in 5 years. Patient with tortuous colon and colonoscopies have been very difficult, she has been recommended CT colonography vs stool based testing for colon cancer screening. No FH of CRC.      RTC 12 months    Thank you for this consultation.  It was a pleasure to participate in the care of this patient; please contact us with any further questions.     53 Minutes was spent on the date of the encounter during chart review, history and exam, documentation, and further activities as noted       Sara Velasquez PA-C  Division of Hepatology, Gastroenterology & Nutrition  [facility]      HPI  Ms. Pineda is a 64 year old year old female with history of obesity, hyperlipidemia, ANDREW, prediabetes osteopenia, depression, hypothyroidism (on levothyroxine), female stress incontinence, GERD status post hysterectomy (2003), cholecystectomy (2020) who presents for evaluation of chronic diarrhea. They are new to the [facility] GI clinic and this is my first encounter with the patient.     Patient reports diarrhea for 5+ years, no inciting events, travel, medication changes however notes worsening following cholecystectomy  "in 2020.  She has tried probiotics which she feels has helped some, recently started fiber gummy 1 per day, may be slightly bulking stools.  Also notes that iron supplement for restless legs make stool dark and slows down GI tract.     Currently going 2 to 3 days without a bowel movement then will have liquid high-volume, urgent, \"explosive \"diarrhea multiple times in a row over the course of a couple hours.  After this feels worn out and empty.  No straining.  No blood or black stools (aside from dark when taking iron).  Stools typically are following meal, often eating out.  No specific food triggers but tends to avoids higher fat foods as she feels this would not be very well-tolerated.  No associated abdominal pain.  No fecal leakage between bowel movements.  Rare incontinence.  No nausea or vomiting.    She does have history of GERD.  EGD report states normal esophagus however recommendation for indefinite use of Nexium.  Per patient endoscopist noted \"scarring \"related to reflux and stated she should continue PPI long-term.  With Nexium 40 mg daily no GERD symptoms, no dysphagia or odynophagia.    Interval History 4/8/25:  Labs and stool studies done after last visit. Labs 11/2024 with normal CBC, negative celiac serolgoies (TTG IgA, TTG IgG, DGP IgA, DGP IgG). Enteric panel negative. O&P negative.   Fecal calprotectin elevated to 120 11/7/25, 227 on recheck 12/21/24.    Had colonoscopy with Dr. Velasco 4/1/25 with normal ileum, diverticulosis in the entire colon, decreased vascular mucosa in entire colon, otherwise normal. Biopsies unremarkable, no evidence of inflammation or microscopic colitis.    Today she reports that overall bowel pattern is much improved. Having 1 formed bowel movement per day without significant urgency. No blood. No explosive stools. Reports improvement with benefiber gummies - taking 3 per day. When stopping these for colonoscopy felt she was starting to get constipated. Will take 1 " g colestipol when eating out/high fat foods which seems to be working well. Will miss BM day following this, but gets bowels stimulated with coffee following day.    No abdominal pain. No nausea or vomiting. Does note some fullness with eating occasionally, especially evening meal. Will pay attention to any food triggers.   GERD well controlled with Nexium.     Has noted hair loss over past 3 years/ Wearing wig. Has derm appt scheduled.     ROS:  Complete 10 System ROS performed. All are negative except as documented below, in the HPI, or in patient questionnaire from today's visit.    PROBLEM LIST  Patient Active Problem List    Diagnosis Date Noted    Class 2 severe obesity due to excess calories with serious comorbidity in adult (H) 01/10/2024     Priority: Medium    Palpitations 08/30/2023     Priority: Medium    Osteopenia of lumbar spine 07/03/2023     Priority: Medium     Dexa 6/2023 - osteopenia lumbar spine, repeat 5 years      Major depressive disorder, recurrent episode, mild 05/16/2023     Priority: Medium    History of difficult colonoscopy 05/16/2023     Priority: Medium     Colonoscopy 7/2022:  Impression:               - The entire examined colon through the ascending                             colon is normal.                             - No specimens collected.   Recommendation:           - Perform a virtual colonoscopy (CT colonography)                             at appointment to be scheduled.                             - Consider further screenings with stool based                             studies +/- CT colonography every 5 years due to                             extreme difficulty with reaching the cecum.     CT colonography 7/14/22: Recommend colon screening going forward with stool studies or CT colography every 5 years, due to extremely difficult colonoscopy and inability to reach the cecum      Prediabetes 05/16/2023     Priority: Medium    Acquired hypothyroidism      Priority:  Medium    Class 2 obesity due to excess calories without serious comorbidity with body mass index (BMI) of 35.0 to 35.9 in adult 08/28/2018     Priority: Medium    Decreased libido 02/25/2018     Priority: Medium    Hyperlipidemia LDL goal <100 02/25/2018     Priority: Medium    ANDREW (obstructive sleep apnea) 03/09/2017     Priority: Medium     Home Sleep Apnea Testing - 3/8/17: 243 lbs 0 oz: AHI 6.7/hr. Supine AHI 19.4/hr. Oxygen Asael of 85%. Baseline 92.4%. Sp02 =< 88% for 3 minutes      Daytime somnolence 02/10/2017     Priority: Medium    Chronic low back pain, unspecified back pain laterality, with sciatica presence unspecified 02/10/2017     Priority: Medium    Ocular hypertension, right 09/28/2016     Priority: Medium    Female stress incontinence 04/10/2015     Priority: Medium    Restless leg syndrome 04/10/2015     Priority: Medium    CARDIOVASCULAR SCREENING; LDL GOAL LESS THAN 160 10/31/2010     Priority: Medium    Dysthymic disorder 11/06/2006     Priority: Medium     Started fluoxetine 10 mg daily January 29, 2013        Esophageal reflux 11/05/2004     Priority: Medium     Had an EGD in 2005      Herpes simplex virus (HSV) infection 11/05/2004     Priority: Medium     Problem list name updated by automated process. Provider to review      Generalized osteoarthrosis, unspecified site 11/05/2004     Priority: Medium     R hip, on meds         PERTINENT PAST MEDICAL HISTORY:  Past Medical History:   Diagnosis Date    Decreased libido 11/06/2006    Depressive disorder, not elsewhere classified     Esophageal reflux     Generalized osteoarthrosis, unspecified site     R hip, on meds    Herpes simplex without mention of complication     oral    Intramural leiomyoma of uterus     Menopausal syndrome (hot flushes) 02/10/2017    Mumps     ANDREW (obstructive sleep apnea)     Palpitations     Pelvic mass 03/05/2021    Ovarian remnant cyst? Right side, needs follow up from 6/20    Right ovarian cyst 05/13/2020     Spider veins     Unspecified hypothyroidism        PREVIOUS SURGERIES:  Past Surgical History:   Procedure Laterality Date    CHOLECYSTECTOMY      COLONOSCOPY  04/24/2015    COLONOSCOPY N/A 07/14/2022    Procedure: COLONOSCOPY;  Surgeon: Brook Calle MD;  Location: [facility] OR    COLONOSCOPY N/A 4/1/2025    Procedure: COLONOSCOPY, WITH BIOPSY;  Surgeon: Barry Velasco MD;  Location: [facility] OR    COLONOSCOPY WITH CO2 INSUFFLATION N/A 04/23/2015    Procedure: COLONOSCOPY WITH CO2 INSUFFLATION;  Surgeon: Bebeto Mortensen MD;  Location: [facility] OR    ELBOW SURGERY      Lt elbow repair.    ESOPHAGOSCOPY, GASTROSCOPY, DUODENOSCOPY (EGD), COMBINED N/A 05/31/2023    Procedure: Esophagoscopy, gastroscopy, duodenoscopy (EGD), combined;  Surgeon: Gurpreet Mata MD;  Location: [facility] GI    HYSTERECTOMY, PAP NO LONGER INDICATED      LAPAROSCOPIC CHOLECYSTECTOMY N/A 04/24/2020    Procedure: CHOLECYSTECTOMY, LAPAROSCOPIC;  Surgeon: Janett Chan MD;  Location: [facility] OR    ORTHOPEDIC SURGERY      SOFT TISSUE SURGERY      cyst, both shoulders and left elbow    ZZC LIGATE FALLOPIAN TUBE      ZZC NONSPECIFIC PROCEDURE  05/2002    rotator cuff surgery both shoulder    ZZC NONSPECIFIC PROCEDURE      wrist surgery for cyst x 2    ZZC VAGINAL HYSTERECTOMY  12/2003    with BSO, 10-12 week size       ALLERGIES:     Allergies   Allergen Reactions    No Known Drug Allergy        PERTINENT MEDICATIONS:    Current Outpatient Medications:     albuterol (PROAIR HFA/PROVENTIL HFA/VENTOLIN HFA) 108 (90 Base) MCG/ACT inhaler, Inhale 2 puffs into the lungs every 6 hours as needed for shortness of breath or wheezing, Disp: 18 g, Rfl: 11    bisacodyl (DULCOLAX) 5 MG EC tablet, Two days prior to exam take two (2) tablets at 4pm. One day prior to exam take two (2) tablets at 4pm, Disp: 4 tablet, Rfl: 0    buPROPion (WELLBUTRIN XL) 150 MG 24 hr tablet, Take 1 tablet (150 mg) by mouth every morning, Disp: 90 tablet, Rfl: 3    Calcium  Carb-Cholecalciferol (CALCIUM 500 + D) 500-5 MG-MCG TABS, , Disp: , Rfl:     colestipol (COLESTID) 1 g tablet, Take 2 tablets (2 g) by mouth daily. 1-2 pills daily as needed.  Administer other drugs including vitamins or mineral supplements at least 1 hour before or at least 4 hours after cholestyramine., Disp: 90 tablet, Rfl: 4    cyclobenzaprine (FLEXERIL) 5 MG tablet, Take 1 tablet (5 mg) by mouth 2 times daily as needed for muscle spasms, Disp: 60 tablet, Rfl: 0    esomeprazole (NEXIUM) 40 MG DR capsule, Take 1 capsule (40 mg) by mouth every morning (before breakfast) Take 30-60 minutes before eating., Disp: 90 capsule, Rfl: 3    famciclovir (FAMVIR) 500 MG tablet, Take 1 tablet (500 mg) by mouth 2 times daily as needed, Disp: 60 tablet, Rfl: 11    ferrous sulfate (FEROSUL) 325 (65 Fe) MG tablet, Take 325 mg by mouth daily (with breakfast), Disp: , Rfl:     levothyroxine (SYNTHROID/LEVOTHROID) 125 MCG tablet, Take 1 tablet (125 mcg) by mouth daily, Disp: 90 tablet, Rfl: 3    nystatin (MYCOSTATIN) 012506 UNIT/GM external powder, Apply topically as needed for other (skin inflammation), Disp: 30 g, Rfl: 1    polyethylene glycol (GOLYTELY) 236 g suspension, Two days before procedure at 5PM fill first container with water. Mix and drink an 8 oz glass every 15 minutes until HALF of the container is gone. Place the remainder in the refrigerator. One day before procedure at 5PM drink second half of bowel prep. Drink an 8 oz glass every 15 minutes until it is gone. Day of procedure 6 hours before arrival time fill the 2nd container with water. Mix and drink an 8 oz glass every 15 minutes until HALF of the container is gone. Discard the remaining solution., Disp: 8000 mL, Rfl: 0    tolterodine ER (DETROL LA) 2 MG 24 hr capsule, Take 1 capsule (2 mg) by mouth daily., Disp: 90 capsule, Rfl: 3    tolterodine ER (DETROL LA) 2 MG 24 hr capsule, Take 1 capsule (2 mg) by mouth daily., Disp: 90 capsule, Rfl: 3    topiramate  (TOPAMAX) 50 MG tablet, Take 1 tablet (50 mg) by mouth 2 times daily, Disp: 180 tablet, Rfl: 1    triamcinolone (KENALOG) 0.1 % external cream, Apply topically 2 times daily Do not use longer than 2 weeks, Disp: 30 g, Rfl: 0    Vitamin D3 (VITAMIN D, CHOLECALCIFEROL,) 25 mcg (1000 units) tablet, Take 1 tablet by mouth daily, Disp: , Rfl:     Vitamins-Lipotropics (LIPOFLAVONOID) TABS, Take 1 tablet by mouth 2 times daily, Disp: , Rfl:    No other NSAID/anticoagulation reported by patient.   No other OTC/herbal/supplements reported by patient.    SOCIAL HISTORY:  Social History     Socioeconomic History    Marital status:      Spouse name: Not on file    Number of children: Not on file    Years of education: Not on file    Highest education level: Not on file   Occupational History    Not on file   Tobacco Use    Smoking status: Never     Passive exposure: Never    Smokeless tobacco: Never   Vaping Use    Vaping status: Never Used   Substance and Sexual Activity    Alcohol use: No    Drug use: Yes     Comment: cannabis gummies    Sexual activity: Not Currently     Partners: Male   Other Topics Concern    Parent/sibling w/ CABG, MI or angioplasty before 65F 55M? No   Social History Narrative    Dairy/d 3-4 servings/d.     Caffeine 2 servings/d    Exercise 0 x week    Sunscreen used - Yes    Seatbelts used - Yes    Working smoke/CO detectors in the home - Yes    Guns stored in the home - Yes    Self Breast Exams - No    Self Testicular Exam - NA    Eye Exam up to date - Yes 2007    Dental Exam up to date - Yes 2007    Pap Smear up to date - Yes  Dr. Whitaker    Mammogram up to date - Yes     PSA up to date - NA    Dexa Scan up to date - NA    Flex Sig / Colonoscopy up to date - NA    Immunizations up to date - Yes Today    Abuse: Current or Past(Physical, Sexual or Emotional)- No    Do you feel safe in your environment - Yes    2008     Social Drivers of Health     Financial Resource Strain: Low Risk   (8/3/2024)    Financial Resource Strain     Within the past 12 months, have you or your family members you live with been unable to get utilities (heat, electricity) when it was really needed?: No   Food Insecurity: Low Risk  (8/3/2024)    Food Insecurity     Within the past 12 months, did you worry that your food would run out before you got money to buy more?: No     Within the past 12 months, did the food you bought just not last and you didn t have money to get more?: No   Transportation Needs: Low Risk  (8/3/2024)    Transportation Needs     Within the past 12 months, has lack of transportation kept you from medical appointments, getting your medicines, non-medical meetings or appointments, work, or from getting things that you need?: No   Physical Activity: Insufficiently Active (8/3/2024)    Exercise Vital Sign     Days of Exercise per Week: 1 day     Minutes of Exercise per Session: 20 min   Stress: No Stress Concern Present (8/3/2024)    [place] [employer] - Occupational Stress Questionnaire     Feeling of Stress : Not at all   Social Connections: Unknown (8/3/2024)    Social Connection and Isolation Panel [NHANES]     Frequency of Communication with Friends and Family: Not on file     Frequency of Social Gatherings with Friends and Family: Once a week     Attends Restorationism Services: Not on file     Active Member of Clubs or Organizations: Not on file     Attends Club or Organization Meetings: Not on file     Marital Status: Not on file   Interpersonal Safety: Low Risk  (8/7/2024)    Interpersonal Safety     Do you feel physically and emotionally safe where you currently live?: Yes     Within the past 12 months, have you been hit, slapped, kicked or otherwise physically hurt by someone?: No     Within the past 12 months, have you been humiliated or emotionally abused in other ways by your partner or ex-partner?: No   Housing Stability: Low Risk  (8/3/2024)    Housing Stability     Do  "you have housing? : Yes     Are you worried about losing your housing?: No       Tobacco: no  Alcohol: no  Cannabis: occasional, no association with GI symptoms  Drugs: no    FAMILY HISTORY:  No colon/panc/esophageal/other GI CA. No other [place] or other HPS-related [place]. No IBD or celiac disease. No other Autoimmune, Liver, or Thyroid disease.  Family History   Problem Relation Age of Onset    Hypertension Mother         High Blood Pressure    Fibrocystic breast disease Mother         Has spot being watching.  Checked every 6 months    Glaucoma Mother         glc surgery    Coronary Artery Disease Father         High Cholesterol    Depression/Anxiety Father         Depression    Thyroid Disease Father         Hypo Thryoid    Retinal detachment Father     Thyroid Disease Father         Hypo    C.A.D. Maternal Grandfather     Coronary Artery Disease Maternal Grandfather         Heart Attack, ()    Breast Cancer Paternal Grandmother         Breast removed ()    Asthma Paternal Grandmother         Emphysema ()    Cerebrovascular Disease Paternal Grandfather         Strokes ()    Known Genetic Syndrome Daughter         Whitaker's Syndrome    Skin Cancer No family hx of         no family hx of skin cancer    Colon Cancer No family hx of     Ovarian Cancer No family hx of        Past/family/social history reviewed and no changes    PHYSICAL EXAMINATION:  Constitutional: AAOx3, cooperative, pleasant, not dyspneic/diaphoretic, no acute distress  Vitals reviewed: Ht 1.753 m (5' 9\")   Wt 101.2 kg (223 lb)   LMP  (LMP Unknown)   BMI 32.93 kg/m    Wt:   Wt Readings from Last 2 Encounters:   25 101.2 kg (223 lb)   25 101.2 kg (223 lb)      Eyes: Sclera anicteric/injected  Ears/nose/mouth/throat: Normal oropharynx without ulcers or exudate, mucus membranes moist, hearing intact  Neck: supple, thyroid normal size  CV: No edema  Respiratory: Unlabored breathing  Lymph: No axillary, " submandibular, supraclavicular or inguinal lymphadenopathy  Abd:  Nondistended, +bs, no hepatosplenomegaly, nontender, no peritoneal signs  Skin: warm, perfused, no jaundice  Psych: Normal affect  MSK: Normal gait    PREVIOUS ENDOSCOPY:  Colonoscopy 4/1/25:  Impression:            - The examined portion of the ileum was normal.                          - Diverticulosis in the entire examined colon.                          - The distal rectum and anal verge are normal on                          retroflexion view.                          - Decreased mucosa vascular pattern in the entire                          examined colon, unclear clinical significance. This                          could reflect stigmata of microscopic colitis vs                          normal variant pattern.                          - Biopsies were taken with a cold forceps from the                          right colon and left colon for evaluation of                          microscopic colitis.   A. RIGHT COLON, BIOPSIES:  - Unremarkable colonic mucosa  - No significant acute cryptitis, chronic changes, or microscopic colitis identified     B. LEFT COLON, BIOPSIES:  - Unremarkable colonic mucosa  - No significant acute cryptitis, chronic changes, or microscopic colitis identified    EGD 5/31/23:  Impression:                 - Normal esophagus.   - Multiple gastric polyps. No specimens collected.   - The examination was otherwise normal.    - Normal examined duodenum.   Recommendation:             - Follow an antireflux regimen for the rest of the patient's life.    - Use Nexium (esomeprazole) 40 mg PO daily indefinitely.     CT Colonography 07/14/2022:  CONCLUSION:  1.  No colonic mass or polyp.  2.  No clinically significant extracolonic findings.    Colonoscopy 7/14/22:  Impression:                 - The entire examined colon through the ascending colon is normal.    - No specimens collected.   Recommendation:             - Perform a  virtual colonoscopy (CT colonography) at appointment to be scheduled.   - Consider further screenings with stool based studies +/- CT colonography every 5 years due to extreme difficulty with reaching the cecum.     Colonoscopy 4/23/15:  Impression:                 - The area at 30 cm proximal to the anus is normal.   Recommendation:             - No polyps or diverticulosis seen. Your colonoscopy was normal, at least the short area that I saw. I was not able to make it around a sharp turn.     PERTINENT STUDIES:  Labs  TSH wnl 1/2024  Hgb A1C 5.8 1/2024  LFTs wnl 8/2024  BMP wnl 8/2024    Imaging  --

## 2025-04-08 NOTE — LETTER
4/8/2025      Amina Pineda  [address] [facility] [address]      Dear Colleague,    Thank you for referring your patient, Amina Pineda, to the [facility] GASTROENTEROLOGY [facility] [place]. Please see a copy of my visit note below.    Virtual Visit Details    Type of service:  Video Visit   Video Start Time:  11:19 AM  Video End Time: 11:45 AM    Originating Location (pt. Location): Home    Distant Location (provider location):  On-site  Platform used for Video Visit: [facility]      GI CLINIC VISIT - NEW PATIENT    CC/REFERRING MD:  Damari Baltazar  REASON FOR CONSULTATION: dairrhea    ASSESSMENT/PLAN:      #GERD  Patient with longstanding GERD with acid regurgitation, nighttime symptoms.  EGD done in 2023 without findings noted, no biopsies taken.  Per patient told signs of reflux and scarring related to reflux so recommended lifelong use of Nexium, this is noted in recommendations.  Nexium is controlling her symptoms, can continue this daily, tolerating well.    #Chronic Intermitent Diarrhea  Patient with 5+ years of intermittent loose urgent stools, with significant worsening after cholecystectomy.  No inciting factors for loose stools at time of onset prior to cholecystectomy.  Has hypothyroidism, on levothyroxine, TSH wnl 1/2024. BMP wnl 8/2024. f Labs 11/2024 with normal CBC, negative celiac serolgoies (TTG IgA, TTG IgG, DGP IgA, DGP IgG). Enteric panel negative. O&P negative. Fecal calprotectin elevated to 120 11/7/25, 227 on recheck 12/21/24. Had colonoscopy with 4/1/25 with normal ileum, diverticulosis in the entire colon, decreased vascular mucosa in entire colon, otherwise normal. Biopsies unremarkable, no evidence of inflammation or microscopic colitis.  Symptoms now well managed with daily fiber - using 3 gummy fiber per day and colestipol with high fat meals/eating out. Suspect that underlying constipation with overflow/emptying and and bile acid diarrhea trigger  bowel movements.  Possible IBS/functional diarrhea component, lactose intolerance, other carbohydrate intolerance . Unclear  of elevated fecal calprotectin, discussed that we could do MRE to fully rule out any small bowel inflammation given colonoscopy and biopsies were normal and infectious stool studies negative. Possible medication effect. Given patient symptomatically doing well, she would like to hold off for now, this is reasonable if loose stools return would recommend proceeding with MRE. Other considerations in the future pending symptoms are work up for pancreatic insuffiencey, SIBO.     Discussed differential, outlined options and reviewed medications with patient.  Mutually agreed upon plan outlined below.  PLAN:  -- Continue 3 Benefiber gummies per day  -- Colestipol, 1-2 tabs with high fat meals/eating out  -- If symptoms worsen, let us know  -------- Consider MRE in the future given elevated fecal calprotectin       Colorectal cancer screening: colonoscopy (unable to get to cecum) and subsequent CT colonography normal 7/14/2022, repeat CT colonography in 5 years. Patient with tortuous colon and colonoscopies have been very difficult, she has been recommended CT colonography vs stool based testing for colon cancer screening. No FH of CRC.      RTC 12 months    Thank you for this consultation.  It was a pleasure to participate in the care of this patient; please contact us with any further questions.     53 Minutes was spent on the date of the encounter during chart review, history and exam, documentation, and further activities as noted       Sara Velasquez PA-C  Division of Hepatology, Gastroenterology & Nutrition  [facility]      HPI  Ms. Pineda is a 64 year old year old female with history of obesity, hyperlipidemia, ANDREW, prediabetes osteopenia, depression, hypothyroidism (on levothyroxine), female stress incontinence, GERD status post hysterectomy (2003), cholecystectomy (2020)  "who presents for evaluation of chronic diarrhea. They are new to the [facility] GI clinic and this is my first encounter with the patient.     Patient reports diarrhea for 5+ years, no inciting events, travel, medication changes however notes worsening following cholecystectomy in 2020.  She has tried probiotics which she feels has helped some, recently started fiber gummy 1 per day, may be slightly bulking stools.  Also notes that iron supplement for restless legs make stool dark and slows down GI tract.     Currently going 2 to 3 days without a bowel movement then will have liquid high-volume, urgent, \"explosive \"diarrhea multiple times in a row over the course of a couple hours.  After this feels worn out and empty.  No straining.  No blood or black stools (aside from dark when taking iron).  Stools typically are following meal, often eating out.  No specific food triggers but tends to avoids higher fat foods as she feels this would not be very well-tolerated.  No associated abdominal pain.  No fecal leakage between bowel movements.  Rare incontinence.  No nausea or vomiting.    She does have history of GERD.  EGD report states normal esophagus however recommendation for indefinite use of Nexium.  Per patient endoscopist noted \"scarring \"related to reflux and stated she should continue PPI long-term.  With Nexium 40 mg daily no GERD symptoms, no dysphagia or odynophagia.    Interval History 4/8/25:  Labs and stool studies done after last visit. Labs 11/2024 with normal CBC, negative celiac serolgoies (TTG IgA, TTG IgG, DGP IgA, DGP IgG). Enteric panel negative. O&P negative.   Fecal calprotectin elevated to 120 11/7/25, 227 on recheck 12/21/24.    Had colonoscopy with Dr. Velasco 4/1/25 with normal ileum, diverticulosis in the entire colon, decreased vascular mucosa in entire colon, otherwise normal. Biopsies unremarkable, no evidence of inflammation or microscopic colitis.    Today she reports that overall bowel " pattern is much improved. Having 1 formed bowel movement per day without significant urgency. No blood. No explosive stools. Reports improvement with benefiber gummies - taking 3 per day. When stopping these for colonoscopy felt she was starting to get constipated. Will take 1 g colestipol when eating out/high fat foods which seems to be working well. Will miss BM day following this, but gets bowels stimulated with coffee following day.    No abdominal pain. No nausea or vomiting. Does note some fullness with eating occasionally, especially evening meal. Will pay attention to any food triggers.   GERD well controlled with Nexium.     Has noted hair loss over past 3 years/ Wearing wig. Has derm appt scheduled.     ROS:  Complete 10 System ROS performed. All are negative except as documented below, in the HPI, or in patient questionnaire from today's visit.    PROBLEM LIST  Patient Active Problem List    Diagnosis Date Noted     Class 2 severe obesity due to excess calories with serious comorbidity in adult (H) 01/10/2024     Priority: Medium     Palpitations 08/30/2023     Priority: Medium     Osteopenia of lumbar spine 07/03/2023     Priority: Medium     Dexa 6/2023 - osteopenia lumbar spine, repeat 5 years       Major depressive disorder, recurrent episode, mild 05/16/2023     Priority: Medium     History of difficult colonoscopy 05/16/2023     Priority: Medium     Colonoscopy 7/2022:  Impression:               - The entire examined colon through the ascending                             colon is normal.                             - No specimens collected.   Recommendation:           - Perform a virtual colonoscopy (CT colonography)                             at appointment to be scheduled.                             - Consider further screenings with stool based                             studies +/- CT colonography every 5 years due to                             extreme difficulty with reaching the cecum.      CT colonography 7/14/22: Recommend colon screening going forward with stool studies or CT colography every 5 years, due to extremely difficult colonoscopy and inability to reach the cecum       Prediabetes 05/16/2023     Priority: Medium     Acquired hypothyroidism      Priority: Medium     Class 2 obesity due to excess calories without serious comorbidity with body mass index (BMI) of 35.0 to 35.9 in adult 08/28/2018     Priority: Medium     Decreased libido 02/25/2018     Priority: Medium     Hyperlipidemia LDL goal <100 02/25/2018     Priority: Medium     ANDREW (obstructive sleep apnea) 03/09/2017     Priority: Medium     Home Sleep Apnea Testing - 3/8/17: 243 lbs 0 oz: AHI 6.7/hr. Supine AHI 19.4/hr. Oxygen Asael of 85%. Baseline 92.4%. Sp02 =< 88% for 3 minutes       Daytime somnolence 02/10/2017     Priority: Medium     Chronic low back pain, unspecified back pain laterality, with sciatica presence unspecified 02/10/2017     Priority: Medium     Ocular hypertension, right 09/28/2016     Priority: Medium     Female stress incontinence 04/10/2015     Priority: Medium     Restless leg syndrome 04/10/2015     Priority: Medium     CARDIOVASCULAR SCREENING; LDL GOAL LESS THAN 160 10/31/2010     Priority: Medium     Dysthymic disorder 11/06/2006     Priority: Medium     Started fluoxetine 10 mg daily January 29, 2013         Esophageal reflux 11/05/2004     Priority: Medium     Had an EGD in 2005       Herpes simplex virus (HSV) infection 11/05/2004     Priority: Medium     Problem list name updated by automated process. Provider to review       Generalized osteoarthrosis, unspecified site 11/05/2004     Priority: Medium     R hip, on meds         PERTINENT PAST MEDICAL HISTORY:  Past Medical History:   Diagnosis Date     Decreased libido 11/06/2006     Depressive disorder, not elsewhere classified      Esophageal reflux      Generalized osteoarthrosis, unspecified site     R hip, on meds     Herpes simplex without  mention of complication     oral     Intramural leiomyoma of uterus      Menopausal syndrome (hot flushes) 02/10/2017     Mumps      ANDREW (obstructive sleep apnea)      Palpitations      Pelvic mass 03/05/2021    Ovarian remnant cyst? Right side, needs follow up from 6/20     Right ovarian cyst 05/13/2020     Spider veins      Unspecified hypothyroidism        PREVIOUS SURGERIES:  Past Surgical History:   Procedure Laterality Date     CHOLECYSTECTOMY       COLONOSCOPY  04/24/2015     COLONOSCOPY N/A 07/14/2022    Procedure: COLONOSCOPY;  Surgeon: Brook Calle MD;  Location: [facility] OR     COLONOSCOPY N/A 4/1/2025    Procedure: COLONOSCOPY, WITH BIOPSY;  Surgeon: Barry Velasco MD;  Location: [facility] OR     COLONOSCOPY WITH CO2 INSUFFLATION N/A 04/23/2015    Procedure: COLONOSCOPY WITH CO2 INSUFFLATION;  Surgeon: Bebeto Mortensen MD;  Location: [facility] OR     ELBOW SURGERY      Lt elbow repair.     ESOPHAGOSCOPY, GASTROSCOPY, DUODENOSCOPY (EGD), COMBINED N/A 05/31/2023    Procedure: Esophagoscopy, gastroscopy, duodenoscopy (EGD), combined;  Surgeon: Gurpreet Mata MD;  Location: [facility] GI     HYSTERECTOMY, PAP NO LONGER INDICATED       LAPAROSCOPIC CHOLECYSTECTOMY N/A 04/24/2020    Procedure: CHOLECYSTECTOMY, LAPAROSCOPIC;  Surgeon: Janett Chan MD;  Location: [facility] OR     ORTHOPEDIC SURGERY       SOFT TISSUE SURGERY      cyst, both shoulders and left elbow     ZZC LIGATE FALLOPIAN TUBE       ZZC NONSPECIFIC PROCEDURE  05/2002    rotator cuff surgery both shoulder     ZZC NONSPECIFIC PROCEDURE      wrist surgery for cyst x 2     ZZC VAGINAL HYSTERECTOMY  12/2003    with BSO, 10-12 week size       ALLERGIES:     Allergies   Allergen Reactions     No Known Drug Allergy        PERTINENT MEDICATIONS:    Current Outpatient Medications:      albuterol (PROAIR HFA/PROVENTIL HFA/VENTOLIN HFA) 108 (90 Base) MCG/ACT inhaler, Inhale 2 puffs into the lungs every 6 hours as needed for shortness of breath or  wheezing, Disp: 18 g, Rfl: 11     bisacodyl (DULCOLAX) 5 MG EC tablet, Two days prior to exam take two (2) tablets at 4pm. One day prior to exam take two (2) tablets at 4pm, Disp: 4 tablet, Rfl: 0     buPROPion (WELLBUTRIN XL) 150 MG 24 hr tablet, Take 1 tablet (150 mg) by mouth every morning, Disp: 90 tablet, Rfl: 3     Calcium Carb-Cholecalciferol (CALCIUM 500 + D) 500-5 MG-MCG TABS, , Disp: , Rfl:      colestipol (COLESTID) 1 g tablet, Take 2 tablets (2 g) by mouth daily. 1-2 pills daily as needed.  Administer other drugs including vitamins or mineral supplements at least 1 hour before or at least 4 hours after cholestyramine., Disp: 90 tablet, Rfl: 4     cyclobenzaprine (FLEXERIL) 5 MG tablet, Take 1 tablet (5 mg) by mouth 2 times daily as needed for muscle spasms, Disp: 60 tablet, Rfl: 0     esomeprazole (NEXIUM) 40 MG DR capsule, Take 1 capsule (40 mg) by mouth every morning (before breakfast) Take 30-60 minutes before eating., Disp: 90 capsule, Rfl: 3     famciclovir (FAMVIR) 500 MG tablet, Take 1 tablet (500 mg) by mouth 2 times daily as needed, Disp: 60 tablet, Rfl: 11     ferrous sulfate (FEROSUL) 325 (65 Fe) MG tablet, Take 325 mg by mouth daily (with breakfast), Disp: , Rfl:      levothyroxine (SYNTHROID/LEVOTHROID) 125 MCG tablet, Take 1 tablet (125 mcg) by mouth daily, Disp: 90 tablet, Rfl: 3     nystatin (MYCOSTATIN) 415585 UNIT/GM external powder, Apply topically as needed for other (skin inflammation), Disp: 30 g, Rfl: 1     polyethylene glycol (GOLYTELY) 236 g suspension, Two days before procedure at 5PM fill first container with water. Mix and drink an 8 oz glass every 15 minutes until HALF of the container is gone. Place the remainder in the refrigerator. One day before procedure at 5PM drink second half of bowel prep. Drink an 8 oz glass every 15 minutes until it is gone. Day of procedure 6 hours before arrival time fill the 2nd container with water. Mix and drink an 8 oz glass every 15 minutes  until HALF of the container is gone. Discard the remaining solution., Disp: 8000 mL, Rfl: 0     tolterodine ER (DETROL LA) 2 MG 24 hr capsule, Take 1 capsule (2 mg) by mouth daily., Disp: 90 capsule, Rfl: 3     tolterodine ER (DETROL LA) 2 MG 24 hr capsule, Take 1 capsule (2 mg) by mouth daily., Disp: 90 capsule, Rfl: 3     topiramate (TOPAMAX) 50 MG tablet, Take 1 tablet (50 mg) by mouth 2 times daily, Disp: 180 tablet, Rfl: 1     triamcinolone (KENALOG) 0.1 % external cream, Apply topically 2 times daily Do not use longer than 2 weeks, Disp: 30 g, Rfl: 0     Vitamin D3 (VITAMIN D, CHOLECALCIFEROL,) 25 mcg (1000 units) tablet, Take 1 tablet by mouth daily, Disp: , Rfl:      Vitamins-Lipotropics (LIPOFLAVONOID) TABS, Take 1 tablet by mouth 2 times daily, Disp: , Rfl:    No other NSAID/anticoagulation reported by patient.   No other OTC/herbal/supplements reported by patient.    SOCIAL HISTORY:  Social History     Socioeconomic History     Marital status:      Spouse name: Not on file     Number of children: Not on file     Years of education: Not on file     Highest education level: Not on file   Occupational History     Not on file   Tobacco Use     Smoking status: Never     Passive exposure: Never     Smokeless tobacco: Never   Vaping Use     Vaping status: Never Used   Substance and Sexual Activity     Alcohol use: No     Drug use: Yes     Comment: cannabis gummies     Sexual activity: Not Currently     Partners: Male   Other Topics Concern     Parent/sibling w/ CABG, MI or angioplasty before 65F 55M? No   Social History Narrative    Dairy/d 3-4 servings/d.     Caffeine 2 servings/d    Exercise 0 x week    Sunscreen used - Yes    Seatbelts used - Yes    Working smoke/CO detectors in the home - Yes    Guns stored in the home - Yes    Self Breast Exams - No    Self Testicular Exam - NA    Eye Exam up to date - Yes 2007    Dental Exam up to date - Yes 2007    Pap Smear up to date - Yes  Dr. Whitaker     Mammogram up to date - Yes     PSA up to date - NA    Dexa Scan up to date - NA    Flex Sig / Colonoscopy up to date - NA    Immunizations up to date - Yes Today    Abuse: Current or Past(Physical, Sexual or Emotional)- No    Do you feel safe in your environment - Yes    2008     Social Drivers of Health     Financial Resource Strain: Low Risk  (8/3/2024)    Financial Resource Strain      Within the past 12 months, have you or your family members you live with been unable to get utilities (heat, electricity) when it was really needed?: No   Food Insecurity: Low Risk  (8/3/2024)    Food Insecurity      Within the past 12 months, did you worry that your food would run out before you got money to buy more?: No      Within the past 12 months, did the food you bought just not last and you didn t have money to get more?: No   Transportation Needs: Low Risk  (8/3/2024)    Transportation Needs      Within the past 12 months, has lack of transportation kept you from medical appointments, getting your medicines, non-medical meetings or appointments, work, or from getting things that you need?: No   Physical Activity: Insufficiently Active (8/3/2024)    Exercise Vital Sign      Days of Exercise per Week: 1 day      Minutes of Exercise per Session: 20 min   Stress: No Stress Concern Present (8/3/2024)    [employer] [place] [employer] - Occupational Stress Questionnaire      Feeling of Stress : Not at all   Social Connections: Unknown (8/3/2024)    Social Connection and Isolation Panel [NHANES]      Frequency of Communication with Friends and Family: Not on file      Frequency of Social Gatherings with Friends and Family: Once a week      Attends Church Services: Not on file      Active Member of Clubs or Organizations: Not on file      Attends Club or Organization Meetings: Not on file      Marital Status: Not on file   Interpersonal Safety: Low Risk  (8/7/2024)    Interpersonal Safety      Do you feel  "physically and emotionally safe where you currently live?: Yes      Within the past 12 months, have you been hit, slapped, kicked or otherwise physically hurt by someone?: No      Within the past 12 months, have you been humiliated or emotionally abused in other ways by your partner or ex-partner?: No   Housing Stability: Low Risk  (8/3/2024)    Housing Stability      Do you have housing? : Yes      Are you worried about losing your housing?: No       Tobacco: no  Alcohol: no  Cannabis: occasional, no association with GI symptoms  Drugs: no    FAMILY HISTORY:  No colon/panc/esophageal/other GI CA. No other [place] or other HPS-related [place]. No IBD or celiac disease. No other Autoimmune, Liver, or Thyroid disease.  Family History   Problem Relation Age of Onset     Hypertension Mother         High Blood Pressure     Fibrocystic breast disease Mother         Has spot being watching.  Checked every 6 months     Glaucoma Mother         glc surgery     Coronary Artery Disease Father         High Cholesterol     Depression/Anxiety Father         Depression     Thyroid Disease Father         Hypo Thryoid     Retinal detachment Father      Thyroid Disease Father         Hypo     C.A.D. Maternal Grandfather      Coronary Artery Disease Maternal Grandfather         Heart Attack, ()     Breast Cancer Paternal Grandmother         Breast removed ()     Asthma Paternal Grandmother         Emphysema ()     Cerebrovascular Disease Paternal Grandfather         Strokes ()     Known Genetic Syndrome Daughter         Whitaker's Syndrome     Skin Cancer No family hx of         no family hx of skin cancer     Colon Cancer No family hx of      Ovarian Cancer No family hx of        Past/family/social history reviewed and no changes    PHYSICAL EXAMINATION:  Constitutional: AAOx3, cooperative, pleasant, not dyspneic/diaphoretic, no acute distress  Vitals reviewed: Ht 1.753 m (5' 9\")   Wt 101.2 kg (223 lb)   LMP  " (LMP Unknown)   BMI 32.93 kg/m    Wt:   Wt Readings from Last 2 Encounters:   04/08/25 101.2 kg (223 lb)   04/01/25 101.2 kg (223 lb)      Eyes: Sclera anicteric/injected  Ears/nose/mouth/throat: Normal oropharynx without ulcers or exudate, mucus membranes moist, hearing intact  Neck: supple, thyroid normal size  CV: No edema  Respiratory: Unlabored breathing  Lymph: No axillary, submandibular, supraclavicular or inguinal lymphadenopathy  Abd:  Nondistended, +bs, no hepatosplenomegaly, nontender, no peritoneal signs  Skin: warm, perfused, no jaundice  Psych: Normal affect  MSK: Normal gait    PREVIOUS ENDOSCOPY:  Colonoscopy 4/1/25:  Impression:            - The examined portion of the ileum was normal.                          - Diverticulosis in the entire examined colon.                          - The distal rectum and anal verge are normal on                          retroflexion view.                          - Decreased mucosa vascular pattern in the entire                          examined colon, unclear clinical significance. This                          could reflect stigmata of microscopic colitis vs                          normal variant pattern.                          - Biopsies were taken with a cold forceps from the                          right colon and left colon for evaluation of                          microscopic colitis.   A. RIGHT COLON, BIOPSIES:  - Unremarkable colonic mucosa  - No significant acute cryptitis, chronic changes, or microscopic colitis identified     B. LEFT COLON, BIOPSIES:  - Unremarkable colonic mucosa  - No significant acute cryptitis, chronic changes, or microscopic colitis identified    EGD 5/31/23:  Impression:                 - Normal esophagus.   - Multiple gastric polyps. No specimens collected.   - The examination was otherwise normal.    - Normal examined duodenum.   Recommendation:             - Follow an antireflux regimen for the rest of the patient's life.     - Use Nexium (esomeprazole) 40 mg PO daily indefinitely.     CT Colonography 07/14/2022:  CONCLUSION:  1.  No colonic mass or polyp.  2.  No clinically significant extracolonic findings.    Colonoscopy 7/14/22:  Impression:                 - The entire examined colon through the ascending colon is normal.    - No specimens collected.   Recommendation:             - Perform a virtual colonoscopy (CT colonography) at appointment to be scheduled.   - Consider further screenings with stool based studies +/- CT colonography every 5 years due to extreme difficulty with reaching the cecum.     Colonoscopy 4/23/15:  Impression:                 - The area at 30 cm proximal to the anus is normal.   Recommendation:             - No polyps or diverticulosis seen. Your colonoscopy was normal, at least the short area that I saw. I was not able to make it around a sharp turn.     PERTINENT STUDIES:  Labs  TSH wnl 1/2024  Hgb A1C 5.8 1/2024  LFTs wnl 8/2024  BMP wnl 8/2024    Imaging  --        Again, thank you for allowing me to participate in the care of your patient.        Sincerely,        Sara Velasquez PA-C    Electronically signed

## 2025-05-12 DIAGNOSIS — E66.01 MORBID OBESITY (H): ICD-10-CM

## 2025-05-12 RX ORDER — TOPIRAMATE 50 MG/1
50 TABLET, FILM COATED ORAL 2 TIMES DAILY
Qty: 180 TABLET | Refills: 0 | Status: SHIPPED | OUTPATIENT
Start: 2025-05-12

## 2025-06-02 ENCOUNTER — PATIENT OUTREACH (OUTPATIENT)
Dept: CARE COORDINATION | Facility: CLINIC | Age: 65
End: 2025-06-02
Payer: COMMERCIAL

## 2025-07-27 DIAGNOSIS — K21.9 GASTROESOPHAGEAL REFLUX DISEASE, UNSPECIFIED WHETHER ESOPHAGITIS PRESENT: Chronic | ICD-10-CM

## 2025-07-28 RX ORDER — ESOMEPRAZOLE MAGNESIUM 40 MG/1
CAPSULE, DELAYED RELEASE ORAL
Qty: 90 CAPSULE | Refills: 0 | Status: SHIPPED | OUTPATIENT
Start: 2025-07-28

## 2025-08-11 ENCOUNTER — ANCILLARY PROCEDURE (OUTPATIENT)
Dept: MAMMOGRAPHY | Facility: CLINIC | Age: 65
End: 2025-08-11
Payer: COMMERCIAL

## 2025-08-11 DIAGNOSIS — Z12.31 VISIT FOR SCREENING MAMMOGRAM: ICD-10-CM

## 2025-08-11 PROCEDURE — 77067 SCR MAMMO BI INCL CAD: CPT | Mod: TC | Performed by: RADIOLOGY

## 2025-08-11 PROCEDURE — 77063 BREAST TOMOSYNTHESIS BI: CPT | Mod: TC | Performed by: RADIOLOGY

## (undated) DEVICE — ENDO FORCEP SPIKED SERRATED SHAFT JUMBO 239CM G56998

## (undated) DEVICE — SPECIMEN CONTAINER 3OZ W/FORMALIN 59901

## (undated) DEVICE — SOL NACL 0.9% IRRIG 3000ML BAG 2B7477

## (undated) DEVICE — KIT ENDO FIRST STEP DISINFECTANT 200ML W/POUCH EP-4

## (undated) DEVICE — GOWN XLG DISP 9545

## (undated) DEVICE — LINEN TOWEL PACK X10 5473

## (undated) DEVICE — BAG CLEAR TRASH 1.3M 39X33" P4040C

## (undated) DEVICE — SOL WATER IRRIG 1000ML BOTTLE 2F7114

## (undated) DEVICE — Device

## (undated) DEVICE — SUCTION CANISTER MEDIVAC LINER 3000ML W/LID 65651-530

## (undated) DEVICE — SOL WATER IRRIG 500ML BOTTLE 2F7113

## (undated) DEVICE — TUBING SUCTION MEDI-VAC 1/4"X20' N620A

## (undated) DEVICE — SUCTION IRR STRYKERFLOW II W/TIP 250-070-520

## (undated) DEVICE — ENDO TROCAR FIRST ENTRY KII FIOS Z-THRD 05X100MM CTF03

## (undated) DEVICE — LINEN POUCH DBL 5427

## (undated) DEVICE — ESU CORD MONOPOLAR 10'  E0510

## (undated) DEVICE — DECANTER VIAL 2006S

## (undated) DEVICE — KIT ENDO TURNOVER/PROCEDURE W/CLEAN A SCOPE LINERS 103888

## (undated) DEVICE — GLOVE PROTEXIS W/NEU-THERA 6.5  2D73TE65

## (undated) DEVICE — PAD CHUX UNDERPAD 30X36" P3036C

## (undated) DEVICE — ENDO POUCH UNIV RETRIEVAL SYSTEM INZII 10MM CD001

## (undated) DEVICE — LINEN HALF SHEET 5512

## (undated) DEVICE — SUCTION MANIFOLD NEPTUNE 2 SYS 1 PORT 702-025-000

## (undated) DEVICE — SU VICRYL 4-0 PS-2 18" UND J496H

## (undated) DEVICE — CLIP APPLIER ENDO 5MM M/L LIGAMAX EL5ML

## (undated) DEVICE — KIT PROCEDURE W/CLEAN-A-SCOPE LINERS V2 200800

## (undated) DEVICE — ESU GROUND PAD ADULT W/CORD E7507

## (undated) DEVICE — TUBING SUCTION MEDI-VAC SOFT 3/16"X20' N520A

## (undated) DEVICE — GLOVE PROTEXIS BLUE W/NEU-THERA 6.5  2D73EB65

## (undated) DEVICE — ENDO FORCEP BX CAPTURA PRO SPIKE G50696

## (undated) DEVICE — KIT ENDO TURNOVER/PROCEDURE CARRY-ON 101822

## (undated) DEVICE — SU VICRYL 0 UR-6 27" J603H

## (undated) DEVICE — ENDO BITE BLOCK ADULT OLYMPUS LATEX FREE MAJ-1632

## (undated) DEVICE — ENDO TROCAR BLUNT TIP KII BALLOON 12X100MM C0R47

## (undated) DEVICE — LINEN FULL SHEET 5511

## (undated) DEVICE — SNARE CAPIVATOR ROUND COLD SNR BX10 M00561101

## (undated) DEVICE — SOL NACL 0.9% IRRIG 1000ML BOTTLE 07138-09

## (undated) DEVICE — ENDO TROCAR SLEEVE KII Z-THREADED 05X100MM CTS02

## (undated) DEVICE — ESU PENCIL W/HOLSTER E2350H

## (undated) DEVICE — GOWN IMPERVIOUS 2XL BLUE

## (undated) DEVICE — BAG RED BIOHAZARD 37X50" 40GAL A7450PR

## (undated) DEVICE — ESU ELEC BLADE 2.75" COATED/INSULATED E1455

## (undated) RX ORDER — FENTANYL CITRATE 50 UG/ML
INJECTION, SOLUTION INTRAMUSCULAR; INTRAVENOUS
Status: DISPENSED
Start: 2023-05-31

## (undated) RX ORDER — PROPOFOL 10 MG/ML
INJECTION, EMULSION INTRAVENOUS
Status: DISPENSED
Start: 2022-07-14

## (undated) RX ORDER — LIDOCAINE HYDROCHLORIDE 10 MG/ML
INJECTION, SOLUTION EPIDURAL; INFILTRATION; INTRACAUDAL; PERINEURAL
Status: DISPENSED
Start: 2020-04-24

## (undated) RX ORDER — FENTANYL CITRATE 50 UG/ML
INJECTION, SOLUTION INTRAMUSCULAR; INTRAVENOUS
Status: DISPENSED
Start: 2020-04-24

## (undated) RX ORDER — GLYCOPYRROLATE 0.2 MG/ML
INJECTION INTRAMUSCULAR; INTRAVENOUS
Status: DISPENSED
Start: 2020-04-24

## (undated) RX ORDER — NEOSTIGMINE METHYLSULFATE 1 MG/ML
VIAL (ML) INJECTION
Status: DISPENSED
Start: 2020-04-24

## (undated) RX ORDER — CEFAZOLIN SODIUM 2 G/100ML
INJECTION, SOLUTION INTRAVENOUS
Status: DISPENSED
Start: 2020-04-24

## (undated) RX ORDER — PROPOFOL 10 MG/ML
INJECTION, EMULSION INTRAVENOUS
Status: DISPENSED
Start: 2020-04-24

## (undated) RX ORDER — BUPIVACAINE HYDROCHLORIDE 5 MG/ML
INJECTION, SOLUTION EPIDURAL; INTRACAUDAL
Status: DISPENSED
Start: 2020-04-24

## (undated) RX ORDER — OXYCODONE HYDROCHLORIDE 5 MG/1
TABLET ORAL
Status: DISPENSED
Start: 2020-04-24

## (undated) RX ORDER — IBUPROFEN 600 MG/1
TABLET, FILM COATED ORAL
Status: DISPENSED
Start: 2020-04-24

## (undated) RX ORDER — ACETAMINOPHEN 325 MG/1
TABLET ORAL
Status: DISPENSED
Start: 2020-04-24

## (undated) RX ORDER — DEXAMETHASONE SODIUM PHOSPHATE 4 MG/ML
INJECTION, SOLUTION INTRA-ARTICULAR; INTRALESIONAL; INTRAMUSCULAR; INTRAVENOUS; SOFT TISSUE
Status: DISPENSED
Start: 2020-04-24

## (undated) RX ORDER — ONDANSETRON 2 MG/ML
INJECTION INTRAMUSCULAR; INTRAVENOUS
Status: DISPENSED
Start: 2020-04-24